# Patient Record
Sex: MALE | Race: WHITE | NOT HISPANIC OR LATINO | Employment: FULL TIME | URBAN - METROPOLITAN AREA
[De-identification: names, ages, dates, MRNs, and addresses within clinical notes are randomized per-mention and may not be internally consistent; named-entity substitution may affect disease eponyms.]

---

## 2017-08-28 RX ORDER — SILDENAFIL CITRATE 100 MG/1
TABLET, FILM COATED ORAL
Qty: 10 TABLET | Refills: 12 | Status: SHIPPED | OUTPATIENT
Start: 2017-08-28 | End: 2018-10-03 | Stop reason: SDUPTHER

## 2017-09-09 RX ORDER — PANTOPRAZOLE SODIUM 40 MG/1
40 TABLET, DELAYED RELEASE ORAL DAILY
Qty: 90 TABLET | Refills: 1 | Status: SHIPPED | OUTPATIENT
Start: 2017-09-09 | End: 2018-03-09 | Stop reason: SDUPTHER

## 2017-09-09 RX ORDER — ATENOLOL 25 MG/1
25 TABLET ORAL DAILY
Qty: 90 TABLET | Refills: 1 | Status: SHIPPED | OUTPATIENT
Start: 2017-09-09 | End: 2018-03-09 | Stop reason: SDUPTHER

## 2017-09-09 RX ORDER — ENALAPRIL MALEATE 20 MG/1
20 TABLET ORAL DAILY
Qty: 90 TABLET | Refills: 1 | Status: SHIPPED | OUTPATIENT
Start: 2017-09-09 | End: 2018-03-09 | Stop reason: SDUPTHER

## 2017-09-13 ENCOUNTER — PATIENT MESSAGE (OUTPATIENT)
Dept: FAMILY MEDICINE | Facility: CLINIC | Age: 57
End: 2017-09-13

## 2017-11-14 ENCOUNTER — OFFICE VISIT (OUTPATIENT)
Dept: FAMILY MEDICINE | Facility: CLINIC | Age: 57
End: 2017-11-14
Payer: COMMERCIAL

## 2017-11-14 VITALS
HEIGHT: 67 IN | TEMPERATURE: 98 F | BODY MASS INDEX: 21.73 KG/M2 | HEART RATE: 72 BPM | WEIGHT: 138.44 LBS | SYSTOLIC BLOOD PRESSURE: 125 MMHG | DIASTOLIC BLOOD PRESSURE: 76 MMHG

## 2017-11-14 DIAGNOSIS — M65.311 TRIGGER FINGER OF RIGHT THUMB: Primary | ICD-10-CM

## 2017-11-14 PROCEDURE — 99999 PR PBB SHADOW E&M-EST. PATIENT-LVL III: CPT | Mod: PBBFAC,,, | Performed by: FAMILY MEDICINE

## 2017-11-14 PROCEDURE — 99213 OFFICE O/P EST LOW 20 MIN: CPT | Mod: S$GLB,,, | Performed by: FAMILY MEDICINE

## 2017-11-14 RX ORDER — DICLOFENAC SODIUM 75 MG/1
75 TABLET, DELAYED RELEASE ORAL 2 TIMES DAILY
Qty: 60 TABLET | Refills: 2 | Status: SHIPPED | OUTPATIENT
Start: 2017-11-14 | End: 2018-05-16 | Stop reason: SDUPTHER

## 2017-11-14 RX ORDER — NAPROXEN SODIUM 220 MG
220 TABLET ORAL
COMMUNITY
End: 2018-04-16

## 2017-11-14 NOTE — PROGRESS NOTES
The patient presents with tender painfulrt thumb x3 weeks triggering  ROS:  General: No fever or sig wt change  HEENT:No other PND eye pain or dc  Respiratory: No cough wheezing  PE: vital signs noted  HEENT: Normocephalic,with no recent trauma,PERRLA,EOMI,conjunctiva injected with no exudate.Nasopharynx is injected and edematous.External otic canal edematous and injected TM dull  Neck:Supple without adenopathy  Chest:Clear bilateral breath sounds    Heart:Regular rhthym without murmer  Abdomen:Soft, non tender,no masses, no hepatosplenomegaly  Extremeties Tender ruiz dip thumb  Neurologic:Grossly within normal limits     Impression:Trigger thumb  Plan:    Diclofenac -if fails to improve Ortho consult

## 2018-03-09 ENCOUNTER — TELEPHONE (OUTPATIENT)
Dept: FAMILY MEDICINE | Facility: CLINIC | Age: 58
End: 2018-03-09

## 2018-03-09 RX ORDER — PANTOPRAZOLE SODIUM 40 MG/1
40 TABLET, DELAYED RELEASE ORAL DAILY
Qty: 90 TABLET | Refills: 4 | Status: SHIPPED | OUTPATIENT
Start: 2018-03-09 | End: 2019-06-01 | Stop reason: SDUPTHER

## 2018-03-09 RX ORDER — ENALAPRIL MALEATE 20 MG/1
20 TABLET ORAL DAILY
Qty: 90 TABLET | Refills: 4 | Status: SHIPPED | OUTPATIENT
Start: 2018-03-09 | End: 2019-06-01 | Stop reason: SDUPTHER

## 2018-03-09 RX ORDER — ATENOLOL 25 MG/1
25 TABLET ORAL DAILY
Qty: 90 TABLET | Refills: 4 | Status: SHIPPED | OUTPATIENT
Start: 2018-03-09 | End: 2019-06-01 | Stop reason: SDUPTHER

## 2018-03-09 NOTE — TELEPHONE ENCOUNTER
----- Message from Darin Marino MD sent at 3/9/2018  6:43 AM CST -----  rf sent-time for lab then terrell w me

## 2018-03-12 ENCOUNTER — TELEPHONE (OUTPATIENT)
Dept: FAMILY MEDICINE | Facility: CLINIC | Age: 58
End: 2018-03-12

## 2018-03-12 DIAGNOSIS — Z00.00 ROUTINE ADULT HEALTH MAINTENANCE: Primary | ICD-10-CM

## 2018-04-02 ENCOUNTER — PATIENT OUTREACH (OUTPATIENT)
Dept: ADMINISTRATIVE | Facility: HOSPITAL | Age: 58
End: 2018-04-02

## 2018-04-13 ENCOUNTER — LAB VISIT (OUTPATIENT)
Dept: LAB | Facility: HOSPITAL | Age: 58
End: 2018-04-13
Attending: FAMILY MEDICINE
Payer: COMMERCIAL

## 2018-04-13 ENCOUNTER — TELEPHONE (OUTPATIENT)
Dept: FAMILY MEDICINE | Facility: CLINIC | Age: 58
End: 2018-04-13

## 2018-04-13 DIAGNOSIS — Z00.00 ROUTINE ADULT HEALTH MAINTENANCE: ICD-10-CM

## 2018-04-13 LAB
ALBUMIN SERPL BCP-MCNC: 4.1 G/DL
ALP SERPL-CCNC: 74 U/L
ALT SERPL W/O P-5'-P-CCNC: 16 U/L
ANION GAP SERPL CALC-SCNC: 9 MMOL/L
AST SERPL-CCNC: 20 U/L
BASOPHILS # BLD AUTO: 0.04 K/UL
BASOPHILS NFR BLD: 0.5 %
BILIRUB SERPL-MCNC: 0.7 MG/DL
BUN SERPL-MCNC: 5 MG/DL
CALCIUM SERPL-MCNC: 9.6 MG/DL
CHLORIDE SERPL-SCNC: 101 MMOL/L
CHOLEST SERPL-MCNC: 124 MG/DL
CHOLEST/HDLC SERPL: 3.8 {RATIO}
CO2 SERPL-SCNC: 27 MMOL/L
CREAT SERPL-MCNC: 0.8 MG/DL
DIFFERENTIAL METHOD: ABNORMAL
EOSINOPHIL # BLD AUTO: 0.1 K/UL
EOSINOPHIL NFR BLD: 0.8 %
ERYTHROCYTE [DISTWIDTH] IN BLOOD BY AUTOMATED COUNT: 13.2 %
EST. GFR  (AFRICAN AMERICAN): >60 ML/MIN/1.73 M^2
EST. GFR  (NON AFRICAN AMERICAN): >60 ML/MIN/1.73 M^2
GLUCOSE SERPL-MCNC: 95 MG/DL
HCT VFR BLD AUTO: 59.3 %
HDLC SERPL-MCNC: 33 MG/DL
HDLC SERPL: 26.6 %
HGB BLD-MCNC: 20.3 G/DL
IMM GRANULOCYTES # BLD AUTO: 0.02 K/UL
IMM GRANULOCYTES NFR BLD AUTO: 0.3 %
LDLC SERPL CALC-MCNC: 70.4 MG/DL
LYMPHOCYTES # BLD AUTO: 2 K/UL
LYMPHOCYTES NFR BLD: 25.7 %
MCH RBC QN AUTO: 34 PG
MCHC RBC AUTO-ENTMCNC: 34.2 G/DL
MCV RBC AUTO: 99 FL
MONOCYTES # BLD AUTO: 0.7 K/UL
MONOCYTES NFR BLD: 8.6 %
NEUTROPHILS # BLD AUTO: 5 K/UL
NEUTROPHILS NFR BLD: 64.1 %
NONHDLC SERPL-MCNC: 91 MG/DL
NRBC BLD-RTO: 0 /100 WBC
PLATELET # BLD AUTO: 130 K/UL
PMV BLD AUTO: 11.2 FL
POTASSIUM SERPL-SCNC: 4.9 MMOL/L
PROT SERPL-MCNC: 7 G/DL
RBC # BLD AUTO: 5.97 M/UL
SODIUM SERPL-SCNC: 137 MMOL/L
TRIGL SERPL-MCNC: 103 MG/DL
TSH SERPL DL<=0.005 MIU/L-ACNC: 0.93 UIU/ML
WBC # BLD AUTO: 7.79 K/UL

## 2018-04-13 PROCEDURE — 80061 LIPID PANEL: CPT

## 2018-04-13 PROCEDURE — 80053 COMPREHEN METABOLIC PANEL: CPT

## 2018-04-13 PROCEDURE — 84443 ASSAY THYROID STIM HORMONE: CPT

## 2018-04-13 PROCEDURE — 36415 COLL VENOUS BLD VENIPUNCTURE: CPT | Mod: PO

## 2018-04-13 PROCEDURE — 85025 COMPLETE CBC W/AUTO DIFF WBC: CPT

## 2018-04-13 NOTE — TELEPHONE ENCOUNTER
Received call from RUSSELL Light at 1555 from [facility] with a critical lab on patient MRN [id number], with Hemoglobin 20.3.  Dr Marino was informed by phone at 0632.

## 2018-04-16 ENCOUNTER — PATIENT MESSAGE (OUTPATIENT)
Dept: FAMILY MEDICINE | Facility: CLINIC | Age: 58
End: 2018-04-16

## 2018-04-16 ENCOUNTER — OFFICE VISIT (OUTPATIENT)
Dept: FAMILY MEDICINE | Facility: CLINIC | Age: 58
End: 2018-04-16
Payer: COMMERCIAL

## 2018-04-16 VITALS
SYSTOLIC BLOOD PRESSURE: 117 MMHG | HEIGHT: 67 IN | DIASTOLIC BLOOD PRESSURE: 71 MMHG | TEMPERATURE: 99 F | HEART RATE: 73 BPM | WEIGHT: 141.56 LBS | BODY MASS INDEX: 22.22 KG/M2

## 2018-04-16 DIAGNOSIS — D75.1 POLYCYTHEMIA: ICD-10-CM

## 2018-04-16 DIAGNOSIS — Z00.00 ROUTINE CHECK-UP: Primary | ICD-10-CM

## 2018-04-16 DIAGNOSIS — K21.9 GASTROESOPHAGEAL REFLUX DISEASE, ESOPHAGITIS PRESENCE NOT SPECIFIED: ICD-10-CM

## 2018-04-16 DIAGNOSIS — Z12.11 SCREEN FOR COLON CANCER: ICD-10-CM

## 2018-04-16 PROCEDURE — 3074F SYST BP LT 130 MM HG: CPT | Mod: CPTII,S$GLB,, | Performed by: FAMILY MEDICINE

## 2018-04-16 PROCEDURE — 99396 PREV VISIT EST AGE 40-64: CPT | Mod: S$GLB,,, | Performed by: FAMILY MEDICINE

## 2018-04-16 PROCEDURE — 99999 PR PBB SHADOW E&M-EST. PATIENT-LVL III: CPT | Mod: PBBFAC,,, | Performed by: FAMILY MEDICINE

## 2018-04-16 PROCEDURE — 3078F DIAST BP <80 MM HG: CPT | Mod: CPTII,S$GLB,, | Performed by: FAMILY MEDICINE

## 2018-04-16 RX ORDER — IBUPROFEN 200 MG
1 TABLET ORAL DAILY
Qty: 28 PATCH | Refills: 1 | Status: SHIPPED | OUTPATIENT
Start: 2018-04-16 | End: 2019-07-30

## 2018-04-16 NOTE — PROGRESS NOTES
The patient presents today for general health evaluation and counseling    Also has ongoing dydpepsia despite PPI   Past Medical History:  Past Medical History:   Diagnosis Date    Anxiety     Depression     Fatigue     History of psychiatric care     Hypertension     Psychiatric problem      Past Surgical History:   Procedure Laterality Date    VASECTOMY       Review of patient's allergies indicates:  No Known Allergies  Current Outpatient Prescriptions on File Prior to Visit   Medication Sig Dispense Refill    atenolol (TENORMIN) 25 MG tablet TAKE 1 TABLET (25 MG TOTAL) BY MOUTH ONCE DAILY. 90 tablet 4    diclofenac (VOLTAREN) 75 MG EC tablet Take 1 tablet (75 mg total) by mouth 2 (two) times daily. (Patient taking differently: Take 75 mg by mouth 2 (two) times daily as needed. ) 60 tablet 2    enalapril (VASOTEC) 20 MG tablet TAKE 1 TABLET (20 MG TOTAL) BY MOUTH ONCE DAILY. 90 tablet 4    pantoprazole (PROTONIX) 40 MG tablet TAKE 1 TABLET (40 MG TOTAL) BY MOUTH ONCE DAILY. 90 tablet 4    VIAGRA 100 mg tablet TAKE 1 TABLET DAILY AS DIRECTED 10 tablet 12    [DISCONTINUED] naproxen sodium (ANAPROX) 220 MG tablet Take 220 mg by mouth every 12 (twelve) hours.      [DISCONTINUED] paroxetine (PAXIL) 20 MG tablet Take 1 tablet (20 mg total) by mouth every morning. 30 tablet 11    [DISCONTINUED] ranitidine (ZANTAC) 300 MG capsule Take 1 capsule (300 mg total) by mouth every evening. 30 capsule 1     No current facility-administered medications on file prior to visit.      Social History     Social History    Marital status:      Spouse name: N/A    Number of children: N/A    Years of education: N/A     Occupational History    unemployed Security Plan Insurance     Social History Main Topics    Smoking status: Current Every Day Smoker     Packs/day: 1.00     Types: Cigarettes    Smokeless tobacco: Not on file    Alcohol use 25.2 oz/week     42 Cans of beer per week    Drug use: No    Sexual  activity: Yes     Partners: Female     Other Topics Concern    Patient Feels They Ought To Cut Down On Drinking/Drug Use Yes    Patient Annoyed By Others Criticizing Their Drinking/Drug Use Yes    Patient Has Felt Bad Or Guilty About Drinking/Drug Use Yes    Patient Has Had A Drink/Used Drugs As An Eye Opener In The Am No     Social History Narrative    No narrative on file     Family History   Problem Relation Age of Onset    Suicide Neg Hx     Sexual abuse Neg Hx     Self injury Neg Hx     Seizures Neg Hx     Schizophrenia Neg Hx     Physical abuse Neg Hx     Paranoid behavior Neg Hx     OCD Neg Hx     Drug abuse Neg Hx     Depression Neg Hx     Dementia Neg Hx     Bipolar disorder Neg Hx     Anxiety disorder Neg Hx     Alcohol abuse Neg Hx          ROS:GENERAL: No fever, chills, fatigability or weight loss.  SKIN: No rashes, itching or changes in color or texture of skin.  HEAD: No headaches or recent head trauma.EYES: Visual acuity fine. No photophobia, ocular pain or diplopia.EARS: Denies ear pain, discharge or vertigo.NOSE: No loss of smell, no epistaxis or postnasal drip.MOUTH & THROAT: No hoarseness or change in voice. No excessive gum bleeding.NODES: Denies swollen glands.  CHEST: Denies NAVARRO, cyanosis, wheezing, cough and sputum production.  CARDIOVASCULAR: Denies chest pain, PND, orthopnea or reduced exercise tolerance.  ABDOMEN: Appetite fine. No weight loss. Denies diarrhea, abdominal pain, hematemesis or blood in stool.  URINARY: No flank pain, dysuria or hematuria.  PERIPHERAL VASCULAR: No claudication or cyanosis.  MUSCULOSKELETAL: See above.  NEUROLOGIC: No history of seizures, paralysis, alteration of gait or coordination.  PE:   HEAD: Normocephalic, atraumatic.EYES: PERRL. EOMI.   EARS: TM's intact. Light reflex normal. No retraction or perforation.   NOSE: Mucosa pink. Airway clear.MOUTH & THROAT: No tonsillar enlargement. No pharyngeal erythema or exudate. No stridor.  NODES:  No cervical, axillary or inguinal lymph node enlargement.  CHEST: Lungs clear to auscultation.  CARDIOVASCULAR: Normal S1, S2. No rubs, murmurs or gallops.  ABDOMEN: Bowel sounds normal. Not distended. Soft. No tenderness or masses.  MUSCULOSKELETAL: No palpable abnormality  NEUROLOGIC: Cranial Nerves: II-XII grossly intact.  Motor: 5/5 strength major flexors/extensors.  DTR's: Knees, Ankles 2+ and equal bilaterally; downgoing toes.  Sensory: Intact to light touch distally.  Gait & Posture: Normal gait and fine motion. No cerebellar signs.     Impression:Routine health check  GERD   Plan:Lab eval  Rec diet and ex recs  Rev age appropriate screenings  -needs first screening c scope   Rev smoking cessation   EGD

## 2018-04-17 ENCOUNTER — TELEPHONE (OUTPATIENT)
Dept: FAMILY MEDICINE | Facility: CLINIC | Age: 58
End: 2018-04-17

## 2018-04-17 DIAGNOSIS — Z86.010 PERSONAL HISTORY OF COLONIC POLYPS: Primary | ICD-10-CM

## 2018-04-17 DIAGNOSIS — Z12.11 SPECIAL SCREENING FOR MALIGNANT NEOPLASMS, COLON: ICD-10-CM

## 2018-04-17 DIAGNOSIS — K21.9 GASTROESOPHAGEAL REFLUX DISEASE, ESOPHAGITIS PRESENCE NOT SPECIFIED: Primary | ICD-10-CM

## 2018-04-17 RX ORDER — SCOLOPAMINE TRANSDERMAL SYSTEM 1 MG/1
1 PATCH, EXTENDED RELEASE TRANSDERMAL
Qty: 4 PATCH | Refills: 1 | Status: SHIPPED | OUTPATIENT
Start: 2018-04-17 | End: 2019-07-30

## 2018-04-17 NOTE — TELEPHONE ENCOUNTER
----- Message from Citlaly Shah sent at 4/17/2018 11:34 AM CDT -----  Pt is requesting a call from nurse to discuss some orders needed at the labs. Please faxt to [phone] ( [facility] ) ( pt is waiting in office )           Please call pt back at [phone]

## 2018-04-17 NOTE — TELEPHONE ENCOUNTER
Rx sent-we need to check an HH when he gets back to see how much the 1 units phlebotomy helped then we can see how soon to do another

## 2018-04-20 ENCOUNTER — DOCUMENTATION ONLY (OUTPATIENT)
Dept: ENDOSCOPY | Facility: HOSPITAL | Age: 58
End: 2018-04-20

## 2018-05-16 RX ORDER — DICLOFENAC SODIUM 75 MG/1
75 TABLET, DELAYED RELEASE ORAL 2 TIMES DAILY PRN
Qty: 60 TABLET | Refills: 11 | Status: SHIPPED | OUTPATIENT
Start: 2018-05-16 | End: 2019-05-22 | Stop reason: SDUPTHER

## 2018-06-24 ENCOUNTER — PATIENT MESSAGE (OUTPATIENT)
Dept: FAMILY MEDICINE | Facility: CLINIC | Age: 58
End: 2018-06-24

## 2018-06-26 NOTE — TELEPHONE ENCOUNTER
Should H and H be rechecked? Does it need to be done at the blood center again or can he do them here?

## 2018-07-02 ENCOUNTER — PATIENT MESSAGE (OUTPATIENT)
Dept: FAMILY MEDICINE | Facility: CLINIC | Age: 58
End: 2018-07-02

## 2018-07-03 ENCOUNTER — PATIENT MESSAGE (OUTPATIENT)
Dept: FAMILY MEDICINE | Facility: CLINIC | Age: 58
End: 2018-07-03

## 2018-07-17 ENCOUNTER — PATIENT MESSAGE (OUTPATIENT)
Dept: FAMILY MEDICINE | Facility: CLINIC | Age: 58
End: 2018-07-17

## 2018-07-17 ENCOUNTER — DOCUMENTATION ONLY (OUTPATIENT)
Dept: ENDOSCOPY | Facility: HOSPITAL | Age: 58
End: 2018-07-17

## 2018-07-17 NOTE — PROGRESS NOTES
7/17/2018 pt called to schedule and states that he wants procedure done in [place].  Instructed he will need to contact his PCP to discuss options.  Verbalized understanding.

## 2018-10-03 RX ORDER — SILDENAFIL 100 MG/1
TABLET, FILM COATED ORAL
Qty: 10 TABLET | Refills: 12 | Status: SHIPPED | OUTPATIENT
Start: 2018-10-03 | End: 2020-01-21 | Stop reason: SDUPTHER

## 2018-12-05 ENCOUNTER — PATIENT OUTREACH (OUTPATIENT)
Dept: ADMINISTRATIVE | Facility: HOSPITAL | Age: 58
End: 2018-12-05

## 2019-05-22 RX ORDER — DICLOFENAC SODIUM 75 MG/1
75 TABLET, DELAYED RELEASE ORAL 2 TIMES DAILY PRN
Qty: 60 TABLET | Refills: 8 | Status: SHIPPED | OUTPATIENT
Start: 2019-05-22 | End: 2020-08-24

## 2019-05-23 ENCOUNTER — PATIENT OUTREACH (OUTPATIENT)
Dept: ADMINISTRATIVE | Facility: HOSPITAL | Age: 59
End: 2019-05-23

## 2019-06-02 RX ORDER — ATENOLOL 25 MG/1
25 TABLET ORAL DAILY
Qty: 90 TABLET | Refills: 1 | Status: SHIPPED | OUTPATIENT
Start: 2019-06-02 | End: 2019-11-23 | Stop reason: SDUPTHER

## 2019-06-02 RX ORDER — ENALAPRIL MALEATE 20 MG/1
20 TABLET ORAL DAILY
Qty: 90 TABLET | Refills: 1 | Status: SHIPPED | OUTPATIENT
Start: 2019-06-02 | End: 2019-11-23 | Stop reason: SDUPTHER

## 2019-06-02 RX ORDER — PANTOPRAZOLE SODIUM 40 MG/1
40 TABLET, DELAYED RELEASE ORAL DAILY
Qty: 90 TABLET | Refills: 1 | Status: SHIPPED | OUTPATIENT
Start: 2019-06-02 | End: 2019-11-23 | Stop reason: SDUPTHER

## 2019-06-02 NOTE — TELEPHONE ENCOUNTER
I am refilling a requested medication x 1 for the patient and reviewed the chart.  The patient is due for a physical.  Please book the patient for lab and OV with Dr. SINGH  2-6 weeks

## 2019-07-16 ENCOUNTER — PATIENT OUTREACH (OUTPATIENT)
Dept: ADMINISTRATIVE | Facility: HOSPITAL | Age: 59
End: 2019-07-16

## 2019-07-30 ENCOUNTER — OFFICE VISIT (OUTPATIENT)
Dept: FAMILY MEDICINE | Facility: CLINIC | Age: 59
End: 2019-07-30
Payer: COMMERCIAL

## 2019-07-30 VITALS
HEIGHT: 67 IN | HEART RATE: 67 BPM | SYSTOLIC BLOOD PRESSURE: 114 MMHG | DIASTOLIC BLOOD PRESSURE: 67 MMHG | BODY MASS INDEX: 21.22 KG/M2 | WEIGHT: 135.19 LBS | TEMPERATURE: 99 F

## 2019-07-30 DIAGNOSIS — Z00.00 ROUTINE CHECK-UP: Primary | ICD-10-CM

## 2019-07-30 PROCEDURE — 3078F PR MOST RECENT DIASTOLIC BLOOD PRESSURE < 80 MM HG: ICD-10-PCS | Mod: CPTII,S$GLB,, | Performed by: FAMILY MEDICINE

## 2019-07-30 PROCEDURE — 99396 PR PREVENTIVE VISIT,EST,40-64: ICD-10-PCS | Mod: S$GLB,,, | Performed by: FAMILY MEDICINE

## 2019-07-30 PROCEDURE — 3078F DIAST BP <80 MM HG: CPT | Mod: CPTII,S$GLB,, | Performed by: FAMILY MEDICINE

## 2019-07-30 PROCEDURE — 99999 PR PBB SHADOW E&M-EST. PATIENT-LVL III: ICD-10-PCS | Mod: PBBFAC,,, | Performed by: FAMILY MEDICINE

## 2019-07-30 PROCEDURE — 99396 PREV VISIT EST AGE 40-64: CPT | Mod: S$GLB,,, | Performed by: FAMILY MEDICINE

## 2019-07-30 PROCEDURE — 3074F PR MOST RECENT SYSTOLIC BLOOD PRESSURE < 130 MM HG: ICD-10-PCS | Mod: CPTII,S$GLB,, | Performed by: FAMILY MEDICINE

## 2019-07-30 PROCEDURE — 3074F SYST BP LT 130 MM HG: CPT | Mod: CPTII,S$GLB,, | Performed by: FAMILY MEDICINE

## 2019-07-30 PROCEDURE — 99999 PR PBB SHADOW E&M-EST. PATIENT-LVL III: CPT | Mod: PBBFAC,,, | Performed by: FAMILY MEDICINE

## 2019-07-30 RX ORDER — MELOXICAM 15 MG/1
15 TABLET ORAL DAILY
Qty: 90 TABLET | Refills: 3 | Status: SHIPPED | OUTPATIENT
Start: 2019-07-30 | End: 2020-08-24 | Stop reason: SDUPTHER

## 2019-07-30 RX ORDER — GABAPENTIN 300 MG/1
600 CAPSULE ORAL NIGHTLY
Qty: 180 CAPSULE | Refills: 3 | Status: SHIPPED | OUTPATIENT
Start: 2019-07-30 | End: 2019-09-17

## 2019-07-30 NOTE — PROGRESS NOTES
The patient presents today for general health evaluation and counseling      Past Medical History:  Past Medical History:   Diagnosis Date    Anxiety     Depression     Fatigue     History of psychiatric care     Hypertension     Psychiatric problem      Past Surgical History:   Procedure Laterality Date    VASECTOMY       Review of patient's allergies indicates:  No Known Allergies  Current Outpatient Medications on File Prior to Visit   Medication Sig Dispense Refill    atenolol (TENORMIN) 25 MG tablet TAKE 1 TABLET (25 MG TOTAL) BY MOUTH ONCE DAILY. 90 tablet 1    diclofenac (VOLTAREN) 75 MG EC tablet TAKE 1 TABLET (75 MG TOTAL) BY MOUTH 2 (TWO) TIMES DAILY AS NEEDED. 60 tablet 8    enalapril (VASOTEC) 20 MG tablet TAKE 1 TABLET (20 MG TOTAL) BY MOUTH ONCE DAILY. 90 tablet 1    nicotine (NICODERM CQ) 21 mg/24 hr Place 1 patch onto the skin once daily. 28 patch 1    pantoprazole (PROTONIX) 40 MG tablet TAKE 1 TABLET (40 MG TOTAL) BY MOUTH ONCE DAILY. 90 tablet 1    scopolamine (TRANSDERM-SCOP) 1.3-1.5 mg (1 mg over 3 days) Place 1 patch onto the skin every 72 hours. 4 patch 1    sildenafil (VIAGRA) 100 MG tablet TAKE 1 TABLET DAILY AS DIRECTED 10 tablet 12     No current facility-administered medications on file prior to visit.      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   Social Needs    Financial resource strain: Not on file    Food insecurity:     Worry: Not on file     Inability: Not on file    Transportation needs:     Medical: Not on file     Non-medical: Not on file   Tobacco Use    Smoking status: Current Every Day Smoker     Packs/day: 1.00     Types: Cigarettes   Substance and Sexual Activity    Alcohol use: Yes     Alcohol/week:  25.2 oz     Types: 42 Cans of beer per week    Drug use: No    Sexual activity: Yes     Partners: Female   Lifestyle    Physical activity:     Days per week: Not on file     Minutes per session: Not on file    Stress: Not on file   Relationships    Social connections:     Talks on phone: Not on file     Gets together: Not on file     Attends [employer] service: Not on file     Active member of club or organization: Not on file     Attends meetings of clubs or organizations: Not on file     Relationship status: Not on file   Other Topics Concern    Patient feels they ought to cut down on drinking/drug use Yes    Patient annoyed by others criticizing their drinking/drug use Yes    Patient has felt bad or guilty about drinking/drug use Yes    Patient has had a drink/used drugs as an eye opener in the AM No   Social History Narrative    Not on file     Family History   Problem Relation Age of Onset    Suicide Neg Hx     Sexual abuse Neg Hx     Self injury Neg Hx     Seizures Neg Hx     Schizophrenia Neg Hx     Physical abuse Neg Hx     Paranoid behavior Neg Hx     OCD Neg Hx     Drug abuse Neg Hx     Depression Neg Hx     Dementia Neg Hx     Bipolar disorder Neg Hx     Anxiety disorder Neg Hx     Alcohol abuse Neg Hx          ROS:GENERAL: No fever, chills, fatigability or weight loss.  SKIN: No rashes, itching or changes in color or texture of skin.  HEAD: No headaches or recent head trauma.EYES: Visual acuity fine. No photophobia, ocular pain or diplopia.EARS: Denies ear pain, discharge or vertigo.NOSE: No loss of smell, no epistaxis or postnasal drip.MOUTH & THROAT: No hoarseness or change in voice. No excessive gum bleeding.NODES: Denies swollen glands.  CHEST: Denies NAVARRO, cyanosis, wheezing, cough and sputum production.  CARDIOVASCULAR: Denies chest pain, PND, orthopnea or reduced exercise tolerance.  ABDOMEN: Appetite fine. No weight loss. Denies diarrhea, abdominal pain, hematemesis or blood  in stool.  URINARY: No flank pain, dysuria or hematuria.  PERIPHERAL VASCULAR: No claudication or cyanosis.  MUSCULOSKELETAL: See above.  NEUROLOGIC: No history of seizures, paralysis, alteration of gait or coordination.  PE:    HEAD: Normocephalic, atraumatic.EYES: PERRL. EOMI.   EARS: TM's intact. Light reflex normal. No retraction or perforation.   NOSE: Mucosa pink. Airway clear.MOUTH & THROAT: No tonsillar enlargement. No pharyngeal erythema or exudate. No stridor.  NODES: No cervical, axillary or inguinal lymph node enlargement.  CHEST: Lungs clear to auscultation.  CARDIOVASCULAR: Normal S1, S2. No rubs, murmurs or gallops.  ABDOMEN: Bowel sounds normal. Not distended. Soft. No tenderness or masses.  MUSCULOSKELETAL: No palpable abnormality  NEUROLOGIC: Cranial Nerves: II-XII grossly intact.  Motor: 5/5 strength major flexors/extensors.  DTR's: Knees, Ankles 2+ and equal bilaterally; downgoing toes.  Sensory: Intact to light touch distally.  Gait & Posture: Normal gait and fine motion. No cerebellar signs.     Impression:Routine health check  Plan:Lab eval  Rec diet and ex recs  Rev age appropriate screenings  -had c scope Dr Sung 7/25/19-polyps   Health Maintenance Due   Topic Date Due    TETANUS VACCINE  01/22/1978    Pneumococcal Vaccine (Medium Risk) (1 of 1 - PPSV23) 01/22/1979    Colonoscopy  01/22/2010

## 2019-08-08 ENCOUNTER — LAB VISIT (OUTPATIENT)
Dept: LAB | Facility: HOSPITAL | Age: 59
End: 2019-08-08
Attending: FAMILY MEDICINE
Payer: COMMERCIAL

## 2019-08-08 DIAGNOSIS — Z00.00 ROUTINE CHECK-UP: ICD-10-CM

## 2019-08-08 LAB
ALBUMIN SERPL BCP-MCNC: 3.9 G/DL (ref 3.5–5.2)
ALP SERPL-CCNC: 78 U/L (ref 55–135)
ALT SERPL W/O P-5'-P-CCNC: 16 U/L (ref 10–44)
ANION GAP SERPL CALC-SCNC: 8 MMOL/L (ref 8–16)
AST SERPL-CCNC: 23 U/L (ref 10–40)
BASOPHILS # BLD AUTO: 0.02 K/UL (ref 0–0.2)
BASOPHILS NFR BLD: 0.2 % (ref 0–1.9)
BILIRUB SERPL-MCNC: 0.8 MG/DL (ref 0.1–1)
BUN SERPL-MCNC: 6 MG/DL (ref 6–20)
CALCIUM SERPL-MCNC: 9.6 MG/DL (ref 8.7–10.5)
CHLORIDE SERPL-SCNC: 99 MMOL/L (ref 95–110)
CHOLEST SERPL-MCNC: 117 MG/DL (ref 120–199)
CHOLEST/HDLC SERPL: 3.9 {RATIO} (ref 2–5)
CO2 SERPL-SCNC: 26 MMOL/L (ref 23–29)
CREAT SERPL-MCNC: 0.9 MG/DL (ref 0.5–1.4)
DIFFERENTIAL METHOD: ABNORMAL
EOSINOPHIL # BLD AUTO: 0.1 K/UL (ref 0–0.5)
EOSINOPHIL NFR BLD: 0.6 % (ref 0–8)
ERYTHROCYTE [DISTWIDTH] IN BLOOD BY AUTOMATED COUNT: 14.9 % (ref 11.5–14.5)
EST. GFR  (AFRICAN AMERICAN): >60 ML/MIN/1.73 M^2
EST. GFR  (NON AFRICAN AMERICAN): >60 ML/MIN/1.73 M^2
GLUCOSE SERPL-MCNC: 90 MG/DL (ref 70–110)
HCT VFR BLD AUTO: 53.1 % (ref 40–54)
HDLC SERPL-MCNC: 30 MG/DL (ref 40–75)
HDLC SERPL: 25.6 % (ref 20–50)
HGB BLD-MCNC: 17.5 G/DL (ref 14–18)
IMM GRANULOCYTES # BLD AUTO: 0.01 K/UL (ref 0–0.04)
IMM GRANULOCYTES NFR BLD AUTO: 0.1 % (ref 0–0.5)
LDLC SERPL CALC-MCNC: 69.2 MG/DL (ref 63–159)
LYMPHOCYTES # BLD AUTO: 1.6 K/UL (ref 1–4.8)
LYMPHOCYTES NFR BLD: 19.6 % (ref 18–48)
MCH RBC QN AUTO: 31.5 PG (ref 27–31)
MCHC RBC AUTO-ENTMCNC: 33 G/DL (ref 32–36)
MCV RBC AUTO: 96 FL (ref 82–98)
MONOCYTES # BLD AUTO: 0.6 K/UL (ref 0.3–1)
MONOCYTES NFR BLD: 6.7 % (ref 4–15)
NEUTROPHILS # BLD AUTO: 6 K/UL (ref 1.8–7.7)
NEUTROPHILS NFR BLD: 72.8 % (ref 38–73)
NONHDLC SERPL-MCNC: 87 MG/DL
NRBC BLD-RTO: 0 /100 WBC
PLATELET # BLD AUTO: 154 K/UL (ref 150–350)
PMV BLD AUTO: 11.1 FL (ref 9.2–12.9)
POTASSIUM SERPL-SCNC: 5.2 MMOL/L (ref 3.5–5.1)
PROT SERPL-MCNC: 6.8 G/DL (ref 6–8.4)
RBC # BLD AUTO: 5.56 M/UL (ref 4.6–6.2)
SODIUM SERPL-SCNC: 133 MMOL/L (ref 136–145)
TRIGL SERPL-MCNC: 89 MG/DL (ref 30–150)
WBC # BLD AUTO: 8.26 K/UL (ref 3.9–12.7)

## 2019-08-08 PROCEDURE — 80053 COMPREHEN METABOLIC PANEL: CPT

## 2019-08-08 PROCEDURE — 36415 COLL VENOUS BLD VENIPUNCTURE: CPT | Mod: PO

## 2019-08-08 PROCEDURE — 85025 COMPLETE CBC W/AUTO DIFF WBC: CPT

## 2019-08-08 PROCEDURE — 80061 LIPID PANEL: CPT

## 2019-08-11 DIAGNOSIS — D75.1 POLYCYTHEMIA: Primary | ICD-10-CM

## 2019-08-12 ENCOUNTER — TELEPHONE (OUTPATIENT)
Dept: FAMILY MEDICINE | Facility: CLINIC | Age: 59
End: 2019-08-12

## 2019-08-12 ENCOUNTER — PATIENT MESSAGE (OUTPATIENT)
Dept: FAMILY MEDICINE | Facility: CLINIC | Age: 59
End: 2019-08-12

## 2019-08-12 NOTE — TELEPHONE ENCOUNTER
----- Message from Darin Marino MD sent at 8/11/2019  9:28 AM CDT -----  K just above nl-prob not sig Sugar kidney liver cholesterol tests good CBC normal-alfie in 4m

## 2019-08-12 NOTE — TELEPHONE ENCOUNTER
If notgetting any help from meloxicam, ok to stop it and try just gabapentin-but if getting any help from it,ok to take both

## 2019-09-16 ENCOUNTER — PATIENT MESSAGE (OUTPATIENT)
Dept: FAMILY MEDICINE | Facility: CLINIC | Age: 59
End: 2019-09-16

## 2019-09-16 DIAGNOSIS — M54.16 LUMBAR RADICULOPATHY: Primary | ICD-10-CM

## 2019-09-16 NOTE — TELEPHONE ENCOUNTER
Since that visit wa a routine physical-I didn't make a note of his problem-was it neck or low back? Consider PT

## 2019-09-17 RX ORDER — GABAPENTIN 300 MG/1
900 CAPSULE ORAL 2 TIMES DAILY
Qty: 180 CAPSULE | Refills: 11 | Status: SHIPPED | OUTPATIENT
Start: 2019-09-17 | End: 2020-08-24

## 2019-09-17 NOTE — TELEPHONE ENCOUNTER
OK-rec incr gabapentin -if it doesn't make him sleepy incr it to 3 x300 am and bedtime . PT might help ok to try if he wishes but if not better soon rec PMR consult. I'll order today since I won't be able to respond next week

## 2019-11-23 RX ORDER — PANTOPRAZOLE SODIUM 40 MG/1
40 TABLET, DELAYED RELEASE ORAL DAILY
Qty: 90 TABLET | Refills: 1 | Status: SHIPPED | OUTPATIENT
Start: 2019-11-23 | End: 2020-05-16

## 2019-11-23 RX ORDER — ATENOLOL 25 MG/1
25 TABLET ORAL DAILY
Qty: 90 TABLET | Refills: 1 | Status: SHIPPED | OUTPATIENT
Start: 2019-11-23 | End: 2020-05-16

## 2019-11-23 RX ORDER — ENALAPRIL MALEATE 20 MG/1
20 TABLET ORAL DAILY
Qty: 90 TABLET | Refills: 1 | Status: SHIPPED | OUTPATIENT
Start: 2019-11-23 | End: 2020-05-16

## 2019-11-27 ENCOUNTER — PATIENT MESSAGE (OUTPATIENT)
Dept: FAMILY MEDICINE | Facility: CLINIC | Age: 59
End: 2019-11-27

## 2019-12-12 ENCOUNTER — LAB VISIT (OUTPATIENT)
Dept: LAB | Facility: HOSPITAL | Age: 59
End: 2019-12-12
Attending: FAMILY MEDICINE
Payer: COMMERCIAL

## 2019-12-12 DIAGNOSIS — Z00.00 ROUTINE ADULT HEALTH MAINTENANCE: ICD-10-CM

## 2019-12-12 DIAGNOSIS — D75.1 POLYCYTHEMIA: ICD-10-CM

## 2019-12-12 LAB
ALBUMIN SERPL BCP-MCNC: 4.1 G/DL (ref 3.5–5.2)
ALP SERPL-CCNC: 79 U/L (ref 55–135)
ALT SERPL W/O P-5'-P-CCNC: 16 U/L (ref 10–44)
ANION GAP SERPL CALC-SCNC: 8 MMOL/L (ref 8–16)
AST SERPL-CCNC: 22 U/L (ref 10–40)
BASOPHILS # BLD AUTO: 0.03 K/UL (ref 0–0.2)
BASOPHILS NFR BLD: 0.4 % (ref 0–1.9)
BILIRUB SERPL-MCNC: 0.8 MG/DL (ref 0.1–1)
BUN SERPL-MCNC: 6 MG/DL (ref 6–20)
CALCIUM SERPL-MCNC: 9.1 MG/DL (ref 8.7–10.5)
CHLORIDE SERPL-SCNC: 98 MMOL/L (ref 95–110)
CHOLEST SERPL-MCNC: 143 MG/DL (ref 120–199)
CHOLEST/HDLC SERPL: 4.6 {RATIO} (ref 2–5)
CO2 SERPL-SCNC: 26 MMOL/L (ref 23–29)
CREAT SERPL-MCNC: 1 MG/DL (ref 0.5–1.4)
DIFFERENTIAL METHOD: ABNORMAL
EOSINOPHIL # BLD AUTO: 0.1 K/UL (ref 0–0.5)
EOSINOPHIL NFR BLD: 0.8 % (ref 0–8)
ERYTHROCYTE [DISTWIDTH] IN BLOOD BY AUTOMATED COUNT: 14.1 % (ref 11.5–14.5)
EST. GFR  (AFRICAN AMERICAN): >60 ML/MIN/1.73 M^2
EST. GFR  (NON AFRICAN AMERICAN): >60 ML/MIN/1.73 M^2
GLUCOSE SERPL-MCNC: 84 MG/DL (ref 70–110)
HCT VFR BLD AUTO: 54.5 % (ref 40–54)
HDLC SERPL-MCNC: 31 MG/DL (ref 40–75)
HDLC SERPL: 21.7 % (ref 20–50)
HGB BLD-MCNC: 17.9 G/DL (ref 14–18)
IMM GRANULOCYTES # BLD AUTO: 0.02 K/UL (ref 0–0.04)
IMM GRANULOCYTES NFR BLD AUTO: 0.3 % (ref 0–0.5)
LDLC SERPL CALC-MCNC: 76 MG/DL (ref 63–159)
LYMPHOCYTES # BLD AUTO: 1.5 K/UL (ref 1–4.8)
LYMPHOCYTES NFR BLD: 20.6 % (ref 18–48)
MCH RBC QN AUTO: 32.4 PG (ref 27–31)
MCHC RBC AUTO-ENTMCNC: 32.8 G/DL (ref 32–36)
MCV RBC AUTO: 99 FL (ref 82–98)
MONOCYTES # BLD AUTO: 0.5 K/UL (ref 0.3–1)
MONOCYTES NFR BLD: 7.4 % (ref 4–15)
NEUTROPHILS # BLD AUTO: 5.1 K/UL (ref 1.8–7.7)
NEUTROPHILS NFR BLD: 70.5 % (ref 38–73)
NONHDLC SERPL-MCNC: 112 MG/DL
NRBC BLD-RTO: 0 /100 WBC
PLATELET # BLD AUTO: 156 K/UL (ref 150–350)
PMV BLD AUTO: 11.3 FL (ref 9.2–12.9)
POTASSIUM SERPL-SCNC: 4.5 MMOL/L (ref 3.5–5.1)
PROT SERPL-MCNC: 7.2 G/DL (ref 6–8.4)
RBC # BLD AUTO: 5.52 M/UL (ref 4.6–6.2)
SODIUM SERPL-SCNC: 132 MMOL/L (ref 136–145)
TRIGL SERPL-MCNC: 180 MG/DL (ref 30–150)
WBC # BLD AUTO: 7.29 K/UL (ref 3.9–12.7)

## 2019-12-12 PROCEDURE — 85025 COMPLETE CBC W/AUTO DIFF WBC: CPT

## 2019-12-12 PROCEDURE — 80061 LIPID PANEL: CPT

## 2019-12-12 PROCEDURE — 36415 COLL VENOUS BLD VENIPUNCTURE: CPT | Mod: PO

## 2019-12-12 PROCEDURE — 80053 COMPREHEN METABOLIC PANEL: CPT

## 2020-01-21 RX ORDER — SILDENAFIL 100 MG/1
100 TABLET, FILM COATED ORAL DAILY
Qty: 10 TABLET | Refills: 12 | Status: SHIPPED | OUTPATIENT
Start: 2020-01-21 | End: 2021-07-02

## 2020-01-21 NOTE — TELEPHONE ENCOUNTER
----- Message from Theresa Ag sent at 1/21/2020  8:38 AM CST -----  Contact: pt  Type:  RX Refill Request    Who Called: Patient  Refill or New Rx:Refill  RX Name and Strength:Sildenafil 50mg  How is the patient currently taking it? (ex. 1XDay):1xday  Is this a 30 day or 90 day RX 90day  Preferred Pharmacy with phone number:[facility]  Local or Mail Order:local  Ordering Provider:Dr Marino  Would the patient rather a call back or a response via [facility]? Call back  Best Call Back Number:[phone]  Additional Information: na

## 2020-05-16 RX ORDER — PANTOPRAZOLE SODIUM 40 MG/1
TABLET, DELAYED RELEASE ORAL
Qty: 90 TABLET | Refills: 1 | Status: SHIPPED | OUTPATIENT
Start: 2020-05-16 | End: 2020-10-12 | Stop reason: SDUPTHER

## 2020-05-16 RX ORDER — ATENOLOL 25 MG/1
TABLET ORAL
Qty: 90 TABLET | Refills: 1 | Status: SHIPPED | OUTPATIENT
Start: 2020-05-16 | End: 2020-08-24 | Stop reason: SDUPTHER

## 2020-05-16 RX ORDER — ENALAPRIL MALEATE 20 MG/1
TABLET ORAL
Qty: 90 TABLET | Refills: 1 | Status: SHIPPED | OUTPATIENT
Start: 2020-05-16 | End: 2020-08-24 | Stop reason: SDUPTHER

## 2020-08-22 NOTE — PROGRESS NOTES
This note is specifically for wellness visit performed today.     WELLNESS EXAM      Patient ID: Sal Villarreal Jr. is a 60 y.o. male.  has a past medical history of Anxiety, Depression, Fatigue, History of psychiatric care, Hypertension, and Psychiatric problem.     Chief Complaint:  Encounter for wellness exam    Well Adult Physical: Patient here for a comprehensive physical exam.The patient reports chronic problems.  Mainly having right leg pain from lumbar degenerative disc disease.  Patient has had several GEORGIE and nerve ablation.  Patient is also seeing neuro surgery.  Patient is likely going to have surgery in the near future.  Patient refuses any pain medication.  Patient takes anti-inflammatories only.  Do you take any herbs or supplements that were not prescribed by a doctor? no   Are you taking calcium supplements? no      History: none  UROLOGIST:   Date last PSA: Reviewed  No results found for: PSA   No history of STDs    Health Maintenance Topics with due status: Not Due       Topic Last Completion Date    Colorectal Cancer Screening 07/25/2019    Lipid Panel 12/12/2019    Influenza Vaccine Not Due        ==============================================  History reviewed.     Health Maintenance Due   Topic Date Due    PROSTATE-SPECIFIC ANTIGEN  1960    HIV Screening  01/22/1975    TETANUS VACCINE  01/22/1978    Pneumococcal Vaccine (Medium Risk) (1 of 1 - PPSV23) 01/22/1979    Shingles Vaccine (1 of 2) 01/22/2010       Past Medical History:  Past Medical History:   Diagnosis Date    Anxiety     Depression     Fatigue     History of psychiatric care     Hypertension     Psychiatric problem      Past Surgical History:   Procedure Laterality Date    VASECTOMY       Review of patient's allergies indicates:   Allergen Reactions    Gabapentin      Current Outpatient Medications on File Prior to Visit   Medication Sig Dispense Refill    pantoprazole (PROTONIX) 40 MG tablet TAKE 1 TABLET  BY MOUTH EVERY DAY 90 tablet 1    sildenafil (VIAGRA) 100 MG tablet Take 1 tablet (100 mg total) by mouth once daily. 10 tablet 12    [DISCONTINUED] atenoloL (TENORMIN) 25 MG tablet TAKE 1 TABLET BY MOUTH EVERY DAY 90 tablet 1    [DISCONTINUED] enalapril (VASOTEC) 20 MG tablet TAKE 1 TABLET BY MOUTH EVERY DAY 90 tablet 1    [DISCONTINUED] meloxicam (MOBIC) 15 MG tablet Take 1 tablet (15 mg total) by mouth once daily. 90 tablet 3    [DISCONTINUED] diclofenac (VOLTAREN) 75 MG EC tablet TAKE 1 TABLET (75 MG TOTAL) BY MOUTH 2 (TWO) TIMES DAILY AS NEEDED. 60 tablet 8    [DISCONTINUED] gabapentin (NEURONTIN) 300 MG capsule Take 3 capsules (900 mg total) by mouth 2 (two) times daily. 180 capsule 11    [DISCONTINUED] methylPREDNISolone (MEDROL DOSEPACK) 4 mg tablet use as directed 1 Package 0    [DISCONTINUED] ranitidine (ZANTAC) 75 MG tablet Take 75 mg by mouth nightly.       No current facility-administered medications on file prior to visit.      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   Social Needs    Financial resource strain: Not hard at all    Food insecurity     Worry: Never true     Inability: Never true    Transportation needs     Medical: No     Non-medical: No   Tobacco Use    Smoking status: Current Every Day Smoker     Packs/day: 1.00     Types: Cigarettes   Substance and Sexual Activity    Alcohol use: Yes     Alcohol/week: 42.0 standard drinks     Types: 42 Cans of beer per week     Frequency: 4 or more times a week     Drinks per session: 5 or 6     Binge frequency: Monthly    Drug use: No    Sexual activity: Yes     Partners: Female   Lifestyle    Physical activity     Days per week: 7 days     Minutes per session: 60 min    Stress:  Very much   Relationships    Social connections     Talks on phone: More than three times a week     Gets together: More than three times a week     Attends [employer] service: Not on file     Active member of club or organization: No     Attends meetings of clubs or organizations: Not on file     Relationship status:    Other Topics Concern    Patient feels they ought to cut down on drinking/drug use Yes    Patient annoyed by others criticizing their drinking/drug use Yes    Patient has felt bad or guilty about drinking/drug use Yes    Patient has had a drink/used drugs as an eye opener in the AM No   Social History Narrative    Not on file     Family History   Problem Relation Age of Onset    Suicide Neg Hx     Sexual abuse Neg Hx     Self injury Neg Hx     Seizures Neg Hx     Schizophrenia Neg Hx     Physical abuse Neg Hx     Paranoid behavior Neg Hx     OCD Neg Hx     Drug abuse Neg Hx     Depression Neg Hx     Dementia Neg Hx     Bipolar disorder Neg Hx     Anxiety disorder Neg Hx     Alcohol abuse Neg Hx        Vitals:    08/24/20 0746   BP: (!) 140/90   Pulse:    Temp:       Body mass index is 21.65 kg/m².     Review of Systems   Constitutional: Negative for activity change and unexpected weight change.   HENT: Negative for hearing loss, rhinorrhea and trouble swallowing.    Eyes: Negative for discharge and visual disturbance.   Respiratory: Negative for chest tightness and wheezing.    Cardiovascular: Negative for chest pain and palpitations.   Gastrointestinal: Negative for blood in stool, constipation, diarrhea and vomiting.   Endocrine: Negative for polydipsia and polyuria.   Genitourinary: Negative for difficulty urinating, hematuria and urgency.   Musculoskeletal: Positive for back pain, gait problem and neck pain. Negative for joint swelling.   Neurological: Negative for weakness and headaches.   Psychiatric/Behavioral: Negative for confusion and dysphoric mood.           Objective:      Physical Exam  Vitals signs and nursing note reviewed.   Constitutional:       General: He is not in acute distress.     Appearance: He is well-developed.   HENT:      Head: Normocephalic and atraumatic.   Eyes:      Pupils: Pupils are equal, round, and reactive to light.   Neck:      Musculoskeletal: Normal range of motion and neck supple.   Cardiovascular:      Rate and Rhythm: Normal rate and regular rhythm.      Heart sounds: Normal heart sounds. No murmur.   Pulmonary:      Effort: Pulmonary effort is normal. Prolonged expiration present. No respiratory distress.      Breath sounds: Decreased air movement present. Decreased breath sounds and wheezing present.   Musculoskeletal:      Cervical back: He exhibits decreased range of motion, tenderness, bony tenderness, deformity, pain and spasm.      Thoracic back: He exhibits decreased range of motion, tenderness, bony tenderness, deformity, pain and spasm. He exhibits no swelling and no edema.      Lumbar back: He exhibits decreased range of motion, tenderness, bony tenderness, deformity, pain and spasm. He exhibits no swelling and no edema.   Skin:     General: Skin is warm and dry.      Capillary Refill: Capillary refill takes less than 2 seconds.   Neurological:      Mental Status: He is alert and oriented to person, place, and time.      Cranial Nerves: No cranial nerve deficit.   Psychiatric:         Behavior: Behavior normal.       CHRONIC. Stable. Compliant with medications for managed conditions. See medication list. No SE reported.   Routine lab analysis is being monitored. Refills were addressed.  Lab Results   Component Value Date    WBC 7.29 12/12/2019    HGB 17.9 12/12/2019    HCT 54.5 (H) 12/12/2019    MCV 99 (H) 12/12/2019     12/12/2019         Chemistry        Component Value Date/Time     (L) 12/12/2019 0746    K 4.5 12/12/2019 0746    CL 98 12/12/2019 0746    CO2 26 12/12/2019 0746    BUN 6 12/12/2019 0746     CREATININE 1.0 12/12/2019 0746    GLU 84 12/12/2019 0746        Component Value Date/Time    CALCIUM 9.1 12/12/2019 0746    ALKPHOS 79 12/12/2019 0746    AST 22 12/12/2019 0746    ALT 16 12/12/2019 0746    BILITOT 0.8 12/12/2019 0746    ESTGFRAFRICA >60.0 12/12/2019 0746    EGFRNONAA >60.0 12/12/2019 0746          Lab Results   Component Value Date    TSH 0.932 04/13/2018     Lab Results   Component Value Date    CHOL 143 12/12/2019    CHOL 117 (L) 08/08/2019    CHOL 124 04/13/2018     Lab Results   Component Value Date    HDL 31 (L) 12/12/2019    HDL 30 (L) 08/08/2019    HDL 33 (L) 04/13/2018     Lab Results   Component Value Date    LDLCALC 76.0 12/12/2019    LDLCALC 69.2 08/08/2019    LDLCALC 70.4 04/13/2018     Lab Results   Component Value Date    TRIG 180 (H) 12/12/2019    TRIG 89 08/08/2019    TRIG 103 04/13/2018     Lab Results   Component Value Date    CHOLHDL 21.7 12/12/2019    CHOLHDL 25.6 08/08/2019    CHOLHDL 26.6 04/13/2018          Assessment / Plan:      1.  Routine health exam-patient here for annual wellness exam.  Labs ordered.  Health maintenance was reviewed and ordered.    Complete history and physical was completed today.  Complete and thorough medication reconciliation was performed.  Discussed risks and benefits of medications.  Advised patient on orders and health maintenance.  We discussed old records and old labs if available.  Will request any records not available through epic.  Continue current medications listed on your summary sheet.    All questions were answered. Patient had no further concerns. Advised of diagnoses and plan. Follow up as planned or return sooner if symptoms persist or worsen.     Orders Placed This Encounter   Procedures    PSA, Screening     Standing Status:   Future     Number of Occurrences:   1     Standing Expiration Date:   10/23/2021    Lipid Panel     Standing Status:   Standing     Number of Occurrences:   99     Standing Expiration Date:   8/19/2040     Hemoglobin A1C     Standing Status:   Standing     Number of Occurrences:   99     Standing Expiration Date:   8/19/2040    Comprehensive metabolic panel     Standing Status:   Standing     Number of Occurrences:   99     Standing Expiration Date:   8/19/2040    TSH     Standing Status:   Standing     Number of Occurrences:   99     Standing Expiration Date:   10/23/2021    CBC Without Differential     Standing Status:   Standing     Number of Occurrences:   99     Standing Expiration Date:   10/23/2021       Medications Ordered This Encounter   Medications    atenoloL (TENORMIN) 25 MG tablet     Sig: Take 1 tablet (25 mg total) by mouth once daily.     Dispense:  90 tablet     Refill:  3     .    enalapril (VASOTEC) 20 MG tablet     Sig: Take 1 tablet (20 mg total) by mouth once daily.     Dispense:  90 tablet     Refill:  3     .    meloxicam (MOBIC) 15 MG tablet     Sig: Take 1 tablet (15 mg total) by mouth once daily.     Dispense:  90 tablet     Refill:  3    methylPREDNISolone (MEDROL DOSEPACK) 4 mg tablet     Sig: follow package directions     Dispense:  21 tablet     Refill:  0        Bipin Penaloza MD

## 2020-08-24 ENCOUNTER — OFFICE VISIT (OUTPATIENT)
Dept: FAMILY MEDICINE | Facility: CLINIC | Age: 60
End: 2020-08-24
Payer: COMMERCIAL

## 2020-08-24 VITALS
TEMPERATURE: 98 F | HEIGHT: 67 IN | DIASTOLIC BLOOD PRESSURE: 88 MMHG | WEIGHT: 138.19 LBS | SYSTOLIC BLOOD PRESSURE: 138 MMHG | HEART RATE: 72 BPM | BODY MASS INDEX: 21.69 KG/M2

## 2020-08-24 DIAGNOSIS — Z00.01 ENCOUNTER FOR GENERAL ADULT MEDICAL EXAMINATION WITH ABNORMAL FINDINGS: Primary | ICD-10-CM

## 2020-08-24 DIAGNOSIS — Z79.899 ENCOUNTER FOR LONG-TERM (CURRENT) USE OF MEDICATIONS: ICD-10-CM

## 2020-08-24 DIAGNOSIS — I10 ESSENTIAL HYPERTENSION: ICD-10-CM

## 2020-08-24 DIAGNOSIS — Z13.6 ENCOUNTER FOR LIPID SCREENING FOR CARDIOVASCULAR DISEASE: ICD-10-CM

## 2020-08-24 DIAGNOSIS — Z12.5 ENCOUNTER FOR PROSTATE CANCER SCREENING: ICD-10-CM

## 2020-08-24 DIAGNOSIS — Z13.220 ENCOUNTER FOR LIPID SCREENING FOR CARDIOVASCULAR DISEASE: ICD-10-CM

## 2020-08-24 DIAGNOSIS — M79.604 RIGHT LEG PAIN: ICD-10-CM

## 2020-08-24 DIAGNOSIS — M51.36 LUMBAR DEGENERATIVE DISC DISEASE: ICD-10-CM

## 2020-08-24 PROBLEM — M51.369 LUMBAR DEGENERATIVE DISC DISEASE: Status: ACTIVE | Noted: 2020-08-24

## 2020-08-24 PROCEDURE — 3075F PR MOST RECENT SYSTOLIC BLOOD PRESS GE 130-139MM HG: ICD-10-PCS | Mod: CPTII,S$GLB,, | Performed by: FAMILY MEDICINE

## 2020-08-24 PROCEDURE — 3079F PR MOST RECENT DIASTOLIC BLOOD PRESSURE 80-89 MM HG: ICD-10-PCS | Mod: CPTII,S$GLB,, | Performed by: FAMILY MEDICINE

## 2020-08-24 PROCEDURE — 99396 PR PREVENTIVE VISIT,EST,40-64: ICD-10-PCS | Mod: S$GLB,,, | Performed by: FAMILY MEDICINE

## 2020-08-24 PROCEDURE — 99396 PREV VISIT EST AGE 40-64: CPT | Mod: S$GLB,,, | Performed by: FAMILY MEDICINE

## 2020-08-24 PROCEDURE — 99999 PR PBB SHADOW E&M-EST. PATIENT-LVL IV: CPT | Mod: PBBFAC,,, | Performed by: FAMILY MEDICINE

## 2020-08-24 PROCEDURE — 3008F PR BODY MASS INDEX (BMI) DOCUMENTED: ICD-10-PCS | Mod: CPTII,S$GLB,, | Performed by: FAMILY MEDICINE

## 2020-08-24 PROCEDURE — 3008F BODY MASS INDEX DOCD: CPT | Mod: CPTII,S$GLB,, | Performed by: FAMILY MEDICINE

## 2020-08-24 PROCEDURE — 3079F DIAST BP 80-89 MM HG: CPT | Mod: CPTII,S$GLB,, | Performed by: FAMILY MEDICINE

## 2020-08-24 PROCEDURE — 3075F SYST BP GE 130 - 139MM HG: CPT | Mod: CPTII,S$GLB,, | Performed by: FAMILY MEDICINE

## 2020-08-24 PROCEDURE — 99999 PR PBB SHADOW E&M-EST. PATIENT-LVL IV: ICD-10-PCS | Mod: PBBFAC,,, | Performed by: FAMILY MEDICINE

## 2020-08-24 RX ORDER — MELOXICAM 15 MG/1
15 TABLET ORAL DAILY
Qty: 90 TABLET | Refills: 3 | Status: ON HOLD | OUTPATIENT
Start: 2020-08-24 | End: 2021-03-25 | Stop reason: HOSPADM

## 2020-08-24 RX ORDER — DICLOFENAC POTASSIUM 50 MG/1
50 TABLET, FILM COATED ORAL 2 TIMES DAILY
Status: ON HOLD | COMMUNITY
Start: 2020-08-18 | End: 2021-03-25 | Stop reason: HOSPADM

## 2020-08-24 RX ORDER — ENALAPRIL MALEATE 20 MG/1
20 TABLET ORAL DAILY
Qty: 90 TABLET | Refills: 3 | Status: SHIPPED | OUTPATIENT
Start: 2020-08-24 | End: 2021-08-25 | Stop reason: SDUPTHER

## 2020-08-24 RX ORDER — DICLOFENAC SODIUM 10 MG/G
GEL TOPICAL
Status: ON HOLD | COMMUNITY
Start: 2020-08-18 | End: 2021-03-25 | Stop reason: HOSPADM

## 2020-08-24 RX ORDER — ATENOLOL 25 MG/1
25 TABLET ORAL DAILY
Qty: 90 TABLET | Refills: 3 | Status: SHIPPED | OUTPATIENT
Start: 2020-08-24 | End: 2021-08-25 | Stop reason: SDUPTHER

## 2020-08-24 RX ORDER — METHYLPREDNISOLONE 4 MG/1
TABLET ORAL
Qty: 21 TABLET | Refills: 0 | Status: ON HOLD | OUTPATIENT
Start: 2020-08-24 | End: 2021-03-25 | Stop reason: HOSPADM

## 2020-08-24 NOTE — PATIENT INSTRUCTIONS
Follow up in about 1 year (around 8/24/2021), or if symptoms worsen or fail to improve, for Annual Wellness Exam, As scheduled.     If no improvement in symptoms or symptoms worsen, please be advised to call MD, follow-up at clinic and/or go to ER if becomes severe.    Bipin Penaloza M.D.        We Offer TELEHEALTH & Same Day Appointments!   Book your Telehealth appointment with me through my nurse or   Clinic appointments on [facility]!    [id number] [place] [address]    Office: [phone]   FAX: [phone]    Check out my Facebook Page and Follow Me at: [email or web address]    Check out my website at [employer] by clicking on: [email or web address]    To Schedule appointments online, go to [facility]: [email or web address]

## 2020-09-08 ENCOUNTER — TELEPHONE (OUTPATIENT)
Dept: FAMILY MEDICINE | Facility: CLINIC | Age: 60
End: 2020-09-08

## 2020-09-08 NOTE — TELEPHONE ENCOUNTER
Returned call to patient,  Patient was calling to speak with me regarding a house for sale in his area.   Message not medically related.

## 2020-09-08 NOTE — TELEPHONE ENCOUNTER
----- Message from Wilmar Nick sent at 9/8/2020  8:03 AM CDT -----  Regarding: Pt advice  Type:  Needs Medical Advice    Who Called: Pt   Symptoms (please be specific): personal matter   How long has patient had these symptoms:  n/a  Pharmacy name and phone #:  n/a  Would the patient rather a call back or a response via [facility]? Call back   Best Call Back Number: [phone]  Additional Information: The patient states he was speaking with a staff member and does not know her name in regards  to a personal matter, please advise.

## 2020-11-23 ENCOUNTER — TELEPHONE (OUTPATIENT)
Dept: VASCULAR SURGERY | Facility: CLINIC | Age: 60
End: 2020-11-23

## 2020-11-23 NOTE — TELEPHONE ENCOUNTER
----- Message from Hoang Tong sent at 11/23/2020 12:05 PM CST -----  Type: Needs Medical Advice  Who Called:  Telma/ Dr. Ayala    Best Call Back Number: [phone]  Additional Information: Caller states that she would like a callback from [facility] regarding scheduling the patient for surgery.

## 2021-03-19 ENCOUNTER — LAB VISIT (OUTPATIENT)
Dept: FAMILY MEDICINE | Facility: CLINIC | Age: 61
End: 2021-03-19
Payer: COMMERCIAL

## 2021-03-19 DIAGNOSIS — Z01.818 PRE-OP TESTING: ICD-10-CM

## 2021-03-19 PROCEDURE — U0005 INFEC AGEN DETEC AMPLI PROBE: HCPCS | Performed by: NEUROLOGICAL SURGERY

## 2021-03-19 PROCEDURE — U0003 INFECTIOUS AGENT DETECTION BY NUCLEIC ACID (DNA OR RNA); SEVERE ACUTE RESPIRATORY SYNDROME CORONAVIRUS 2 (SARS-COV-2) (CORONAVIRUS DISEASE [COVID-19]), AMPLIFIED PROBE TECHNIQUE, MAKING USE OF HIGH THROUGHPUT TECHNOLOGIES AS DESCRIBED BY CMS-2020-01-R: HCPCS | Performed by: NEUROLOGICAL SURGERY

## 2021-03-20 LAB — SARS-COV-2 RNA RESP QL NAA+PROBE: NOT DETECTED

## 2021-03-23 PROBLEM — M51.16 INTERVERTEBRAL DISC DISORDER WITH RADICULOPATHY OF LUMBAR REGION: Status: ACTIVE | Noted: 2021-03-23

## 2021-03-25 PROBLEM — M43.17 SPONDYLOLISTHESIS AT L5-S1 LEVEL: Status: ACTIVE | Noted: 2021-03-25

## 2021-05-28 ENCOUNTER — PATIENT MESSAGE (OUTPATIENT)
Dept: FAMILY MEDICINE | Facility: CLINIC | Age: 61
End: 2021-05-28

## 2021-06-07 ENCOUNTER — PATIENT MESSAGE (OUTPATIENT)
Dept: ADMINISTRATIVE | Facility: HOSPITAL | Age: 61
End: 2021-06-07

## 2021-06-07 ENCOUNTER — PATIENT OUTREACH (OUTPATIENT)
Dept: ADMINISTRATIVE | Facility: HOSPITAL | Age: 61
End: 2021-06-07

## 2021-06-08 ENCOUNTER — OFFICE VISIT (OUTPATIENT)
Dept: FAMILY MEDICINE | Facility: CLINIC | Age: 61
End: 2021-06-08
Payer: COMMERCIAL

## 2021-06-08 ENCOUNTER — TELEPHONE (OUTPATIENT)
Dept: VASCULAR SURGERY | Facility: CLINIC | Age: 61
End: 2021-06-08

## 2021-06-08 VITALS
SYSTOLIC BLOOD PRESSURE: 127 MMHG | HEIGHT: 67 IN | RESPIRATION RATE: 14 BRPM | DIASTOLIC BLOOD PRESSURE: 82 MMHG | TEMPERATURE: 98 F | OXYGEN SATURATION: 99 % | HEART RATE: 79 BPM | BODY MASS INDEX: 22.3 KG/M2 | WEIGHT: 142.06 LBS

## 2021-06-08 DIAGNOSIS — R60.0 BILATERAL LOWER EXTREMITY EDEMA: ICD-10-CM

## 2021-06-08 DIAGNOSIS — I73.9 RIGHT LEG CLAUDICATION: ICD-10-CM

## 2021-06-08 DIAGNOSIS — F17.210 CIGARETTE SMOKER: ICD-10-CM

## 2021-06-08 DIAGNOSIS — Z09 HOSPITAL DISCHARGE FOLLOW-UP: Primary | ICD-10-CM

## 2021-06-08 PROCEDURE — 3008F BODY MASS INDEX DOCD: CPT | Mod: CPTII,S$GLB,, | Performed by: FAMILY MEDICINE

## 2021-06-08 PROCEDURE — 1125F AMNT PAIN NOTED PAIN PRSNT: CPT | Mod: S$GLB,,, | Performed by: FAMILY MEDICINE

## 2021-06-08 PROCEDURE — 1125F PR PAIN SEVERITY QUANTIFIED, PAIN PRESENT: ICD-10-PCS | Mod: S$GLB,,, | Performed by: FAMILY MEDICINE

## 2021-06-08 PROCEDURE — 3008F PR BODY MASS INDEX (BMI) DOCUMENTED: ICD-10-PCS | Mod: CPTII,S$GLB,, | Performed by: FAMILY MEDICINE

## 2021-06-08 PROCEDURE — 99999 PR PBB SHADOW E&M-EST. PATIENT-LVL V: ICD-10-PCS | Mod: PBBFAC,,, | Performed by: FAMILY MEDICINE

## 2021-06-08 PROCEDURE — 99214 PR OFFICE/OUTPT VISIT, EST, LEVL IV, 30-39 MIN: ICD-10-PCS | Mod: S$GLB,,, | Performed by: FAMILY MEDICINE

## 2021-06-08 PROCEDURE — 99214 OFFICE O/P EST MOD 30 MIN: CPT | Mod: S$GLB,,, | Performed by: FAMILY MEDICINE

## 2021-06-08 PROCEDURE — 99999 PR PBB SHADOW E&M-EST. PATIENT-LVL V: CPT | Mod: PBBFAC,,, | Performed by: FAMILY MEDICINE

## 2021-06-08 RX ORDER — GABAPENTIN 300 MG/1
300 CAPSULE ORAL NIGHTLY
COMMUNITY
End: 2021-07-23 | Stop reason: CLARIF

## 2021-06-08 RX ORDER — NICOTINE 7MG/24HR
1 PATCH, TRANSDERMAL 24 HOURS TRANSDERMAL DAILY
Qty: 14 PATCH | Refills: 1 | Status: SHIPPED | OUTPATIENT
Start: 2021-06-08 | End: 2021-08-25

## 2021-06-08 RX ORDER — SULFAMETHOXAZOLE AND TRIMETHOPRIM 800; 160 MG/1; MG/1
1 TABLET ORAL 2 TIMES DAILY
COMMUNITY
Start: 2021-03-19 | End: 2021-06-08

## 2021-06-10 ENCOUNTER — TELEPHONE (OUTPATIENT)
Dept: VASCULAR SURGERY | Facility: CLINIC | Age: 61
End: 2021-06-10

## 2021-06-15 ENCOUNTER — HOSPITAL ENCOUNTER (OUTPATIENT)
Dept: RADIOLOGY | Facility: HOSPITAL | Age: 61
Discharge: HOME | End: 2021-06-15
Attending: FAMILY MEDICINE
Payer: COMMERCIAL

## 2021-06-15 ENCOUNTER — PATIENT MESSAGE (OUTPATIENT)
Dept: FAMILY MEDICINE | Facility: CLINIC | Age: 61
End: 2021-06-15

## 2021-06-15 DIAGNOSIS — I73.9 RIGHT LEG CLAUDICATION: ICD-10-CM

## 2021-06-15 DIAGNOSIS — I73.9 RIGHT LEG CLAUDICATION: Primary | ICD-10-CM

## 2021-06-15 DIAGNOSIS — I73.9 PERIPHERAL VASCULAR DISEASE, UNSPECIFIED: ICD-10-CM

## 2021-06-15 PROCEDURE — 93925 LOWER EXTREMITY STUDY: CPT | Mod: 26,,, | Performed by: RADIOLOGY

## 2021-06-15 PROCEDURE — 93925 US ARTERIAL LOWER EXTREMITY BILAT WITH ABI (XPD): ICD-10-PCS | Mod: 26,,, | Performed by: RADIOLOGY

## 2021-06-15 PROCEDURE — 93922 US ARTERIAL LOWER EXTREMITY BILAT WITH ABI (XPD): ICD-10-PCS | Mod: 26,,, | Performed by: RADIOLOGY

## 2021-06-15 PROCEDURE — 93922 UPR/L XTREMITY ART 2 LEVELS: CPT | Mod: TC,PO

## 2021-06-15 PROCEDURE — 93922 UPR/L XTREMITY ART 2 LEVELS: CPT | Mod: 26,,, | Performed by: RADIOLOGY

## 2021-06-16 DIAGNOSIS — M96.0 PSEUDARTHROSIS AFTER FUSION OR ARTHRODESIS: ICD-10-CM

## 2021-06-16 DIAGNOSIS — Z09 HOSPITAL DISCHARGE FOLLOW-UP: Primary | ICD-10-CM

## 2021-06-16 DIAGNOSIS — M43.16 SPONDYLOLISTHESIS OF LUMBAR REGION: ICD-10-CM

## 2021-06-17 ENCOUNTER — HOSPITAL ENCOUNTER (OUTPATIENT)
Dept: RADIOLOGY | Facility: HOSPITAL | Age: 61
Discharge: HOME | End: 2021-06-17
Attending: NEUROLOGICAL SURGERY
Payer: COMMERCIAL

## 2021-06-17 ENCOUNTER — HOSPITAL ENCOUNTER (OUTPATIENT)
Dept: RADIOLOGY | Facility: HOSPITAL | Age: 61
Discharge: HOME | End: 2021-06-17
Attending: FAMILY MEDICINE
Payer: COMMERCIAL

## 2021-06-17 DIAGNOSIS — M43.16 SPONDYLOLISTHESIS OF LUMBAR REGION: ICD-10-CM

## 2021-06-17 DIAGNOSIS — I73.9 RIGHT LEG CLAUDICATION: ICD-10-CM

## 2021-06-17 DIAGNOSIS — I73.9 PERIPHERAL VASCULAR DISEASE, UNSPECIFIED: ICD-10-CM

## 2021-06-17 PROCEDURE — 72100 X-RAY EXAM L-S SPINE 2/3 VWS: CPT | Mod: TC,FY,PO

## 2021-06-17 PROCEDURE — 75635 CT ANGIO ABDOMINAL ARTERIES: CPT | Mod: 26,,, | Performed by: RADIOLOGY

## 2021-06-17 PROCEDURE — 72131 CT LUMBAR SPINE W/O DYE: CPT | Mod: TC,PO

## 2021-06-17 PROCEDURE — 72100 XR LUMBAR SPINE AP AND LATERAL: ICD-10-PCS | Mod: 26,,, | Performed by: RADIOLOGY

## 2021-06-17 PROCEDURE — 75635 CT ANGIO ABDOMINAL ARTERIES: CPT | Mod: TC,PO

## 2021-06-17 PROCEDURE — 72131 CT LUMBAR SPINE W/O DYE: CPT | Mod: 26,,, | Performed by: RADIOLOGY

## 2021-06-17 PROCEDURE — 75635 CTA RUNOFF ABD PEL BILAT LOWER EXT: ICD-10-PCS | Mod: 26,,, | Performed by: RADIOLOGY

## 2021-06-17 PROCEDURE — 25500020 PHARM REV CODE 255: Mod: PO | Performed by: FAMILY MEDICINE

## 2021-06-17 PROCEDURE — 72100 X-RAY EXAM L-S SPINE 2/3 VWS: CPT | Mod: 26,,, | Performed by: RADIOLOGY

## 2021-06-17 PROCEDURE — 72131 CT LUMBAR SPINE WITHOUT CONTRAST: ICD-10-PCS | Mod: 26,,, | Performed by: RADIOLOGY

## 2021-06-17 RX ADMIN — IOHEXOL 100 ML: 350 INJECTION, SOLUTION INTRAVENOUS at 11:06

## 2021-06-18 ENCOUNTER — PATIENT MESSAGE (OUTPATIENT)
Dept: VASCULAR SURGERY | Facility: CLINIC | Age: 61
End: 2021-06-18

## 2021-06-18 ENCOUNTER — PATIENT MESSAGE (OUTPATIENT)
Dept: FAMILY MEDICINE | Facility: CLINIC | Age: 61
End: 2021-06-18

## 2021-06-29 ENCOUNTER — PATIENT MESSAGE (OUTPATIENT)
Dept: VASCULAR SURGERY | Facility: CLINIC | Age: 61
End: 2021-06-29

## 2021-06-30 ENCOUNTER — PATIENT MESSAGE (OUTPATIENT)
Dept: VASCULAR SURGERY | Facility: CLINIC | Age: 61
End: 2021-06-30

## 2021-07-04 ENCOUNTER — PATIENT MESSAGE (OUTPATIENT)
Dept: FAMILY MEDICINE | Facility: CLINIC | Age: 61
End: 2021-07-04

## 2021-07-06 ENCOUNTER — PATIENT MESSAGE (OUTPATIENT)
Dept: FAMILY MEDICINE | Facility: CLINIC | Age: 61
End: 2021-07-06

## 2021-07-07 ENCOUNTER — PATIENT OUTREACH (OUTPATIENT)
Dept: ADMINISTRATIVE | Facility: OTHER | Age: 61
End: 2021-07-07

## 2021-07-08 ENCOUNTER — HOSPITAL ENCOUNTER (OUTPATIENT)
Dept: RADIOLOGY | Facility: HOSPITAL | Age: 61
Discharge: HOME | End: 2021-07-08
Attending: NEUROLOGICAL SURGERY
Payer: COMMERCIAL

## 2021-07-08 ENCOUNTER — OFFICE VISIT (OUTPATIENT)
Dept: VASCULAR SURGERY | Facility: CLINIC | Age: 61
End: 2021-07-08
Payer: COMMERCIAL

## 2021-07-08 VITALS
HEART RATE: 69 BPM | WEIGHT: 141.13 LBS | HEIGHT: 67 IN | BODY MASS INDEX: 22.15 KG/M2 | DIASTOLIC BLOOD PRESSURE: 83 MMHG | SYSTOLIC BLOOD PRESSURE: 139 MMHG

## 2021-07-08 DIAGNOSIS — M43.16 SPONDYLOLISTHESIS OF LUMBAR REGION: ICD-10-CM

## 2021-07-08 DIAGNOSIS — I73.9 PAD (PERIPHERAL ARTERY DISEASE): Primary | ICD-10-CM

## 2021-07-08 DIAGNOSIS — M96.0 PSEUDARTHROSIS AFTER FUSION OR ARTHRODESIS: ICD-10-CM

## 2021-07-08 PROCEDURE — 3008F BODY MASS INDEX DOCD: CPT | Mod: CPTII,S$GLB,, | Performed by: THORACIC SURGERY (CARDIOTHORACIC VASCULAR SURGERY)

## 2021-07-08 PROCEDURE — 72100 X-RAY EXAM L-S SPINE 2/3 VWS: CPT | Mod: TC,FY,PO

## 2021-07-08 PROCEDURE — 99999 PR PBB SHADOW E&M-EST. PATIENT-LVL IV: ICD-10-PCS | Mod: PBBFAC,,, | Performed by: THORACIC SURGERY (CARDIOTHORACIC VASCULAR SURGERY)

## 2021-07-08 PROCEDURE — 72100 XR LUMBAR SPINE AP AND LATERAL: ICD-10-PCS | Mod: 26,,, | Performed by: RADIOLOGY

## 2021-07-08 PROCEDURE — 99215 OFFICE O/P EST HI 40 MIN: CPT | Mod: S$GLB,,, | Performed by: THORACIC SURGERY (CARDIOTHORACIC VASCULAR SURGERY)

## 2021-07-08 PROCEDURE — 72100 X-RAY EXAM L-S SPINE 2/3 VWS: CPT | Mod: 26,,, | Performed by: RADIOLOGY

## 2021-07-08 PROCEDURE — 1125F PR PAIN SEVERITY QUANTIFIED, PAIN PRESENT: ICD-10-PCS | Mod: S$GLB,,, | Performed by: THORACIC SURGERY (CARDIOTHORACIC VASCULAR SURGERY)

## 2021-07-08 PROCEDURE — 99215 PR OFFICE/OUTPT VISIT, EST, LEVL V, 40-54 MIN: ICD-10-PCS | Mod: S$GLB,,, | Performed by: THORACIC SURGERY (CARDIOTHORACIC VASCULAR SURGERY)

## 2021-07-08 PROCEDURE — 99999 PR PBB SHADOW E&M-EST. PATIENT-LVL IV: CPT | Mod: PBBFAC,,, | Performed by: THORACIC SURGERY (CARDIOTHORACIC VASCULAR SURGERY)

## 2021-07-08 PROCEDURE — 3008F PR BODY MASS INDEX (BMI) DOCUMENTED: ICD-10-PCS | Mod: CPTII,S$GLB,, | Performed by: THORACIC SURGERY (CARDIOTHORACIC VASCULAR SURGERY)

## 2021-07-08 PROCEDURE — 1125F AMNT PAIN NOTED PAIN PRSNT: CPT | Mod: S$GLB,,, | Performed by: THORACIC SURGERY (CARDIOTHORACIC VASCULAR SURGERY)

## 2021-07-08 RX ORDER — DEXTROMETHORPHAN HYDROBROMIDE, GUAIFENESIN 5; 100 MG/5ML; MG/5ML
650 LIQUID ORAL EVERY 8 HOURS
COMMUNITY
End: 2021-11-29 | Stop reason: CLARIF

## 2021-07-08 RX ORDER — HYDROCODONE BITARTRATE AND ACETAMINOPHEN 5; 325 MG/1; MG/1
1 TABLET ORAL EVERY 6 HOURS PRN
COMMUNITY
End: 2021-08-25

## 2021-07-13 ENCOUNTER — TELEPHONE (OUTPATIENT)
Dept: VASCULAR SURGERY | Facility: CLINIC | Age: 61
End: 2021-07-13

## 2021-07-13 DIAGNOSIS — Z01.818 PRE-OP TESTING: ICD-10-CM

## 2021-07-13 DIAGNOSIS — I73.9 PAD (PERIPHERAL ARTERY DISEASE): Primary | ICD-10-CM

## 2021-07-13 RX ORDER — LIDOCAINE HYDROCHLORIDE 10 MG/ML
1 INJECTION, SOLUTION EPIDURAL; INFILTRATION; INTRACAUDAL; PERINEURAL ONCE
Status: CANCELLED | OUTPATIENT
Start: 2021-07-13 | End: 2021-07-13

## 2021-07-24 ENCOUNTER — LAB VISIT (OUTPATIENT)
Dept: FAMILY MEDICINE | Facility: CLINIC | Age: 61
End: 2021-07-24
Payer: COMMERCIAL

## 2021-07-24 DIAGNOSIS — Z01.818 PRE-OP TESTING: ICD-10-CM

## 2021-07-24 DIAGNOSIS — I73.9 PAD (PERIPHERAL ARTERY DISEASE): ICD-10-CM

## 2021-07-24 PROCEDURE — U0005 INFEC AGEN DETEC AMPLI PROBE: HCPCS | Performed by: THORACIC SURGERY (CARDIOTHORACIC VASCULAR SURGERY)

## 2021-07-24 PROCEDURE — U0003 INFECTIOUS AGENT DETECTION BY NUCLEIC ACID (DNA OR RNA); SEVERE ACUTE RESPIRATORY SYNDROME CORONAVIRUS 2 (SARS-COV-2) (CORONAVIRUS DISEASE [COVID-19]), AMPLIFIED PROBE TECHNIQUE, MAKING USE OF HIGH THROUGHPUT TECHNOLOGIES AS DESCRIBED BY CMS-2020-01-R: HCPCS | Performed by: THORACIC SURGERY (CARDIOTHORACIC VASCULAR SURGERY)

## 2021-07-26 LAB
SARS-COV-2 RNA RESP QL NAA+PROBE: NOT DETECTED
SARS-COV-2- CYCLE NUMBER: -1

## 2021-07-27 PROBLEM — I73.9 PAD (PERIPHERAL ARTERY DISEASE): Status: ACTIVE | Noted: 2021-07-27

## 2021-07-28 ENCOUNTER — PATIENT MESSAGE (OUTPATIENT)
Dept: VASCULAR SURGERY | Facility: CLINIC | Age: 61
End: 2021-07-28

## 2021-07-29 ENCOUNTER — PATIENT MESSAGE (OUTPATIENT)
Dept: FAMILY MEDICINE | Facility: CLINIC | Age: 61
End: 2021-07-29

## 2021-07-29 ENCOUNTER — PATIENT MESSAGE (OUTPATIENT)
Dept: VASCULAR SURGERY | Facility: CLINIC | Age: 61
End: 2021-07-29

## 2021-07-29 DIAGNOSIS — Z20.822 EXPOSURE TO COVID-19 VIRUS: Primary | ICD-10-CM

## 2021-07-30 ENCOUNTER — CLINICAL SUPPORT (OUTPATIENT)
Dept: FAMILY MEDICINE | Facility: CLINIC | Age: 61
End: 2021-07-30
Payer: COMMERCIAL

## 2021-07-30 DIAGNOSIS — Z20.822 EXPOSURE TO COVID-19 VIRUS: ICD-10-CM

## 2021-07-30 PROCEDURE — U0005 INFEC AGEN DETEC AMPLI PROBE: HCPCS | Performed by: FAMILY MEDICINE

## 2021-07-30 PROCEDURE — U0003 INFECTIOUS AGENT DETECTION BY NUCLEIC ACID (DNA OR RNA); SEVERE ACUTE RESPIRATORY SYNDROME CORONAVIRUS 2 (SARS-COV-2) (CORONAVIRUS DISEASE [COVID-19]), AMPLIFIED PROBE TECHNIQUE, MAKING USE OF HIGH THROUGHPUT TECHNOLOGIES AS DESCRIBED BY CMS-2020-01-R: HCPCS | Performed by: FAMILY MEDICINE

## 2021-07-31 ENCOUNTER — PATIENT MESSAGE (OUTPATIENT)
Dept: FAMILY MEDICINE | Facility: CLINIC | Age: 61
End: 2021-07-31

## 2021-07-31 LAB
SARS-COV-2 RNA RESP QL NAA+PROBE: NOT DETECTED
SARS-COV-2- CYCLE NUMBER: -1

## 2021-08-17 ENCOUNTER — PATIENT MESSAGE (OUTPATIENT)
Dept: FAMILY MEDICINE | Facility: CLINIC | Age: 61
End: 2021-08-17

## 2021-08-22 ENCOUNTER — PATIENT MESSAGE (OUTPATIENT)
Dept: FAMILY MEDICINE | Facility: CLINIC | Age: 61
End: 2021-08-22

## 2021-08-24 ENCOUNTER — TELEPHONE (OUTPATIENT)
Dept: FAMILY MEDICINE | Facility: CLINIC | Age: 61
End: 2021-08-24

## 2021-08-25 ENCOUNTER — OFFICE VISIT (OUTPATIENT)
Dept: FAMILY MEDICINE | Facility: CLINIC | Age: 61
End: 2021-08-25
Payer: COMMERCIAL

## 2021-08-25 DIAGNOSIS — Z79.899 ENCOUNTER FOR LONG-TERM (CURRENT) USE OF MEDICATIONS: ICD-10-CM

## 2021-08-25 DIAGNOSIS — Z98.1 STATUS POST LUMBAR SPINAL FUSION: ICD-10-CM

## 2021-08-25 DIAGNOSIS — Z00.00 WELL ADULT EXAM: Primary | ICD-10-CM

## 2021-08-25 DIAGNOSIS — Z13.220 ENCOUNTER FOR LIPID SCREENING FOR CARDIOVASCULAR DISEASE: ICD-10-CM

## 2021-08-25 DIAGNOSIS — Z13.6 ENCOUNTER FOR LIPID SCREENING FOR CARDIOVASCULAR DISEASE: ICD-10-CM

## 2021-08-25 DIAGNOSIS — I10 ESSENTIAL HYPERTENSION: ICD-10-CM

## 2021-08-25 DIAGNOSIS — F17.210 CIGARETTE SMOKER: ICD-10-CM

## 2021-08-25 DIAGNOSIS — Z12.5 ENCOUNTER FOR PROSTATE CANCER SCREENING: ICD-10-CM

## 2021-08-25 DIAGNOSIS — Z23 NEED FOR PNEUMOCOCCAL VACCINE: ICD-10-CM

## 2021-08-25 DIAGNOSIS — I73.9 PAD (PERIPHERAL ARTERY DISEASE): ICD-10-CM

## 2021-08-25 PROCEDURE — 1159F PR MEDICATION LIST DOCUMENTED IN MEDICAL RECORD: ICD-10-PCS | Mod: CPTII,,, | Performed by: FAMILY MEDICINE

## 2021-08-25 PROCEDURE — 1160F PR REVIEW ALL MEDS BY PRESCRIBER/CLIN PHARMACIST DOCUMENTED: ICD-10-PCS | Mod: CPTII,,, | Performed by: FAMILY MEDICINE

## 2021-08-25 PROCEDURE — 1159F MED LIST DOCD IN RCRD: CPT | Mod: CPTII,,, | Performed by: FAMILY MEDICINE

## 2021-08-25 PROCEDURE — 1160F RVW MEDS BY RX/DR IN RCRD: CPT | Mod: CPTII,,, | Performed by: FAMILY MEDICINE

## 2021-08-25 PROCEDURE — 99396 PR PREVENTIVE VISIT,EST,40-64: ICD-10-PCS | Mod: 95,,, | Performed by: FAMILY MEDICINE

## 2021-08-25 PROCEDURE — 99396 PREV VISIT EST AGE 40-64: CPT | Mod: 95,,, | Performed by: FAMILY MEDICINE

## 2021-08-25 RX ORDER — ATENOLOL 25 MG/1
25 TABLET ORAL EVERY MORNING
Qty: 90 TABLET | Refills: 4 | Status: SHIPPED | OUTPATIENT
Start: 2021-08-25 | End: 2022-09-15

## 2021-08-25 RX ORDER — ENALAPRIL MALEATE 20 MG/1
20 TABLET ORAL EVERY MORNING
Qty: 90 TABLET | Refills: 4 | Status: SHIPPED | OUTPATIENT
Start: 2021-08-25 | End: 2022-06-06

## 2021-08-26 ENCOUNTER — OFFICE VISIT (OUTPATIENT)
Dept: VASCULAR SURGERY | Facility: CLINIC | Age: 61
End: 2021-08-26
Payer: COMMERCIAL

## 2021-08-26 VITALS
HEART RATE: 68 BPM | SYSTOLIC BLOOD PRESSURE: 142 MMHG | WEIGHT: 138.88 LBS | DIASTOLIC BLOOD PRESSURE: 84 MMHG | HEIGHT: 66 IN | BODY MASS INDEX: 22.32 KG/M2

## 2021-08-26 DIAGNOSIS — I73.9 PAD (PERIPHERAL ARTERY DISEASE): Primary | ICD-10-CM

## 2021-08-26 PROCEDURE — 1159F MED LIST DOCD IN RCRD: CPT | Mod: CPTII,S$GLB,, | Performed by: THORACIC SURGERY (CARDIOTHORACIC VASCULAR SURGERY)

## 2021-08-26 PROCEDURE — 3079F DIAST BP 80-89 MM HG: CPT | Mod: CPTII,S$GLB,, | Performed by: THORACIC SURGERY (CARDIOTHORACIC VASCULAR SURGERY)

## 2021-08-26 PROCEDURE — 99999 PR PBB SHADOW E&M-EST. PATIENT-LVL III: CPT | Mod: PBBFAC,,, | Performed by: THORACIC SURGERY (CARDIOTHORACIC VASCULAR SURGERY)

## 2021-08-26 PROCEDURE — 99215 PR OFFICE/OUTPT VISIT, EST, LEVL V, 40-54 MIN: ICD-10-PCS | Mod: S$GLB,,, | Performed by: THORACIC SURGERY (CARDIOTHORACIC VASCULAR SURGERY)

## 2021-08-26 PROCEDURE — 3077F PR MOST RECENT SYSTOLIC BLOOD PRESSURE >= 140 MM HG: ICD-10-PCS | Mod: CPTII,S$GLB,, | Performed by: THORACIC SURGERY (CARDIOTHORACIC VASCULAR SURGERY)

## 2021-08-26 PROCEDURE — 3077F SYST BP >= 140 MM HG: CPT | Mod: CPTII,S$GLB,, | Performed by: THORACIC SURGERY (CARDIOTHORACIC VASCULAR SURGERY)

## 2021-08-26 PROCEDURE — 99999 PR PBB SHADOW E&M-EST. PATIENT-LVL III: ICD-10-PCS | Mod: PBBFAC,,, | Performed by: THORACIC SURGERY (CARDIOTHORACIC VASCULAR SURGERY)

## 2021-08-26 PROCEDURE — 99215 OFFICE O/P EST HI 40 MIN: CPT | Mod: S$GLB,,, | Performed by: THORACIC SURGERY (CARDIOTHORACIC VASCULAR SURGERY)

## 2021-08-26 PROCEDURE — 1160F RVW MEDS BY RX/DR IN RCRD: CPT | Mod: CPTII,S$GLB,, | Performed by: THORACIC SURGERY (CARDIOTHORACIC VASCULAR SURGERY)

## 2021-08-26 PROCEDURE — 3008F BODY MASS INDEX DOCD: CPT | Mod: CPTII,S$GLB,, | Performed by: THORACIC SURGERY (CARDIOTHORACIC VASCULAR SURGERY)

## 2021-08-26 PROCEDURE — 3079F PR MOST RECENT DIASTOLIC BLOOD PRESSURE 80-89 MM HG: ICD-10-PCS | Mod: CPTII,S$GLB,, | Performed by: THORACIC SURGERY (CARDIOTHORACIC VASCULAR SURGERY)

## 2021-08-26 PROCEDURE — 1160F PR REVIEW ALL MEDS BY PRESCRIBER/CLIN PHARMACIST DOCUMENTED: ICD-10-PCS | Mod: CPTII,S$GLB,, | Performed by: THORACIC SURGERY (CARDIOTHORACIC VASCULAR SURGERY)

## 2021-08-26 PROCEDURE — 3008F PR BODY MASS INDEX (BMI) DOCUMENTED: ICD-10-PCS | Mod: CPTII,S$GLB,, | Performed by: THORACIC SURGERY (CARDIOTHORACIC VASCULAR SURGERY)

## 2021-08-26 PROCEDURE — 1159F PR MEDICATION LIST DOCUMENTED IN MEDICAL RECORD: ICD-10-PCS | Mod: CPTII,S$GLB,, | Performed by: THORACIC SURGERY (CARDIOTHORACIC VASCULAR SURGERY)

## 2021-08-26 PROCEDURE — 1125F AMNT PAIN NOTED PAIN PRSNT: CPT | Mod: CPTII,S$GLB,, | Performed by: THORACIC SURGERY (CARDIOTHORACIC VASCULAR SURGERY)

## 2021-08-26 PROCEDURE — 1125F PR PAIN SEVERITY QUANTIFIED, PAIN PRESENT: ICD-10-PCS | Mod: CPTII,S$GLB,, | Performed by: THORACIC SURGERY (CARDIOTHORACIC VASCULAR SURGERY)

## 2021-09-08 ENCOUNTER — CLINICAL SUPPORT (OUTPATIENT)
Dept: SMOKING CESSATION | Facility: CLINIC | Age: 61
End: 2021-09-08
Payer: COMMERCIAL

## 2021-09-08 DIAGNOSIS — F17.210 MODERATE CIGARETTE SMOKER (10-19 PER DAY): Primary | ICD-10-CM

## 2021-09-08 PROCEDURE — 99404 PR PREVENT COUNSEL,INDIV,60 MIN: ICD-10-PCS | Mod: S$GLB,,,

## 2021-09-08 PROCEDURE — 99999 PR PBB SHADOW E&M-EST. PATIENT-LVL I: ICD-10-PCS | Mod: PBBFAC,,,

## 2021-09-08 PROCEDURE — 99999 PR PBB SHADOW E&M-EST. PATIENT-LVL I: CPT | Mod: PBBFAC,,,

## 2021-09-08 PROCEDURE — 99404 PREV MED CNSL INDIV APPRX 60: CPT | Mod: S$GLB,,,

## 2021-09-08 RX ORDER — IBUPROFEN 200 MG
1 TABLET ORAL DAILY
Qty: 28 PATCH | Refills: 0 | Status: SHIPPED | OUTPATIENT
Start: 2021-09-08 | End: 2021-10-05 | Stop reason: SDUPTHER

## 2021-09-08 RX ORDER — MICONAZOLE NITRATE 2 %
2 CREAM (GRAM) TOPICAL
Qty: 100 EACH | Refills: 0 | Status: SHIPPED | OUTPATIENT
Start: 2021-09-08 | End: 2021-10-05 | Stop reason: SDUPTHER

## 2021-09-13 PROBLEM — Z09 HOSPITAL DISCHARGE FOLLOW-UP: Status: RESOLVED | Noted: 2021-06-08 | Resolved: 2021-09-13

## 2021-09-15 ENCOUNTER — CLINICAL SUPPORT (OUTPATIENT)
Dept: SMOKING CESSATION | Facility: CLINIC | Age: 61
End: 2021-09-15
Payer: COMMERCIAL

## 2021-09-15 DIAGNOSIS — F17.210 MODERATE CIGARETTE SMOKER (10-19 PER DAY): Primary | ICD-10-CM

## 2021-09-15 PROCEDURE — 99404 PREV MED CNSL INDIV APPRX 60: CPT | Mod: S$GLB,,,

## 2021-09-15 PROCEDURE — 99999 PR PBB SHADOW E&M-EST. PATIENT-LVL I: CPT | Mod: PBBFAC,,,

## 2021-09-15 PROCEDURE — 99999 PR PBB SHADOW E&M-EST. PATIENT-LVL I: ICD-10-PCS | Mod: PBBFAC,,,

## 2021-09-15 PROCEDURE — 99404 PR PREVENT COUNSEL,INDIV,60 MIN: ICD-10-PCS | Mod: S$GLB,,,

## 2021-09-15 RX ORDER — VARENICLINE TARTRATE 0.5 (11)-1
KIT ORAL
Qty: 1 PACKAGE | Refills: 0 | Status: SHIPPED | OUTPATIENT
Start: 2021-09-15 | End: 2021-11-29 | Stop reason: CLARIF

## 2021-09-18 ENCOUNTER — PATIENT MESSAGE (OUTPATIENT)
Dept: VASCULAR SURGERY | Facility: CLINIC | Age: 61
End: 2021-09-18

## 2021-09-21 RX ORDER — PANTOPRAZOLE SODIUM 40 MG/1
TABLET, DELAYED RELEASE ORAL
Qty: 90 TABLET | Refills: 1 | Status: SHIPPED | OUTPATIENT
Start: 2021-09-21 | End: 2022-01-10

## 2021-09-22 ENCOUNTER — CLINICAL SUPPORT (OUTPATIENT)
Dept: SMOKING CESSATION | Facility: CLINIC | Age: 61
End: 2021-09-22
Payer: COMMERCIAL

## 2021-09-22 DIAGNOSIS — F17.210 MODERATE CIGARETTE SMOKER (10-19 PER DAY): Primary | ICD-10-CM

## 2021-09-22 PROCEDURE — 99999 PR PBB SHADOW E&M-EST. PATIENT-LVL I: CPT | Mod: PBBFAC,,,

## 2021-09-22 PROCEDURE — 99404 PREV MED CNSL INDIV APPRX 60: CPT | Mod: S$GLB,,,

## 2021-09-22 PROCEDURE — 99999 PR PBB SHADOW E&M-EST. PATIENT-LVL I: ICD-10-PCS | Mod: PBBFAC,,,

## 2021-09-22 PROCEDURE — 99404 PR PREVENT COUNSEL,INDIV,60 MIN: ICD-10-PCS | Mod: S$GLB,,,

## 2021-09-23 RX ORDER — SODIUM FLUORIDE 1.1 G/100G
1 CREAM ORAL 2 TIMES DAILY
COMMUNITY
Start: 2021-09-10

## 2021-09-23 RX ORDER — IBUPROFEN 600 MG/1
600 TABLET ORAL NIGHTLY
Status: ON HOLD | COMMUNITY
Start: 2021-08-27 | End: 2021-12-02 | Stop reason: HOSPADM

## 2021-09-23 RX ORDER — SILDENAFIL 100 MG/1
100 TABLET, FILM COATED ORAL
COMMUNITY
Start: 2021-09-05 | End: 2022-02-09 | Stop reason: SDUPTHER

## 2021-09-25 ENCOUNTER — PATIENT MESSAGE (OUTPATIENT)
Dept: VASCULAR SURGERY | Facility: CLINIC | Age: 61
End: 2021-09-25

## 2021-09-29 ENCOUNTER — CLINICAL SUPPORT (OUTPATIENT)
Dept: SMOKING CESSATION | Facility: CLINIC | Age: 61
End: 2021-09-29
Payer: COMMERCIAL

## 2021-09-29 DIAGNOSIS — F17.210 MODERATE CIGARETTE SMOKER (10-19 PER DAY): Primary | ICD-10-CM

## 2021-09-29 PROCEDURE — 99404 PR PREVENT COUNSEL,INDIV,60 MIN: ICD-10-PCS | Mod: S$GLB,,,

## 2021-09-29 PROCEDURE — 99999 PR PBB SHADOW E&M-EST. PATIENT-LVL I: CPT | Mod: PBBFAC,,,

## 2021-09-29 PROCEDURE — 99404 PREV MED CNSL INDIV APPRX 60: CPT | Mod: S$GLB,,,

## 2021-09-29 PROCEDURE — 99999 PR PBB SHADOW E&M-EST. PATIENT-LVL I: ICD-10-PCS | Mod: PBBFAC,,,

## 2021-10-05 ENCOUNTER — CLINICAL SUPPORT (OUTPATIENT)
Dept: SMOKING CESSATION | Facility: CLINIC | Age: 61
End: 2021-10-05
Payer: COMMERCIAL

## 2021-10-05 DIAGNOSIS — F17.210 MODERATE CIGARETTE SMOKER (10-19 PER DAY): ICD-10-CM

## 2021-10-05 DIAGNOSIS — F17.210 MODERATE CIGARETTE SMOKER (10-19 PER DAY): Primary | ICD-10-CM

## 2021-10-05 PROCEDURE — 99404 PR PREVENT COUNSEL,INDIV,60 MIN: ICD-10-PCS | Mod: S$GLB,,,

## 2021-10-05 PROCEDURE — 99404 PREV MED CNSL INDIV APPRX 60: CPT | Mod: S$GLB,,,

## 2021-10-05 PROCEDURE — 99999 PR PBB SHADOW E&M-EST. PATIENT-LVL I: CPT | Mod: PBBFAC,,,

## 2021-10-05 PROCEDURE — 99999 PR PBB SHADOW E&M-EST. PATIENT-LVL I: ICD-10-PCS | Mod: PBBFAC,,,

## 2021-10-05 RX ORDER — IBUPROFEN 200 MG
1 TABLET ORAL DAILY
Qty: 28 PATCH | Refills: 0 | Status: SHIPPED | OUTPATIENT
Start: 2021-10-05 | End: 2021-11-29 | Stop reason: CLARIF

## 2021-10-05 RX ORDER — MICONAZOLE NITRATE 2 %
2 CREAM (GRAM) TOPICAL
Qty: 100 EACH | Refills: 0 | Status: SHIPPED | OUTPATIENT
Start: 2021-10-05 | End: 2021-10-22

## 2021-10-07 RX ORDER — IBUPROFEN 200 MG
1 TABLET ORAL DAILY
Qty: 28 PATCH | Refills: 0 | OUTPATIENT
Start: 2021-10-07

## 2021-10-13 ENCOUNTER — CLINICAL SUPPORT (OUTPATIENT)
Dept: SMOKING CESSATION | Facility: CLINIC | Age: 61
End: 2021-10-13
Payer: COMMERCIAL

## 2021-10-13 DIAGNOSIS — F17.210 MODERATE CIGARETTE SMOKER (10-19 PER DAY): Primary | ICD-10-CM

## 2021-10-13 PROCEDURE — 99999 PR PBB SHADOW E&M-EST. PATIENT-LVL I: CPT | Mod: PBBFAC,,,

## 2021-10-13 PROCEDURE — 99404 PR PREVENT COUNSEL,INDIV,60 MIN: ICD-10-PCS | Mod: S$GLB,,,

## 2021-10-13 PROCEDURE — 99404 PREV MED CNSL INDIV APPRX 60: CPT | Mod: S$GLB,,,

## 2021-10-13 PROCEDURE — 99999 PR PBB SHADOW E&M-EST. PATIENT-LVL I: ICD-10-PCS | Mod: PBBFAC,,,

## 2021-10-18 ENCOUNTER — PATIENT OUTREACH (OUTPATIENT)
Dept: ADMINISTRATIVE | Facility: OTHER | Age: 61
End: 2021-10-18

## 2021-10-22 ENCOUNTER — PATIENT MESSAGE (OUTPATIENT)
Dept: VASCULAR SURGERY | Facility: CLINIC | Age: 61
End: 2021-10-22

## 2021-10-22 ENCOUNTER — OFFICE VISIT (OUTPATIENT)
Dept: CARDIOLOGY | Facility: CLINIC | Age: 61
End: 2021-10-22
Payer: COMMERCIAL

## 2021-10-22 VITALS
DIASTOLIC BLOOD PRESSURE: 81 MMHG | BODY MASS INDEX: 23.03 KG/M2 | WEIGHT: 143.31 LBS | SYSTOLIC BLOOD PRESSURE: 128 MMHG | HEIGHT: 66 IN | HEART RATE: 72 BPM

## 2021-10-22 DIAGNOSIS — E78.5 DYSLIPIDEMIA (HIGH LDL; LOW HDL): Chronic | ICD-10-CM

## 2021-10-22 DIAGNOSIS — I10 ESSENTIAL HYPERTENSION: Chronic | ICD-10-CM

## 2021-10-22 DIAGNOSIS — R09.89 BRUIT OF RIGHT CAROTID ARTERY: ICD-10-CM

## 2021-10-22 DIAGNOSIS — F17.210 CIGARETTE SMOKER: Chronic | ICD-10-CM

## 2021-10-22 DIAGNOSIS — I73.9 PAD (PERIPHERAL ARTERY DISEASE): Primary | Chronic | ICD-10-CM

## 2021-10-22 DIAGNOSIS — R01.1 UNDIAGNOSED CARDIAC MURMURS: Chronic | ICD-10-CM

## 2021-10-22 DIAGNOSIS — Z01.810 PRE-OPERATIVE CARDIOVASCULAR EXAMINATION: ICD-10-CM

## 2021-10-22 PROCEDURE — 4010F ACE/ARB THERAPY RXD/TAKEN: CPT | Mod: CPTII,S$GLB,, | Performed by: INTERNAL MEDICINE

## 2021-10-22 PROCEDURE — 99999 PR PBB SHADOW E&M-EST. PATIENT-LVL IV: ICD-10-PCS | Mod: PBBFAC,,, | Performed by: INTERNAL MEDICINE

## 2021-10-22 PROCEDURE — 1159F MED LIST DOCD IN RCRD: CPT | Mod: CPTII,S$GLB,, | Performed by: INTERNAL MEDICINE

## 2021-10-22 PROCEDURE — 3079F PR MOST RECENT DIASTOLIC BLOOD PRESSURE 80-89 MM HG: ICD-10-PCS | Mod: CPTII,S$GLB,, | Performed by: INTERNAL MEDICINE

## 2021-10-22 PROCEDURE — 99999 PR PBB SHADOW E&M-EST. PATIENT-LVL IV: CPT | Mod: PBBFAC,,, | Performed by: INTERNAL MEDICINE

## 2021-10-22 PROCEDURE — 93010 EKG 12-LEAD: ICD-10-PCS | Mod: S$GLB,,, | Performed by: INTERNAL MEDICINE

## 2021-10-22 PROCEDURE — 93010 ELECTROCARDIOGRAM REPORT: CPT | Mod: S$GLB,,, | Performed by: INTERNAL MEDICINE

## 2021-10-22 PROCEDURE — 3008F PR BODY MASS INDEX (BMI) DOCUMENTED: ICD-10-PCS | Mod: CPTII,S$GLB,, | Performed by: INTERNAL MEDICINE

## 2021-10-22 PROCEDURE — 3074F SYST BP LT 130 MM HG: CPT | Mod: CPTII,S$GLB,, | Performed by: INTERNAL MEDICINE

## 2021-10-22 PROCEDURE — 3079F DIAST BP 80-89 MM HG: CPT | Mod: CPTII,S$GLB,, | Performed by: INTERNAL MEDICINE

## 2021-10-22 PROCEDURE — 99204 OFFICE O/P NEW MOD 45 MIN: CPT | Mod: 25,S$GLB,, | Performed by: INTERNAL MEDICINE

## 2021-10-22 PROCEDURE — 99204 PR OFFICE/OUTPT VISIT, NEW, LEVL IV, 45-59 MIN: ICD-10-PCS | Mod: 25,S$GLB,, | Performed by: INTERNAL MEDICINE

## 2021-10-22 PROCEDURE — 93005 ELECTROCARDIOGRAM TRACING: CPT | Mod: PO

## 2021-10-22 PROCEDURE — 3074F PR MOST RECENT SYSTOLIC BLOOD PRESSURE < 130 MM HG: ICD-10-PCS | Mod: CPTII,S$GLB,, | Performed by: INTERNAL MEDICINE

## 2021-10-22 PROCEDURE — 4010F PR ACE/ARB THEARPY RXD/TAKEN: ICD-10-PCS | Mod: CPTII,S$GLB,, | Performed by: INTERNAL MEDICINE

## 2021-10-22 PROCEDURE — 3008F BODY MASS INDEX DOCD: CPT | Mod: CPTII,S$GLB,, | Performed by: INTERNAL MEDICINE

## 2021-10-22 PROCEDURE — 1159F PR MEDICATION LIST DOCUMENTED IN MEDICAL RECORD: ICD-10-PCS | Mod: CPTII,S$GLB,, | Performed by: INTERNAL MEDICINE

## 2021-10-22 RX ORDER — ROSUVASTATIN CALCIUM 10 MG/1
10 TABLET, COATED ORAL NIGHTLY
Qty: 90 TABLET | Refills: 0 | Status: SHIPPED | OUTPATIENT
Start: 2021-10-22 | End: 2022-01-10

## 2021-10-23 ENCOUNTER — PATIENT MESSAGE (OUTPATIENT)
Dept: VASCULAR SURGERY | Facility: CLINIC | Age: 61
End: 2021-10-23
Payer: COMMERCIAL

## 2021-10-25 DIAGNOSIS — Z01.818 PRE-OP TESTING: ICD-10-CM

## 2021-10-25 DIAGNOSIS — I77.9 PERIPHERAL ARTERIAL OCCLUSIVE DISEASE: Primary | ICD-10-CM

## 2021-10-25 RX ORDER — LIDOCAINE HYDROCHLORIDE 10 MG/ML
1 INJECTION, SOLUTION EPIDURAL; INFILTRATION; INTRACAUDAL; PERINEURAL ONCE
Status: CANCELLED | OUTPATIENT
Start: 2021-10-25 | End: 2021-10-25

## 2021-10-27 ENCOUNTER — CLINICAL SUPPORT (OUTPATIENT)
Dept: CARDIOLOGY | Facility: HOSPITAL | Age: 61
End: 2021-10-27
Attending: INTERNAL MEDICINE
Payer: COMMERCIAL

## 2021-10-27 DIAGNOSIS — R09.89 BRUIT OF RIGHT CAROTID ARTERY: ICD-10-CM

## 2021-10-27 PROCEDURE — 93880 EXTRACRANIAL BILAT STUDY: CPT | Mod: 26,,, | Performed by: INTERNAL MEDICINE

## 2021-10-27 PROCEDURE — 93880 EXTRACRANIAL BILAT STUDY: CPT | Mod: PO

## 2021-10-27 PROCEDURE — 93880 CV US DOPPLER CAROTID (CUPID ONLY): ICD-10-PCS | Mod: 26,,, | Performed by: INTERNAL MEDICINE

## 2021-10-28 ENCOUNTER — CLINICAL SUPPORT (OUTPATIENT)
Dept: CARDIOLOGY | Facility: HOSPITAL | Age: 61
End: 2021-10-28
Attending: INTERNAL MEDICINE
Payer: COMMERCIAL

## 2021-10-28 VITALS
WEIGHT: 143.31 LBS | HEIGHT: 66 IN | BODY MASS INDEX: 23.03 KG/M2 | HEART RATE: 61 BPM | DIASTOLIC BLOOD PRESSURE: 87 MMHG | SYSTOLIC BLOOD PRESSURE: 140 MMHG

## 2021-10-28 DIAGNOSIS — I73.9 PAD (PERIPHERAL ARTERY DISEASE): ICD-10-CM

## 2021-10-28 DIAGNOSIS — F17.210 CIGARETTE SMOKER: ICD-10-CM

## 2021-10-28 DIAGNOSIS — R01.1 UNDIAGNOSED CARDIAC MURMURS: ICD-10-CM

## 2021-10-28 DIAGNOSIS — Z01.810 PRE-OPERATIVE CARDIOVASCULAR EXAMINATION: ICD-10-CM

## 2021-10-28 LAB
ASCENDING AORTA: 3.38 CM
AV INDEX (PROSTH): 0.55
AV MEAN GRADIENT: 6 MMHG
AV PEAK GRADIENT: 9 MMHG
AV VALVE AREA: 1.79 CM2
AV VELOCITY RATIO: 0.57
BSA FOR ECHO PROCEDURE: 1.74 M2
CV ECHO LV RWT: 0.34 CM
CV STRESS BASE HR: 70 BPM
DIASTOLIC BLOOD PRESSURE: 87 MMHG
DOP CALC AO PEAK VEL: 1.53 M/S
DOP CALC AO VTI: 36.92 CM
DOP CALC LVOT AREA: 3.3 CM2
DOP CALC LVOT DIAMETER: 2.04 CM
DOP CALC LVOT PEAK VEL: 0.87 M/S
DOP CALC LVOT STROKE VOLUME: 66.12 CM3
DOP CALCLVOT PEAK VEL VTI: 20.24 CM
E WAVE DECELERATION TIME: 236.35 MSEC
E/A RATIO: 1.22
E/E' RATIO: 7.43 M/S
ECHO LV POSTERIOR WALL: 0.86 CM (ref 0.6–1.1)
EJECTION FRACTION: 65 %
FRACTIONAL SHORTENING: 35 % (ref 28–44)
INTERVENTRICULAR SEPTUM: 0.85 CM (ref 0.6–1.1)
IVRT: 128.45 MSEC
LA MAJOR: 3.74 CM
LA MINOR: 4.24 CM
LA WIDTH: 3.45 CM
LEFT ATRIUM SIZE: 3.85 CM
LEFT ATRIUM VOLUME INDEX: 25.8 ML/M2
LEFT ATRIUM VOLUME: 44.87 CM3
LEFT CBA DIAS: 25 CM/S
LEFT CBA SYS: 76 CM/S
LEFT CCA DIST DIAS: 28 CM/S
LEFT CCA DIST SYS: 89 CM/S
LEFT CCA MID DIAS: 28 CM/S
LEFT CCA MID SYS: 95 CM/S
LEFT CCA PROX DIAS: 20 CM/S
LEFT CCA PROX SYS: 90 CM/S
LEFT ECA DIAS: 13 CM/S
LEFT ECA SYS: 57 CM/S
LEFT ICA DIST DIAS: 42 CM/S
LEFT ICA DIST SYS: 92 CM/S
LEFT ICA MID DIAS: 44 CM/S
LEFT ICA MID SYS: 111 CM/S
LEFT ICA PROX DIAS: 39 CM/S
LEFT ICA PROX SYS: 116 CM/S
LEFT INTERNAL DIMENSION IN SYSTOLE: 3.3 CM (ref 2.1–4)
LEFT VENTRICLE DIASTOLIC VOLUME INDEX: 69.89 ML/M2
LEFT VENTRICLE DIASTOLIC VOLUME: 121.6 ML
LEFT VENTRICLE MASS INDEX: 87 G/M2
LEFT VENTRICLE SYSTOLIC VOLUME INDEX: 25.4 ML/M2
LEFT VENTRICLE SYSTOLIC VOLUME: 44.18 ML
LEFT VENTRICULAR INTERNAL DIMENSION IN DIASTOLE: 5.06 CM (ref 3.5–6)
LEFT VENTRICULAR MASS: 150.97 G
LEFT VERTEBRAL DIAS: 0 CM/S
LEFT VERTEBRAL SYS: 22 CM/S
LV LATERAL E/E' RATIO: 6.5 M/S
LV SEPTAL E/E' RATIO: 8.67 M/S
MV A" WAVE DURATION": 9.42 MSEC
MV PEAK A VEL: 0.64 M/S
MV PEAK E VEL: 0.78 M/S
MV STENOSIS PRESSURE HALF TIME: 68.54 MS
MV VALVE AREA P 1/2 METHOD: 3.21 CM2
OHS CV CAROTID RIGHT ICA EDV HIGHEST: 51
OHS CV CAROTID ULTRASOUND LEFT ICA/CCA RATIO: 1.3
OHS CV CAROTID ULTRASOUND RIGHT ICA/CCA RATIO: 1.92
OHS CV CPX 85 PERCENT MAX PREDICTED HEART RATE MALE: 135
OHS CV CPX MAX PREDICTED HEART RATE: 159
OHS CV CPX PATIENT IS FEMALE: 0
OHS CV CPX PATIENT IS MALE: 1
OHS CV CPX PEAK DIASTOLIC BLOOD PRESSURE: 66 MMHG
OHS CV CPX PEAK HEAR RATE: 150 BPM
OHS CV CPX PEAK RATE PRESSURE PRODUCT: NORMAL
OHS CV CPX PEAK SYSTOLIC BLOOD PRESSURE: 147 MMHG
OHS CV CPX PERCENT MAX PREDICTED HEART RATE ACHIEVED: 94
OHS CV CPX RATE PRESSURE PRODUCT PRESENTING: 9800
OHS CV PV CAROTID LEFT HIGHEST CCA: 95
OHS CV PV CAROTID LEFT HIGHEST ICA: 116
OHS CV PV CAROTID RIGHT HIGHEST CCA: 101
OHS CV PV CAROTID RIGHT HIGHEST ICA: 144
OHS CV US CAROTID LEFT HIGHEST EDV: 44
PULM VEIN S/D RATIO: 1.18
PV PEAK D VEL: 0.49 M/S
PV PEAK S VEL: 0.58 M/S
RA MAJOR: 4.6 CM
RA PRESSURE: 3 MMHG
RA WIDTH: 2.48 CM
RIGHT CBA DIAS: 18 CM/S
RIGHT CBA SYS: 63 CM/S
RIGHT CCA DIST DIAS: 14 CM/S
RIGHT CCA DIST SYS: 75 CM/S
RIGHT CCA MID DIAS: 15 CM/S
RIGHT CCA MID SYS: 101 CM/S
RIGHT CCA PROX DIAS: 13 CM/S
RIGHT CCA PROX SYS: 91 CM/S
RIGHT ECA DIAS: 8 CM/S
RIGHT ECA SYS: 53 CM/S
RIGHT ICA DIST DIAS: 51 CM/S
RIGHT ICA DIST SYS: 139 CM/S
RIGHT ICA MID DIAS: 48 CM/S
RIGHT ICA MID SYS: 113 CM/S
RIGHT ICA PROX DIAS: 51 CM/S
RIGHT ICA PROX SYS: 144 CM/S
RIGHT VENTRICULAR END-DIASTOLIC DIMENSION: 3.13 CM
RIGHT VERTEBRAL DIAS: 22 CM/S
RIGHT VERTEBRAL SYS: 80 CM/S
SINUS: 3.72 CM
STJ: 2.75 CM
SYSTOLIC BLOOD PRESSURE: 140 MMHG
TDI LATERAL: 0.12 M/S
TDI SEPTAL: 0.09 M/S
TDI: 0.11 M/S
TRICUSPID ANNULAR PLANE SYSTOLIC EXCURSION: 2.41 CM

## 2021-10-28 PROCEDURE — 93325 STRESS ECHO (CUPID ONLY): ICD-10-PCS | Mod: 26,,, | Performed by: INTERNAL MEDICINE

## 2021-10-28 PROCEDURE — 93351 STRESS TTE COMPLETE: CPT | Mod: 26,,, | Performed by: INTERNAL MEDICINE

## 2021-10-28 PROCEDURE — 93325 DOPPLER ECHO COLOR FLOW MAPG: CPT | Mod: 26,,, | Performed by: INTERNAL MEDICINE

## 2021-10-28 PROCEDURE — 93320 STRESS ECHO (CUPID ONLY): ICD-10-PCS | Mod: 26,,, | Performed by: INTERNAL MEDICINE

## 2021-10-28 PROCEDURE — 93320 DOPPLER ECHO COMPLETE: CPT | Mod: 26,,, | Performed by: INTERNAL MEDICINE

## 2021-10-28 PROCEDURE — 93351 STRESS TTE COMPLETE: CPT | Mod: PO

## 2021-10-28 PROCEDURE — 93351 STRESS ECHO (CUPID ONLY): ICD-10-PCS | Mod: 26,,, | Performed by: INTERNAL MEDICINE

## 2021-11-01 ENCOUNTER — TELEPHONE (OUTPATIENT)
Dept: CARDIOLOGY | Facility: CLINIC | Age: 61
End: 2021-11-01
Payer: COMMERCIAL

## 2021-11-02 ENCOUNTER — TELEPHONE (OUTPATIENT)
Dept: SMOKING CESSATION | Facility: CLINIC | Age: 61
End: 2021-11-02
Payer: COMMERCIAL

## 2021-11-04 ENCOUNTER — PATIENT MESSAGE (OUTPATIENT)
Dept: FAMILY MEDICINE | Facility: CLINIC | Age: 61
End: 2021-11-04
Payer: COMMERCIAL

## 2021-11-07 ENCOUNTER — PATIENT MESSAGE (OUTPATIENT)
Dept: FAMILY MEDICINE | Facility: CLINIC | Age: 61
End: 2021-11-07
Payer: COMMERCIAL

## 2021-11-08 ENCOUNTER — TELEPHONE (OUTPATIENT)
Dept: FAMILY MEDICINE | Facility: CLINIC | Age: 61
End: 2021-11-08
Payer: COMMERCIAL

## 2021-11-08 ENCOUNTER — PATIENT OUTREACH (OUTPATIENT)
Dept: ADMINISTRATIVE | Facility: HOSPITAL | Age: 61
End: 2021-11-08
Payer: COMMERCIAL

## 2021-11-08 VITALS — SYSTOLIC BLOOD PRESSURE: 107 MMHG | DIASTOLIC BLOOD PRESSURE: 63 MMHG

## 2021-11-29 ENCOUNTER — PATIENT MESSAGE (OUTPATIENT)
Dept: FAMILY MEDICINE | Facility: CLINIC | Age: 61
End: 2021-11-29
Payer: COMMERCIAL

## 2021-11-30 PROBLEM — I77.9 PERIPHERAL ARTERIAL OCCLUSIVE DISEASE: Status: ACTIVE | Noted: 2021-07-27

## 2021-12-01 PROBLEM — Z95.828 S/P FEMORAL-FEMORAL BYPASS SURGERY: Status: ACTIVE | Noted: 2021-12-01

## 2021-12-02 ENCOUNTER — TELEPHONE (OUTPATIENT)
Dept: VASCULAR SURGERY | Facility: CLINIC | Age: 61
End: 2021-12-02
Payer: COMMERCIAL

## 2021-12-03 ENCOUNTER — PATIENT OUTREACH (OUTPATIENT)
Dept: ADMINISTRATIVE | Facility: HOSPITAL | Age: 61
End: 2021-12-03
Payer: COMMERCIAL

## 2021-12-03 ENCOUNTER — PATIENT MESSAGE (OUTPATIENT)
Dept: FAMILY MEDICINE | Facility: CLINIC | Age: 61
End: 2021-12-03
Payer: COMMERCIAL

## 2021-12-06 ENCOUNTER — OFFICE VISIT (OUTPATIENT)
Dept: FAMILY MEDICINE | Facility: CLINIC | Age: 61
End: 2021-12-06
Payer: COMMERCIAL

## 2021-12-06 VITALS
BODY MASS INDEX: 22.68 KG/M2 | WEIGHT: 141.13 LBS | HEART RATE: 73 BPM | TEMPERATURE: 99 F | DIASTOLIC BLOOD PRESSURE: 76 MMHG | SYSTOLIC BLOOD PRESSURE: 118 MMHG | HEIGHT: 66 IN

## 2021-12-06 DIAGNOSIS — D64.9 ANEMIA, UNSPECIFIED TYPE: ICD-10-CM

## 2021-12-06 DIAGNOSIS — I77.9 PERIPHERAL ARTERIAL OCCLUSIVE DISEASE: ICD-10-CM

## 2021-12-06 DIAGNOSIS — Z95.828 S/P FEMORAL-FEMORAL BYPASS SURGERY: ICD-10-CM

## 2021-12-06 DIAGNOSIS — Z09 HOSPITAL DISCHARGE FOLLOW-UP: Primary | ICD-10-CM

## 2021-12-06 PROCEDURE — 99495 TRANSJ CARE MGMT MOD F2F 14D: CPT | Mod: S$GLB,,, | Performed by: NURSE PRACTITIONER

## 2021-12-06 PROCEDURE — 99495 TCM SERVICES (MODERATE COMPLEXITY): ICD-10-PCS | Mod: S$GLB,,, | Performed by: NURSE PRACTITIONER

## 2021-12-06 PROCEDURE — 99999 PR PBB SHADOW E&M-EST. PATIENT-LVL IV: ICD-10-PCS | Mod: PBBFAC,,, | Performed by: NURSE PRACTITIONER

## 2021-12-06 PROCEDURE — 4010F ACE/ARB THERAPY RXD/TAKEN: CPT | Mod: CPTII,S$GLB,, | Performed by: NURSE PRACTITIONER

## 2021-12-06 PROCEDURE — 4010F PR ACE/ARB THEARPY RXD/TAKEN: ICD-10-PCS | Mod: CPTII,S$GLB,, | Performed by: NURSE PRACTITIONER

## 2021-12-06 PROCEDURE — 99999 PR PBB SHADOW E&M-EST. PATIENT-LVL IV: CPT | Mod: PBBFAC,,, | Performed by: NURSE PRACTITIONER

## 2021-12-07 ENCOUNTER — PATIENT MESSAGE (OUTPATIENT)
Dept: VASCULAR SURGERY | Facility: CLINIC | Age: 61
End: 2021-12-07
Payer: COMMERCIAL

## 2021-12-08 ENCOUNTER — PATIENT MESSAGE (OUTPATIENT)
Dept: VASCULAR SURGERY | Facility: CLINIC | Age: 61
End: 2021-12-08
Payer: COMMERCIAL

## 2021-12-16 ENCOUNTER — OFFICE VISIT (OUTPATIENT)
Dept: VASCULAR SURGERY | Facility: CLINIC | Age: 61
End: 2021-12-16
Payer: COMMERCIAL

## 2021-12-16 VITALS
WEIGHT: 136.69 LBS | HEIGHT: 66 IN | HEART RATE: 67 BPM | SYSTOLIC BLOOD PRESSURE: 114 MMHG | DIASTOLIC BLOOD PRESSURE: 72 MMHG | BODY MASS INDEX: 21.97 KG/M2

## 2021-12-16 DIAGNOSIS — I73.9 PAD (PERIPHERAL ARTERY DISEASE): Primary | ICD-10-CM

## 2021-12-16 PROCEDURE — 99999 PR PBB SHADOW E&M-EST. PATIENT-LVL IV: ICD-10-PCS | Mod: PBBFAC,,, | Performed by: THORACIC SURGERY (CARDIOTHORACIC VASCULAR SURGERY)

## 2021-12-16 PROCEDURE — 4010F PR ACE/ARB THEARPY RXD/TAKEN: ICD-10-PCS | Mod: CPTII,S$GLB,, | Performed by: THORACIC SURGERY (CARDIOTHORACIC VASCULAR SURGERY)

## 2021-12-16 PROCEDURE — 99024 PR POST-OP FOLLOW-UP VISIT: ICD-10-PCS | Mod: S$GLB,,, | Performed by: THORACIC SURGERY (CARDIOTHORACIC VASCULAR SURGERY)

## 2021-12-16 PROCEDURE — 99024 POSTOP FOLLOW-UP VISIT: CPT | Mod: S$GLB,,, | Performed by: THORACIC SURGERY (CARDIOTHORACIC VASCULAR SURGERY)

## 2021-12-16 PROCEDURE — 99999 PR PBB SHADOW E&M-EST. PATIENT-LVL IV: CPT | Mod: PBBFAC,,, | Performed by: THORACIC SURGERY (CARDIOTHORACIC VASCULAR SURGERY)

## 2021-12-16 PROCEDURE — 4010F ACE/ARB THERAPY RXD/TAKEN: CPT | Mod: CPTII,S$GLB,, | Performed by: THORACIC SURGERY (CARDIOTHORACIC VASCULAR SURGERY)

## 2021-12-17 ENCOUNTER — TELEPHONE (OUTPATIENT)
Dept: VASCULAR SURGERY | Facility: CLINIC | Age: 61
End: 2021-12-17
Payer: COMMERCIAL

## 2021-12-17 DIAGNOSIS — I77.9 PERIPHERAL ARTERIAL OCCLUSIVE DISEASE: ICD-10-CM

## 2021-12-17 DIAGNOSIS — I73.9 PAD (PERIPHERAL ARTERY DISEASE): Primary | ICD-10-CM

## 2021-12-23 ENCOUNTER — HOSPITAL ENCOUNTER (OUTPATIENT)
Dept: RADIOLOGY | Facility: HOSPITAL | Age: 61
Discharge: HOME | End: 2021-12-23
Attending: THORACIC SURGERY (CARDIOTHORACIC VASCULAR SURGERY)
Payer: COMMERCIAL

## 2021-12-23 DIAGNOSIS — I73.9 PAD (PERIPHERAL ARTERY DISEASE): ICD-10-CM

## 2021-12-23 DIAGNOSIS — I77.9 PERIPHERAL ARTERIAL OCCLUSIVE DISEASE: ICD-10-CM

## 2021-12-23 PROCEDURE — 93922 UPR/L XTREMITY ART 2 LEVELS: CPT | Mod: 26,,, | Performed by: RADIOLOGY

## 2021-12-23 PROCEDURE — 93922 US ARTERIAL LOWER EXTREMITY BILAT WITH ABI (XPD): ICD-10-PCS | Mod: 26,,, | Performed by: RADIOLOGY

## 2021-12-23 PROCEDURE — 93925 US ARTERIAL LOWER EXTREMITY BILAT WITH ABI (XPD): ICD-10-PCS | Mod: 26,,, | Performed by: RADIOLOGY

## 2021-12-23 PROCEDURE — 93922 UPR/L XTREMITY ART 2 LEVELS: CPT | Mod: TC,PO

## 2021-12-23 PROCEDURE — 93925 LOWER EXTREMITY STUDY: CPT | Mod: 26,,, | Performed by: RADIOLOGY

## 2022-01-04 ENCOUNTER — PATIENT MESSAGE (OUTPATIENT)
Dept: FAMILY MEDICINE | Facility: CLINIC | Age: 62
End: 2022-01-04
Payer: COMMERCIAL

## 2022-01-07 ENCOUNTER — TELEPHONE (OUTPATIENT)
Dept: VASCULAR SURGERY | Facility: CLINIC | Age: 62
End: 2022-01-07
Payer: COMMERCIAL

## 2022-01-07 DIAGNOSIS — I73.9 PAD (PERIPHERAL ARTERY DISEASE): Primary | ICD-10-CM

## 2022-01-07 NOTE — TELEPHONE ENCOUNTER
Spoke with patient and informed him that Dr. Briggs reviewed his results of US and recommends another US in 6 months. Verbalized understanding and agreeable.

## 2022-01-10 RX ORDER — PANTOPRAZOLE SODIUM 40 MG/1
TABLET, DELAYED RELEASE ORAL
Qty: 90 TABLET | Refills: 4 | Status: SHIPPED | OUTPATIENT
Start: 2022-01-10 | End: 2023-03-29

## 2022-01-10 NOTE — TELEPHONE ENCOUNTER
No new care gaps identified.  Powered by [employer]: turn off the lights. Reference number: [id number].   1/10/2022 11:38:17 AM CST

## 2022-01-11 ENCOUNTER — PATIENT OUTREACH (OUTPATIENT)
Dept: ADMINISTRATIVE | Facility: OTHER | Age: 62
End: 2022-01-11
Payer: COMMERCIAL

## 2022-01-11 NOTE — PROGRESS NOTES
LINKS immunization registry updated  Care Everywhere updated  Health Maintenance updated  Chart reviewed for overdue Proactive [facility] Encounters ([facility]YANA) health maintenance testing (CRS, Breast Ca, Diabetic Eye Exam)   Orders entered:N/A

## 2022-01-14 ENCOUNTER — OFFICE VISIT (OUTPATIENT)
Dept: CARDIOLOGY | Facility: CLINIC | Age: 62
End: 2022-01-14
Payer: COMMERCIAL

## 2022-01-14 VITALS
SYSTOLIC BLOOD PRESSURE: 133 MMHG | WEIGHT: 142.44 LBS | HEART RATE: 58 BPM | DIASTOLIC BLOOD PRESSURE: 82 MMHG | HEIGHT: 66 IN | BODY MASS INDEX: 22.89 KG/M2

## 2022-01-14 DIAGNOSIS — Z79.02 LONG TERM (CURRENT) USE OF ANTITHROMBOTICS/ANTIPLATELETS: Chronic | ICD-10-CM

## 2022-01-14 DIAGNOSIS — Z71.6 TOBACCO ABUSE COUNSELING: Chronic | ICD-10-CM

## 2022-01-14 DIAGNOSIS — E78.5 DYSLIPIDEMIA (HIGH LDL; LOW HDL): Chronic | ICD-10-CM

## 2022-01-14 DIAGNOSIS — I77.810 MILD ASCENDING AORTA DILATATION: Chronic | ICD-10-CM

## 2022-01-14 DIAGNOSIS — I10 ESSENTIAL HYPERTENSION: Chronic | ICD-10-CM

## 2022-01-14 DIAGNOSIS — I77.9 PERIPHERAL ARTERIAL OCCLUSIVE DISEASE: Chronic | ICD-10-CM

## 2022-01-14 DIAGNOSIS — I65.21 CAROTID STENOSIS, ASYMPTOMATIC, RIGHT: Primary | Chronic | ICD-10-CM

## 2022-01-14 PROCEDURE — 1159F MED LIST DOCD IN RCRD: CPT | Mod: CPTII,S$GLB,, | Performed by: INTERNAL MEDICINE

## 2022-01-14 PROCEDURE — 99214 OFFICE O/P EST MOD 30 MIN: CPT | Mod: S$GLB,,, | Performed by: INTERNAL MEDICINE

## 2022-01-14 PROCEDURE — 3075F PR MOST RECENT SYSTOLIC BLOOD PRESS GE 130-139MM HG: ICD-10-PCS | Mod: CPTII,S$GLB,, | Performed by: INTERNAL MEDICINE

## 2022-01-14 PROCEDURE — 1159F PR MEDICATION LIST DOCUMENTED IN MEDICAL RECORD: ICD-10-PCS | Mod: CPTII,S$GLB,, | Performed by: INTERNAL MEDICINE

## 2022-01-14 PROCEDURE — 3079F PR MOST RECENT DIASTOLIC BLOOD PRESSURE 80-89 MM HG: ICD-10-PCS | Mod: CPTII,S$GLB,, | Performed by: INTERNAL MEDICINE

## 2022-01-14 PROCEDURE — 3075F SYST BP GE 130 - 139MM HG: CPT | Mod: CPTII,S$GLB,, | Performed by: INTERNAL MEDICINE

## 2022-01-14 PROCEDURE — 99999 PR PBB SHADOW E&M-EST. PATIENT-LVL III: ICD-10-PCS | Mod: PBBFAC,,, | Performed by: INTERNAL MEDICINE

## 2022-01-14 PROCEDURE — 99214 PR OFFICE/OUTPT VISIT, EST, LEVL IV, 30-39 MIN: ICD-10-PCS | Mod: S$GLB,,, | Performed by: INTERNAL MEDICINE

## 2022-01-14 PROCEDURE — 3008F BODY MASS INDEX DOCD: CPT | Mod: CPTII,S$GLB,, | Performed by: INTERNAL MEDICINE

## 2022-01-14 PROCEDURE — 3008F PR BODY MASS INDEX (BMI) DOCUMENTED: ICD-10-PCS | Mod: CPTII,S$GLB,, | Performed by: INTERNAL MEDICINE

## 2022-01-14 PROCEDURE — 3079F DIAST BP 80-89 MM HG: CPT | Mod: CPTII,S$GLB,, | Performed by: INTERNAL MEDICINE

## 2022-01-14 PROCEDURE — 99999 PR PBB SHADOW E&M-EST. PATIENT-LVL III: CPT | Mod: PBBFAC,,, | Performed by: INTERNAL MEDICINE

## 2022-01-14 RX ORDER — ROSUVASTATIN CALCIUM 10 MG/1
10 TABLET, COATED ORAL NIGHTLY
Qty: 90 TABLET | Refills: 0 | Status: SHIPPED | OUTPATIENT
Start: 2022-01-14 | End: 2022-02-05

## 2022-01-14 NOTE — PROGRESS NOTES
Subjective:    Patient ID:  Sal Villarreal Jr. is a 61 y.o. male who presents for No chief complaint on file.        HPI    NEGATIVE SE,MILDLY DIALTED ASECNDING AORTA,  40-49% JING STENOSIS, STILL SMOKES 1 PPD,HAD FEM POP BYPASS 12/1/2021,  SEE ROS  Past Medical History:   Diagnosis Date    Anxiety     Arthritis     Depression     Digestive disorder     ulcers    Fatigue     History of acute bronchitis     History of psychiatric care     Hypertension     Intervertebral disc disorder with radiculopathy of lumbar region 3/23/2021    Psychiatric problem     PVD (peripheral vascular disease)     S/P femoral-femoral bypass surgery 12/1/2021     Past Surgical History:   Procedure Laterality Date    ANTERIOR LUMBAR INTERBODY FUSION (ALIF) Right 3/23/2021    Procedure: FUSION, SPINE, LUMBAR, ALIF RIGHT L5-S1;  Surgeon: Kelly Ayala MD;  Location: [facility];  Service: Neurosurgery;  Laterality: Right;    AORTOGRAPHY WITH EXTREMITY RUNOFF N/A 7/27/2021    Procedure: AORTOGRAM, WITH EXTREMITY RUNOFF;  Surgeon: Jim Briggs MD;  Location: [facility] CATH;  Service: Cardiovascular;  Laterality: N/A;    AORTOGRAPHY WITH SERIALOGRAPHY Bilateral 7/27/2021    Procedure: AORTOGRAM, WITH SERIALOGRAPHY;  Surgeon: Jim Briggs MD;  Location: [facility] CATH;  Service: Cardiovascular;  Laterality: Bilateral;    CREATION OF FEMORAL-FEMORAL ARTERY BYPASS WITH GRAFT Left 11/30/2021    Procedure: CREATION, BYPASS, ARTERIAL, FEMORAL TO FEMORAL, USING GRAFT;  Surgeon: Jim Briggs MD;  Location: [facility] OR;  Service: Cardiovascular;  Laterality: Left;    ENDARTERECTOMY OF FEMORAL ARTERY Bilateral 11/30/2021    Procedure: ENDARTERECTOMY, FEMORAL;  Surgeon: Jim Briggs MD;  Location: [facility] OR;  Service: Cardiovascular;  Laterality: Bilateral;    FUSION OF LUMBAR SPINE BY ANTERIOR APPROACH  3/23/2021    Procedure: FUSION, SPINE, LUMBAR, ANTERIOR APPROACH;  Surgeon: Jim Brgigs MD;  Location: [facility] OR;  Service: Cardiovascular;;     HARVESTING OF BONE GRAFT N/A 3/23/2021    Procedure: SURGICAL PROCUREMENT, BONE GRAFT-Iliac;  Surgeon: Kelly Ayala MD;  Location: [facility] OR;  Service: Neurosurgery;  Laterality: N/A;    HYPOSPADIAS CORRECTION      VASECTOMY       Family History   Problem Relation Age of Onset    Diabetes Mother     Hypertension Mother     Diabetes Father     Hypertension Father     Cancer Sister     Muscular dystrophy Son     Arthritis Paternal Grandfather     Birth defects Brother     Heart disease Maternal Aunt     Suicide Neg Hx     Sexual abuse Neg Hx     Self injury Neg Hx     Seizures Neg Hx     Schizophrenia Neg Hx     Physical abuse Neg Hx     Paranoid behavior Neg Hx     OCD Neg Hx     Drug abuse Neg Hx     Depression Neg Hx     Dementia Neg Hx     Bipolar disorder Neg Hx     Anxiety disorder Neg Hx     Alcohol abuse Neg Hx      Social History     Socioeconomic History    Marital status:    Occupational History    Occupation: unemployed     Employer: [employer]   Tobacco Use    Smoking status: Current Every Day Smoker     Packs/day: 1.00     Years: 42.00     Pack years: 42.00     Types: Cigarettes    Smokeless tobacco: Never Used   Substance and Sexual Activity    Alcohol use: Not Currently     Alcohol/week: 42.0 standard drinks     Types: 42 Cans of beer per week     Comment: stopped recently - last drink 3/21    Drug use: No    Sexual activity: Yes     Partners: Female, Male   Other Topics Concern    Patient feels they ought to cut down on drinking/drug use Yes    Patient annoyed by others criticizing their drinking/drug use Yes    Patient has felt bad or guilty about drinking/drug use Yes    Patient has had a drink/used drugs as an eye opener in the AM No     Social Determinants of Health     Financial Resource Strain: Low Risk     Difficulty of Paying Living Expenses: Not hard at all   Food Insecurity: No Food Insecurity    Worried About Running Out of  Food in the Last Year: Never true    Ran Out of Food in the Last Year: Never true   Transportation Needs: No Transportation Needs    Lack of Transportation (Medical): No    Lack of Transportation (Non-Medical): No   Physical Activity: Sufficiently Active    Days of Exercise per Week: 6 days    Minutes of Exercise per Session: 150+ min   Stress: Stress Concern Present    Feeling of Stress : To some extent   Social Connections: Unknown    Frequency of Communication with Friends and Family: More than three times a week    Frequency of Social Gatherings with Friends and Family: More than three times a week    Active Member of Clubs or Organizations: No    Attends Club or Organization Meetings: Never    Marital Status:    Housing Stability: Low Risk     Unable to Pay for Housing in the Last Year: No    Number of Places Lived in the Last Year: 1    Unstable Housing in the Last Year: No       Review of patient's allergies indicates:   Allergen Reactions    Blueberry        Current Outpatient Medications:     aspirin (ECOTRIN) 81 MG EC tablet, Take 1 tablet (81 mg total) by mouth once daily., Disp: 90 tablet, Rfl: 0    atenoloL (TENORMIN) 25 MG tablet, Take 1 tablet (25 mg total) by mouth every morning., Disp: 90 tablet, Rfl: 4    clopidogreL (PLAVIX) 75 mg tablet, Take 1 tablet (75 mg total) by mouth once daily., Disp: 30 tablet, Rfl: 2    DENTA 5000 PLUS 1.1 % Crea, Take 1 application by mouth 2 (two) times daily., Disp: , Rfl:     diclofenac (VOLTAREN) 25 MG TbEC, Take 25 mg by mouth 2 (two) times daily as needed., Disp: , Rfl:     enalapril (VASOTEC) 20 MG tablet, Take 1 tablet (20 mg total) by mouth every morning., Disp: 90 tablet, Rfl: 4    famotidine (PEPCID) 20 MG tablet, Take 20 mg by mouth every evening., Disp: , Rfl:     gabapentin (NEURONTIN) 300 MG capsule, Take 300 mg by mouth every evening., Disp: , Rfl:     multivitamin (THERAGRAN) per tablet, Take 1 tablet by mouth once  "daily., Disp: , Rfl:     pantoprazole (PROTONIX) 40 MG tablet, TAKE 1 TABLET BY MOUTH EVERY DAY, Disp: 90 tablet, Rfl: 4    sildenafiL (VIAGRA) 100 MG tablet, Take 100 mg by mouth as needed., Disp: , Rfl:     HYDROcodone-acetaminophen (NORCO) 5-325 mg per tablet, Take 1 tablet by mouth every 6 (six) hours as needed for Pain., Disp: 28 tablet, Rfl: 0    rosuvastatin (CRESTOR) 10 MG tablet, Take 1 tablet (10 mg total) by mouth every evening., Disp: 90 tablet, Rfl: 0    Review of Systems   Constitutional: Negative for chills, diaphoresis, fever, malaise/fatigue and night sweats.   HENT: Negative for congestion and nosebleeds.    Eyes: Negative for blurred vision (READING) and visual disturbance.   Cardiovascular: Negative for chest pain, claudication (BETTER), cyanosis, dyspnea on exertion, irregular heartbeat, leg swelling (OCC), near-syncope, orthopnea, palpitations, paroxysmal nocturnal dyspnea and syncope.   Respiratory: Negative for cough, hemoptysis, shortness of breath, sleep disturbances due to breathing and wheezing.    Endocrine: Negative.    Hematologic/Lymphatic: Negative for adenopathy. Does not bruise/bleed easily.   Skin: Negative for color change and rash.   Musculoskeletal: Positive for back pain. Negative for falls.   Gastrointestinal: Negative for abdominal pain, change in bowel habit, dysphagia, jaundice, melena, nausea and vomiting.   Genitourinary: Negative for dysuria and flank pain.   Neurological: Negative for brief paralysis, dizziness, focal weakness, light-headedness, loss of balance, numbness and weakness.   Psychiatric/Behavioral: Negative for altered mental status, depression and memory loss. The patient is not nervous/anxious.    Allergic/Immunologic: Negative.         Objective:      Vitals:    01/14/22 1144   BP: 133/82   Pulse: (!) 58   Weight: 64.6 kg (142 lb 6.7 oz)   Height: 5' 6" (1.676 m)   PainSc: 0-No pain     Body mass index is 22.99 kg/m².    Physical Exam  HENT:      " Head: Normocephalic and atraumatic.   Eyes:      Conjunctiva/sclera: Conjunctivae normal.      Pupils: Pupils are equal, round, and reactive to light.   Neck:      Vascular: Carotid bruit present.   Cardiovascular:      Rate and Rhythm: Normal rate and regular rhythm.      Pulses:           Carotid pulses are 2+ on the right side with bruit and 2+ on the left side.       Radial pulses are 2+ on the right side and 2+ on the left side.        Femoral pulses are 2+ on the right side with bruit and 2+ on the left side with bruit.       Posterior tibial pulses are 1+ on the right side and 1+ on the left side.      Heart sounds: No friction rub. No gallop.    Pulmonary:      Effort: Pulmonary effort is normal.      Breath sounds: Rhonchi present.   Abdominal:      Palpations: Abdomen is soft.      Tenderness: There is no abdominal tenderness.       Musculoskeletal:      Cervical back: Neck supple.      Right lower leg: No edema.      Left lower leg: No edema.   Skin:     General: Skin is warm and dry.      Capillary Refill: Capillary refill takes less than 2 seconds.   Neurological:      General: No focal deficit present.      Mental Status: He is alert.   Psychiatric:         Mood and Affect: Mood normal.         Behavior: Behavior normal.                 ..    Chemistry        Component Value Date/Time     (L) 12/02/2021 0515    K 3.6 12/02/2021 0515     12/02/2021 0515    CO2 26 12/02/2021 0515    BUN 3 (L) 12/02/2021 0515    CREATININE 0.71 12/02/2021 0515     12/02/2021 0515        Component Value Date/Time    CALCIUM 8.4 12/02/2021 0515    ALKPHOS 115 11/29/2021 1520    AST 37 11/29/2021 1520    ALT 24 11/29/2021 1520    BILITOT 0.9 11/29/2021 1520    ESTGFRAFRICA >60 12/02/2021 0515    EGFRNONAA >60 12/02/2021 0515            ..  Lab Results   Component Value Date    CHOL 120 11/29/2021    CHOL 143 12/12/2019    CHOL 117 (L) 08/08/2019     Lab Results   Component Value Date    HDL 23 (L)  11/29/2021    HDL 31 (L) 12/12/2019    HDL 30 (L) 08/08/2019     Lab Results   Component Value Date    LDLCALC 63.4 11/29/2021    LDLCALC 76.0 12/12/2019    LDLCALC 69.2 08/08/2019     Lab Results   Component Value Date    TRIG 168 (H) 11/29/2021    TRIG 180 (H) 12/12/2019    TRIG 89 08/08/2019     Lab Results   Component Value Date    CHOLHDL 19.2 (L) 11/29/2021    CHOLHDL 21.7 12/12/2019    CHOLHDL 25.6 08/08/2019     ..  Lab Results   Component Value Date    WBC 10.55 12/02/2021    HGB 9.8 (L) 12/02/2021    HCT 28.5 (L) 12/02/2021    MCV 91 12/02/2021     (L) 12/02/2021       Test(s) Reviewed  I have reviewed the following in detail:  [x] Stress test   [] Angiography   [x] Echocardiogram   [x] Labs   [x] Other:       Assessment:         ICD-10-CM ICD-9-CM   1. Carotid stenosis, asymptomatic, right  I65.21 433.10   2. Tobacco abuse counseling  Z71.6 V65.42     305.1   3. Long term (current) use of antithrombotics/antiplatelets  Z79.02 V58.63   4. Peripheral arterial occlusive disease  I77.9 444.22   5. Mild ascending aorta dilatation  I77.810 447.71   6. Dyslipidemia (high LDL; low HDL)  E78.5 272.4   7. Essential hypertension  I10 401.9     Problem List Items Addressed This Visit        Cardiac/Vascular    Essential hypertension (Chronic)    Relevant Orders    Comprehensive Metabolic Panel    Peripheral arterial occlusive disease    Overview     Date of Service: 07/27/2021  DATE OF PROCEDURE:  07/27/2021.     PREOPERATIVE DIAGNOSES:  1.  Peripheral arterial occlusive disease.  2.  Pain of the right hip.  3.  Severe intermittent claudication of the right lower extremity affecting   activities of daily living.     POSTOPERATIVE DIAGNOSES:  1.  Peripheral arterial occlusive disease.  2.  Pain of the right hip  3.  Severe intermittent claudication of the right lower extremity affecting   activities of daily living.     OPERATIONS:  1.  Aortogram with bilateral renal arteriogram.  2.  Bilateral common iliac  arteriograms with bilateral lower extremity runoff.  3.  Measurement of pressures in the aorta and the left iliac artery.     SURGEON:  Momo Briggs M.D., F.A.C.S.     ANESTHESIA:  Lidocaine 1% for local and intravenous sedation using 4 mg of   Versed and 100 mcg of fentanyl IV.  The patient's level of consciousness was   monitored by the registered nurse from 10:59-11:30 a.m.  Other monitors included   blood pressure, pulse oximetry, heart rate and respiratory rate.     PROCEDURE IN DETAIL:  With the patient in the supine position on the cardiac   cath table, bilateral groins were prepped and draped in a sterile fashion.  CT   angiography had showed an occluded right external iliac artery and a 50%   stenosis of the left common iliac artery.  Access to the left common femoral   artery was obtained using an 18-gauge access needle and a guidewire was passed   through this.  A 5-Azeri sheath was passed over the guidewire and the guidewire   and dilator were removed and the sheath was aspirated and flushed.  A J-tip   guidewire was passed through the sheath and advanced up into the suprarenal   aorta under fluoroscopy.  The guidewire was removed.  Contrast was injected and   digital subtraction angiography was performed and an abdominal aortogram with   bilateral renal arteriograms was done.  The renal arteries were patent.  The   infrarenal aorta had calcified plaque with some mild luminal irregularities, but   no significant stenosis.     The angiographic catheter was pulled down into the distal aorta just above the   aortic bifurcation.  Contrast was injected and cineangiography was performed,   and bilateral common iliac arteriograms with bilateral lower extremity runoff   were performed.  The right common iliac artery was patent.  The right   hypogastric artery was patent and large, giving off a lot of collaterals around   the hip.  The right external iliac artery and the right common femoral artery   were both  occluded.  The very distal common femoral artery reconstituted a   couple of millimeters above the femoral bifurcation.  The profunda femoris and   the superficial femoral artery and the right popliteal artery were all patent.    Runoff was via 3 vessels.     The left common iliac artery had some luminal irregularities, but did not seem   to have high-grade stenosis.  The left hypogastric and the left external iliac   arteries were patent, although the left external iliac artery seemed to have   about 20% stenosis distally.  The left common femoral, the left profunda, the   left superficial femoral and the left popliteal arteries were patent.  Runoff   was via 3 vessels.  Bilateral oblique arteriograms of the iliac arteries were   then performed to see if there was any stenosis that could not be seen with the   anteroposterior projections.  No high-grade stenosis in the left common iliac   artery could be seen.  The Omniflush angiographic catheter was then exchanged   for a straight Glidecath.  Pressures were then measured in the infrarenal aorta   and the catheter was pulled back with continuous blood pressure measurements   down into the left common iliac and then into the left external iliac artery.    There was absolutely no change in pressure from the aorta into the common iliac   or into the external iliac artery.  No intervention was performed.  The sheath   was pulled from the left common femoral artery and pressure was held for about   20 minutes.  At the end of that time, there was no bleeding and no hematoma.    Sterile dressing was placed.  The patient tolerated the procedure and left the   Cardiac Cath Lab in satisfactory and stable condition.        RON  dd: 07/27/2021 11:52:36 (CDT)  td: 07/27/2021 13:12:39 (CDT)  Doc ID   #[id number]  Job ID #[id number]     CC:          Last signed by: Jim Briggs MD at 7/27/2021  3:08 PM              Relevant Orders    Comprehensive Metabolic Panel    Dyslipidemia (high  LDL; low HDL)    Relevant Orders    Comprehensive Metabolic Panel    Lipid Panel    Carotid stenosis, asymptomatic, right - Primary    Mild ascending aorta dilatation       Hematology    Long term (current) use of antithrombotics/antiplatelets       Other    Tobacco abuse counseling           Plan:     EXTENSIVE COUNSELING REGARDING TOBACCO CESSATION, WATCH BLOOD PRESSURE,ALL CV CLINICALLY RELATIVELY STABLE, NO ANGINA, NO HF, NO TIA, NO CLINICAL ARRHYTHMIA,CONTINUE CURRENT MEDS, EDUCATION, DIET, EXERCISE, WALKING EXERCISE RETURN TO CLINIC IN 4 MO WITH LABS, TOBACCO CESSATION      Carotid stenosis, asymptomatic, right  Comments:  NO TIA    Tobacco abuse counseling  Comments:  COUNSELING    Long term (current) use of antithrombotics/antiplatelets    Peripheral arterial occlusive disease  -     Comprehensive Metabolic Panel; Future; Expected date: 05/14/2022    Mild ascending aorta dilatation    Dyslipidemia (high LDL; low HDL)  -     Comprehensive Metabolic Panel; Future; Expected date: 05/14/2022  -     Lipid Panel; Future; Expected date: 05/14/2022    Essential hypertension  -     Comprehensive Metabolic Panel; Future; Expected date: 05/14/2022    Other orders  -     rosuvastatin (CRESTOR) 10 MG tablet; Take 1 tablet (10 mg total) by mouth every evening.  Dispense: 90 tablet; Refill: 0    RTC Low level/low impact aerobic exercise 5x's/wk. Heart healthy diet and risk factor modification.    See labs and med orders.    Aerobic exercise 5x's/wk. Heart healthy diet and risk factor modification.    See labs and med orders.

## 2022-01-17 ENCOUNTER — PATIENT MESSAGE (OUTPATIENT)
Dept: VASCULAR SURGERY | Facility: CLINIC | Age: 62
End: 2022-01-17
Payer: COMMERCIAL

## 2022-01-18 ENCOUNTER — PATIENT MESSAGE (OUTPATIENT)
Dept: VASCULAR SURGERY | Facility: CLINIC | Age: 62
End: 2022-01-18
Payer: COMMERCIAL

## 2022-01-19 ENCOUNTER — PATIENT MESSAGE (OUTPATIENT)
Dept: VASCULAR SURGERY | Facility: CLINIC | Age: 62
End: 2022-01-19
Payer: COMMERCIAL

## 2022-01-19 ENCOUNTER — PATIENT MESSAGE (OUTPATIENT)
Dept: FAMILY MEDICINE | Facility: CLINIC | Age: 62
End: 2022-01-19
Payer: COMMERCIAL

## 2022-01-19 DIAGNOSIS — Z98.1 STATUS POST LUMBAR SPINAL FUSION: Primary | ICD-10-CM

## 2022-01-19 DIAGNOSIS — M51.16 INTERVERTEBRAL DISC DISORDER WITH RADICULOPATHY OF LUMBAR REGION: ICD-10-CM

## 2022-01-20 ENCOUNTER — TELEPHONE (OUTPATIENT)
Dept: FAMILY MEDICINE | Facility: CLINIC | Age: 62
End: 2022-01-20
Payer: COMMERCIAL

## 2022-01-20 ENCOUNTER — TELEPHONE (OUTPATIENT)
Dept: NEUROSURGERY | Facility: CLINIC | Age: 62
End: 2022-01-20
Payer: COMMERCIAL

## 2022-01-20 NOTE — TELEPHONE ENCOUNTER
Patient had note in chart that he was to see a neurosurgeon that was recommended to him.  I called to verify this because the referral was in my WQ.  Patient verified that he has an appt with an outside physician.      Sury Phillips RN

## 2022-01-20 NOTE — TELEPHONE ENCOUNTER
I have signed for the following orders AND/OR meds.  Please call the patient and ask the patient to schedule the testing AND/OR inform about any medications that were sent.      Orders Placed This Encounter   Procedures    Ambulatory referral/consult to Neurosurgery     Standing Status:   Future     Standing Expiration Date:   2/20/2023     Referral Priority:   Routine     Referral Type:   Consultation     Referral Reason:   Specialty Services Required     Requested Specialty:   Neurosurgery     Number of Visits Requested:   1

## 2022-01-20 NOTE — TELEPHONE ENCOUNTER
----- Message from Sharri Azul sent at 1/20/2022  8:59 AM CST -----  Pt is returning Angella call. Pt can be reached at [phone]

## 2022-02-07 ENCOUNTER — TELEPHONE (OUTPATIENT)
Dept: FAMILY MEDICINE | Facility: CLINIC | Age: 62
End: 2022-02-07
Payer: COMMERCIAL

## 2022-02-07 NOTE — TELEPHONE ENCOUNTER
----- Message from Anusha Ruby sent at 2/7/2022  3:11 PM CST -----  Doing PA on sildenafil citrate.  I need a diagnosis and code please.  thanks

## 2022-02-08 ENCOUNTER — OFFICE VISIT (OUTPATIENT)
Dept: FAMILY MEDICINE | Facility: CLINIC | Age: 62
End: 2022-02-08
Payer: COMMERCIAL

## 2022-02-08 ENCOUNTER — HOSPITAL ENCOUNTER (OUTPATIENT)
Dept: RADIOLOGY | Facility: HOSPITAL | Age: 62
Discharge: HOME | End: 2022-02-08
Attending: NURSE PRACTITIONER
Payer: COMMERCIAL

## 2022-02-08 VITALS
DIASTOLIC BLOOD PRESSURE: 76 MMHG | BODY MASS INDEX: 22.82 KG/M2 | HEIGHT: 66 IN | TEMPERATURE: 98 F | SYSTOLIC BLOOD PRESSURE: 123 MMHG | WEIGHT: 142 LBS | HEART RATE: 68 BPM

## 2022-02-08 DIAGNOSIS — M25.521 RIGHT ELBOW PAIN: ICD-10-CM

## 2022-02-08 DIAGNOSIS — M25.521 RIGHT ELBOW PAIN: Primary | ICD-10-CM

## 2022-02-08 PROCEDURE — 99214 PR OFFICE/OUTPT VISIT, EST, LEVL IV, 30-39 MIN: ICD-10-PCS | Mod: S$GLB,,, | Performed by: NURSE PRACTITIONER

## 2022-02-08 PROCEDURE — 73080 X-RAY EXAM OF ELBOW: CPT | Mod: 26,RT,, | Performed by: RADIOLOGY

## 2022-02-08 PROCEDURE — 73080 XR ELBOW COMPLETE 3 VIEW RIGHT: ICD-10-PCS | Mod: 26,RT,, | Performed by: RADIOLOGY

## 2022-02-08 PROCEDURE — 99214 OFFICE O/P EST MOD 30 MIN: CPT | Mod: S$GLB,,, | Performed by: NURSE PRACTITIONER

## 2022-02-08 PROCEDURE — 1159F PR MEDICATION LIST DOCUMENTED IN MEDICAL RECORD: ICD-10-PCS | Mod: CPTII,S$GLB,, | Performed by: NURSE PRACTITIONER

## 2022-02-08 PROCEDURE — 99999 PR PBB SHADOW E&M-EST. PATIENT-LVL IV: CPT | Mod: PBBFAC,,, | Performed by: NURSE PRACTITIONER

## 2022-02-08 PROCEDURE — 3074F PR MOST RECENT SYSTOLIC BLOOD PRESSURE < 130 MM HG: ICD-10-PCS | Mod: CPTII,S$GLB,, | Performed by: NURSE PRACTITIONER

## 2022-02-08 PROCEDURE — 99999 PR PBB SHADOW E&M-EST. PATIENT-LVL IV: ICD-10-PCS | Mod: PBBFAC,,, | Performed by: NURSE PRACTITIONER

## 2022-02-08 PROCEDURE — 73080 X-RAY EXAM OF ELBOW: CPT | Mod: TC,PO,RT

## 2022-02-08 PROCEDURE — 3008F BODY MASS INDEX DOCD: CPT | Mod: CPTII,S$GLB,, | Performed by: NURSE PRACTITIONER

## 2022-02-08 PROCEDURE — 3008F PR BODY MASS INDEX (BMI) DOCUMENTED: ICD-10-PCS | Mod: CPTII,S$GLB,, | Performed by: NURSE PRACTITIONER

## 2022-02-08 PROCEDURE — 1160F RVW MEDS BY RX/DR IN RCRD: CPT | Mod: CPTII,S$GLB,, | Performed by: NURSE PRACTITIONER

## 2022-02-08 PROCEDURE — 1159F MED LIST DOCD IN RCRD: CPT | Mod: CPTII,S$GLB,, | Performed by: NURSE PRACTITIONER

## 2022-02-08 PROCEDURE — 3078F DIAST BP <80 MM HG: CPT | Mod: CPTII,S$GLB,, | Performed by: NURSE PRACTITIONER

## 2022-02-08 PROCEDURE — 3074F SYST BP LT 130 MM HG: CPT | Mod: CPTII,S$GLB,, | Performed by: NURSE PRACTITIONER

## 2022-02-08 PROCEDURE — 1160F PR REVIEW ALL MEDS BY PRESCRIBER/CLIN PHARMACIST DOCUMENTED: ICD-10-PCS | Mod: CPTII,S$GLB,, | Performed by: NURSE PRACTITIONER

## 2022-02-08 PROCEDURE — 3078F PR MOST RECENT DIASTOLIC BLOOD PRESSURE < 80 MM HG: ICD-10-PCS | Mod: CPTII,S$GLB,, | Performed by: NURSE PRACTITIONER

## 2022-02-08 RX ORDER — PREDNISONE 20 MG/1
40 TABLET ORAL DAILY
Qty: 10 TABLET | Refills: 0 | Status: SHIPPED | OUTPATIENT
Start: 2022-02-08 | End: 2022-02-13

## 2022-02-08 NOTE — TELEPHONE ENCOUNTER
Insurance typically does not cover this medication for erectile dysfunction.  Patient will need to use good Rx and not go through insurance.

## 2022-02-08 NOTE — PROGRESS NOTES
Assessment/Plan:  Problem List Items Addressed This Visit        Orthopedic    Right elbow pain - Primary    Overview     Patient is in clinic today as an established patient here for pain and swelling to right elbow. Report onset x4 days ago. Worse with palpation. He has had no localized redness or warmth. There has been no fever, chills, sweats. He denies limitation in ROM. Denies known injury or trauma. He has tried using ACE compression wrap which has provided no improvement in symptoms.     Will obtain imaging today. Trial of steroids as prescribed. Supportive care- rest, ice, heat, compression, avoid heavy lifting, limit strenuous activity. Follow up with PCP as scheduled in 2 weeks.          Relevant Medications    predniSONE (DELTASONE) 20 MG tablet    Other Relevant Orders    X-Ray Elbow Complete Right (Completed)        Follow up in about 1 week (around 2/15/2022), or if symptoms worsen or fail to improve, for Follow up with PCP.    Helena Azul NP  _____________________________________________________________________________________________________________________________________________________    CC: swelling to elbow    HPI: Patient is in clinic today as an established patient here for pain and swelling to right elbow. Report onset x4 days ago. Worse with palpation. He has had no localized redness or warmth. There has been no fever, chills, sweats. He denies limitation in ROM. Denies known injury or trauma. He has tried using ACE compression wrap which has provided no improvement in symptoms.     Past Medical History:  Past Medical History:   Diagnosis Date    Anxiety     Arthritis     Depression     Digestive disorder     ulcers    Fatigue     History of acute bronchitis     History of psychiatric care     Hypertension     Intervertebral disc disorder with radiculopathy of lumbar region 3/23/2021    Psychiatric problem     PVD (peripheral vascular disease)     S/P femoral-femoral bypass  surgery 12/1/2021     Past Surgical History:   Procedure Laterality Date    ANTERIOR LUMBAR INTERBODY FUSION (ALIF) Right 3/23/2021    Procedure: FUSION, SPINE, LUMBAR, ALIF RIGHT L5-S1;  Surgeon: Kelly Ayala MD;  Location: [facility] OR;  Service: Neurosurgery;  Laterality: Right;    AORTOGRAPHY WITH EXTREMITY RUNOFF N/A 7/27/2021    Procedure: AORTOGRAM, WITH EXTREMITY RUNOFF;  Surgeon: Jim Briggs MD;  Location: [facility] CATH;  Service: Cardiovascular;  Laterality: N/A;    AORTOGRAPHY WITH SERIALOGRAPHY Bilateral 7/27/2021    Procedure: AORTOGRAM, WITH SERIALOGRAPHY;  Surgeon: Jim Briggs MD;  Location: [facility];  Service: Cardiovascular;  Laterality: Bilateral;    CREATION OF FEMORAL-FEMORAL ARTERY BYPASS WITH GRAFT Left 11/30/2021    Procedure: CREATION, BYPASS, ARTERIAL, FEMORAL TO FEMORAL, USING GRAFT;  Surgeon: Jim Briggs MD;  Location: [facility] OR;  Service: Cardiovascular;  Laterality: Left;    ENDARTERECTOMY OF FEMORAL ARTERY Bilateral 11/30/2021    Procedure: ENDARTERECTOMY, FEMORAL;  Surgeon: Jim Briggs MD;  Location: [facility] OR;  Service: Cardiovascular;  Laterality: Bilateral;    FUSION OF LUMBAR SPINE BY ANTERIOR APPROACH  3/23/2021    Procedure: FUSION, SPINE, LUMBAR, ANTERIOR APPROACH;  Surgeon: Jim Briggs MD;  Location: [facility] OR;  Service: Cardiovascular;;    HARVESTING OF BONE GRAFT N/A 3/23/2021    Procedure: SURGICAL PROCUREMENT, BONE GRAFT-Iliac;  Surgeon: Kelly Ayala MD;  Location: [facility] OR;  Service: Neurosurgery;  Laterality: N/A;    HYPOSPADIAS CORRECTION      VASECTOMY       Review of patient's allergies indicates:   Allergen Reactions    Blueberry      Social History     Tobacco Use    Smoking status: Current Every Day Smoker     Packs/day: 1.00     Years: 42.00     Pack years: 42.00     Types: Cigarettes    Smokeless tobacco: Never Used   Substance Use Topics    Alcohol use: Not Currently     Alcohol/week: 42.0 standard drinks     Types: 42 Cans of beer per week      Comment: stopped recently - last drink 3/21    Drug use: No     Family History   Problem Relation Age of Onset    Diabetes Mother     Hypertension Mother     Diabetes Father     Hypertension Father     Cancer Sister     Muscular dystrophy Son     Arthritis Paternal Grandfather     Birth defects Brother     Heart disease Maternal Aunt     Suicide Neg Hx     Sexual abuse Neg Hx     Self injury Neg Hx     Seizures Neg Hx     Schizophrenia Neg Hx     Physical abuse Neg Hx     Paranoid behavior Neg Hx     OCD Neg Hx     Drug abuse Neg Hx     Depression Neg Hx     Dementia Neg Hx     Bipolar disorder Neg Hx     Anxiety disorder Neg Hx     Alcohol abuse Neg Hx      Current Outpatient Medications on File Prior to Visit   Medication Sig Dispense Refill    aspirin (ECOTRIN) 81 MG EC tablet Take 1 tablet (81 mg total) by mouth once daily. 90 tablet 0    atenoloL (TENORMIN) 25 MG tablet Take 1 tablet (25 mg total) by mouth every morning. 90 tablet 4    clopidogreL (PLAVIX) 75 mg tablet Take 1 tablet (75 mg total) by mouth once daily. 30 tablet 2    DENTA 5000 PLUS 1.1 % Crea Take 1 application by mouth 2 (two) times daily.      diclofenac (VOLTAREN) 25 MG TbEC Take 25 mg by mouth 2 (two) times daily as needed.      enalapril (VASOTEC) 20 MG tablet Take 1 tablet (20 mg total) by mouth every morning. 90 tablet 4    famotidine (PEPCID) 20 MG tablet Take 20 mg by mouth every evening.      gabapentin (NEURONTIN) 300 MG capsule Take 300 mg by mouth every evening.      multivitamin (THERAGRAN) per tablet Take 1 tablet by mouth once daily.      pantoprazole (PROTONIX) 40 MG tablet TAKE 1 TABLET BY MOUTH EVERY DAY 90 tablet 4    rosuvastatin (CRESTOR) 10 MG tablet TAKE 1 TABLET BY MOUTH EVERY DAY IN THE EVENING 90 tablet 0    sildenafiL (VIAGRA) 100 MG tablet Take 100 mg by mouth as needed.      [DISCONTINUED] HYDROcodone-acetaminophen (NORCO) 5-325 mg per tablet Take 1 tablet by mouth every 6 (six)  "hours as needed for Pain. 28 tablet 0     No current facility-administered medications on file prior to visit.     Review of Systems   Constitutional: Negative for activity change, appetite change, chills, fatigue, fever and unexpected weight change.   HENT: Negative for congestion, hearing loss, rhinorrhea, sore throat and trouble swallowing.    Eyes: Negative for discharge and visual disturbance.   Respiratory: Negative for cough, chest tightness, shortness of breath and wheezing.    Cardiovascular: Negative for chest pain, palpitations and leg swelling.   Gastrointestinal: Negative for abdominal pain, blood in stool, constipation, diarrhea and vomiting.   Endocrine: Negative for polydipsia and polyuria.   Genitourinary: Negative for difficulty urinating, dysuria, hematuria and urgency.   Musculoskeletal: Positive for arthralgias and joint swelling. Negative for myalgias and neck pain.   Skin: Negative for rash and wound.   Neurological: Negative for dizziness, weakness and headaches.   Psychiatric/Behavioral: Negative for behavioral problems, confusion and dysphoric mood. The patient is not nervous/anxious.      Vitals:    02/08/22 0708   BP: 123/76   Pulse: 68   Temp: 97.9 °F (36.6 °C)   TempSrc: Oral   Weight: 64.4 kg (142 lb)   Height: 5' 6" (1.676 m)     Wt Readings from Last 3 Encounters:   02/08/22 64.4 kg (142 lb)   01/14/22 64.6 kg (142 lb 6.7 oz)   12/16/21 62 kg (136 lb 11 oz)     Physical Exam  Vitals reviewed.   Constitutional:       General: He is not in acute distress.     Appearance: Normal appearance. He is not ill-appearing.   HENT:      Head: Normocephalic and atraumatic.      Right Ear: External ear normal.      Left Ear: External ear normal.   Eyes:      Extraocular Movements: Extraocular movements intact.      Conjunctiva/sclera: Conjunctivae normal.   Cardiovascular:      Rate and Rhythm: Normal rate.      Heart sounds: Normal heart sounds.   Pulmonary:      Effort: Pulmonary effort is " normal. No respiratory distress.      Breath sounds: Normal breath sounds.   Abdominal:      General: Abdomen is flat. There is no distension.   Musculoskeletal:         General: Normal range of motion.      Right elbow: Swelling present. Normal range of motion. Tenderness present.      Left elbow: Normal.      Cervical back: Normal range of motion.      Comments: No erythema or warmth noted   Skin:     General: Skin is warm and dry.      Capillary Refill: Capillary refill takes less than 2 seconds.      Coloration: Skin is not pale.      Findings: No rash.   Neurological:      Mental Status: He is alert and oriented to person, place, and time. Mental status is at baseline.   Psychiatric:         Mood and Affect: Mood normal.         Behavior: Behavior normal.       Health Maintenance   Topic Date Due    TETANUS VACCINE  Never done    LDCT Lung Screen  Never done    PROSTATE-SPECIFIC ANTIGEN  11/29/2022    High Dose Statin  02/08/2023    Lipid Panel  11/29/2026    Hepatitis C Screening  Completed

## 2022-02-09 ENCOUNTER — PATIENT MESSAGE (OUTPATIENT)
Dept: FAMILY MEDICINE | Facility: CLINIC | Age: 62
End: 2022-02-09
Payer: COMMERCIAL

## 2022-02-09 DIAGNOSIS — N52.9 ERECTILE DYSFUNCTION, UNSPECIFIED ERECTILE DYSFUNCTION TYPE: Primary | ICD-10-CM

## 2022-02-09 RX ORDER — SILDENAFIL 100 MG/1
100 TABLET, FILM COATED ORAL
Qty: 30 TABLET | Refills: 2 | Status: SHIPPED | OUTPATIENT
Start: 2022-02-09 | End: 2022-02-25 | Stop reason: SDUPTHER

## 2022-02-09 NOTE — TELEPHONE ENCOUNTER
No new care gaps identified.  Powered by [employer]. Reference number: [id number].   2/09/2022 10:04:50 AM CST

## 2022-02-09 NOTE — TELEPHONE ENCOUNTER
I received your message which was reviewed along with the the medication list and allergies that we have below.  Please review it for accuracy to make sure that we have the most recent records on your history.     Based on this, the following orders were placed AND/OR medicines were sent in.     No orders of the defined types were placed in this encounter.      Medications written and sent at this time include:  Medications Ordered This Encounter   Medications    sildenafiL (VIAGRA) 100 MG tablet     Sig: Take 1 tablet (100 mg total) by mouth as needed.     Dispense:  30 tablet     Refill:  2     Use good Rx.  Do not run on patient's insurance.       Your pharmacy(ies) of choice at this time on record include the list below and any medications would have been sent to the one at the top.    [id number] - [place], [place] - [address]  [address] [address]  Phone: [phone] Fax: [phone]    [id number] - [place], [place] - [address] [address]  Phone: [phone] Fax: [phone]      Thank you for choosing us as your healthcare provider!  Dr. Bipin Penaloza    ALLERGY LIST  Review of patient's allergies indicates:   Allergen Reactions    Blueberry        MEDICATION LIST  Current Outpatient Medications on File Prior to Visit   Medication Sig Dispense Refill    aspirin (ECOTRIN) 81 MG EC tablet Take 1 tablet (81 mg total) by mouth once daily. 90 tablet 0    atenoloL (TENORMIN) 25 MG tablet Take 1 tablet (25 mg total) by mouth every morning. 90 tablet 4    clopidogreL (PLAVIX) 75 mg tablet Take 1 tablet (75 mg total) by mouth once daily. 30 tablet 2    DENTA 5000 PLUS 1.1 % Crea Take 1 application by mouth 2 (two) times daily.      diclofenac (VOLTAREN) 25 MG TbEC Take 25 mg by mouth 2 (two) times daily as needed.      enalapril (VASOTEC) 20 MG tablet Take 1 tablet (20 mg total) by mouth every morning. 90  tablet 4    famotidine (PEPCID) 20 MG tablet Take 20 mg by mouth every evening.      gabapentin (NEURONTIN) 300 MG capsule Take 300 mg by mouth every evening.      multivitamin (THERAGRAN) per tablet Take 1 tablet by mouth once daily.      pantoprazole (PROTONIX) 40 MG tablet TAKE 1 TABLET BY MOUTH EVERY DAY 90 tablet 4    predniSONE (DELTASONE) 20 MG tablet Take 2 tablets (40 mg total) by mouth once daily. for 5 days 10 tablet 0    rosuvastatin (CRESTOR) 10 MG tablet TAKE 1 TABLET BY MOUTH EVERY DAY IN THE EVENING 90 tablet 0    [DISCONTINUED] sildenafiL (VIAGRA) 100 MG tablet Take 100 mg by mouth as needed.       No current facility-administered medications on file prior to visit.       HEALTH MAINTENANCE THAT IS OVERDUE OR NEEDS TO BE UPDATED ON OUR CHART IS LISTED BELOW.  IF YOU HAVE HAD IT DONE ELSEWHERE, PLEASE SEND US DATES AND RECORDS IF YOU HAVE THEM TO MAKE YOUR CHART ACCURATE.  IF YOU HAVE NOT HAD THESE DONE AND ARE READY FOR US TO SCHEDULE THEM, PLEASE SEND US A MESSAGE.  Health Maintenance Due   Topic Date Due    TETANUS VACCINE  Never done    LDCT Lung Screen  Never done    Shingles Vaccine (1 of 2) Never done    Pneumococcal Vaccines (Age 0-64) (1 of 2 - PPSV23) 11/16/2021    COVID-19 Vaccine (3 - Booster for Pfizer series) 02/23/2022       DISCLAIMER: This note was compiled by using a speech recognition dictation system and therefore please be aware that typographical / speech recognition errors can and do occur.  Please contact me if you see any errors specifically.    Bipin Penaloza MD  We Offer Telehealth & Same Day Appointments!   Book your Telehealth appointment with me through my nurse or   Clinic appointments on [employer]!  [phone]     Check out my Facebook Page and Follow Me at: CLICK HERE    Check out my website at [employer] by clicking on: CLICK HERE    To Schedule appointments online, go to [employer]: CLICK HERE     Location:  [email or web address]    [address]  [place] [address]    FAX: [phone]

## 2022-02-11 ENCOUNTER — PATIENT MESSAGE (OUTPATIENT)
Dept: VASCULAR SURGERY | Facility: CLINIC | Age: 62
End: 2022-02-11
Payer: COMMERCIAL

## 2022-02-11 ENCOUNTER — PATIENT MESSAGE (OUTPATIENT)
Dept: FAMILY MEDICINE | Facility: CLINIC | Age: 62
End: 2022-02-11
Payer: COMMERCIAL

## 2022-02-14 ENCOUNTER — PATIENT MESSAGE (OUTPATIENT)
Dept: VASCULAR SURGERY | Facility: CLINIC | Age: 62
End: 2022-02-14
Payer: COMMERCIAL

## 2022-02-25 ENCOUNTER — OFFICE VISIT (OUTPATIENT)
Dept: FAMILY MEDICINE | Facility: CLINIC | Age: 62
End: 2022-02-25
Payer: COMMERCIAL

## 2022-02-25 VITALS
DIASTOLIC BLOOD PRESSURE: 82 MMHG | BODY MASS INDEX: 22.92 KG/M2 | HEART RATE: 64 BPM | WEIGHT: 142.63 LBS | OXYGEN SATURATION: 98 % | HEIGHT: 66 IN | RESPIRATION RATE: 16 BRPM | TEMPERATURE: 99 F | SYSTOLIC BLOOD PRESSURE: 136 MMHG

## 2022-02-25 DIAGNOSIS — R04.0 BLEEDING NOSE: ICD-10-CM

## 2022-02-25 DIAGNOSIS — M70.21 OLECRANON BURSITIS OF RIGHT ELBOW: Primary | ICD-10-CM

## 2022-02-25 DIAGNOSIS — N52.9 ERECTILE DYSFUNCTION, UNSPECIFIED ERECTILE DYSFUNCTION TYPE: ICD-10-CM

## 2022-02-25 DIAGNOSIS — Z01.818 PRE-OP EXAM: ICD-10-CM

## 2022-02-25 DIAGNOSIS — Z98.1 STATUS POST LUMBAR SPINAL FUSION: ICD-10-CM

## 2022-02-25 PROCEDURE — 1159F MED LIST DOCD IN RCRD: CPT | Mod: CPTII,S$GLB,, | Performed by: FAMILY MEDICINE

## 2022-02-25 PROCEDURE — 99999 PR PBB SHADOW E&M-EST. PATIENT-LVL V: CPT | Mod: PBBFAC,,, | Performed by: FAMILY MEDICINE

## 2022-02-25 PROCEDURE — 3008F BODY MASS INDEX DOCD: CPT | Mod: CPTII,S$GLB,, | Performed by: FAMILY MEDICINE

## 2022-02-25 PROCEDURE — 3079F PR MOST RECENT DIASTOLIC BLOOD PRESSURE 80-89 MM HG: ICD-10-PCS | Mod: CPTII,S$GLB,, | Performed by: FAMILY MEDICINE

## 2022-02-25 PROCEDURE — 3075F SYST BP GE 130 - 139MM HG: CPT | Mod: CPTII,S$GLB,, | Performed by: FAMILY MEDICINE

## 2022-02-25 PROCEDURE — 1160F RVW MEDS BY RX/DR IN RCRD: CPT | Mod: CPTII,S$GLB,, | Performed by: FAMILY MEDICINE

## 2022-02-25 PROCEDURE — 3075F PR MOST RECENT SYSTOLIC BLOOD PRESS GE 130-139MM HG: ICD-10-PCS | Mod: CPTII,S$GLB,, | Performed by: FAMILY MEDICINE

## 2022-02-25 PROCEDURE — 1160F PR REVIEW ALL MEDS BY PRESCRIBER/CLIN PHARMACIST DOCUMENTED: ICD-10-PCS | Mod: CPTII,S$GLB,, | Performed by: FAMILY MEDICINE

## 2022-02-25 PROCEDURE — 99214 PR OFFICE/OUTPT VISIT, EST, LEVL IV, 30-39 MIN: ICD-10-PCS | Mod: S$GLB,,, | Performed by: FAMILY MEDICINE

## 2022-02-25 PROCEDURE — 3079F DIAST BP 80-89 MM HG: CPT | Mod: CPTII,S$GLB,, | Performed by: FAMILY MEDICINE

## 2022-02-25 PROCEDURE — 1159F PR MEDICATION LIST DOCUMENTED IN MEDICAL RECORD: ICD-10-PCS | Mod: CPTII,S$GLB,, | Performed by: FAMILY MEDICINE

## 2022-02-25 PROCEDURE — 99214 OFFICE O/P EST MOD 30 MIN: CPT | Mod: S$GLB,,, | Performed by: FAMILY MEDICINE

## 2022-02-25 PROCEDURE — 3008F PR BODY MASS INDEX (BMI) DOCUMENTED: ICD-10-PCS | Mod: CPTII,S$GLB,, | Performed by: FAMILY MEDICINE

## 2022-02-25 PROCEDURE — 99999 PR PBB SHADOW E&M-EST. PATIENT-LVL V: ICD-10-PCS | Mod: PBBFAC,,, | Performed by: FAMILY MEDICINE

## 2022-02-25 RX ORDER — SILDENAFIL 100 MG/1
100 TABLET, FILM COATED ORAL
Qty: 30 TABLET | Refills: 2 | Status: SHIPPED | OUTPATIENT
Start: 2022-02-25 | End: 2022-11-30

## 2022-02-25 RX ORDER — MUPIROCIN 20 MG/G
OINTMENT TOPICAL 3 TIMES DAILY
Start: 2022-02-25 | End: 2022-06-06

## 2022-02-25 NOTE — ASSESSMENT & PLAN NOTE
Apply Bactroban to the nose bilaterally.  Avoid coughing and sneezing.  ER precautions if severe bleeding occurs.  Patient may need ENT consult.

## 2022-02-25 NOTE — ASSESSMENT & PLAN NOTE
Refill sildenafil.  Patient given good Rx information.    The patient desires Viagra to treat his erectile dysfunction. History and physical exam has not disclosed any obvious treatable cause of this complaint. He is informed that Viagra is sometimes not covered by insurance. It is available on a fee-for-service cost basis, and is relatively expensive. He can start with 50 mg dose, and increase to 100 mg if necessary. The method of use 1 hour prior to anticipated intercourse is explained. He should not use any more than one tablet in a 24 hour period. The side effects of possible headache, flushing, dyspepsia and transient changes in vision have been explained.     The patient is not taking nitrates, and denies he has access to nitrates in any form at any time. I have counseled him that taking Viagra with nitrates of any form can cause death. Additionally, Viagra serum concentrations can be increased by the following: cimetidine, erythromycin, itraconazole or ketoconazole. This patient does not take these drugs, but I have counseled him to avoid Viagra if he does take any of these.    We have also discussed the fact that there have been some deaths in patients after taking Viagra, felt due to the exertion of intercourse rather than the drug itself. The patient is aware of this, and accepts whatever unknown degree of risk there is in this aspect.

## 2022-02-25 NOTE — ASSESSMENT & PLAN NOTE
Patient advised to send preop workup to us from performing facility and we will review.  I recommend Cardiology and vascular clearance.

## 2022-02-25 NOTE — PROGRESS NOTES
PLAN:      Problem List Items Addressed This Visit     Status post lumbar spinal fusion (Chronic)     Patient is undergoing another lumbar surgery.  Requesting records.           Olecranon bursitis of right elbow - Primary (Chronic)     I have recommended compression with Ace wrap.  Continue monitoring.  No signs of infection today.  If worsening will refer to Orthopedics.    Discussed condition course and signs and symptoms to expect.  Patient advised take anti-inflammatories and or Tylenol for pain or fever.  ER precautions.  Call MD or follow-up to clinic if not improving or worsening symptoms.             Erectile dysfunction (Chronic)     Refill sildenafil.  Patient given good Rx information.    The patient desires Viagra to treat his erectile dysfunction. History and physical exam has not disclosed any obvious treatable cause of this complaint. He is informed that Viagra is sometimes not covered by insurance. It is available on a fee-for-service cost basis, and is relatively expensive. He can start with 50 mg dose, and increase to 100 mg if necessary. The method of use 1 hour prior to anticipated intercourse is explained. He should not use any more than one tablet in a 24 hour period. The side effects of possible headache, flushing, dyspepsia and transient changes in vision have been explained.     The patient is not taking nitrates, and denies he has access to nitrates in any form at any time. I have counseled him that taking Viagra with nitrates of any form can cause death. Additionally, Viagra serum concentrations can be increased by the following: cimetidine, erythromycin, itraconazole or ketoconazole. This patient does not take these drugs, but I have counseled him to avoid Viagra if he does take any of these.    We have also discussed the fact that there have been some deaths in patients after taking Viagra, felt due to the exertion of intercourse rather than the drug itself. The patient is aware of  this, and accepts whatever unknown degree of risk there is in this aspect.             Relevant Medications    sildenafiL (VIAGRA) 100 MG tablet    Pre-op exam     Patient advised to send preop workup to us from performing facility and we will review.  I recommend Cardiology and vascular clearance.           Bleeding nose     Apply Bactroban to the nose bilaterally.  Avoid coughing and sneezing.  ER precautions if severe bleeding occurs.  Patient may need ENT consult.           Relevant Medications    mupirocin (BACTROBAN) 2 % ointment        Future Appointments     Date Provider Specialty Appt Notes    3/17/2022  Radiology Doctors orders    6/6/2022 Samm Wagner MD Cardiology 5 mo fu    7/7/2022  Radiology N/a    8/29/2022 Bipin Penaloza MD Family Medicine 6 mth f/u         Medication Management for assessment above:   Medication List with Changes/Refills   New Medications    MUPIROCIN (BACTROBAN) 2 % OINTMENT    Apply topically 3 (three) times daily.   Current Medications    ASPIRIN (ECOTRIN) 81 MG EC TABLET    Take 1 tablet (81 mg total) by mouth once daily.    ATENOLOL (TENORMIN) 25 MG TABLET    Take 1 tablet (25 mg total) by mouth every morning.    CLOPIDOGREL (PLAVIX) 75 MG TABLET    Take 1 tablet (75 mg total) by mouth once daily.    DENTA 5000 PLUS 1.1 % CREA    Take 1 application by mouth 2 (two) times daily.    DICLOFENAC (VOLTAREN) 25 MG TBEC    Take 25 mg by mouth 2 (two) times daily as needed.    ENALAPRIL (VASOTEC) 20 MG TABLET    Take 1 tablet (20 mg total) by mouth every morning.    FAMOTIDINE (PEPCID) 20 MG TABLET    Take 20 mg by mouth every evening.    GABAPENTIN (NEURONTIN) 300 MG CAPSULE    Take 300 mg by mouth every evening.    MULTIVITAMIN (THERAGRAN) PER TABLET    Take 1 tablet by mouth once daily.    PANTOPRAZOLE (PROTONIX) 40 MG TABLET    TAKE 1 TABLET BY MOUTH EVERY DAY    ROSUVASTATIN (CRESTOR) 10 MG TABLET    TAKE 1 TABLET BY MOUTH EVERY DAY IN THE EVENING   Changed and/or Refilled  Medications    Modified Medication Previous Medication    SILDENAFIL (VIAGRA) 100 MG TABLET sildenafiL (VIAGRA) 100 MG tablet       Take 1 tablet (100 mg total) by mouth as needed for Erectile Dysfunction.    Take 1 tablet (100 mg total) by mouth as needed.       Bipin Penaloza M.D.  ==========================================================================  Subjective:   Patient ID: Sal Villarreal Jr. is a 62 y.o. male.  has a past medical history of Anxiety, Arthritis, Depression, Digestive disorder, Fatigue, History of acute bronchitis, History of psychiatric care, Hypertension, Intervertebral disc disorder with radiculopathy of lumbar region (3/23/2021), Psychiatric problem, PVD (peripheral vascular disease), and S/P femoral-femoral bypass surgery (12/1/2021).   Chief Complaint: Follow-up      Problem List Items Addressed This Visit     Status post lumbar spinal fusion (Chronic)    Overview     SURGEON: Kelly Ayala M.D.     ASSISTING SURGEON: Momo Briggs M.D.     APPROACH AND CLOSURE SURGEON: Momo Briggs M.D.     PREOPERATIVE DIAGNOSES:  1. Lumbar disk disorder with radiculopathy at L5-S1.  2. Connective tissue and disc stenosis of the lumbar foramina at L5-S1.   3. Lumbar spondylolisthesis.      POSTOPERATIVE DIAGNOSES:  1. Lumbar disk disorder with radiculopathy at L5-S1.  2. Connective tissue and disc stenosis of the lumbar foramina at L5-S1.   3. Lumbar spondylolisthesis.      PROCEDURES:  1. Anterior lumbar interbody arthrodesis at L5-S1.  2. Anterior lumbar diskectomy of L5-S1 with preparation of endplates for  arthrodesis and distraction.  3. Insertion of Titanium-coated PEEK interbody device at L5-S1.  4. Anterior lumbar instrumentation at L5-S1 with 3 vertebral body screws.  5. Adrian of small iliac crest bone graft from the right anterior iliac crest side.  6. Placement of morcellized autograft from a different incision into the fusion  site at L5-S1, mixed with OsteoSponge  allograft.  7. Insertion of BMP Infuse.  8. Use of C-Arm fluoroscopy for image guidance.  9. Intraoperative monitoring with SSEPs and spontaneous EMG with sphincter monitoring.     ESTIMATED BLOOD LOSS: 200 mL.     PROCEDURE NOTE: Please note a separate operative report for the  exposure will be dictated by Dr Briggs.     PROCEDURE IN DETAIL: The patient was wheeled into the Operating Theater and  placed under general anesthesia with endotracheal intubation. At this point,  the entire abdomen including the right ASIS of the iliac crest was exposed and  prepped and draped in standard sterile fashion. At this point, a small incision  on the right ASIS, but slightly behind it was made with #15 knife followed by  dissection all the way to the anterior iliac crest border avoiding any neural  structures. At this point, a small button of iliac crest bone harvest was  obtained and sent for processing, and hemostasis was achieved with Surgifoam.  Closure was done after irrigation with 2-0 Vicryl suture followed by Dermabond.  Then the area was dressed appropriately.     At this point, a midline abdominal incision was marked by Dr Briggs and injected  with 1% Xylocaine and Marcaine with epinephrine. At this  point, the incision was made for the approach with anterior lumbar interbody  fusion with #10 knife, and the approach was performed by Dr. Briggs to  expose the anterior L5-S1 level voiding all vascular structures, the ureter and  intraabdominal organs. Please refer to Dr. Briggs dictation for details on  this approach.     After exposure, the L5-S1 level was confirmed with x-ray guidance. At this point,   a #15 knife was made to incise the anterior annulus of the L5-S1 disk  and a Styles elevator was used to elevate the disk off the endplates at L5 and S1.   The disk was then removed mostly en bloc, but the remainder of the disk was then  removed using sequential pituitary rongeurs. Please note that the disk was   fragmented and  came off easily off the endplates.  Discectomy was achieved   until the PLL was visualized, and foraminal decompression achieved as well.    The ALL was prepped sequentially to accommodate a 30mm footprint anterior   interbody Titanium plasma-sprayed PEEK device, and the distraction was achieved by   from 11mm to 13mm using trials. At this point, the appropriate size interbody device   was prepped and filled with autograft obtained from the separate iliac crest   incision, as well as allograft with OsteoSponge and Infuse BMP, and placed in the   interbody space under compression. The cage inserted has a 12 deg lordosis.   Subsequently, anterior instrumentation was achieved with the attached plate with   3 vertebral body screws at L5 and S1.      At this point, copious irrigation of the area was achieved with normal saline and   bacitracin solution.  Closure was performed by Dr. Briggs. Please refer his dictation  for details of the closure. Staple were used for skin closure. Please note that all  counts were correct at the end of the procedure.        Kelly Ayala MD         Electronically signed by Kelly Ayala MD at 3/24/2021  3:50 PM             Current Assessment & Plan     Patient is undergoing another lumbar surgery.  Requesting records.           Olecranon bursitis of right elbow - Primary (Chronic)    Overview     Subacute.  Started weeks ago.  Patient was seen here and had x-ray.  He has been applying compression.  Located on the right elbow where he puts most of his pressure own during driving.  No trauma noted.    X-Ray Elbow Complete Right  Narrative: EXAMINATION:  XR ELBOW COMPLETE 3 VIEW RIGHT    CLINICAL HISTORY:  . Pain in right elbow    TECHNIQUE:  AP, lateral, and oblique views of the right elbow were performed.    COMPARISON:  None    FINDINGS:  There is nonspecific localized soft tissue prominence seen along the dorsal surface of the elbow and proximal forearm.  No acute fractures  or dislocations visualized.  No definite joint effusion demonstrated.  Joint spaces are preserved. Visualized osseous structures appear diffusely osteopenic.  Impression: As above    Electronically signed by: Scar Sanders MD  Date:    02/08/2022  Time:    08:07               Current Assessment & Plan     I have recommended compression with Ace wrap.  Continue monitoring.  No signs of infection today.  If worsening will refer to Orthopedics.    Discussed condition course and signs and symptoms to expect.  Patient advised take anti-inflammatories and or Tylenol for pain or fever.  ER precautions.  Call MD or follow-up to clinic if not improving or worsening symptoms.             Erectile dysfunction (Chronic)    Overview     Chronic.  Stable.  Patient has tolerated sildenafil in the past.  Requesting refill.           Current Assessment & Plan     Refill sildenafil.  Patient given good Rx information.    The patient desires Viagra to treat his erectile dysfunction. History and physical exam has not disclosed any obvious treatable cause of this complaint. He is informed that Viagra is sometimes not covered by insurance. It is available on a fee-for-service cost basis, and is relatively expensive. He can start with 50 mg dose, and increase to 100 mg if necessary. The method of use 1 hour prior to anticipated intercourse is explained. He should not use any more than one tablet in a 24 hour period. The side effects of possible headache, flushing, dyspepsia and transient changes in vision have been explained.     The patient is not taking nitrates, and denies he has access to nitrates in any form at any time. I have counseled him that taking Viagra with nitrates of any form can cause death. Additionally, Viagra serum concentrations can be increased by the following: cimetidine, erythromycin, itraconazole or ketoconazole. This patient does not take these drugs, but I have counseled him to avoid Viagra if he does take  any of these.    We have also discussed the fact that there have been some deaths in patients after taking Viagra, felt due to the exertion of intercourse rather than the drug itself. The patient is aware of this, and accepts whatever unknown degree of risk there is in this aspect.             Pre-op exam    Overview     Patient coming in for preop for upcoming back surgery.  He is unsure what they are doing.  I do not have any records at this time.  He states that he has full labs, EKG chest x-ray preop coming up at \[facility]".            Current Assessment & Plan     Patient advised to send preop workup to us from performing facility and we will review.  I recommend Cardiology and vascular clearance.           Bleeding nose    Overview     Chronic.  Worsening.  Patient recently on plavix.            Current Assessment & Plan     Apply Bactroban to the nose bilaterally.  Avoid coughing and sneezing.  ER precautions if severe bleeding occurs.  Patient may need ENT consult.                  Review of patient's allergies indicates:   Allergen Reactions    Blueberry      Current Outpatient Medications   Medication Instructions    aspirin (ECOTRIN) 81 mg, Oral, Daily    atenoloL (TENORMIN) 25 mg, Oral, Every morning    clopidogreL (PLAVIX) 75 mg, Oral, Daily    DENTA 5000 PLUS 1.1 % Crea 1 application, Oral, 2 times daily    diclofenac (VOLTAREN) 25 mg, Oral, 2 times daily PRN    enalapril (VASOTEC) 20 mg, Oral, Every morning    famotidine (PEPCID) 20 mg, Oral, Nightly    gabapentin (NEURONTIN) 300 mg, Oral, Nightly    multivitamin (THERAGRAN) per tablet 1 tablet, Oral, Daily    mupirocin (BACTROBAN) 2 % ointment Topical (Top), 3 times daily    pantoprazole (PROTONIX) 40 MG tablet TAKE 1 TABLET BY MOUTH EVERY DAY    rosuvastatin (CRESTOR) 10 MG tablet TAKE 1 TABLET BY MOUTH EVERY DAY IN THE EVENING    sildenafiL (VIAGRA) 100 mg, Oral, As needed (PRN)      I have reviewed the PMH, social history, FamilyHx,  "surgical history, allergies and medications documented / confirmed by the patient at the time of this visit.  Review of Systems   Constitutional: Positive for fatigue. Negative for activity change and unexpected weight change.   HENT: Negative for hearing loss, rhinorrhea and trouble swallowing.    Eyes: Negative for discharge and visual disturbance.   Respiratory: Negative for chest tightness and wheezing.    Cardiovascular: Negative for chest pain and palpitations.   Gastrointestinal: Negative for blood in stool, constipation, diarrhea and vomiting.   Endocrine: Negative for polydipsia and polyuria.   Genitourinary: Negative for difficulty urinating, hematuria and urgency.   Musculoskeletal: Positive for arthralgias, back pain, gait problem and neck pain. Negative for joint swelling.   Neurological: Negative for weakness and headaches.   Psychiatric/Behavioral: Negative for confusion and dysphoric mood.     Objective:   /82   Pulse 64   Temp 98.7 °F (37.1 °C) (Temporal)   Resp 16   Ht 5' 6" (1.676 m)   Wt 64.7 kg (142 lb 9.6 oz)   SpO2 98%   BMI 23.02 kg/m²   Physical Exam  Vitals and nursing note reviewed.   Constitutional:       General: He is not in acute distress.     Appearance: He is well-developed.   HENT:      Head: Normocephalic and atraumatic.      Right Ear: External ear normal.      Left Ear: External ear normal.      Nose: Nose normal. No rhinorrhea.   Eyes:      Extraocular Movements: Extraocular movements intact.      Pupils: Pupils are equal, round, and reactive to light.   Cardiovascular:      Rate and Rhythm: Normal rate.      Pulses: Normal pulses.   Pulmonary:      Effort: Pulmonary effort is normal. No respiratory distress.      Breath sounds: Normal breath sounds.   Musculoskeletal:         General: Swelling, tenderness and deformity present. Normal range of motion.      Right elbow: Deformity and effusion present.      Cervical back: Normal range of motion and neck supple. "   Skin:     General: Skin is warm and dry.      Capillary Refill: Capillary refill takes less than 2 seconds.   Neurological:      General: No focal deficit present.      Mental Status: He is alert and oriented to person, place, and time.   Psychiatric:         Mood and Affect: Mood normal.         Behavior: Behavior normal.        Patient is wearing a mask which limits physical exam due to COVID restrictions.   Assessment:     1. Olecranon bursitis of right elbow    2. Erectile dysfunction, unspecified erectile dysfunction type    3. Status post lumbar spinal fusion    4. Pre-op exam    5. Bleeding nose      MDM:   Moderate complexity.  Moderate risk.  Total time:31 minutes.  This includes total time spent on the encounter, which includes face to face time and non-face to face time preparing to see the patient (eg, review of previous medical records, tests), Obtaining and/or reviewing separately obtained history, documenting clinical information in the electronic or other health record, independently interpreting results (not separately reported)/communicating results to the patient/family/caregiver, and/or care coordination (not separately reported).    I have Reviewed and summarized old records.  I have performed thorough medication reconciliation today and discussed risk and benefits of medications.  I have reviewed labs and discussed with patient.  All questions were answered.  I am requesting old records and will review them once they are available.    I have signed for the following orders AND/OR meds.  No orders of the defined types were placed in this encounter.    Medications Ordered This Encounter   Medications    mupirocin (BACTROBAN) 2 % ointment     Sig: Apply topically 3 (three) times daily.    sildenafiL (VIAGRA) 100 MG tablet     Sig: Take 1 tablet (100 mg total) by mouth as needed for Erectile Dysfunction.     Dispense:  30 tablet     Refill:  2     Use good Rx.  Do not run on patient's  insurance.        Follow up in about 6 months (around 8/25/2022), or if symptoms worsen or fail to improve, for Annual Wellness Exam.    If no improvement in symptoms or symptoms worsen, advised to call/follow-up at clinic or go to ER. Patient voiced understanding and all questions/concerns were addressed.   DISCLAIMER: This note was compiled by using a speech recognition dictation system and therefore please be aware that typographical / speech recognition errors can and do occur.  Please contact me if you see any errors specifically.    Bipin Penaloza M.D.       Office: [phone]   [address] [place] [address]  FAX: [phone]

## 2022-02-25 NOTE — PATIENT INSTRUCTIONS
Follow up in about 6 months (around 8/25/2022), or if symptoms worsen or fail to improve, for Annual Wellness Exam.     Dear patient,   As a result of recent federal legislation (The Federal Cures Act), you may receive lab or pathology results from your visit in your [facility] account before your physician is able to contact you. Your physician or their representative will relay the results to you with their recommendations at their soonest availability.     If no improvement in symptoms or symptoms worsen, please be advised to call MD, follow-up at clinic and/or go to ER if becomes severe.    Bipin Penaloza M.D.        We Offer TELEHEALTH & Same Day Appointments!   Book your Telehealth appointment with me through my nurse or   Clinic appointments on [employer]!    [address] [place] [address]    Office: [phone]   FAX: [phone]    Check out my Facebook Page and Follow Me at: [email or web address]    Check out my website at [email or web address] by clicking on: [email or web address]    To Schedule appointments online, go to [facility]: [email or web address]

## 2022-02-25 NOTE — ASSESSMENT & PLAN NOTE
I have recommended compression with Ace wrap.  Continue monitoring.  No signs of infection today.  If worsening will refer to Orthopedics.    Discussed condition course and signs and symptoms to expect.  Patient advised take anti-inflammatories and or Tylenol for pain or fever.  ER precautions.  Call MD or follow-up to clinic if not improving or worsening symptoms.

## 2022-03-15 ENCOUNTER — CLINICAL SUPPORT (OUTPATIENT)
Dept: SMOKING CESSATION | Facility: CLINIC | Age: 62
End: 2022-03-15
Payer: COMMERCIAL

## 2022-03-15 DIAGNOSIS — F17.200 NICOTINE DEPENDENCE, UNCOMPLICATED: Primary | ICD-10-CM

## 2022-03-15 PROCEDURE — 99407 PR TOBACCO USE CESSATION INTENSIVE >10 MINUTES: ICD-10-PCS | Mod: S$GLB,,, | Performed by: GENERAL PRACTICE

## 2022-03-15 PROCEDURE — 99407 BEHAV CHNG SMOKING > 10 MIN: CPT | Mod: S$GLB,,, | Performed by: GENERAL PRACTICE

## 2022-03-15 NOTE — PROGRESS NOTES
Spoke with patient today in regard to smoking cessation progress for 6 month follow up. He states he is not tobacco free. Patient had back surgery and can not ride in a car for three weeks. Patient stated he did want to rejoin the program once he can come to his appointments. Informed patient of benefit period, future follow ups and contact information if any further help is needed. Will resolve smart form for 3 & 6 month follow up for Quit # 1.

## 2022-03-17 ENCOUNTER — PATIENT MESSAGE (OUTPATIENT)
Dept: FAMILY MEDICINE | Facility: CLINIC | Age: 62
End: 2022-03-17
Payer: COMMERCIAL

## 2022-04-14 ENCOUNTER — HOSPITAL ENCOUNTER (OUTPATIENT)
Dept: RADIOLOGY | Facility: HOSPITAL | Age: 62
Discharge: HOME | End: 2022-04-14
Attending: THORACIC SURGERY (CARDIOTHORACIC VASCULAR SURGERY)
Payer: COMMERCIAL

## 2022-04-14 ENCOUNTER — PATIENT MESSAGE (OUTPATIENT)
Dept: FAMILY MEDICINE | Facility: CLINIC | Age: 62
End: 2022-04-14
Payer: COMMERCIAL

## 2022-04-14 DIAGNOSIS — I77.9 PERIPHERAL ARTERIAL OCCLUSIVE DISEASE: ICD-10-CM

## 2022-04-14 DIAGNOSIS — I73.9 PAD (PERIPHERAL ARTERY DISEASE): ICD-10-CM

## 2022-04-14 DIAGNOSIS — Z76.89 REFERRAL OF PATIENT: Primary | ICD-10-CM

## 2022-04-14 PROCEDURE — 93922 US ARTERIAL LOWER EXTREMITY BILAT WITH ABI (XPD): ICD-10-PCS | Mod: 26,,, | Performed by: RADIOLOGY

## 2022-04-14 PROCEDURE — 93925 LOWER EXTREMITY STUDY: CPT | Mod: 26,,, | Performed by: RADIOLOGY

## 2022-04-14 PROCEDURE — 93925 US ARTERIAL LOWER EXTREMITY BILAT WITH ABI (XPD): ICD-10-PCS | Mod: 26,,, | Performed by: RADIOLOGY

## 2022-04-14 PROCEDURE — 93925 LOWER EXTREMITY STUDY: CPT | Mod: TC,PO

## 2022-04-14 PROCEDURE — 93922 UPR/L XTREMITY ART 2 LEVELS: CPT | Mod: 26,,, | Performed by: RADIOLOGY

## 2022-04-14 NOTE — TELEPHONE ENCOUNTER
I received your message which was reviewed along with the the medication list and allergies that we have below.  Please review it for accuracy to make sure that we have the most recent records on your history.     Based on this, the following orders were placed AND/OR medicines were sent in.     Orders Placed This Encounter   Procedures    Ambulatory referral/consult to Urology     Standing Status:   Future     Standing Expiration Date:   5/14/2023     Referral Priority:   Routine     Referral Type:   Consultation     Referral Reason:   Specialty Services Required     Requested Specialty:   Urology     Number of Visits Requested:   1       Medications written and sent at this time include:       Your pharmacy(ies) of choice at this time on record include the list below and any medications would have been sent to the one at the top.    [id number] - [place], [place] - [address] [facility] [address] [place]  [address] [facility] [address]  Phone: [phone] Fax: [phone]    [id number] - [place], [place] - [address] [address]  Phone: [phone] Fax: [phone]      Thank you for choosing us as your healthcare provider!  Dr. Bipin Penaloza    ALLERGY LIST  Review of patient's allergies indicates:   Allergen Reactions    Blueberry        MEDICATION LIST  Current Outpatient Medications on File Prior to Visit   Medication Sig Dispense Refill    aspirin (ECOTRIN) 81 MG EC tablet Take 1 tablet (81 mg total) by mouth once daily. 90 tablet 0    atenoloL (TENORMIN) 25 MG tablet Take 1 tablet (25 mg total) by mouth every morning. 90 tablet 4    clopidogreL (PLAVIX) 75 mg tablet Take 1 tablet (75 mg total) by mouth once daily. 30 tablet 2    DENTA 5000 PLUS 1.1 % Crea Take 1 application by mouth 2 (two) times daily.      diclofenac (VOLTAREN) 25 MG TbEC Take 25 mg by mouth 2 (two) times daily as needed.      enalapril (VASOTEC) 20 MG tablet Take 1  tablet (20 mg total) by mouth every morning. 90 tablet 4    famotidine (PEPCID) 20 MG tablet Take 20 mg by mouth every evening.      gabapentin (NEURONTIN) 300 MG capsule Take 300 mg by mouth every evening.      multivitamin (THERAGRAN) per tablet Take 1 tablet by mouth once daily.      mupirocin (BACTROBAN) 2 % ointment Apply topically 3 (three) times daily.      pantoprazole (PROTONIX) 40 MG tablet TAKE 1 TABLET BY MOUTH EVERY DAY 90 tablet 4    rosuvastatin (CRESTOR) 10 MG tablet TAKE 1 TABLET BY MOUTH EVERY DAY IN THE EVENING 90 tablet 0    sildenafiL (VIAGRA) 100 MG tablet Take 1 tablet (100 mg total) by mouth as needed for Erectile Dysfunction. 30 tablet 2     No current facility-administered medications on file prior to visit.       HEALTH MAINTENANCE THAT IS OVERDUE OR NEEDS TO BE UPDATED ON OUR CHART IS LISTED BELOW.  IF YOU HAVE HAD IT DONE ELSEWHERE, PLEASE SEND US DATES AND RECORDS IF YOU HAVE THEM TO MAKE YOUR CHART ACCURATE.  IF YOU HAVE NOT HAD THESE DONE AND ARE READY FOR US TO SCHEDULE THEM, PLEASE SEND US A MESSAGE.  Health Maintenance Due   Topic Date Due    TETANUS VACCINE  Never done    LDCT Lung Screen  Never done    Shingles Vaccine (1 of 2) Never done       DISCLAIMER: This note was compiled by using a speech recognition dictation system and therefore please be aware that typographical / speech recognition errors can and do occur.  Please contact me if you see any errors specifically.    Bipin Penaloza MD  We Offer Telehealth & Same Day Appointments!   Book your Telehealth appointment with me through my nurse or   Clinic appointments on beqom!  [phone]     Check out my Facebook Page and Follow Me at: CLICK HERE    Check out my website at [employer] by clicking on: CLICK HERE    To Schedule appointments online, go to beqom: CLICK HERE     Location: [email or web address]    [address] [place] [address]    FAX: [phone]

## 2022-04-19 ENCOUNTER — PATIENT MESSAGE (OUTPATIENT)
Dept: CARDIOLOGY | Facility: CLINIC | Age: 62
End: 2022-04-19
Payer: COMMERCIAL

## 2022-04-19 RX ORDER — CLOPIDOGREL BISULFATE 75 MG/1
75 TABLET ORAL DAILY
Qty: 30 TABLET | Refills: 1 | Status: SHIPPED | OUTPATIENT
Start: 2022-04-19 | End: 2022-05-13

## 2022-04-21 ENCOUNTER — PATIENT MESSAGE (OUTPATIENT)
Dept: VASCULAR SURGERY | Facility: CLINIC | Age: 62
End: 2022-04-21
Payer: COMMERCIAL

## 2022-04-22 ENCOUNTER — PATIENT MESSAGE (OUTPATIENT)
Dept: VASCULAR SURGERY | Facility: CLINIC | Age: 62
End: 2022-04-22
Payer: COMMERCIAL

## 2022-04-22 DIAGNOSIS — I73.9 PAD (PERIPHERAL ARTERY DISEASE): Primary | ICD-10-CM

## 2022-04-26 ENCOUNTER — PATIENT MESSAGE (OUTPATIENT)
Dept: FAMILY MEDICINE | Facility: CLINIC | Age: 62
End: 2022-04-26
Payer: COMMERCIAL

## 2022-04-26 DIAGNOSIS — F17.210 CIGARETTE SMOKER: ICD-10-CM

## 2022-04-26 DIAGNOSIS — Z71.6 TOBACCO ABUSE COUNSELING: Primary | ICD-10-CM

## 2022-04-26 NOTE — TELEPHONE ENCOUNTER
I received your message which was reviewed along with the the medication list and allergies that we have below.  Please review it for accuracy to make sure that we have the most recent records on your history.     Based on this, the following orders were placed AND/OR medicines were sent in.     Orders Placed This Encounter   Procedures    Ambulatory referral/consult to Smoking Cessation Program     Standing Status:   Future     Standing Expiration Date:   5/26/2023     Referral Priority:   Routine     Referral Type:   Consultation     Referral Reason:   Specialty Services Required     Requested Specialty:   Addiction Medicine     Number of Visits Requested:   1       Medications written and sent at this time include:       Your pharmacy(ies) of choice at this time on record include the list below and any medications would have been sent to the one at the top.    [id number] - [place], [place] - [address] [facility] [address] [place]  [address] [facility] [address]  Phone: [phone] Fax: [phone]    [id number] - [place], [place] - [address] [address]  Phone: [phone] Fax: [phone]      Thank you for choosing us as your healthcare provider!  Dr. Bipin Penaloza    ALLERGY LIST  Review of patient's allergies indicates:   Allergen Reactions    Blueberry        MEDICATION LIST  Current Outpatient Medications on File Prior to Visit   Medication Sig Dispense Refill    aspirin (ECOTRIN) 81 MG EC tablet Take 1 tablet (81 mg total) by mouth once daily. 90 tablet 0    atenoloL (TENORMIN) 25 MG tablet Take 1 tablet (25 mg total) by mouth every morning. 90 tablet 4    clopidogreL (PLAVIX) 75 mg tablet Take 1 tablet (75 mg total) by mouth once daily. 30 tablet 1    DENTA 5000 PLUS 1.1 % Crea Take 1 application by mouth 2 (two) times daily.      diclofenac (VOLTAREN) 25 MG TbEC Take 25 mg by mouth 2 (two) times daily as needed.      enalapril  (VASOTEC) 20 MG tablet Take 1 tablet (20 mg total) by mouth every morning. 90 tablet 4    famotidine (PEPCID) 20 MG tablet Take 20 mg by mouth every evening.      gabapentin (NEURONTIN) 300 MG capsule Take 300 mg by mouth every evening.      multivitamin (THERAGRAN) per tablet Take 1 tablet by mouth once daily.      mupirocin (BACTROBAN) 2 % ointment Apply topically 3 (three) times daily.      pantoprazole (PROTONIX) 40 MG tablet TAKE 1 TABLET BY MOUTH EVERY DAY 90 tablet 4    rosuvastatin (CRESTOR) 10 MG tablet TAKE 1 TABLET BY MOUTH EVERY DAY IN THE EVENING 90 tablet 0    sildenafiL (VIAGRA) 100 MG tablet Take 1 tablet (100 mg total) by mouth as needed for Erectile Dysfunction. 30 tablet 2     No current facility-administered medications on file prior to visit.       HEALTH MAINTENANCE THAT IS OVERDUE OR NEEDS TO BE UPDATED ON OUR CHART IS LISTED BELOW.  IF YOU HAVE HAD IT DONE ELSEWHERE, PLEASE SEND US DATES AND RECORDS IF YOU HAVE THEM TO MAKE YOUR CHART ACCURATE.  IF YOU HAVE NOT HAD THESE DONE AND ARE READY FOR US TO SCHEDULE THEM, PLEASE SEND US A MESSAGE.  Health Maintenance Due   Topic Date Due    TETANUS VACCINE  Never done    LDCT Lung Screen  Never done    Shingles Vaccine (1 of 2) Never done       DISCLAIMER: This note was compiled by using a speech recognition dictation system and therefore please be aware that typographical / speech recognition errors can and do occur.  Please contact me if you see any errors specifically.    Bipin Penaloza MD  We Offer Telehealth & Same Day Appointments!   Book your Telehealth appointment with me through my nurse or   Clinic appointments on [employer]!  [phone]     Check out my Facebook Page and Follow Me at: CLICK HERE    Check out my website at [employer] by clicking on: CLICK HERE    To Schedule appointments online, go to Billdesk: CLICK HERE     Location: [email or web address]    [address] [facility] [place]  [address]    FAX: [phone]

## 2022-05-12 ENCOUNTER — PATIENT MESSAGE (OUTPATIENT)
Dept: VASCULAR SURGERY | Facility: CLINIC | Age: 62
End: 2022-05-12
Payer: COMMERCIAL

## 2022-05-13 ENCOUNTER — PATIENT MESSAGE (OUTPATIENT)
Dept: VASCULAR SURGERY | Facility: CLINIC | Age: 62
End: 2022-05-13
Payer: COMMERCIAL

## 2022-05-13 DIAGNOSIS — I73.9 PAD (PERIPHERAL ARTERY DISEASE): Primary | ICD-10-CM

## 2022-05-17 ENCOUNTER — CLINICAL SUPPORT (OUTPATIENT)
Dept: SMOKING CESSATION | Facility: CLINIC | Age: 62
End: 2022-05-17
Payer: COMMERCIAL

## 2022-05-17 VITALS
HEART RATE: 75 BPM | WEIGHT: 142 LBS | OXYGEN SATURATION: 100 % | BODY MASS INDEX: 22.92 KG/M2 | DIASTOLIC BLOOD PRESSURE: 79 MMHG | SYSTOLIC BLOOD PRESSURE: 119 MMHG

## 2022-05-17 DIAGNOSIS — F17.210 HEAVY SMOKER (MORE THAN 20 CIGARETTES PER DAY): Primary | ICD-10-CM

## 2022-05-17 PROCEDURE — 99999 PR PBB SHADOW E&M-EST. PATIENT-LVL III: CPT | Mod: PBBFAC,,,

## 2022-05-17 PROCEDURE — 99999 PR PBB SHADOW E&M-EST. PATIENT-LVL III: ICD-10-PCS | Mod: PBBFAC,,,

## 2022-05-17 PROCEDURE — 99404 PREV MED CNSL INDIV APPRX 60: CPT | Mod: S$GLB,,,

## 2022-05-17 PROCEDURE — 99404 PR PREVENT COUNSEL,INDIV,60 MIN: ICD-10-PCS | Mod: S$GLB,,,

## 2022-05-17 RX ORDER — ACETAMINOPHEN AND CODEINE PHOSPHATE 300; 60 MG/1; MG/1
TABLET ORAL
COMMUNITY
End: 2023-02-16

## 2022-05-17 RX ORDER — VARENICLINE TARTRATE 1 MG/1
1 TABLET, FILM COATED ORAL 2 TIMES DAILY
Qty: 56 TABLET | Refills: 0 | Status: SHIPPED | OUTPATIENT
Start: 2022-05-17 | End: 2022-06-14 | Stop reason: SDUPTHER

## 2022-05-17 NOTE — PROGRESS NOTES
Per Smoker[facility]zer CO 48 ppm, last smoked 1 hour prior to meeting. He states he smokes 20+ cigarettes per day, was a 40 cigarettes per day smoker in the past. Starting on Chantix Starter pack, he has 21 mg patches at home, he states he's used but they do nothing for him. Advised to try to double patch or to cut in half and use 1.5 patches. FTND of 8 indicates a high level of tobacco addiction. CESD of 32 is preceived as a significant amount of mental distress or depression at this time. Patient is on medical leave due to health issues and is not happy to be off of work and not able to play Golf or other sports.

## 2022-05-17 NOTE — Clinical Note
Your patient has just re-enrolled in the smoking program. Per Smoker[facility] CO 48 ppm, last smoked 1 hour prior to meeting. He states he smokes 20+ cigarettes per day, was a 40 cigarettes per day smoker in the past. Starting on Chantix Starter pack, he has 21 mg patches at home, he states he's used but they do nothing for him. Advised to try to double patch or to cut in half and use 1.5 patches. FTND of 8 indicates a high level of tobacco addiction. CESD of 32 is preceived as a significant amount of mental distress or depression at this time. Will monitor. Patient is on medical leave due to health issues and is not happy to be off of work and not able to play Golf or other sports.

## 2022-05-23 ENCOUNTER — TELEPHONE (OUTPATIENT)
Dept: SMOKING CESSATION | Facility: CLINIC | Age: 62
End: 2022-05-23
Payer: COMMERCIAL

## 2022-05-23 DIAGNOSIS — F17.210 MODERATE CIGARETTE SMOKER (10-19 PER DAY): Primary | ICD-10-CM

## 2022-05-23 RX ORDER — IBUPROFEN 200 MG
1 TABLET ORAL DAILY
Qty: 28 PATCH | Refills: 0 | Status: SHIPPED | OUTPATIENT
Start: 2022-05-23 | End: 2022-06-14 | Stop reason: SDUPTHER

## 2022-05-25 ENCOUNTER — CLINICAL SUPPORT (OUTPATIENT)
Dept: SMOKING CESSATION | Facility: CLINIC | Age: 62
End: 2022-05-25
Payer: COMMERCIAL

## 2022-05-25 DIAGNOSIS — F17.210 MODERATE CIGARETTE SMOKER (10-19 PER DAY): Primary | ICD-10-CM

## 2022-05-25 PROCEDURE — 99404 PREV MED CNSL INDIV APPRX 60: CPT | Mod: S$GLB,,,

## 2022-05-25 PROCEDURE — 99999 PR PBB SHADOW E&M-EST. PATIENT-LVL II: CPT | Mod: PBBFAC,,,

## 2022-05-25 PROCEDURE — 99999 PR PBB SHADOW E&M-EST. PATIENT-LVL II: ICD-10-PCS | Mod: PBBFAC,,,

## 2022-05-25 PROCEDURE — 99404 PR PREVENT COUNSEL,INDIV,60 MIN: ICD-10-PCS | Mod: S$GLB,,,

## 2022-05-25 RX ORDER — NICOTINE POLACRILEX 2 MG/1
2 LOZENGE ORAL
Qty: 81 EACH | Refills: 0 | Status: SHIPPED | OUTPATIENT
Start: 2022-05-25 | End: 2022-08-29

## 2022-05-25 RX ORDER — DM/P-EPHED/ACETAMINOPH/DOXYLAM 30-7.5/3
2 LIQUID (ML) ORAL
Qty: 100 LOZENGE | Refills: 0 | Status: SHIPPED | OUTPATIENT
Start: 2022-05-25 | End: 2022-05-25

## 2022-05-25 NOTE — Clinical Note
"Patient was seen in the clinic for smoking cessation follow up. He states he's smoking 18-20 cigarettes per day. Commended him for this reduction. The patient remains on the prescribed tobacco cessation medication regimen of 21 mg NRT patches, 2 mg NRT gum and 1 mg Chantix BID without any negative side effects at this time. He states his anxiety/depression is increasing, which is to be expected but was significantly high when beginning the program. At intake 5/17/22 he scored on the CESD a 32 which is preceived as a significant amount of mental distress or depression at this time. Patient is on medical leave due to health issues and is not happy to be off of work and not able to play Golf or other sports. He has a least 2 more months at home until he gets his medical clearance. He plans to message his PCP regarding his increased anxiety. He has taken Wellbutrin in the past with "not a good reaction". He could not remember specifics. "

## 2022-05-25 NOTE — PROGRESS NOTES
"Individual Follow-Up Form    5/25/2022    Quit Date: tba    Clinical Status of Patient: Outpatient    Length of Service: 60 minutes    Continuing Medication: yes  Chantix, Patches or Nicotine gum    Other Medications: none     Target Symptoms: Withdrawal and medication side effects. The following were  rated moderate (3) to severe (4) on TCRS:  · Moderate (3): none  · Severe (4): none    Comments: Patient was seen in the clinic for smoking cessation follow up. He states he's smoking 18-20 cigarettes per day. Commended him for this reduction. The patient remains on the prescribed tobacco cessation medication regimen of 21 mg NRT patches, 2 mg NRT gum and 1 mg Chantix BID without any negative side effects at this time. He states his anxiety/depression is increasing, which is to be expected but was significantly high when beginning the program. At intake 5/17/22 he scored on the CESD a 32 which is preceived as a significant amount of mental distress or depression at this time. Patient is on medical leave due to health issues and is not happy to be off of work and not able to play Golf or other sports. He has a least 2 more months at home until he gets his medical clearance. He plans to message his PCP regarding his increased anxiety. He has taken Wellbutrin in the past with "not a good reaction". He could not remember specifics. Session Focus:  orientation, client introductions, completion of TCRS (Tobacco Cessation Rating Scale) learned addiction model, cues/triggers, personal reasons for quitting, medications, goals, quit date discussed but not set yet. The patient will continue with  therapy sessions and medication monitoring by CTTS. Prescribed medication management will be by physician.    Diagnosis: F17.210    Next Visit: 1 week    "

## 2022-05-31 ENCOUNTER — PATIENT MESSAGE (OUTPATIENT)
Dept: FAMILY MEDICINE | Facility: CLINIC | Age: 62
End: 2022-05-31
Payer: COMMERCIAL

## 2022-05-31 ENCOUNTER — CLINICAL SUPPORT (OUTPATIENT)
Dept: SMOKING CESSATION | Facility: CLINIC | Age: 62
End: 2022-05-31
Payer: COMMERCIAL

## 2022-05-31 VITALS — HEART RATE: 74 BPM | DIASTOLIC BLOOD PRESSURE: 74 MMHG | SYSTOLIC BLOOD PRESSURE: 110 MMHG | OXYGEN SATURATION: 98 %

## 2022-05-31 DIAGNOSIS — R11.0 NAUSEA: Primary | ICD-10-CM

## 2022-05-31 DIAGNOSIS — F17.210 MODERATE CIGARETTE SMOKER (10-19 PER DAY): Primary | ICD-10-CM

## 2022-05-31 PROCEDURE — 99404 PREV MED CNSL INDIV APPRX 60: CPT | Mod: S$GLB,,,

## 2022-05-31 PROCEDURE — 99999 PR PBB SHADOW E&M-EST. PATIENT-LVL II: CPT | Mod: PBBFAC,,,

## 2022-05-31 PROCEDURE — 99404 PR PREVENT COUNSEL,INDIV,60 MIN: ICD-10-PCS | Mod: S$GLB,,,

## 2022-05-31 PROCEDURE — 99999 PR PBB SHADOW E&M-EST. PATIENT-LVL II: ICD-10-PCS | Mod: PBBFAC,,,

## 2022-05-31 NOTE — PROGRESS NOTES
Individual Follow-Up Form    5/31/2022    Quit Date: tba    Clinical Status of Patient: Outpatient    Length of Service: 60 minutes    Continuing Medication: yes  Chantix or Patches    Other Medications: none     Target Symptoms: Withdrawal and medication side effects. The following were  rated moderate (3) to severe (4) on TCRS:  · Moderate (3): none  · Severe (4): none    Comments: Patient was seen in the clinic for smoking cessation follow up.  Per Smokerlyzer CO 28 ppm, last smoked 2 hours prior to meeting. He states he's down to 10 cigarettes per day. The patient remains on the prescribed tobacco cessation medication regimen of 21 mg patches and 1 mg Chantix BID without any negative side effects at this time. However, he does admit to feeling an overwhelming amount of tiredness. He is also starting to feal some nausea.  Discussed reducing to 1, 21mg patch and .5 mg BID Chantix. He mentioned he may message his PCP for nausea medication if symptoms do not subside. Session Focus: completion of TCRS (Tobacco Cessation Rating Scale) reviewed strategies, habitual behavior, high risks situations, understanding urges and cravings, stress and relaxation with open discussion and additional interventions, Introduced lapses, relapses, understanding them and analyzing the situation of a lapse, conflict issues that may be linked to a lapse. The patient denies any abnormal behavioral or mental changes at this time. The patient will continue with  therapy sessions and medication monitoring by CTTS. Prescribed medication management will be by physician.    Diagnosis: F17.210    Next Visit: 1 week

## 2022-05-31 NOTE — Clinical Note
Just a note to advise how the patient is progressing in the tobacco cessation program. Patient was seen in the clinic for smoking cessation follow up.  Per Smokerlyzer CO 28 ppm, last smoked 2 hours prior to meeting. He states he's down to 10 cigarettes per day. The patient remains on the prescribed tobacco cessation medication regimen of 21 mg patches and 1 mg Chantix BID without any negative side effects at this time. However, he does admit to feeling an overwhelming amount of tiredness. He is also starting to feal some nausea.  Discussed reducing to 1, 21mg patch and .5 mg BID Chantix. He mentioned he may message his PCP for nausea medication if symptoms do not subside. Session Focus: completion of TCRS (Tobacco Cessation Rating Scale) reviewed strategies, habitual behavior, high risks situations, understanding urges and cravings, stress and relaxation with open discussion and additional interventions, Introduced lapses, relapses, understanding them and analyzing the situation of a lapse.

## 2022-06-01 ENCOUNTER — PATIENT MESSAGE (OUTPATIENT)
Dept: FAMILY MEDICINE | Facility: CLINIC | Age: 62
End: 2022-06-01
Payer: COMMERCIAL

## 2022-06-01 RX ORDER — ONDANSETRON 8 MG/1
8 TABLET, ORALLY DISINTEGRATING ORAL EVERY 6 HOURS PRN
Qty: 30 TABLET | Refills: 0 | Status: SHIPPED | OUTPATIENT
Start: 2022-06-01 | End: 2022-07-01

## 2022-06-01 NOTE — TELEPHONE ENCOUNTER
I have signed for the following orders AND/OR meds.  Please call the patient and ask the patient to schedule the testing AND/OR inform about any medications that were sent.      No orders of the defined types were placed in this encounter.      Medications Ordered This Encounter   Medications    ondansetron (ZOFRAN-ODT) 8 MG TbDL     Sig: Take 1 tablet (8 mg total) by mouth every 6 (six) hours as needed.     Dispense:  30 tablet     Refill:  0

## 2022-06-06 ENCOUNTER — LAB VISIT (OUTPATIENT)
Dept: LAB | Facility: HOSPITAL | Age: 62
End: 2022-06-06
Attending: INTERNAL MEDICINE
Payer: COMMERCIAL

## 2022-06-06 ENCOUNTER — OFFICE VISIT (OUTPATIENT)
Dept: CARDIOLOGY | Facility: CLINIC | Age: 62
End: 2022-06-06
Payer: COMMERCIAL

## 2022-06-06 VITALS
SYSTOLIC BLOOD PRESSURE: 122 MMHG | WEIGHT: 140.88 LBS | DIASTOLIC BLOOD PRESSURE: 80 MMHG | HEIGHT: 66 IN | BODY MASS INDEX: 22.64 KG/M2 | HEART RATE: 61 BPM

## 2022-06-06 DIAGNOSIS — I77.9 PERIPHERAL ARTERIAL OCCLUSIVE DISEASE: ICD-10-CM

## 2022-06-06 DIAGNOSIS — I10 ESSENTIAL HYPERTENSION: Chronic | ICD-10-CM

## 2022-06-06 DIAGNOSIS — Z71.6 TOBACCO ABUSE COUNSELING: Chronic | ICD-10-CM

## 2022-06-06 DIAGNOSIS — E78.5 DYSLIPIDEMIA (HIGH LDL; LOW HDL): ICD-10-CM

## 2022-06-06 DIAGNOSIS — E78.5 DYSLIPIDEMIA (HIGH LDL; LOW HDL): Chronic | ICD-10-CM

## 2022-06-06 DIAGNOSIS — I77.9 PERIPHERAL ARTERIAL OCCLUSIVE DISEASE: Primary | Chronic | ICD-10-CM

## 2022-06-06 DIAGNOSIS — Z79.02 LONG TERM (CURRENT) USE OF ANTITHROMBOTICS/ANTIPLATELETS: Chronic | ICD-10-CM

## 2022-06-06 DIAGNOSIS — Z79.02 LONG TERM (CURRENT) USE OF ANTITHROMBOTICS/ANTIPLATELETS: ICD-10-CM

## 2022-06-06 LAB
ALBUMIN SERPL BCP-MCNC: 4.1 G/DL (ref 3.5–5.2)
ALP SERPL-CCNC: 88 U/L (ref 55–135)
ALT SERPL W/O P-5'-P-CCNC: 27 U/L (ref 10–44)
ANION GAP SERPL CALC-SCNC: 11 MMOL/L (ref 8–16)
AST SERPL-CCNC: 23 U/L (ref 10–40)
BILIRUB SERPL-MCNC: 0.5 MG/DL (ref 0.1–1)
BUN SERPL-MCNC: 4 MG/DL (ref 8–23)
CALCIUM SERPL-MCNC: 9.5 MG/DL (ref 8.7–10.5)
CHLORIDE SERPL-SCNC: 104 MMOL/L (ref 95–110)
CHOLEST SERPL-MCNC: 81 MG/DL (ref 120–199)
CHOLEST/HDLC SERPL: 3 {RATIO} (ref 2–5)
CO2 SERPL-SCNC: 25 MMOL/L (ref 23–29)
CREAT SERPL-MCNC: 1 MG/DL (ref 0.5–1.4)
EST. GFR  (AFRICAN AMERICAN): >60 ML/MIN/1.73 M^2
EST. GFR  (NON AFRICAN AMERICAN): >60 ML/MIN/1.73 M^2
GLUCOSE SERPL-MCNC: 100 MG/DL (ref 70–110)
HDLC SERPL-MCNC: 27 MG/DL (ref 40–75)
HDLC SERPL: 33.3 % (ref 20–50)
HGB BLD-MCNC: 12.7 G/DL (ref 14–18)
LDLC SERPL CALC-MCNC: 30.6 MG/DL (ref 63–159)
NONHDLC SERPL-MCNC: 54 MG/DL
POTASSIUM SERPL-SCNC: 5 MMOL/L (ref 3.5–5.1)
PROT SERPL-MCNC: 6.6 G/DL (ref 6–8.4)
SODIUM SERPL-SCNC: 140 MMOL/L (ref 136–145)
TRIGL SERPL-MCNC: 117 MG/DL (ref 30–150)

## 2022-06-06 PROCEDURE — 99214 PR OFFICE/OUTPT VISIT, EST, LEVL IV, 30-39 MIN: ICD-10-PCS | Mod: S$GLB,,, | Performed by: INTERNAL MEDICINE

## 2022-06-06 PROCEDURE — 4010F PR ACE/ARB THEARPY RXD/TAKEN: ICD-10-PCS | Mod: CPTII,S$GLB,, | Performed by: INTERNAL MEDICINE

## 2022-06-06 PROCEDURE — 36415 COLL VENOUS BLD VENIPUNCTURE: CPT | Mod: PO | Performed by: INTERNAL MEDICINE

## 2022-06-06 PROCEDURE — 99999 PR PBB SHADOW E&M-EST. PATIENT-LVL III: CPT | Mod: PBBFAC,,, | Performed by: INTERNAL MEDICINE

## 2022-06-06 PROCEDURE — 85018 HEMOGLOBIN: CPT | Performed by: INTERNAL MEDICINE

## 2022-06-06 PROCEDURE — 3008F PR BODY MASS INDEX (BMI) DOCUMENTED: ICD-10-PCS | Mod: CPTII,S$GLB,, | Performed by: INTERNAL MEDICINE

## 2022-06-06 PROCEDURE — 3079F PR MOST RECENT DIASTOLIC BLOOD PRESSURE 80-89 MM HG: ICD-10-PCS | Mod: CPTII,S$GLB,, | Performed by: INTERNAL MEDICINE

## 2022-06-06 PROCEDURE — 99214 OFFICE O/P EST MOD 30 MIN: CPT | Mod: S$GLB,,, | Performed by: INTERNAL MEDICINE

## 2022-06-06 PROCEDURE — 4010F ACE/ARB THERAPY RXD/TAKEN: CPT | Mod: CPTII,S$GLB,, | Performed by: INTERNAL MEDICINE

## 2022-06-06 PROCEDURE — 3074F PR MOST RECENT SYSTOLIC BLOOD PRESSURE < 130 MM HG: ICD-10-PCS | Mod: CPTII,S$GLB,, | Performed by: INTERNAL MEDICINE

## 2022-06-06 PROCEDURE — 80061 LIPID PANEL: CPT | Performed by: INTERNAL MEDICINE

## 2022-06-06 PROCEDURE — 3079F DIAST BP 80-89 MM HG: CPT | Mod: CPTII,S$GLB,, | Performed by: INTERNAL MEDICINE

## 2022-06-06 PROCEDURE — 80053 COMPREHEN METABOLIC PANEL: CPT | Performed by: INTERNAL MEDICINE

## 2022-06-06 PROCEDURE — 99999 PR PBB SHADOW E&M-EST. PATIENT-LVL III: ICD-10-PCS | Mod: PBBFAC,,, | Performed by: INTERNAL MEDICINE

## 2022-06-06 PROCEDURE — 3074F SYST BP LT 130 MM HG: CPT | Mod: CPTII,S$GLB,, | Performed by: INTERNAL MEDICINE

## 2022-06-06 PROCEDURE — 3008F BODY MASS INDEX DOCD: CPT | Mod: CPTII,S$GLB,, | Performed by: INTERNAL MEDICINE

## 2022-06-06 RX ORDER — LISINOPRIL 2.5 MG/1
2.5 TABLET ORAL NIGHTLY
Qty: 90 TABLET | Refills: 1 | Status: SHIPPED | OUTPATIENT
Start: 2022-06-06 | End: 2022-11-28

## 2022-06-06 NOTE — PROGRESS NOTES
Subjective:    Patient ID:  Sal Villarreal Jr. is a 62 y.o. male who presents for Hypertension and Peripheral Arterial Disease        HPI    PATIENT MISSED LABS, DOING WELL, DECREASED TOBACCO TO 1/2 PPD, NO CHEST PAIN NO TIA TYPE SYMPTOMS NO NEAR-SYNCOPE, , SEE ROS  Past Medical History:   Diagnosis Date    Anxiety     Arthritis     Depression     Digestive disorder     ulcers    Fatigue     History of acute bronchitis     History of psychiatric care     Hypertension     Intervertebral disc disorder with radiculopathy of lumbar region 3/23/2021    Psychiatric problem     PVD (peripheral vascular disease)     S/P femoral-femoral bypass surgery 12/1/2021     Past Surgical History:   Procedure Laterality Date    ANTERIOR LUMBAR INTERBODY FUSION (ALIF) Right 3/23/2021    Procedure: FUSION, SPINE, LUMBAR, ALIF RIGHT L5-S1;  Surgeon: Kelly Ayala MD;  Location: [facility] OR;  Service: Neurosurgery;  Laterality: Right;    AORTOGRAPHY WITH EXTREMITY RUNOFF N/A 7/27/2021    Procedure: AORTOGRAM, WITH EXTREMITY RUNOFF;  Surgeon: Jim Briggs MD;  Location: [facility] CATH;  Service: Cardiovascular;  Laterality: N/A;    AORTOGRAPHY WITH SERIALOGRAPHY Bilateral 7/27/2021    Procedure: AORTOGRAM, WITH SERIALOGRAPHY;  Surgeon: Jim Briggs MD;  Location: [facility] CATH;  Service: Cardiovascular;  Laterality: Bilateral;    CREATION OF FEMORAL-FEMORAL ARTERY BYPASS WITH GRAFT Left 11/30/2021    Procedure: CREATION, BYPASS, ARTERIAL, FEMORAL TO FEMORAL, USING GRAFT;  Surgeon: Jim Briggs MD;  Location: [facility] OR;  Service: Cardiovascular;  Laterality: Left;    ENDARTERECTOMY OF FEMORAL ARTERY Bilateral 11/30/2021    Procedure: ENDARTERECTOMY, FEMORAL;  Surgeon: Jim Briggs MD;  Location: [facility] OR;  Service: Cardiovascular;  Laterality: Bilateral;    FUSION OF LUMBAR SPINE BY ANTERIOR APPROACH  3/23/2021    Procedure: FUSION, SPINE, LUMBAR, ANTERIOR APPROACH;  Surgeon: Jim Briggs MD;  Location: [facility] OR;  Service:  Cardiovascular;;    HARVESTING OF BONE GRAFT N/A 3/23/2021    Procedure: SURGICAL PROCUREMENT, BONE GRAFT-Iliac;  Surgeon: Kelly Ayala MD;  [email or web address] OR;  Service: Neurosurgery;  Laterality: N/A;    HYPOSPADIAS CORRECTION      VASECTOMY       Family History   Problem Relation Age of Onset    Diabetes Mother     Hypertension Mother     Diabetes Father     Hypertension Father     Cancer Sister     Muscular dystrophy Son     Arthritis Paternal Grandfather     Birth defects Brother     Heart disease Maternal Aunt     Suicide Neg Hx     Sexual abuse Neg Hx     Self injury Neg Hx     Seizures Neg Hx     Schizophrenia Neg Hx     Physical abuse Neg Hx     Paranoid behavior Neg Hx     OCD Neg Hx     Drug abuse Neg Hx     Depression Neg Hx     Dementia Neg Hx     Bipolar disorder Neg Hx     Anxiety disorder Neg Hx     Alcohol abuse Neg Hx      Social History     Socioeconomic History    Marital status:    Occupational History    Occupation: unemployed     Employer: [employer]   Tobacco Use    Smoking status: Current Every Day Smoker     Packs/day: 1.00     Years: 42.00     Pack years: 42.00     Types: Cigarettes    Smokeless tobacco: Never Used   Substance and Sexual Activity    Alcohol use: Not Currently     Alcohol/week: 42.0 standard drinks     Types: 42 Cans of beer per week     Comment: stopped recently - last drink 3/21    Drug use: No    Sexual activity: Yes     Partners: Female, Male   Other Topics Concern    Patient feels they ought to cut down on drinking/drug use Yes    Patient annoyed by others criticizing their drinking/drug use Yes    Patient has felt bad or guilty about drinking/drug use Yes    Patient has had a drink/used drugs as an eye opener in the AM No     Social Determinants of Health     Financial Resource Strain: Low Risk     Difficulty of Paying Living Expenses: Not hard at all   Food Insecurity: No Food Insecurity    Worried  About Running Out of Food in the Last Year: Never true    Ran Out of Food in the Last Year: Never true   Transportation Needs: No Transportation Needs    Lack of Transportation (Medical): No    Lack of Transportation (Non-Medical): No   Physical Activity: Sufficiently Active    Days of Exercise per Week: 5 days    Minutes of Exercise per Session: 150+ min   Stress: Stress Concern Present    Feeling of Stress : Rather much   Social Connections: Unknown    Frequency of Communication with Friends and Family: More than three times a week    Frequency of Social Gatherings with Friends and Family: Three times a week    Active Member of Clubs or Organizations: Yes    Attends Club or Organization Meetings: More than 4 times per year    Marital Status:    Housing Stability: Low Risk     Unable to Pay for Housing in the Last Year: No    Number of Places Lived in the Last Year: 1    Unstable Housing in the Last Year: No       Review of patient's allergies indicates:   Allergen Reactions    Blueberry        Current Outpatient Medications:     acetaminophen-codeine 300-60mg (TYLENOL #4) 300-60 mg Tab, Take by mouth., Disp: , Rfl:     aspirin (ECOTRIN) 81 MG EC tablet, Take 1 tablet (81 mg total) by mouth once daily., Disp: 90 tablet, Rfl: 0    atenoloL (TENORMIN) 25 MG tablet, Take 1 tablet (25 mg total) by mouth every morning., Disp: 90 tablet, Rfl: 4    clopidogreL (PLAVIX) 75 mg tablet, TAKE 1 TABLET BY MOUTH EVERY DAY, Disp: 30 tablet, Rfl: 0    DENTA 5000 PLUS 1.1 % Crea, Take 1 application by mouth 2 (two) times daily., Disp: , Rfl:     famotidine (PEPCID) 20 MG tablet, Take 20 mg by mouth every evening., Disp: , Rfl:     multivitamin (THERAGRAN) per tablet, Take 1 tablet by mouth once daily., Disp: , Rfl:     nicotine (NICODERM CQ) 21 mg/24 hr, Place 1 patch onto the skin once daily., Disp: 28 patch, Rfl: 0    nicotine, polacrilex, 2 mg lzmn, Take 1 lozenge (2 mg total) by mouth as needed  (as needed). Dispense MINIS please. Can use 1-2 per hour as needed in place of a cigarette., Disp: 81 each, Rfl: 0    ondansetron (ZOFRAN-ODT) 8 MG TbDL, Take 1 tablet (8 mg total) by mouth every 6 (six) hours as needed., Disp: 30 tablet, Rfl: 0    pantoprazole (PROTONIX) 40 MG tablet, TAKE 1 TABLET BY MOUTH EVERY DAY, Disp: 90 tablet, Rfl: 4    rosuvastatin (CRESTOR) 10 MG tablet, TAKE 1 TABLET BY MOUTH EVERY DAY IN THE EVENING, Disp: 90 tablet, Rfl: 0    sildenafiL (VIAGRA) 100 MG tablet, Take 1 tablet (100 mg total) by mouth as needed for Erectile Dysfunction., Disp: 30 tablet, Rfl: 2    varenicline (CHANTIX) 1 mg Tab, Take ½ tablet once a day for 3 days, then increase to ½ tablet twice a day for 4 days. Beginning on Day 8, take 1 tablet twice daily until complete, Disp: 56 tablet, Rfl: 0    diclofenac (VOLTAREN) 25 MG TbEC, Take 25 mg by mouth 2 (two) times daily as needed., Disp: , Rfl:     gabapentin (NEURONTIN) 300 MG capsule, Take 300 mg by mouth every evening., Disp: , Rfl:     lisinopriL (PRINIVIL,ZESTRIL) 2.5 MG tablet, Take 1 tablet (2.5 mg total) by mouth every evening., Disp: 90 tablet, Rfl: 1    Review of Systems   Constitutional: Negative for chills, diaphoresis, fever, malaise/fatigue and night sweats.   HENT: Negative for congestion and nosebleeds.    Eyes: Negative for blurred vision (READING) and visual disturbance.   Cardiovascular: Negative for chest pain, claudication (MILD), cyanosis, dyspnea on exertion, irregular heartbeat, leg swelling, near-syncope, orthopnea, palpitations, paroxysmal nocturnal dyspnea and syncope.   Respiratory: Negative for cough, hemoptysis, shortness of breath, sleep disturbances due to breathing and wheezing.    Endocrine: Negative.    Hematologic/Lymphatic: Negative for adenopathy. Does not bruise/bleed easily.   Skin: Negative for color change and rash.   Musculoskeletal: Positive for back pain. Negative for falls.   Gastrointestinal: Negative for abdominal  "pain, change in bowel habit, dysphagia, jaundice, melena and nausea (WITH CHANTIX).   Genitourinary: Negative for dysuria and flank pain.   Neurological: Negative for brief paralysis, focal weakness, headaches, light-headedness, loss of balance, numbness (INCISIONAL) and weakness.   Psychiatric/Behavioral: Negative for altered mental status, depression and memory loss. The patient is not nervous/anxious.    Allergic/Immunologic: Negative.         Objective:      Vitals:    06/06/22 0951   BP: 122/80   Pulse: 61   Weight: 63.9 kg (140 lb 14 oz)   Height: 5' 6" (1.676 m)   PainSc:   4   PainLoc: Back     Body mass index is 22.74 kg/m².    Physical Exam  HENT:      Head: Normocephalic and atraumatic.   Eyes:      Pupils: Pupils are equal, round, and reactive to light.   Neck:      Vascular: Carotid bruit present.   Cardiovascular:      Rate and Rhythm: Normal rate and regular rhythm.      Pulses:           Carotid pulses are 2+ on the right side with bruit and 2+ on the left side.       Radial pulses are 2+ on the right side and 2+ on the left side.        Femoral pulses are 2+ on the right side with bruit and 2+ on the left side with bruit.       Posterior tibial pulses are 1+ on the right side and 1+ on the left side.      Heart sounds: Murmur heard.    Systolic murmur is present with a grade of 1/6.    No friction rub. No gallop.   Pulmonary:      Effort: Pulmonary effort is normal.      Breath sounds: No rales.   Abdominal:      Palpations: Abdomen is soft.      Tenderness: There is no abdominal tenderness.       Musculoskeletal:      Cervical back: Neck supple.      Right lower leg: No edema.      Left lower leg: No edema.   Skin:     General: Skin is warm and dry.      Capillary Refill: Capillary refill takes less than 2 seconds.   Neurological:      General: No focal deficit present.      Mental Status: He is alert.   Psychiatric:         Mood and Affect: Mood normal.         Behavior: Behavior normal.           "       ..    Chemistry        Component Value Date/Time     06/06/2022 1037    K 5.0 06/06/2022 1037     06/06/2022 1037    CO2 25 06/06/2022 1037    BUN 4 (L) 06/06/2022 1037    CREATININE 1.0 06/06/2022 1037     06/06/2022 1037        Component Value Date/Time    CALCIUM 9.5 06/06/2022 1037    ALKPHOS 88 06/06/2022 1037    AST 23 06/06/2022 1037    ALT 27 06/06/2022 1037    BILITOT 0.5 06/06/2022 1037    ESTGFRAFRICA >60.0 06/06/2022 1037    EGFRNONAA >60.0 06/06/2022 1037            ..  Lab Results   Component Value Date    CHOL 81 (L) 06/06/2022    CHOL 120 11/29/2021    CHOL 143 12/12/2019     Lab Results   Component Value Date    HDL 27 (L) 06/06/2022    HDL 23 (L) 11/29/2021    HDL 31 (L) 12/12/2019     Lab Results   Component Value Date    LDLCALC 30.6 (L) 06/06/2022    LDLCALC 63.4 11/29/2021    LDLCALC 76.0 12/12/2019     Lab Results   Component Value Date    TRIG 117 06/06/2022    TRIG 168 (H) 11/29/2021    TRIG 180 (H) 12/12/2019     Lab Results   Component Value Date    CHOLHDL 33.3 06/06/2022    CHOLHDL 19.2 (L) 11/29/2021    CHOLHDL 21.7 12/12/2019     ..  Lab Results   Component Value Date    WBC 10.55 12/02/2021    HGB 12.7 (L) 06/06/2022    HCT 28.5 (L) 12/02/2021    MCV 91 12/02/2021     (L) 12/02/2021       Test(s) Reviewed  I have reviewed the following in detail:  [] Stress test   [] Angiography   [] Echocardiogram   [] Labs   [] Other:       Assessment:         ICD-10-CM ICD-9-CM   1. Peripheral arterial occlusive disease  I77.9 444.22   2. Long term (current) use of antithrombotics/antiplatelets  Z79.02 V58.63   3. Tobacco abuse counseling  Z71.6 V65.42     305.1   4. Dyslipidemia (high LDL; low HDL)  E78.5 272.4   5. Essential hypertension  I10 401.9     Problem List Items Addressed This Visit        Cardiac/Vascular    Essential hypertension (Chronic)    Relevant Orders    Comprehensive Metabolic Panel (Completed)    Peripheral arterial occlusive disease - Primary     Overview     Date of Service: 07/27/2021  DATE OF PROCEDURE:  07/27/2021.     PREOPERATIVE DIAGNOSES:  1.  Peripheral arterial occlusive disease.  2.  Pain of the right hip.  3.  Severe intermittent claudication of the right lower extremity affecting   activities of daily living.     POSTOPERATIVE DIAGNOSES:  1.  Peripheral arterial occlusive disease.  2.  Pain of the right hip  3.  Severe intermittent claudication of the right lower extremity affecting   activities of daily living.     OPERATIONS:  1.  Aortogram with bilateral renal arteriogram.  2.  Bilateral common iliac arteriograms with bilateral lower extremity runoff.  3.  Measurement of pressures in the aorta and the left iliac artery.     SURGEON:  Momo Briggs M.D., F.A.C.S.     ANESTHESIA:  Lidocaine 1% for local and intravenous sedation using 4 mg of   Versed and 100 mcg of fentanyl IV.  The patient's level of consciousness was   monitored by the registered nurse from 10:59-11:30 a.m.  Other monitors included   blood pressure, pulse oximetry, heart rate and respiratory rate.     PROCEDURE IN DETAIL:  With the patient in the supine position on the cardiac   cath table, bilateral groins were prepped and draped in a sterile fashion.  CT   angiography had showed an occluded right external iliac artery and a 50%   stenosis of the left common iliac artery.  Access to the left common femoral   artery was obtained using an 18-gauge access needle and a guidewire was passed   through this.  A 5-Indonesian sheath was passed over the guidewire and the guidewire   and dilator were removed and the sheath was aspirated and flushed.  A J-tip   guidewire was passed through the sheath and advanced up into the suprarenal   aorta under fluoroscopy.  The guidewire was removed.  Contrast was injected and   digital subtraction angiography was performed and an abdominal aortogram with   bilateral renal arteriograms was done.  The renal arteries were patent.  The   infrarenal aorta  had calcified plaque with some mild luminal irregularities, but   no significant stenosis.     The angiographic catheter was pulled down into the distal aorta just above the   aortic bifurcation.  Contrast was injected and cineangiography was performed,   and bilateral common iliac arteriograms with bilateral lower extremity runoff   were performed.  The right common iliac artery was patent.  The right   hypogastric artery was patent and large, giving off a lot of collaterals around   the hip.  The right external iliac artery and the right common femoral artery   were both occluded.  The very distal common femoral artery reconstituted a   couple of millimeters above the femoral bifurcation.  The profunda femoris and   the superficial femoral artery and the right popliteal artery were all patent.    Runoff was via 3 vessels.     The left common iliac artery had some luminal irregularities, but did not seem   to have high-grade stenosis.  The left hypogastric and the left external iliac   arteries were patent, although the left external iliac artery seemed to have   about 20% stenosis distally.  The left common femoral, the left profunda, the   left superficial femoral and the left popliteal arteries were patent.  Runoff   was via 3 vessels.  Bilateral oblique arteriograms of the iliac arteries were   then performed to see if there was any stenosis that could not be seen with the   anteroposterior projections.  No high-grade stenosis in the left common iliac   artery could be seen.  The Omniflush angiographic catheter was then exchanged   for a straight Glidecath.  Pressures were then measured in the infrarenal aorta   and the catheter was pulled back with continuous blood pressure measurements   down into the left common iliac and then into the left external iliac artery.    There was absolutely no change in pressure from the aorta into the common iliac   or into the external iliac artery.  No intervention was  performed.  The sheath   was pulled from the left common femoral artery and pressure was held for about   20 minutes.  At the end of that time, there was no bleeding and no hematoma.    Sterile dressing was placed.  The patient tolerated the procedure and left the   Cardiac Cath Lab in satisfactory and stable condition.        NIMO/VIVIAN  dd: 07/27/2021 11:52:36 (CDT)  td: 07/27/2021 13:12:39 (CDT)  Doc ID   #[id number]  Job ID #[id number]     CC:          Last signed by: Jim Briggs MD at 7/27/2021  3:08 PM                Relevant Orders    Lipid Panel (Completed)    Dyslipidemia (high LDL; low HDL)    Relevant Orders    Comprehensive Metabolic Panel (Completed)    Lipid Panel (Completed)       Hematology    Long term (current) use of antithrombotics/antiplatelets    Relevant Orders    Comprehensive Metabolic Panel (Completed)    Hemoglobin (Completed)       Other    Tobacco abuse counseling           Plan:         ADD LISINOPRIL FOR BLOOD PRESSURE CARDIOVASCULAR ATHEROSCLEROSIS, CHECK, LABS, TOBACCO CESSATION COUNSELING AND WALKING EXERCISE PROGRAM,ALL CV CLINICALLY STABLE, NO ANGINA, NO HF, NO TIA, NO CLINICAL ARRHYTHMIA,CONTINUE CURRENT MEDS, EDUCATION, DIET, EXERCISE RETURN TO CLINIC IN, 6 MO  Peripheral arterial occlusive disease  -     Lipid Panel; Future; Expected date: 06/06/2022    Long term (current) use of antithrombotics/antiplatelets  -     Comprehensive Metabolic Panel; Future; Expected date: 06/06/2022  -     Hemoglobin; Future; Expected date: 06/06/2022    Tobacco abuse counseling    Dyslipidemia (high LDL; low HDL)  -     Comprehensive Metabolic Panel; Future; Expected date: 06/06/2022  -     Lipid Panel; Future; Expected date: 06/06/2022    Essential hypertension  Comments:  CONTROLLED  Orders:  -     Comprehensive Metabolic Panel; Future; Expected date: 06/06/2022    Other orders  -     lisinopriL (PRINIVIL,ZESTRIL) 2.5 MG tablet; Take 1 tablet (2.5 mg total) by mouth every evening.  Dispense: 90 tablet;  Refill: 1    RTC Low level/low impact aerobic exercise 5x's/wk. Heart healthy diet and risk factor modification.    See labs and med orders.    Aerobic exercise 5x's/wk. Heart healthy diet and risk factor modification.    See labs and med orders.

## 2022-06-07 ENCOUNTER — CLINICAL SUPPORT (OUTPATIENT)
Dept: SMOKING CESSATION | Facility: CLINIC | Age: 62
End: 2022-06-07
Payer: COMMERCIAL

## 2022-06-07 DIAGNOSIS — F17.210 MODERATE CIGARETTE SMOKER (10-19 PER DAY): Primary | ICD-10-CM

## 2022-06-07 PROCEDURE — 99404 PREV MED CNSL INDIV APPRX 60: CPT | Mod: S$GLB,,,

## 2022-06-07 PROCEDURE — 99404 PR PREVENT COUNSEL,INDIV,60 MIN: ICD-10-PCS | Mod: S$GLB,,,

## 2022-06-07 PROCEDURE — 99999 PR PBB SHADOW E&M-EST. PATIENT-LVL I: ICD-10-PCS | Mod: PBBFAC,,,

## 2022-06-07 PROCEDURE — 99999 PR PBB SHADOW E&M-EST. PATIENT-LVL I: CPT | Mod: PBBFAC,,,

## 2022-06-07 NOTE — PROGRESS NOTES
Individual Follow-Up Form    6/7/2022    Quit Date: tba    Clinical Status of Patient: Outpatient    Length of Service: 60 minutes    Continuing Medication: yes  Chantix, Patches or Nicotine Lozenges    Other Medications: none     Target Symptoms: Withdrawal and medication side effects. The following were  rated moderate (3) to severe (4) on TCRS:  · Moderate (3): none  · Severe (4): none    Comments: Patient was seen in the clinic for smoking cessation follow up. He states he's smoking 10-12 cigarettes per day. Per Smokerlyzer 25 ppm last smoked 3 hours prior to meeting. The patient remains on the prescribed tobacco cessation medication regimen of .5 mg Chantix in the AM and 1 mg Chantix PM, 21 mg patches and 2 mg lozenges prn without any negative side effects at this time. Morning cigarette is down to 1 in the am with coffee and all other cigarettes are 1:30 apart from each other. Patient states it's hard but he's determined. Session Focus: completion of TCRS (Tobacco Cessation Rating Scale) reviewed strategies, cues, and triggers. Introduced the negative impact of tobacco on health, the health advantages of discontinuing the use of tobacco, time line improved health changes after a quit, withdrawal issues to expect from nicotine and habit, and ways to achieve the goal of a quit.The patient denies any abnormal behavioral or mental changes at this time. The patient will continue with  therapy sessions and medication monitoring by CTTS. Prescribed medication management will be by physician.    Diagnosis: F17.210    Next Visit: 1 week

## 2022-06-07 NOTE — Clinical Note
Just a note to advise how the patient is progressing in the tobacco cessation program.  Patient was seen in the clinic for smoking cessation follow up. He states he's smoking 10-12 cigarettes per day. Per Smokerlyzer 25 ppm last smoked 3 hours prior to meeting. The patient remains on the prescribed tobacco cessation medication regimen of .5 mg Chantix in the AM and 1 mg Chantix PM, 21 mg patches and 2 mg lozenges prn without any negative side effects at this time. Morning cigarette is down to 1 in the am with coffee and all other cigarettes are 1:30 apart from each other. Patient states it's hard but he's determined. Session Focus: completion of TCRS (Tobacco Cessation Rating Scale) reviewed strategies, cues, and triggers. Introduced the negative impact of tobacco on health, the health advantages of discontinuing the use of tobacco, time line improved health changes after a quit, withdrawal issues to expect from nicotine and habit, and ways to achieve the goal of a quit.

## 2022-06-14 ENCOUNTER — CLINICAL SUPPORT (OUTPATIENT)
Dept: SMOKING CESSATION | Facility: CLINIC | Age: 62
End: 2022-06-14
Payer: COMMERCIAL

## 2022-06-14 DIAGNOSIS — F17.210 HEAVY SMOKER (MORE THAN 20 CIGARETTES PER DAY): ICD-10-CM

## 2022-06-14 DIAGNOSIS — F17.210 MODERATE CIGARETTE SMOKER (10-19 PER DAY): ICD-10-CM

## 2022-06-14 PROCEDURE — 99999 PR PBB SHADOW E&M-EST. PATIENT-LVL II: ICD-10-PCS | Mod: PBBFAC,,,

## 2022-06-14 PROCEDURE — 99404 PREV MED CNSL INDIV APPRX 60: CPT | Mod: S$GLB,,,

## 2022-06-14 PROCEDURE — 99999 PR PBB SHADOW E&M-EST. PATIENT-LVL II: CPT | Mod: PBBFAC,,,

## 2022-06-14 PROCEDURE — 99404 PR PREVENT COUNSEL,INDIV,60 MIN: ICD-10-PCS | Mod: S$GLB,,,

## 2022-06-14 RX ORDER — VARENICLINE TARTRATE 1 MG/1
1 TABLET, FILM COATED ORAL 2 TIMES DAILY
Qty: 60 TABLET | Refills: 0 | Status: SHIPPED | OUTPATIENT
Start: 2022-06-14 | End: 2022-07-06 | Stop reason: SDUPTHER

## 2022-06-14 RX ORDER — IBUPROFEN 200 MG
1 TABLET ORAL DAILY
Qty: 28 PATCH | Refills: 0 | Status: SHIPPED | OUTPATIENT
Start: 2022-06-14 | End: 2022-06-14

## 2022-06-14 NOTE — Clinical Note
Patient was seen in the clinic for smoking cessation follow up . He states he's smoking about 10-12 cigarettes per day. Per Smokerlyzer CO 13 ppm, last smoked 3 hours prior. The patient remains on the prescribed tobacco cessation medication regimen of 1.5 mg Chantix QD, 21 mg patches and 2 mg lozenges without any negative side effects at this time. Session Focus: completion of TCRS (Tobacco Cessation Rating Scale) reviewed strategies, habitual behavior, high risks situations, understanding urges and cravings, stress and relaxation with open discussion and additional interventions, Introduced lapses, relapses, understanding them and analyzing the situation of a lapse, conflict issues that may be linked to a lapse. The patient denies any abnormal behavioral or mental changes at this time.

## 2022-06-14 NOTE — PROGRESS NOTES
Individual Follow-Up Form    6/14/2022    Quit Date: tba    Clinical Status of Patient: Outpatient    Length of Service: 60 minutes    Continuing Medication: yes  Chantix, Patches or Nicotine Lozenges    Other Medications: none     Target Symptoms: Withdrawal and medication side effects. The following were  rated moderate (3) to severe (4) on TCRS:  · Moderate (3): none  · Severe (4): none    Comments:  Patient was seen in the clinic for smoking cessation follow up . He states he's smoking about 10-12 cigarettes per day. Per Smokerlyzer CO 13 ppm, last smoked 3 hours prior. The patient remains on the prescribed tobacco cessation medication regimen of 1.5 mg Chantix QD, 21 mg patches and 2 mg lozenges without any negative side effects at this time. Session Focus: completion of TCRS (Tobacco Cessation Rating Scale) reviewed strategies, habitual behavior, high risks situations, understanding urges and cravings, stress and relaxation with open discussion and additional interventions, Introduced lapses, relapses, understanding them and analyzing the situation of a lapse, conflict issues that may be linked to a lapse. The patient denies any abnormal behavioral or mental changes at this time.     Diagnosis: F17.210    Next Visit: 1 week

## 2022-06-21 ENCOUNTER — CLINICAL SUPPORT (OUTPATIENT)
Dept: SMOKING CESSATION | Facility: CLINIC | Age: 62
End: 2022-06-21
Payer: COMMERCIAL

## 2022-06-21 DIAGNOSIS — F17.210 HEAVY SMOKER (MORE THAN 20 CIGARETTES PER DAY): ICD-10-CM

## 2022-06-21 PROCEDURE — 99999 PR PBB SHADOW E&M-EST. PATIENT-LVL II: ICD-10-PCS | Mod: PBBFAC,,,

## 2022-06-21 PROCEDURE — 99999 PR PBB SHADOW E&M-EST. PATIENT-LVL II: CPT | Mod: PBBFAC,,,

## 2022-06-21 PROCEDURE — 99404 PR PREVENT COUNSEL,INDIV,60 MIN: ICD-10-PCS | Mod: S$GLB,,,

## 2022-06-21 PROCEDURE — 99404 PREV MED CNSL INDIV APPRX 60: CPT | Mod: S$GLB,,,

## 2022-06-21 NOTE — PROGRESS NOTES
"Individual Follow-Up Form    6/21/2022    Quit Date: tba    Clinical Status of Patient: Outpatient    Length of Service: 60 minutes    Continuing Medication: yes  Chantix    Other Medications: none     Target Symptoms: Withdrawal and medication side effects. The following were  rated moderate (3) to severe (4) on TCRS:  · Moderate (3): none  · Severe (4): none    Comments: Stopped taking 21 mg patches and is not using the 2 mg lozenges he is continuing with 1.5 mg Chantix and has kept his 10 cigarettes per day or smoking every 1.5 hours. He is now planing activities during the "set smoke time" so he has to wait even longer (eg. This appointment was set during his smoke break time so he passed it up). Commended him for this. Patient very determined to quit but is concerned about how strong the cravings are. Session Focus: completion of TCRS (Tobacco Cessation Rating Scale) reviewed strategies, controlling environment, cues, triggers, new goals set. Introduced high risk situations with preparation interventions, caffeine similarities with withdrawal issues of habit and nicotine, Alcohol, Understanding urges, cravings, stress and relaxation. Open discussion with intervention discussion. The patient denies any abnormal behavioral or mental changes at this time. The patient will continue with  therapy sessions and medication monitoring by CTTS. Prescribed medication management will be by physician.    Diagnosis: F17.210    Next Visit: 1 week    "

## 2022-06-21 NOTE — Clinical Note
"Stopped taking 21 mg patches and is not using the 2 mg lozenges he is continuing with 1.5 mg Chantix and has kept his 10 cigarettes per day or smoking every 1.5 hours. He is now planing activities during the "set smoke time" so he has to wait even longer (eg. This appointment was set during his smoke break time so he passed it up). Commended him for this. Patient very determined to quit but is concerned about how strong the cravings are. Session Focus: completion of TCRS (Tobacco Cessation Rating Scale) reviewed strategies, controlling environment, cues, triggers, new goals set. Introduced high risk situations with preparation interventions, caffeine similarities with withdrawal issues of habit and nicotine, Alcohol, Understanding urges, cravings, stress and relaxation. Open discussion with intervention discussion. The patient denies any abnormal behavioral or mental changes at this time. The patient will continue with  therapy sessions and medication monitoring by CTTS. "

## 2022-06-28 ENCOUNTER — CLINICAL SUPPORT (OUTPATIENT)
Dept: SMOKING CESSATION | Facility: CLINIC | Age: 62
End: 2022-06-28
Payer: COMMERCIAL

## 2022-06-28 DIAGNOSIS — F17.210 LIGHT CIGARETTE SMOKER (1-9 CIGARETTES PER DAY): Primary | ICD-10-CM

## 2022-06-28 PROCEDURE — 99999 PR PBB SHADOW E&M-EST. PATIENT-LVL II: ICD-10-PCS | Mod: PBBFAC,,,

## 2022-06-28 PROCEDURE — 99404 PR PREVENT COUNSEL,INDIV,60 MIN: ICD-10-PCS | Mod: S$GLB,,,

## 2022-06-28 PROCEDURE — 99999 PR PBB SHADOW E&M-EST. PATIENT-LVL II: CPT | Mod: PBBFAC,,,

## 2022-06-28 PROCEDURE — 99404 PREV MED CNSL INDIV APPRX 60: CPT | Mod: S$GLB,,,

## 2022-06-28 NOTE — Clinical Note
Just a note to advise how the patient is progressing in the tobacco cessation program.  Patient was seen in the clinic for smoking cessation follow up. He states he is smoking 10 cigarettes per day and extending it to 1.5 hours but states its hard especially since reducing the NRTs (paches and gum) commended him for sticking to it. He states he's tried to quit several times and this is the best he's done and he's more determined than ever.  Discussed its only been 1.5 months and a quit attempt is 4 months but that it's different for everyone. The important thing is that he's continuing to work on this. The patient remains on the prescribed tobacco cessation medication regimen of 1 mg Chantix BID without any negative side effects at this time. Session Focus: completion of TCRS (Tobacco Cessation Rating Scale) reviewed strategies, habitual behavior, high risks situations, understanding urges and cravings, stress and relaxation with open discussion and additional interventions, Introduced lapses, relapses.

## 2022-06-28 NOTE — PROGRESS NOTES
Addendum created to correct the medication amount take daily.     Individual Follow-Up Form    6/28/2022    Quit Date: tba    Clinical Status of Patient: Outpatient    Length of Service: 60 minutes    Continuing Medication: yes  Chantix    Other Medications: none     Target Symptoms: Withdrawal and medication side effects. The following were  rated moderate (3) to severe (4) on TCRS:  · Moderate (3): none  · Severe (4): none    Comments: Patient was seen in the clinic for smoking cessation follow up. He states he is smoking 10 cigarettes per day and extending it to 1.5 hours but states its hard especially since reducing the NRTs (paches and gum) commended him for sticking to it. He states he's tried to quit several times and this is the best he's done and he's more determined than ever.  Discussed its only been 1.5 months and a quit attempt is 4 months but that it's different for everyone. The important thing is that he's continuing to work on this. The patient remains on the prescribed tobacco cessation medication regimen of 1.5 mg Chantix daily (.5mg in the am and 1mg in pm) without any negative side effects at this time. Session Focus: completion of TCRS (Tobacco Cessation Rating Scale) reviewed strategies, habitual behavior, high risks situations, understanding urges and cravings, stress and relaxation with open discussion and additional interventions, Introduced lapses, relapses, understanding them and analyzing the situation of a lapse, conflict issues that may be linked to a lapse. The patient denies any abnormal behavioral or mental changes at this time. The patient will continue with  therapy sessions and medication monitoring by CTTS. Prescribed medication management will be by physician.    Diagnosis: F17.210    Next Visit: 1 week

## 2022-06-30 ENCOUNTER — PATIENT MESSAGE (OUTPATIENT)
Dept: VASCULAR SURGERY | Facility: CLINIC | Age: 62
End: 2022-06-30
Payer: COMMERCIAL

## 2022-07-06 ENCOUNTER — CLINICAL SUPPORT (OUTPATIENT)
Dept: SMOKING CESSATION | Facility: CLINIC | Age: 62
End: 2022-07-06
Payer: COMMERCIAL

## 2022-07-06 DIAGNOSIS — F17.210 HEAVY SMOKER (MORE THAN 20 CIGARETTES PER DAY): ICD-10-CM

## 2022-07-06 DIAGNOSIS — F17.210 LIGHT CIGARETTE SMOKER (1-9 CIGARETTES PER DAY): Primary | ICD-10-CM

## 2022-07-06 PROCEDURE — 99404 PREV MED CNSL INDIV APPRX 60: CPT | Mod: S$GLB,,,

## 2022-07-06 PROCEDURE — 99999 PR PBB SHADOW E&M-EST. PATIENT-LVL II: ICD-10-PCS | Mod: PBBFAC,,,

## 2022-07-06 PROCEDURE — 99999 PR PBB SHADOW E&M-EST. PATIENT-LVL II: CPT | Mod: PBBFAC,,,

## 2022-07-06 PROCEDURE — 99404 PR PREVENT COUNSEL,INDIV,60 MIN: ICD-10-PCS | Mod: S$GLB,,,

## 2022-07-06 RX ORDER — VARENICLINE TARTRATE 1 MG/1
1 TABLET, FILM COATED ORAL 2 TIMES DAILY
Qty: 60 TABLET | Refills: 0 | Status: SHIPPED | OUTPATIENT
Start: 2022-07-06 | End: 2022-08-16 | Stop reason: SDUPTHER

## 2022-07-06 NOTE — Clinical Note
Just a note to advise how the patient is progressing in the tobacco cessation program. The patient was seen in the clinic for smoking cessation follow up. He states he's smokes 10 cigarettes per day and that this is the best he's ever done trying to quit and also the longest he's tried to quit. He stated he had a major break through, [place] and stuck to his goal of smoking 1.5 hours. Commended him for this. The patient remains on the prescribed tobacco cessation medication regimen of 1mg Chantix BID, which was increased from 1mg in am and .5 mg in pm without any negative side effects at this time. He states he's using his nausea medication and still feeling nauseas but he's willing to push through because he can feel a big difference in his urges. And not watching the clock as much. Discussed trying to stop smoking in the car. Per Smokerlyzer CO 24 ppm last smoked 1.5 hours prior to meeting. The patient denies any abnormal behavioral or mental changes at this time.

## 2022-07-06 NOTE — PROGRESS NOTES
Individual Follow-Up Form    7/6/2022    Quit Date: tba    Clinical Status of Patient: Outpatient    Length of Service: 60 minutes    Continuing Medication: yes  Chantix    Other Medications: none     Target Symptoms: Withdrawal and medication side effects. The following were  rated moderate (3) to severe (4) on TCRS:  · Moderate (3): none  · Severe (4): none    Comments: The patient was seen in the clinic for smoking cessation follow up. He states he's smokes 10 cigarettes per day and that this is the best he's ever done trying to quit and also the longest he's tried to quit. He stated he had a major break through, he [place] and stuck to his goal of smoking 1.5 hours. Commended him for this. The patient remains on the prescribed tobacco cessation medication regimen of 1mg Chantix BID, which was increased from 1mg in am and .5 mg in pm without any negative side effects at this time. He states he's using his nausea medication and still feeling nauseas but he's willing to push through because he can feel a big difference in his urges. And not watching the clock as much. Discussed trying to stop smoking in the car. Per Smokerlyzer CO 24 ppm last smoked 1.5 hours prior to meeting. The patient denies any abnormal behavioral or mental changes at this time. The patient will continue with  therapy sessions and medication monitoring by CTTS. Prescribed medication management will be by physician. He is going to the beach and wants to see if he can not bring cigarette there. Follow up in 2 weeks.     Diagnosis: F17.210    Next Visit: 2 weeks

## 2022-07-07 ENCOUNTER — HOSPITAL ENCOUNTER (OUTPATIENT)
Dept: RADIOLOGY | Facility: HOSPITAL | Age: 62
Discharge: HOME | End: 2022-07-07
Attending: THORACIC SURGERY (CARDIOTHORACIC VASCULAR SURGERY)
Payer: COMMERCIAL

## 2022-07-07 DIAGNOSIS — I73.9 PAD (PERIPHERAL ARTERY DISEASE): ICD-10-CM

## 2022-07-07 PROCEDURE — 93922 UPR/L XTREMITY ART 2 LEVELS: CPT | Mod: 26,,, | Performed by: RADIOLOGY

## 2022-07-07 PROCEDURE — 93925 LOWER EXTREMITY STUDY: CPT | Mod: TC,PO

## 2022-07-07 PROCEDURE — 93922 US ARTERIAL LOWER EXTREMITY BILAT WITH ABI (XPD): ICD-10-PCS | Mod: 26,,, | Performed by: RADIOLOGY

## 2022-07-07 PROCEDURE — 93925 LOWER EXTREMITY STUDY: CPT | Mod: 26,,, | Performed by: RADIOLOGY

## 2022-07-07 PROCEDURE — 93925 US ARTERIAL LOWER EXTREMITY BILAT WITH ABI (XPD): ICD-10-PCS | Mod: 26,,, | Performed by: RADIOLOGY

## 2022-07-12 ENCOUNTER — PATIENT MESSAGE (OUTPATIENT)
Dept: VASCULAR SURGERY | Facility: CLINIC | Age: 62
End: 2022-07-12
Payer: COMMERCIAL

## 2022-07-19 ENCOUNTER — CLINICAL SUPPORT (OUTPATIENT)
Dept: SMOKING CESSATION | Facility: CLINIC | Age: 62
End: 2022-07-19
Payer: COMMERCIAL

## 2022-07-19 ENCOUNTER — PATIENT MESSAGE (OUTPATIENT)
Dept: VASCULAR SURGERY | Facility: CLINIC | Age: 62
End: 2022-07-19
Payer: COMMERCIAL

## 2022-07-19 ENCOUNTER — HOSPITAL ENCOUNTER (OUTPATIENT)
Dept: RADIOLOGY | Facility: HOSPITAL | Age: 62
Discharge: HOME | End: 2022-07-19
Attending: THORACIC SURGERY (CARDIOTHORACIC VASCULAR SURGERY)
Payer: COMMERCIAL

## 2022-07-19 DIAGNOSIS — F17.200 NICOTINE DEPENDENCE: Primary | ICD-10-CM

## 2022-07-19 DIAGNOSIS — I73.9 PAD (PERIPHERAL ARTERY DISEASE): ICD-10-CM

## 2022-07-19 PROCEDURE — 99404 PREV MED CNSL INDIV APPRX 60: CPT | Mod: S$GLB,,,

## 2022-07-19 PROCEDURE — 99999 PR PBB SHADOW E&M-EST. PATIENT-LVL II: CPT | Mod: PBBFAC,,,

## 2022-07-19 PROCEDURE — 99404 PR PREVENT COUNSEL,INDIV,60 MIN: ICD-10-PCS | Mod: S$GLB,,,

## 2022-07-19 PROCEDURE — 99999 PR PBB SHADOW E&M-EST. PATIENT-LVL II: ICD-10-PCS | Mod: PBBFAC,,,

## 2022-07-19 NOTE — Clinical Note
The patient was seen in the clinic for smoking cessation follow up. He states he smokes 15 cigarettes per day. The patient remains on the prescribed tobacco cessation medication regimen of 1 mg Chantix bid without any negative side effects at this time other than nausea that he is treating. Session Focus completion of TCRS (Tobacco Cessation Rating Scale) reviewed strategies, habitual behavior, stress, and high risk situations. Introduced stress with addition interventions, SOLVE, relaxation with interventions, nutrition, exercise, weight gain, and the importance of rewarding oneself for accomplishments toward becoming tobacco free. Open discussion of all items with interventions. He states he recently went on a trip to the beach that went well. He was able to stick to his goal of 1.5 hours between smoking. He discussed how many times during the trip he noticed that he normally would be smoking at certain times. We discussed pushing his goal to 2 hours.

## 2022-07-19 NOTE — PROGRESS NOTES
Individual Follow-Up Form    7/19/2022    Quit Date: tba    Clinical Status of Patient: Outpatient    Length of Service: 60 minutes    Continuing Medication: yes  Chantix    Other Medications: none     Target Symptoms: Withdrawal and medication side effects. The following were  rated moderate (3) to severe (4) on TCRS:  · Moderate (3): none  · Severe (4): none    Comments: The patient was seen in the clinic for smoking cessation follow up. He states he smokes 15 cigarettes per day. The patient remains on the prescribed tobacco cessation medication regimen of 1 mg Chantix bid without any negative side effects at this time other than nausea that he is treating. Session Focus completion of TCRS (Tobacco Cessation Rating Scale) reviewed strategies, habitual behavior, stress, and high risk situations. Introduced stress with addition interventions, SOLVE, relaxation with interventions, nutrition, exercise, weight gain, and the importance of rewarding oneself for accomplishments toward becoming tobacco free. Open discussion of all items with interventions. He states he recently went on a trip to the beach that went well. He was able to stick to his goal of 1.5 hours between smoking. He discussed how many times during the trip he noticed that he normally would be smoking at certain times. We discussed pushing his goal to 2 hours. The patient denies any abnormal behavioral or mental changes at this time. The patient will continue with  therapy sessions and medication monitoring by CTTS. Prescribed medication management will be by physician.    Diagnosis: F17.210    Next Visit: 1 week

## 2022-07-21 ENCOUNTER — PATIENT MESSAGE (OUTPATIENT)
Dept: PULMONOLOGY | Facility: CLINIC | Age: 62
End: 2022-07-21
Payer: COMMERCIAL

## 2022-07-26 ENCOUNTER — PATIENT MESSAGE (OUTPATIENT)
Dept: VASCULAR SURGERY | Facility: CLINIC | Age: 62
End: 2022-07-26
Payer: COMMERCIAL

## 2022-07-26 ENCOUNTER — CLINICAL SUPPORT (OUTPATIENT)
Dept: SMOKING CESSATION | Facility: CLINIC | Age: 62
End: 2022-07-26
Payer: COMMERCIAL

## 2022-07-26 DIAGNOSIS — F17.200 NICOTINE DEPENDENCE: Primary | ICD-10-CM

## 2022-07-26 DIAGNOSIS — I73.9 PAD (PERIPHERAL ARTERY DISEASE): Primary | ICD-10-CM

## 2022-07-26 PROCEDURE — 99404 PREV MED CNSL INDIV APPRX 60: CPT | Mod: S$GLB,,,

## 2022-07-26 PROCEDURE — 99999 PR PBB SHADOW E&M-EST. PATIENT-LVL II: CPT | Mod: PBBFAC,,,

## 2022-07-26 PROCEDURE — 99404 PR PREVENT COUNSEL,INDIV,60 MIN: ICD-10-PCS | Mod: S$GLB,,,

## 2022-07-26 PROCEDURE — 99999 PR PBB SHADOW E&M-EST. PATIENT-LVL II: ICD-10-PCS | Mod: PBBFAC,,,

## 2022-07-26 NOTE — Clinical Note
The patient was seen in the clinic for smoking cessation follow up.  He states keeping up with his new goal of every  2 hrs in between smoking. He states he smokes 6 cigarettes per day. Not smoking when waking, he waits 40-60 minutes. Per Smokerlyzer CO 19 ppm, last smoked 2.5  hours prior to meeting. The patient remains on the prescribed tobacco cessation medication regimen of 1 mg Chantix bid without any negative side effects at this time. He states he's been in a very bad mood lastly probably due to the reduction. The patient denies any abnormal behavioral or mental changes at this time. The patient will continue with  therapy sessions and medication monitoring by CTTS. Prescribed medication management will be by physician.

## 2022-07-26 NOTE — PROGRESS NOTES
Individual Follow-Up Form    7/26/2022    Quit Date: tba    Clinical Status of Patient: Outpatient    Length of Service: 60 minutes    Continuing Medication: yes  Chantix    Other Medications: none     Target Symptoms: Withdrawal and medication side effects. The following were  rated moderate (3) to severe (4) on TCRS:  · Moderate (3): none  · Severe (4): none    Comments: The patient was seen in the clinic for smoking cessation follow up.  He states keeping up with his new goal of every  2 hrs in between smoking. He states he smokes 6 cigarettes per day. Not smoking when waking, he waits 40-60 minutes. Per Smokerlyzer CO 19 ppm, last smoked 2.5  hours prior to meeting. The patient remains on the prescribed tobacco cessation medication regimen of 1 mg Chantix bid without any negative side effects at this time. He states he's been in a very bad mood lastly probably due to the reduction. The patient denies any abnormal behavioral or mental changes at this time. The patient will continue with  therapy sessions and medication monitoring by CTTS. Prescribed medication management will be by physician.    Diagnosis: F17.210    Next Visit: 2 weeks

## 2022-08-01 ENCOUNTER — PATIENT MESSAGE (OUTPATIENT)
Dept: FAMILY MEDICINE | Facility: CLINIC | Age: 62
End: 2022-08-01
Payer: COMMERCIAL

## 2022-08-02 RX ORDER — ONDANSETRON 4 MG/1
4 TABLET, ORALLY DISINTEGRATING ORAL EVERY 8 HOURS PRN
Qty: 30 TABLET | Refills: 0 | Status: SHIPPED | OUTPATIENT
Start: 2022-08-02 | End: 2022-08-26

## 2022-08-02 NOTE — TELEPHONE ENCOUNTER
No new care gaps identified.  [facility] Embedded Care Gaps. Reference number: [id number]. 8/02/2022   7:15:00 AM CDT

## 2022-08-09 ENCOUNTER — CLINICAL SUPPORT (OUTPATIENT)
Dept: SMOKING CESSATION | Facility: CLINIC | Age: 62
End: 2022-08-09
Payer: COMMERCIAL

## 2022-08-09 ENCOUNTER — PATIENT MESSAGE (OUTPATIENT)
Dept: FAMILY MEDICINE | Facility: CLINIC | Age: 62
End: 2022-08-09
Payer: COMMERCIAL

## 2022-08-09 DIAGNOSIS — F17.200 NICOTINE DEPENDENCE: Primary | ICD-10-CM

## 2022-08-09 PROCEDURE — 99404 PREV MED CNSL INDIV APPRX 60: CPT | Mod: S$GLB,,,

## 2022-08-09 PROCEDURE — 99999 PR PBB SHADOW E&M-EST. PATIENT-LVL II: ICD-10-PCS | Mod: PBBFAC,,,

## 2022-08-09 PROCEDURE — 99404 PR PREVENT COUNSEL,INDIV,60 MIN: ICD-10-PCS | Mod: S$GLB,,,

## 2022-08-09 PROCEDURE — 99999 PR PBB SHADOW E&M-EST. PATIENT-LVL II: CPT | Mod: PBBFAC,,,

## 2022-08-09 NOTE — Clinical Note
Just a note to advise how the patient is progressing in the tobacco cessation program. The patient was seen in the clinic for smoking cessation follow up. He states he smokes 6-8 cigarettes per day and pushing them 2 hours between (at least), discussed setting a 3 hour between smoking goal.  Per Smokerlyzer CO 23 ppm, last smoked  hours prior to meeting. [place] to [place] around with friends and did not smoke the entire time. He is very proud that he has gotten so low in his goal number and he's never trieid to quit this long before and can finally see the light at the end of the tunnel. He feels like he can actually quit one day! The patient remains on the prescribed tobacco cessation medication regimen of 1 mg Chantix BID without any negative side effects at this time. Session Focus: completion of TCRS (Tobacco Cessation Rating Scale) reviewed strategies, habitual behavior, high risks situations, understanding urges and cravings, stress and relaxation with open discussion.

## 2022-08-09 NOTE — PROGRESS NOTES
Individual Follow-Up Form    8/9/2022    Quit Date: tba    Clinical Status of Patient: Outpatient    Length of Service: 60 minutes    Continuing Medication: yes  Chantix    Other Medications: none     Target Symptoms: Withdrawal and medication side effects. The following were rated moderate (3) to severe (4) on TCRS:  · Moderate (3): none  · Severe (4): none    Comments: The patient was seen in the clinic for smoking cessation follow up. He states he smokes 6-8 cigarettes per day and pushing them 2 hours between (at least), discussed setting a 3 hour between smoking goal.  Per Smokerlyzer CO 23 ppm, last smoked  hours prior to meeting. [place] to [place] around with friends and did not smoke the entire time. He is very proud that he has gotten so low in his goal number and he's never trieid to quit this long before and can finally see the light at the end of the tunnel. He feels like he can actually quit one day! The patient remains on the prescribed tobacco cessation medication regimen of 1 mg Chantix BID without any negative side effects at this time. Session Focus: completion of TCRS (Tobacco Cessation Rating Scale) reviewed strategies, habitual behavior, high risks situations, understanding urges and cravings, stress and relaxation with open discussion and additional interventions, Introduced lapses, relapses, understanding them and analyzing the situation of a lapse, conflict issues that may be linked to a lapse. The patient denies any abnormal behavioral or mental changes at this time. The patient will continue with  therapy sessions and medication monitoring by CTTS. Prescribed medication management will be by physician.     Diagnosis: F17.210    Next Visit: 1 week

## 2022-08-16 ENCOUNTER — CLINICAL SUPPORT (OUTPATIENT)
Dept: SMOKING CESSATION | Facility: CLINIC | Age: 62
End: 2022-08-16
Payer: COMMERCIAL

## 2022-08-16 DIAGNOSIS — F17.210 HEAVY SMOKER (MORE THAN 20 CIGARETTES PER DAY): ICD-10-CM

## 2022-08-16 PROCEDURE — 99999 PR PBB SHADOW E&M-EST. PATIENT-LVL II: ICD-10-PCS | Mod: PBBFAC,,,

## 2022-08-16 PROCEDURE — 99404 PREV MED CNSL INDIV APPRX 60: CPT | Mod: S$GLB,,,

## 2022-08-16 PROCEDURE — 99999 PR PBB SHADOW E&M-EST. PATIENT-LVL II: CPT | Mod: PBBFAC,,,

## 2022-08-16 PROCEDURE — 99404 PR PREVENT COUNSEL,INDIV,60 MIN: ICD-10-PCS | Mod: S$GLB,,,

## 2022-08-16 RX ORDER — VARENICLINE TARTRATE 1 MG/1
1 TABLET, FILM COATED ORAL 2 TIMES DAILY
Qty: 60 TABLET | Refills: 0 | Status: SHIPPED | OUTPATIENT
Start: 2022-08-16 | End: 2022-09-20 | Stop reason: SDUPTHER

## 2022-08-16 NOTE — PROGRESS NOTES
Individual Follow-Up Form    8/16/2022    Quit Date: tba    Clinical Status of Patient: Outpatient    Length of Service: 60 minutes    Continuing Medication: yes  Chantix    Other Medications: lozenges (not using)     Target Symptoms: Withdrawal and medication side effects. The following were  rated moderate (3) to severe (4) on TCRS:  · Moderate (3): none  · Severe (4): none    Comments: The patient was seen in the clinic for smoking cessation follow up.  He states he smokes 7 cigarettes per day. He states this week has been harder than it has been since quitting. He says there are no extra stressors in his life however he feels like he's smoked less lately so that may be the cause of feeling these craving. Per Smokerlyzer CO 19 ppm, last smoked 2.5 hours prior to meeting.The patient remains on the prescribed tobacco cessation medication regimen of 1 mg Chantix BID without any negative side effects at this time. He has 2 mg lozenges that I advised he use these to help pass up 1-2 cigarette per day. He is not comfortable using nicotine to help quitting. Discussed this.  He still has a goal of smoking no less than ever 2 hours. He was feeling down due to wanting to expand his goal to smoking every 3 hours which we too large of a jump. Discussed setting a goal of 2 hours and 15 minutes. He is getting flexible with the 7 cigarettes he is smoking, he's not smoking right after eating is his latest accomplishment. Commended him for this. The patient denies any abnormal behavioral or mental changes at this time. The patient will continue with  therapy sessions and medication monitoring by CTTS. Prescribed medication management will be by physician.    Diagnosis: F17.210    Next Visit: 1 week

## 2022-08-16 NOTE — Clinical Note
The patient was seen in the clinic for smoking cessation follow up.  He states he smokes 7 cigarettes per day. He states this week has been harder than it has been since quitting. He says there are no extra stressors in his life however he feels like he's smoked less lately so that may be the cause of feeling these craving. Per Smokerlyzer CO 19 ppm, last smoked 2.5 hours prior to meeting.The patient remains on the prescribed tobacco cessation medication regimen of 1 mg Chantix BID without any negative side effects at this time. He has 2 mg lozenges that I advised he use these to help pass up 1-2 cigarette per day. He is not comfortable using nicotine to help quitting. Discussed this.  He still has a goal of smoking no less than ever 2 hours. He was feeling down due to wanting to expand his goal to smoking every 3 hours which we too large of a jump. Discussed setting a goal of 2 hours and 15 minutes. He is getting flexible with the 7 cigarettes he is smoking, he's not smoking right after eating is his latest.

## 2022-08-18 ENCOUNTER — PATIENT MESSAGE (OUTPATIENT)
Dept: FAMILY MEDICINE | Facility: CLINIC | Age: 62
End: 2022-08-18
Payer: COMMERCIAL

## 2022-08-19 ENCOUNTER — PATIENT MESSAGE (OUTPATIENT)
Dept: FAMILY MEDICINE | Facility: CLINIC | Age: 62
End: 2022-08-19
Payer: COMMERCIAL

## 2022-08-25 ENCOUNTER — LAB VISIT (OUTPATIENT)
Dept: LAB | Facility: HOSPITAL | Age: 62
End: 2022-08-25
Attending: FAMILY MEDICINE
Payer: COMMERCIAL

## 2022-08-25 DIAGNOSIS — Z00.00 WELL ADULT EXAM: ICD-10-CM

## 2022-08-25 DIAGNOSIS — Z13.220 ENCOUNTER FOR LIPID SCREENING FOR CARDIOVASCULAR DISEASE: ICD-10-CM

## 2022-08-25 DIAGNOSIS — Z12.5 ENCOUNTER FOR PROSTATE CANCER SCREENING: ICD-10-CM

## 2022-08-25 DIAGNOSIS — Z13.6 ENCOUNTER FOR LIPID SCREENING FOR CARDIOVASCULAR DISEASE: ICD-10-CM

## 2022-08-25 DIAGNOSIS — Z79.899 ENCOUNTER FOR LONG-TERM (CURRENT) USE OF MEDICATIONS: ICD-10-CM

## 2022-08-25 LAB
ALBUMIN SERPL BCP-MCNC: 3.9 G/DL (ref 3.5–5.2)
ALP SERPL-CCNC: 80 U/L (ref 55–135)
ALT SERPL W/O P-5'-P-CCNC: 37 U/L (ref 10–44)
ANION GAP SERPL CALC-SCNC: 8 MMOL/L (ref 8–16)
AST SERPL-CCNC: 36 U/L (ref 10–40)
BILIRUB SERPL-MCNC: 0.5 MG/DL (ref 0.1–1)
BUN SERPL-MCNC: 5 MG/DL (ref 8–23)
CALCIUM SERPL-MCNC: 9.5 MG/DL (ref 8.7–10.5)
CHLORIDE SERPL-SCNC: 105 MMOL/L (ref 95–110)
CHOLEST SERPL-MCNC: 76 MG/DL (ref 120–199)
CHOLEST/HDLC SERPL: 4.2 {RATIO} (ref 2–5)
CO2 SERPL-SCNC: 25 MMOL/L (ref 23–29)
COMPLEXED PSA SERPL-MCNC: 1.2 NG/ML (ref 0–4)
CREAT SERPL-MCNC: 0.9 MG/DL (ref 0.5–1.4)
ERYTHROCYTE [DISTWIDTH] IN BLOOD BY AUTOMATED COUNT: 12 % (ref 11.5–14.5)
EST. GFR  (NO RACE VARIABLE): >60 ML/MIN/1.73 M^2
ESTIMATED AVG GLUCOSE: 111 MG/DL (ref 68–131)
GLUCOSE SERPL-MCNC: 91 MG/DL (ref 70–110)
HBA1C MFR BLD: 5.5 % (ref 4–5.6)
HCT VFR BLD AUTO: 37 % (ref 40–54)
HDLC SERPL-MCNC: 18 MG/DL (ref 40–75)
HDLC SERPL: 23.7 % (ref 20–50)
HGB BLD-MCNC: 12.5 G/DL (ref 14–18)
LDLC SERPL CALC-MCNC: 22.4 MG/DL (ref 63–159)
MCH RBC QN AUTO: 30.9 PG (ref 27–31)
MCHC RBC AUTO-ENTMCNC: 33.8 G/DL (ref 32–36)
MCV RBC AUTO: 91 FL (ref 82–98)
NONHDLC SERPL-MCNC: 58 MG/DL
PLATELET # BLD AUTO: 249 K/UL (ref 150–450)
PMV BLD AUTO: 9.8 FL (ref 9.2–12.9)
POTASSIUM SERPL-SCNC: 4.2 MMOL/L (ref 3.5–5.1)
PROT SERPL-MCNC: 6.9 G/DL (ref 6–8.4)
RBC # BLD AUTO: 4.05 M/UL (ref 4.6–6.2)
SODIUM SERPL-SCNC: 138 MMOL/L (ref 136–145)
TRIGL SERPL-MCNC: 178 MG/DL (ref 30–150)
TSH SERPL DL<=0.005 MIU/L-ACNC: 1.06 UIU/ML (ref 0.4–4)
WBC # BLD AUTO: 7.01 K/UL (ref 3.9–12.7)

## 2022-08-25 PROCEDURE — 85027 COMPLETE CBC AUTOMATED: CPT | Performed by: FAMILY MEDICINE

## 2022-08-25 PROCEDURE — 84153 ASSAY OF PSA TOTAL: CPT | Performed by: FAMILY MEDICINE

## 2022-08-25 PROCEDURE — 83036 HEMOGLOBIN GLYCOSYLATED A1C: CPT | Performed by: FAMILY MEDICINE

## 2022-08-25 PROCEDURE — 84443 ASSAY THYROID STIM HORMONE: CPT | Performed by: FAMILY MEDICINE

## 2022-08-25 PROCEDURE — 80053 COMPREHEN METABOLIC PANEL: CPT | Performed by: FAMILY MEDICINE

## 2022-08-25 PROCEDURE — 36415 COLL VENOUS BLD VENIPUNCTURE: CPT | Mod: PO | Performed by: FAMILY MEDICINE

## 2022-08-25 PROCEDURE — 80061 LIPID PANEL: CPT | Performed by: FAMILY MEDICINE

## 2022-08-26 RX ORDER — ONDANSETRON 4 MG/1
4 TABLET, ORALLY DISINTEGRATING ORAL EVERY 8 HOURS PRN
Qty: 30 TABLET | Refills: 0 | Status: SHIPPED | OUTPATIENT
Start: 2022-08-26 | End: 2022-08-29 | Stop reason: SDUPTHER

## 2022-08-29 ENCOUNTER — OFFICE VISIT (OUTPATIENT)
Dept: FAMILY MEDICINE | Facility: CLINIC | Age: 62
End: 2022-08-29
Payer: COMMERCIAL

## 2022-08-29 VITALS
BODY MASS INDEX: 23.19 KG/M2 | RESPIRATION RATE: 16 BRPM | SYSTOLIC BLOOD PRESSURE: 132 MMHG | OXYGEN SATURATION: 98 % | HEART RATE: 78 BPM | TEMPERATURE: 97 F | DIASTOLIC BLOOD PRESSURE: 80 MMHG | HEIGHT: 66 IN | WEIGHT: 144.31 LBS

## 2022-08-29 DIAGNOSIS — Z23 NEED FOR PNEUMOCOCCAL VACCINE: ICD-10-CM

## 2022-08-29 DIAGNOSIS — Z79.899 ENCOUNTER FOR LONG-TERM (CURRENT) USE OF MEDICATIONS: ICD-10-CM

## 2022-08-29 DIAGNOSIS — E78.5 DYSLIPIDEMIA (HIGH LDL; LOW HDL): ICD-10-CM

## 2022-08-29 DIAGNOSIS — F33.41 RECURRENT MAJOR DEPRESSIVE DISORDER, IN PARTIAL REMISSION: ICD-10-CM

## 2022-08-29 DIAGNOSIS — I10 ESSENTIAL HYPERTENSION: Primary | ICD-10-CM

## 2022-08-29 DIAGNOSIS — E55.9 VITAMIN D DEFICIENCY: ICD-10-CM

## 2022-08-29 DIAGNOSIS — R53.83 FATIGUE, UNSPECIFIED TYPE: ICD-10-CM

## 2022-08-29 DIAGNOSIS — R11.0 NAUSEA: ICD-10-CM

## 2022-08-29 PROCEDURE — 1160F RVW MEDS BY RX/DR IN RCRD: CPT | Mod: CPTII,S$GLB,, | Performed by: FAMILY MEDICINE

## 2022-08-29 PROCEDURE — 3075F PR MOST RECENT SYSTOLIC BLOOD PRESS GE 130-139MM HG: ICD-10-PCS | Mod: CPTII,S$GLB,, | Performed by: FAMILY MEDICINE

## 2022-08-29 PROCEDURE — 3079F DIAST BP 80-89 MM HG: CPT | Mod: CPTII,S$GLB,, | Performed by: FAMILY MEDICINE

## 2022-08-29 PROCEDURE — 3008F BODY MASS INDEX DOCD: CPT | Mod: CPTII,S$GLB,, | Performed by: FAMILY MEDICINE

## 2022-08-29 PROCEDURE — 3079F PR MOST RECENT DIASTOLIC BLOOD PRESSURE 80-89 MM HG: ICD-10-PCS | Mod: CPTII,S$GLB,, | Performed by: FAMILY MEDICINE

## 2022-08-29 PROCEDURE — 99214 PR OFFICE/OUTPT VISIT, EST, LEVL IV, 30-39 MIN: ICD-10-PCS | Mod: S$GLB,,, | Performed by: FAMILY MEDICINE

## 2022-08-29 PROCEDURE — 3008F PR BODY MASS INDEX (BMI) DOCUMENTED: ICD-10-PCS | Mod: CPTII,S$GLB,, | Performed by: FAMILY MEDICINE

## 2022-08-29 PROCEDURE — 1159F PR MEDICATION LIST DOCUMENTED IN MEDICAL RECORD: ICD-10-PCS | Mod: CPTII,S$GLB,, | Performed by: FAMILY MEDICINE

## 2022-08-29 PROCEDURE — 99999 PR PBB SHADOW E&M-EST. PATIENT-LVL V: ICD-10-PCS | Mod: PBBFAC,,, | Performed by: FAMILY MEDICINE

## 2022-08-29 PROCEDURE — 99999 PR PBB SHADOW E&M-EST. PATIENT-LVL V: CPT | Mod: PBBFAC,,, | Performed by: FAMILY MEDICINE

## 2022-08-29 PROCEDURE — 3044F HG A1C LEVEL LT 7.0%: CPT | Mod: CPTII,S$GLB,, | Performed by: FAMILY MEDICINE

## 2022-08-29 PROCEDURE — 3044F PR MOST RECENT HEMOGLOBIN A1C LEVEL <7.0%: ICD-10-PCS | Mod: CPTII,S$GLB,, | Performed by: FAMILY MEDICINE

## 2022-08-29 PROCEDURE — 1160F PR REVIEW ALL MEDS BY PRESCRIBER/CLIN PHARMACIST DOCUMENTED: ICD-10-PCS | Mod: CPTII,S$GLB,, | Performed by: FAMILY MEDICINE

## 2022-08-29 PROCEDURE — 4010F PR ACE/ARB THEARPY RXD/TAKEN: ICD-10-PCS | Mod: CPTII,S$GLB,, | Performed by: FAMILY MEDICINE

## 2022-08-29 PROCEDURE — 1159F MED LIST DOCD IN RCRD: CPT | Mod: CPTII,S$GLB,, | Performed by: FAMILY MEDICINE

## 2022-08-29 PROCEDURE — 99214 OFFICE O/P EST MOD 30 MIN: CPT | Mod: S$GLB,,, | Performed by: FAMILY MEDICINE

## 2022-08-29 PROCEDURE — 4010F ACE/ARB THERAPY RXD/TAKEN: CPT | Mod: CPTII,S$GLB,, | Performed by: FAMILY MEDICINE

## 2022-08-29 PROCEDURE — 3075F SYST BP GE 130 - 139MM HG: CPT | Mod: CPTII,S$GLB,, | Performed by: FAMILY MEDICINE

## 2022-08-29 RX ORDER — ONDANSETRON 4 MG/1
4 TABLET, ORALLY DISINTEGRATING ORAL EVERY 8 HOURS PRN
Qty: 30 TABLET | Refills: 0 | Status: SHIPPED | OUTPATIENT
Start: 2022-08-29 | End: 2022-10-04

## 2022-08-29 RX ORDER — ROSUVASTATIN CALCIUM 5 MG/1
5 TABLET, COATED ORAL NIGHTLY
Qty: 90 TABLET | Refills: 4 | Status: SHIPPED | OUTPATIENT
Start: 2022-08-29 | End: 2023-11-16

## 2022-08-29 NOTE — PATIENT INSTRUCTIONS
Follow up in 6 months (on 2/28/2023), or if symptoms worsen or fail to improve, for HTN, Med refills, LAB RESULTS.     Dear patient,   As a result of recent federal legislation (The Federal Cures Act), you may receive lab or pathology results from your visit in your [facility] account before your physician is able to contact you. Your physician or their representative will relay the results to you with their recommendations at their soonest availability.     If no improvement in symptoms or symptoms worsen, please be advised to call MD, follow-up at clinic and/or go to ER if becomes severe.    Bipin Penaloza M.D.        We Offer TELEHEALTH & Same Day Appointments!   Book your Telehealth appointment with me through my nurse or   Clinic appointments on [facility]!    [address] [place] [address]    Office: [phone]   FAX: [phone]    Check out my Facebook Page and Follow Me at: [email or web address]    Check out my website at [employer] by clicking on: [email or web address]    To Schedule appointments online, go to [facility]: [email or web address]

## 2022-08-29 NOTE — ASSESSMENT & PLAN NOTE
Declines medications.  He continues therapy with smoking cessation.Please be advised of condition course.  SNRI/SSRI is first-line treatment for this condition.  Please be advised of the risk of discontinuing this medication without tapering/contacting MD.  Patient has been advised of side effects, and all questions were answered.  Patient voiced understanding.  Patient will follow up routinely and notify us if having any side effects or worsening or persistent symptoms.  ER precautions were given. Antidepressant/Antianxiety Medication Initiation:  Patient informed of risks, benefits, and potential side effects of medication and accepts informed consent.  Common side effects include nausea, fatigue, headache, insomnia, etc see medication insert for complete side effect profile.  Most importantly be advised of the possibility of new or worsening suicidal thoughts/depression/anxiety etcetera.  Please be advised to stop the medication immediately and seek urgent treatment if this occurs.  Therefore please do not to abruptly discontinue medication without physician guidance except in cases of sudden onset or worsening of SI.

## 2022-08-29 NOTE — ASSESSMENT & PLAN NOTE
Reduce rosuvastatin to 5 milligrams daily to see if his LDL remain below 70 while hopefully giving him left side effects.    Counseled on hyperlipidemia disease course, healthy diet and increased need for exercise.  Please be advised of the risk of cardiovascular disease, increase stroke and heart attack risk with uncontrolled/untreated hyperlipidemia.     Patient voiced understanding and understood the treatment plan. All questions were answered.

## 2022-08-29 NOTE — PROGRESS NOTES
PLAN:      Problem List Items Addressed This Visit       Essential hypertension - Primary (Chronic)     Counseled on importance of hypertension disease course, I recommend ongoing Education for DASH-diet and exercise.  Counseled on medication regimen importance of treating high blood pressure.  Please be advised of risk of untreated blood pressure as discussed.  Please advised of ER precautions were given for symptoms of hypertensive urgency and emergency.           Encounter for long-term (current) use of medications (Chronic)     Complete history and physical was completed today.  Complete and thorough medication reconciliation was performed.  Discussed risks and benefits of medications.  Advised patient on orders and health maintenance.  We discussed old records and old labs if available.  Will request any records not available through epic.  Continue current medications listed on your summary sheet.           Dyslipidemia (high LDL; low HDL) (Chronic)     Reduce rosuvastatin to 5 milligrams daily to see if his LDL remain below 70 while hopefully giving him left side effects.    Counseled on hyperlipidemia disease course, healthy diet and increased need for exercise.  Please be advised of the risk of cardiovascular disease, increase stroke and heart attack risk with uncontrolled/untreated hyperlipidemia.     Patient voiced understanding and understood the treatment plan. All questions were answered.              Relevant Medications    rosuvastatin (CRESTOR) 5 MG tablet    Recurrent major depressive disorder, in partial remission (Chronic)     Declines medications.  He continues therapy with smoking cessation.Please be advised of condition course.  SNRI/SSRI is first-line treatment for this condition.  Please be advised of the risk of discontinuing this medication without tapering/contacting MD.  Patient has been advised of side effects, and all questions were answered.  Patient voiced understanding.  Patient  will follow up routinely and notify us if having any side effects or worsening or persistent symptoms.  ER precautions were given. Antidepressant/Antianxiety Medication Initiation:  Patient informed of risks, benefits, and potential side effects of medication and accepts informed consent.  Common side effects include nausea, fatigue, headache, insomnia, etc see medication insert for complete side effect profile.  Most importantly be advised of the possibility of new or worsening suicidal thoughts/depression/anxiety etcetera.  Please be advised to stop the medication immediately and seek urgent treatment if this occurs.  Therefore please do not to abruptly discontinue medication without physician guidance except in cases of sudden onset or worsening of SI.            Vitamin D deficiency (Chronic)     Check vitamin-D level.         Relevant Orders    Vitamin D    Fatigue (Chronic)     Check labs.  Increase lifestyle modification with diet and exercise.         Relevant Orders    Testosterone    Vitamin D    Nausea     Refill Zofran.  Discontinue Chantix once tobacco cessation is completed.         Relevant Medications    ondansetron (ZOFRAN-ODT) 4 MG TbDL     Other Visit Diagnoses       Need for pneumococcal vaccine        Relevant Orders    (In Office Administered) Pneumococcal Conjugate Vaccine (20 Valent) (IM)          Future Appointments       Date Provider Specialty Appt Notes    8/30/2022 Lara Wakefield, RRT Smoking Cessation ///indiv 7/9 clinic- Benefit End Date 10/26/2022    9/23/2022  Family Medicine Need for pneumococcal vaccine     12/6/2022 Samm Wagner MD Cardiology 6 mo fu w labs     1/26/2023  Radiology Follow doctors instructions    2/24/2023  Lab df    3/1/2023 Bipin Penaloza MD Family Medicine 6month            Medication Management for assessment above:   Medication List with Changes/Refills   Current Medications    ACETAMINOPHEN-CODEINE 300-60MG (TYLENOL #4) 300-60 MG TAB    Take by mouth.     ASPIRIN (ECOTRIN) 81 MG EC TABLET    Take 1 tablet (81 mg total) by mouth once daily.    ATENOLOL (TENORMIN) 25 MG TABLET    Take 1 tablet (25 mg total) by mouth every morning.    CLOPIDOGREL (PLAVIX) 75 MG TABLET    TAKE 1 TABLET BY MOUTH EVERY DAY    DENTA 5000 PLUS 1.1 % CREA    Take 1 application by mouth 2 (two) times daily.    FAMOTIDINE (PEPCID) 20 MG TABLET    Take 20 mg by mouth every evening.    LISINOPRIL (PRINIVIL,ZESTRIL) 2.5 MG TABLET    Take 1 tablet (2.5 mg total) by mouth every evening.    MULTIVITAMIN (THERAGRAN) PER TABLET    Take 1 tablet by mouth once daily.    PANTOPRAZOLE (PROTONIX) 40 MG TABLET    TAKE 1 TABLET BY MOUTH EVERY DAY    SILDENAFIL (VIAGRA) 100 MG TABLET    Take 1 tablet (100 mg total) by mouth as needed for Erectile Dysfunction.    VARENICLINE (CHANTIX) 1 MG TAB    Take 1 tablet by mouth twice daily.   Changed and/or Refilled Medications    Modified Medication Previous Medication    ONDANSETRON (ZOFRAN-ODT) 4 MG TBDL ondansetron (ZOFRAN-ODT) 4 MG TbDL       Take 1 tablet (4 mg total) by mouth every 8 (eight) hours as needed (nausea/vomiting).    TAKE 1 TABLET (4 MG TOTAL) BY MOUTH EVERY 8 (EIGHT) HOURS AS NEEDED (NAUSEA/VOMITING).    ROSUVASTATIN (CRESTOR) 5 MG TABLET rosuvastatin (CRESTOR) 10 MG tablet       Take 1 tablet (5 mg total) by mouth every evening.    TAKE 1 TABLET BY MOUTH EVERY DAY IN THE EVENING   Discontinued Medications    DICLOFENAC (VOLTAREN) 25 MG TBEC    Take 25 mg by mouth 2 (two) times daily as needed.    GABAPENTIN (NEURONTIN) 300 MG CAPSULE    Take 300 mg by mouth every evening.    NICOTINE, POLACRILEX, 2 MG LZMN    Take 1 lozenge (2 mg total) by mouth as needed (as needed). Dispense MINIS please. Can use 1-2 per hour as needed in place of a cigarette.       Bipin Penaloza M.D.  ==========================================================================  Subjective:   Patient ID: Sal DOSHI Phil Anders is a 62 y.o. male.  has a past medical history  of Anxiety, Arthritis, Depression, Digestive disorder, Fatigue, History of acute bronchitis, History of psychiatric care, Hypertension, Intervertebral disc disorder with radiculopathy of lumbar region (3/23/2021), Psychiatric problem, PVD (peripheral vascular disease), and S/P femoral-femoral bypass surgery (12/1/2021).   Chief Complaint: Follow-up, Hypertension, and Hyperlipidemia      Problem List Items Addressed This Visit       Essential hypertension - Primary (Chronic)    Overview     CHRONIC. STABLE. BP Reviewed.  Compliant with BP medications. No SE reported.   (-) CP, SOB, palpitations, dizziness, lightheadedness, HA, arm numbness, tingling or weakness, syncope.  Creatinine   Date Value Ref Range Status   08/25/2022 0.9 0.5 - 1.4 mg/dL Final            Current Assessment & Plan     Counseled on importance of hypertension disease course, I recommend ongoing Education for DASH-diet and exercise.  Counseled on medication regimen importance of treating high blood pressure.  Please be advised of risk of untreated blood pressure as discussed.  Please advised of ER precautions were given for symptoms of hypertensive urgency and emergency.           Encounter for long-term (current) use of medications (Chronic)    Overview     CHRONIC. Stable. Compliant with medications for managed conditions. See medication list. No SE reported.   Routine lab analysis is being monitored. Refills were addressed.  Lab Results   Component Value Date    WBC 7.01 08/25/2022    HGB 12.5 (L) 08/25/2022    HCT 37.0 (L) 08/25/2022    MCV 91 08/25/2022     08/25/2022       Chemistry        Component Value Date/Time     08/25/2022 0706    K 4.2 08/25/2022 0706     08/25/2022 0706    CO2 25 08/25/2022 0706    BUN 5 (L) 08/25/2022 0706    CREATININE 0.9 08/25/2022 0706    GLU 91 08/25/2022 0706        Component Value Date/Time    CALCIUM 9.5 08/25/2022 0706    ALKPHOS 80 08/25/2022 0706    AST 36 08/25/2022 0706    ALT 37  08/25/2022 0706    BILITOT 0.5 08/25/2022 0706    ESTGFRAFRICA >60.0 06/06/2022 1037    EGFRNONAA >60.0 06/06/2022 1037          Lab Results   Component Value Date    TSH 1.059 08/25/2022            Current Assessment & Plan     Complete history and physical was completed today.  Complete and thorough medication reconciliation was performed.  Discussed risks and benefits of medications.  Advised patient on orders and health maintenance.  We discussed old records and old labs if available.  Will request any records not available through epic.  Continue current medications listed on your summary sheet.           Dyslipidemia (high LDL; low HDL) (Chronic)    Overview     CHRONIC.  August 2022: LDL is very low on rosuvastatin 10 milligrams daily.  STABLE. Lab analysis reviewed.   (-) CP, SOB, abdominal pain, N/V/D, constipation, jaundice, skin changes.  (-) Myalgias  Lab Results   Component Value Date    CHOL 76 (L) 08/25/2022    CHOL 81 (L) 06/06/2022    CHOL 120 11/29/2021     Lab Results   Component Value Date    HDL 18 (L) 08/25/2022    HDL 27 (L) 06/06/2022    HDL 23 (L) 11/29/2021     Lab Results   Component Value Date    LDLCALC 22.4 (L) 08/25/2022    LDLCALC 30.6 (L) 06/06/2022    LDLCALC 63.4 11/29/2021     Lab Results   Component Value Date    TRIG 178 (H) 08/25/2022    TRIG 117 06/06/2022    TRIG 168 (H) 11/29/2021     Lab Results   Component Value Date    CHOLHDL 23.7 08/25/2022    CHOLHDL 33.3 06/06/2022    CHOLHDL 19.2 (L) 11/29/2021     Lab Results   Component Value Date    TOTALCHOLEST 4.2 08/25/2022    TOTALCHOLEST 3.0 06/06/2022    TOTALCHOLEST 5.2 (H) 11/29/2021     Lab Results   Component Value Date    ALT 37 08/25/2022    AST 36 08/25/2022    ALKPHOS 80 08/25/2022    BILITOT 0.5 08/25/2022   ======================================================           Current Assessment & Plan     Reduce rosuvastatin to 5 milligrams daily to see if his LDL remain below 70 while hopefully giving him left side  effects.    Counseled on hyperlipidemia disease course, healthy diet and increased need for exercise.  Please be advised of the risk of cardiovascular disease, increase stroke and heart attack risk with uncontrolled/untreated hyperlipidemia.     Patient voiced understanding and understood the treatment plan. All questions were answered.              Recurrent major depressive disorder, in partial remission (Chronic)    Overview     Chronic.  Stable.  Patient declines any medication.  He is doing well with smoking cessation.  He is going on a trip in October 2 [place] and would like to be tobacco free by then.  Denies SI HI or hallucinations.         Current Assessment & Plan     Declines medications.  He continues therapy with smoking cessation.Please be advised of condition course.  SNRI/SSRI is first-line treatment for this condition.  Please be advised of the risk of discontinuing this medication without tapering/contacting MD.  Patient has been advised of side effects, and all questions were answered.  Patient voiced understanding.  Patient will follow up routinely and notify us if having any side effects or worsening or persistent symptoms.  ER precautions were given. Antidepressant/Antianxiety Medication Initiation:  Patient informed of risks, benefits, and potential side effects of medication and accepts informed consent.  Common side effects include nausea, fatigue, headache, insomnia, etc see medication insert for complete side effect profile.  Most importantly be advised of the possibility of new or worsening suicidal thoughts/depression/anxiety etcetera.  Please be advised to stop the medication immediately and seek urgent treatment if this occurs.  Therefore please do not to abruptly discontinue medication without physician guidance except in cases of sudden onset or worsening of SI.            Vitamin D deficiency (Chronic)    Overview     Chronic. Vit d deficiency. Takes vitamin d supplement. No SE  reported.  Patient with chronic low back pain.  No results found for: [id number]            Current Assessment & Plan     Check vitamin-D level.         Fatigue (Chronic)    Overview     Chronic.  Intermittent control.  Lab Results   Component Value Date    WBC 7.01 08/25/2022    HGB 12.5 (L) 08/25/2022    HCT 37.0 (L) 08/25/2022    MCV 91 08/25/2022     08/25/2022       No results found for: IRON, TIBC, FERRITIN, SATURATEDIRO  Lab Results   Component Value Date    YVKTORGA01 290 10/04/2005     Lab Results   Component Value Date    FOLATE 10.3 11/07/2005     Lab Results   Component Value Date    TSH 1.059 08/25/2022              Current Assessment & Plan     Check labs.  Increase lifestyle modification with diet and exercise.         Nausea    Overview     Associated with Chantix usage.         Current Assessment & Plan     Refill Zofran.  Discontinue Chantix once tobacco cessation is completed.          Other Visit Diagnoses       Need for pneumococcal vaccine                 Review of patient's allergies indicates:   Allergen Reactions    Blueberry      Current Outpatient Medications   Medication Instructions    acetaminophen-codeine 300-60mg (TYLENOL #4) 300-60 mg Tab Oral    aspirin (ECOTRIN) 81 mg, Oral, Daily    atenoloL (TENORMIN) 25 mg, Oral, Every morning    clopidogreL (PLAVIX) 75 mg tablet TAKE 1 TABLET BY MOUTH EVERY DAY    DENTA 5000 PLUS 1.1 % Crea 1 application, Oral, 2 times daily    famotidine (PEPCID) 20 mg, Oral, Nightly    lisinopriL (PRINIVIL,ZESTRIL) 2.5 mg, Oral, Nightly    multivitamin (THERAGRAN) per tablet 1 tablet, Oral, Daily    ondansetron (ZOFRAN-ODT) 4 mg, Oral, Every 8 hours PRN    pantoprazole (PROTONIX) 40 MG tablet TAKE 1 TABLET BY MOUTH EVERY DAY    rosuvastatin (CRESTOR) 5 mg, Oral, Nightly    sildenafiL (VIAGRA) 100 mg, Oral, As needed (PRN)    varenicline (CHANTIX) 1 mg Tab Take 1 tablet by mouth twice daily.      I have reviewed the PMH, social history, FamilyHx,  "surgical history, allergies and medications documented / confirmed by the patient at the time of this visit.  Review of Systems   Constitutional:  Positive for fatigue. Negative for activity change and unexpected weight change.   HENT:  Negative for hearing loss, rhinorrhea and trouble swallowing.    Eyes:  Negative for discharge and visual disturbance.   Respiratory:  Negative for chest tightness and wheezing.    Cardiovascular:  Negative for chest pain and palpitations.   Gastrointestinal:  Negative for blood in stool, constipation, diarrhea and vomiting.   Endocrine: Negative for polydipsia and polyuria.   Genitourinary:  Negative for difficulty urinating, hematuria and urgency.   Musculoskeletal:  Positive for arthralgias, back pain, gait problem and neck pain. Negative for joint swelling.   Neurological:  Negative for weakness and headaches.   Psychiatric/Behavioral:  Negative for confusion and dysphoric mood.    Objective:   /80   Pulse 78   Temp 97 °F (36.1 °C) (Temporal)   Resp 16   Ht 5' 6" (1.676 m)   Wt 65.5 kg (144 lb 4.7 oz)   SpO2 98%   BMI 23.29 kg/m²   Physical Exam  Vitals and nursing note reviewed.   Constitutional:       General: He is not in acute distress.     Appearance: He is well-developed.   HENT:      Head: Normocephalic and atraumatic.      Right Ear: External ear normal.      Left Ear: External ear normal.      Nose: Nose normal. No rhinorrhea.   Eyes:      Extraocular Movements: Extraocular movements intact.      Pupils: Pupils are equal, round, and reactive to light.   Cardiovascular:      Rate and Rhythm: Normal rate.      Pulses: Normal pulses.   Pulmonary:      Effort: Pulmonary effort is normal. No respiratory distress.      Breath sounds: Normal breath sounds.   Musculoskeletal:         General: Tenderness and deformity present. No swelling. Normal range of motion.      Cervical back: Normal range of motion and neck supple.   Skin:     General: Skin is warm and dry.    "   Capillary Refill: Capillary refill takes less than 2 seconds.   Neurological:      General: No focal deficit present.      Mental Status: He is alert and oriented to person, place, and time.   Psychiatric:         Mood and Affect: Mood normal.         Behavior: Behavior normal.      Patient is wearing a mask which limits physical exam due to COVID restrictions.   Assessment:     1. Essential hypertension    2. Dyslipidemia (high LDL; low HDL)    3. Encounter for long-term (current) use of medications    4. Nausea    5. Recurrent major depressive disorder, in partial remission    6. Need for pneumococcal vaccine    7. Fatigue, unspecified type    8. Vitamin D deficiency      MDM:   Moderate complexity.  Moderate risk.  Total time: 33 minutes.  This includes total time spent on the encounter, which includes face to face time and non-face to face time preparing to see the patient (eg, review of previous medical records, tests), Obtaining and/or reviewing separately obtained history, documenting clinical information in the electronic or other health record, independently interpreting results (not separately reported)/communicating results to the patient/family/caregiver, and/or care coordination (not separately reported).    I have Reviewed and summarized old records.  I have performed thorough medication reconciliation today and discussed risk and benefits of medications.  I have reviewed labs and discussed with patient.  All questions were answered.  I am requesting old records and will review them once they are available.  Cardiology, physical therapy, neuro surgery    I have signed for the following orders AND/OR meds.  Orders Placed This Encounter   Procedures    (In Office Administered) Pneumococcal Conjugate Vaccine (20 Valent) (IM)     Standing Status:   Future     Standing Expiration Date:   2/29/2024    Testosterone     Standing Status:   Future     Standing Expiration Date:   8/29/2023    Vitamin D      Standing Status:   Future     Standing Expiration Date:   8/29/2023       Medications Ordered This Encounter   Medications    ondansetron (ZOFRAN-ODT) 4 MG TbDL     Sig: Take 1 tablet (4 mg total) by mouth every 8 (eight) hours as needed (nausea/vomiting).     Dispense:  30 tablet     Refill:  0    rosuvastatin (CRESTOR) 5 MG tablet     Sig: Take 1 tablet (5 mg total) by mouth every evening.     Dispense:  90 tablet     Refill:  4          Follow up in 6 months (on 2/28/2023), or if symptoms worsen or fail to improve, for HTN, Med refills, LAB RESULTS.    If no improvement in symptoms or symptoms worsen, advised to call/follow-up at clinic or go to ER. Patient voiced understanding and all questions/concerns were addressed.   DISCLAIMER: This note was compiled by using a speech recognition dictation system and therefore please be aware that typographical / speech recognition errors can and do occur.  Please contact me if you see any errors specifically.    Bipin Penaloza M.D.       Office: [phone] [address] [place] [address]  FAX: [phone]

## 2022-08-30 ENCOUNTER — CLINICAL SUPPORT (OUTPATIENT)
Dept: SMOKING CESSATION | Facility: CLINIC | Age: 62
End: 2022-08-30
Payer: COMMERCIAL

## 2022-08-30 VITALS — OXYGEN SATURATION: 98 %

## 2022-08-30 DIAGNOSIS — F17.200 NICOTINE DEPENDENCE: Primary | ICD-10-CM

## 2022-08-30 PROCEDURE — 99999 PR PBB SHADOW E&M-EST. PATIENT-LVL II: CPT | Mod: PBBFAC,,,

## 2022-08-30 PROCEDURE — 99404 PR PREVENT COUNSEL,INDIV,60 MIN: ICD-10-PCS | Mod: S$GLB,,,

## 2022-08-30 PROCEDURE — 99999 PR PBB SHADOW E&M-EST. PATIENT-LVL II: ICD-10-PCS | Mod: PBBFAC,,,

## 2022-08-30 PROCEDURE — 99404 PREV MED CNSL INDIV APPRX 60: CPT | Mod: S$GLB,,,

## 2022-08-30 NOTE — PROGRESS NOTES
Individual Follow-Up Form    8/30/2022    Quit Date: tba    Clinical Status of Patient: Outpatient    Length of Service: 60 minutes    Continuing Medication: yes  Chantix    Other Medications: none     Target Symptoms: Withdrawal and medication side effects. The following were  rated moderate (3) to severe (4) on TCRS:  Moderate (3): none  Severe (4): none    Comments: The patient was seen in the clinic for smoking cessation follow up.  He states he smokes 5 cigarettes per day. Waiting 3 hours between smoking. Commended him for these efforts. Per [employer] CO 10 ppm, last smoked 3.5  hours prior to meeting. Went to the [employer], he didn't bring his cigarettes in with him. He has a trip to [place] in Oct and wants to quit before then. He's gaining weight which he wanted and needed to do. He's feeling better overall. Session Focus: completion of TCRS (Tobacco Cessation Rating Scale) reviewed strategies, habitual behavior, stress, and high risk situations. Introduced stress with addition interventions, SOLVE, relaxation with interventions, nutrition, exercise, weight gain, and the importance of rewarding oneself for accomplishments toward becoming tobacco free. Open discussion of all items with interventions. The patient denies any abnormal behavioral or mental changes at this time. The patient will continue with  therapy sessions and medication monitoring by CTTS. Prescribed medication management will be by physician.    Diagnosis: F17.210    Next Visit: 1 week

## 2022-08-30 NOTE — Clinical Note
The patient was seen in the clinic for smoking cessation follow up.  He states he smokes 5 cigarettes per day. Waiting 3 hours between smoking. Commended him for these efforts. Per Smoker[employer] CO 10 ppm, last smoked 3.5  hours prior to meeting. Went to the [employer], he didn't bring his cigarettes in with him. He has a trip to [place] in Oct and wants to quit before then. He's gaining weight which he wanted and needed to do. He's feeling better overall. Session Focus: completion of TCRS (Tobacco Cessation Rating Scale) reviewed strategies, habitual behavior, stress, and high risk situations. Introduced stress with addition interventions, SOLVE, relaxation with interventions, nutrition, exercise, weight gain, and the importance of rewarding oneself for accomplishments toward becoming tobacco free. Open discussion of all items with interventions. The patient denies any abnormal behavioral or mental changes at this time. The patient will continue with  therapy sessions and medication monitoring by CTTS.

## 2022-09-06 ENCOUNTER — CLINICAL SUPPORT (OUTPATIENT)
Dept: SMOKING CESSATION | Facility: CLINIC | Age: 62
End: 2022-09-06
Payer: COMMERCIAL

## 2022-09-06 DIAGNOSIS — F17.200 NICOTINE DEPENDENCE: Primary | ICD-10-CM

## 2022-09-06 PROCEDURE — 99999 PR PBB SHADOW E&M-EST. PATIENT-LVL II: CPT | Mod: PBBFAC,,,

## 2022-09-06 PROCEDURE — 99999 PR PBB SHADOW E&M-EST. PATIENT-LVL II: ICD-10-PCS | Mod: PBBFAC,,,

## 2022-09-06 PROCEDURE — 99404 PR PREVENT COUNSEL,INDIV,60 MIN: ICD-10-PCS | Mod: S$GLB,,,

## 2022-09-06 PROCEDURE — 99404 PREV MED CNSL INDIV APPRX 60: CPT | Mod: S$GLB,,,

## 2022-09-06 RX ORDER — BUPROPION HYDROCHLORIDE 150 MG/1
150 TABLET, EXTENDED RELEASE ORAL 2 TIMES DAILY
Qty: 58 TABLET | Refills: 0 | Status: SHIPPED | OUTPATIENT
Start: 2022-09-06 | End: 2022-10-04 | Stop reason: SDUPTHER

## 2022-09-06 NOTE — Clinical Note
"Just a note to advise how the patient is progressing in the tobacco cessation program. The patient was seen in the clinic for smoking cessation follow up. He states he smokes 5 cigarettes per day, spaced out 3 hours between each. He states "he survived the [employer] game without smoking". Morning cigarettes is not an issue any longer, can wait til on the way to. The patient remains on the prescribed tobacco cessation medication regimen of 1 mg Chantix BID without any negative side effects at this time. He states he's been very short tempered, irritable and easily frustrated. Discussed adding Wellbutrin, ordered medication. Session Focus: completion of TCRS (Tobacco Cessation Rating Scale) reviewed strategies, habitual behavior, high risks situations, understanding urges and cravings, stress and relaxation with open discussion and additional interventions, Introduced lapses, relapses, understanding them and analyzing the situation of a lapse, conflict issues that may be linked to a lapse. "

## 2022-09-06 NOTE — PROGRESS NOTES
"Addendum: created to correct his note.     Individual Follow-Up Form    9/6/2022    Quit Date: tba    Clinical Status of Patient: Outpatient    Length of Service: 60 minutes    Continuing Medication: yes  Chantix    Other Medications: none     Target Symptoms: Withdrawal and medication side effects. The following were  rated moderate (3) to severe (4) on TCRS:  Moderate (3): none  Severe (4): none    Comments: The patient was seen in the clinic for smoking cessation follow up. He states he smokes 5 cigarettes per day, spaced out 3 hours between each. He states "he survived the [employer] game without smoking". Morning cigarettes is not an issue any longer, can wait til on the way to work to smoke his first cigarette, rather than his usual 2 or so in the mornings before leaving the house. The patient remains on the prescribed tobacco cessation medication regimen of 1 mg Chantix BID without any negative side effects at this time. He states he's been very short tempered, irritable and easily frustrated. Discussed adding Wellbutrin, ordered medication. Session Focus: completion of TCRS (Tobacco Cessation Rating Scale) reviewed strategies, habitual behavior, high risks situations, understanding urges and cravings, stress and relaxation with open discussion and additional interventions, Introduced lapses, relapses, understanding them and analyzing the situation of a lapse, conflict issues that may be linked to a lapse. The patient denies any abnormal behavioral or mental changes at this time. The patient will continue with  therapy sessions and medication monitoring by CTTS. Prescribed medication management will be by physician.    Diagnosis: F17.210    Next Visit: 1 week    "

## 2022-09-07 ENCOUNTER — CLINICAL SUPPORT (OUTPATIENT)
Dept: SMOKING CESSATION | Facility: CLINIC | Age: 62
End: 2022-09-07
Payer: COMMERCIAL

## 2022-09-07 DIAGNOSIS — F17.200 NICOTINE DEPENDENCE: Primary | ICD-10-CM

## 2022-09-07 PROCEDURE — 99407 PR TOBACCO USE CESSATION INTENSIVE >10 MINUTES: ICD-10-PCS | Mod: S$GLB,,,

## 2022-09-07 PROCEDURE — 99999 PR PBB SHADOW E&M-EST. PATIENT-LVL I: ICD-10-PCS | Mod: PBBFAC,,,

## 2022-09-07 PROCEDURE — 99407 BEHAV CHNG SMOKING > 10 MIN: CPT | Mod: S$GLB,,,

## 2022-09-07 PROCEDURE — 99999 PR PBB SHADOW E&M-EST. PATIENT-LVL I: CPT | Mod: PBBFAC,,,

## 2022-09-07 NOTE — PROGRESS NOTES
Spoke with patient today in regard to smoking cessation progress for 12/3 month phone follow up on Quit 1 and 2. Patient not tobacco free at this time but stated that he is close to Quitting, and down to about 5 cpd. Commended patient on their progress thus far.  Patient currently enrolled in program for Quit 2 and has an appointment scheduled with his CTTS.   Patient very happy with the help and support he has received from our program and his CTTS, Lara Wakefield.  Informed patient of benefit period and contact information if any further help or support is needed.  Will complete smart form for 3 month follow up on Quit attempt # 2.  Quit 1 was completed and resolved.

## 2022-09-13 ENCOUNTER — CLINICAL SUPPORT (OUTPATIENT)
Dept: SMOKING CESSATION | Facility: CLINIC | Age: 62
End: 2022-09-13
Payer: COMMERCIAL

## 2022-09-13 DIAGNOSIS — F17.200 NICOTINE DEPENDENCE: Primary | ICD-10-CM

## 2022-09-13 PROCEDURE — 99999 PR PBB SHADOW E&M-EST. PATIENT-LVL II: CPT | Mod: PBBFAC,,,

## 2022-09-13 PROCEDURE — 99404 PR PREVENT COUNSEL,INDIV,60 MIN: ICD-10-PCS | Mod: S$GLB,,,

## 2022-09-13 PROCEDURE — 99404 PREV MED CNSL INDIV APPRX 60: CPT | Mod: S$GLB,,,

## 2022-09-13 PROCEDURE — 99999 PR PBB SHADOW E&M-EST. PATIENT-LVL II: ICD-10-PCS | Mod: PBBFAC,,,

## 2022-09-13 NOTE — Clinical Note
"Just a note to advise how the patient is progressing in the tobacco cessation program. The patient was seen in the clinic for smoking cessation follow up.  He states he smokes 4 cigarettes per day, 4 hour between each. He states he has an appetite now. He's happy that he's not having "coughing fits" in the am, like he use to. The patient remains on the prescribed tobacco cessation medication regimen of 1 mg Chantix BID without any negative side effects at this time. He just started taking 150 mg Wellbutrin QD. Per Smokerlyzer CO 13  ppm, last smoked 2.5 hours prior to meeting. Session Focus: completion of TCRS (Tobacco Cessation Rating Scale) reviewed strategies, habitual behavior, high risks situations, understanding urges and cravings, stress and relaxation with open discussion and additional interventions, Introduced lapses, relapses, understanding them and analyzing the situation of a lapse, conflict issues that may be linked to a lapse."

## 2022-09-13 NOTE — PROGRESS NOTES
"Individual Follow-Up Form    9/13/2022    Quit Date: tba    Clinical Status of Patient: Outpatient    Length of Service: 60 minutes    Continuing Medication: yes  Chantix or Wellbutrin    Other Medications: none     Target Symptoms: Withdrawal and medication side effects. The following were  rated moderate (3) to severe (4) on TCRS:  Moderate (3): none  Severe (4): none    Comments: The patient was seen in the clinic for smoking cessation follow up.  He states he smokes 4 cigarettes per day, 4 hour between each. He states he has an appetite now. He's happy that he's not having "coughing fits" in the am, like he use to. The patient remains on the prescribed tobacco cessation medication regimen of 1 mg Chantix BID without any negative side effects at this time. He just started taking 150 mg Wellbutrin QD. Per Smokerlyzer CO 13  ppm, last smoked 2.5 hours prior to meeting. Session Focus: completion of TCRS (Tobacco Cessation Rating Scale) reviewed strategies, habitual behavior, high risks situations, understanding urges and cravings, stress and relaxation with open discussion and additional interventions, Introduced lapses, relapses, understanding them and analyzing the situation of a lapse, conflict issues that may be linked to a lapse. The patient denies any abnormal behavioral or mental changes at this time. The patient will continue with  therapy sessions and medication monitoring by CTTS. Prescribed medication management will be by physician.      Diagnosis: F17.210    Next Visit: 1 week    "

## 2022-09-15 DIAGNOSIS — I10 ESSENTIAL HYPERTENSION: ICD-10-CM

## 2022-09-15 RX ORDER — ATENOLOL 25 MG/1
TABLET ORAL
Qty: 90 TABLET | Refills: 3 | Status: SHIPPED | OUTPATIENT
Start: 2022-09-15 | End: 2023-09-04

## 2022-09-15 NOTE — TELEPHONE ENCOUNTER
Refill Decision Note   Sal Phil  is requesting a refill authorization.  Brief Assessment and Rationale for Refill:  Approve     Medication Therapy Plan:       Medication Reconciliation Completed: No   Comments:     No Care Gaps recommended.     Note composed:4:31 AM 09/15/2022

## 2022-09-15 NOTE — TELEPHONE ENCOUNTER
No new care gaps identified.  [facility] Embedded Care Gaps. Reference number: [id number]. 9/15/2022   12:05:54 AM NORIST

## 2022-09-20 ENCOUNTER — CLINICAL SUPPORT (OUTPATIENT)
Dept: SMOKING CESSATION | Facility: CLINIC | Age: 62
End: 2022-09-20
Payer: COMMERCIAL

## 2022-09-20 DIAGNOSIS — F17.210 HEAVY SMOKER (MORE THAN 20 CIGARETTES PER DAY): ICD-10-CM

## 2022-09-20 DIAGNOSIS — F17.200 NICOTINE DEPENDENCE: Primary | ICD-10-CM

## 2022-09-20 PROCEDURE — 99404 PREV MED CNSL INDIV APPRX 60: CPT | Mod: S$GLB,,,

## 2022-09-20 PROCEDURE — 99999 PR PBB SHADOW E&M-EST. PATIENT-LVL II: ICD-10-PCS | Mod: PBBFAC,,,

## 2022-09-20 PROCEDURE — 99404 PR PREVENT COUNSEL,INDIV,60 MIN: ICD-10-PCS | Mod: S$GLB,,,

## 2022-09-20 PROCEDURE — 99999 PR PBB SHADOW E&M-EST. PATIENT-LVL II: CPT | Mod: PBBFAC,,,

## 2022-09-20 RX ORDER — VARENICLINE TARTRATE 1 MG/1
1 TABLET, FILM COATED ORAL 2 TIMES DAILY
Qty: 56 TABLET | Refills: 0 | Status: SHIPPED | OUTPATIENT
Start: 2022-09-20 | End: 2022-10-04 | Stop reason: SDUPTHER

## 2022-09-20 NOTE — Clinical Note
"The patient was seen in the clinic for smoking cessation follow up. He smokes 4 cigarettes per day.Per Smokerlyzer CO 8 ppm, last smoked 4 hours prior to meeting. Patient states he played Golf for the first time in 6 months and did not smoke the entire time. He even waited until he was home to smoke at all, after a 30 minute drive. He was proud of this accomplishment and states he can "see the light at the end of the tunnel" He states he feels like a quit date at the end of the month sounds possible. Commended him for this!!! He also took a trip to the [employer] and stayed on the non-smoking floor the entire time. He wakes at 5:30 and not smoking until 7:00, which is a big accomplishment. He likes that he is not buying cigarettes and beer regularly, his drinking has also decreased. The patient remains on the prescribed tobacco cessation medication regimen of 1 mg Chantix BID and 150 mg Wellbutrin Sr BID without any negative side effects at this time. He states the Wellbutrin seem to be helping a little already. "

## 2022-09-20 NOTE — PROGRESS NOTES
"Individual Follow-Up Form    9/20/2022    Quit Date: tba    Clinical Status of Patient: Outpatient    Length of Service: 60 minutes    Continuing Medication: yes  Chantix or Wellbutrin    Other Medications: none     Target Symptoms: Withdrawal and medication side effects. The following were  rated moderate (3) to severe (4) on TCRS:  Moderate (3): none  Severe (4): none    Comments: The patient was seen in the clinic for smoking cessation follow up. He states he smokes 4 cigarettes per day. Per Smokerlyzer CO 8 ppm, last smoked 4 hours prior to meeting. Patient states he played Golf for the first time in 6 months and did not smoke the entire time. He even waited until he was home to smoke at all, after a 30 minute drive. He was proud of this accomplishment and states he can "see the light at the end of the tunnel" He states he feels like a quit date at the end of the month sounds possible. Commended him for this!!! He also took a trip to the [employer] and stayed on the non-smoking floor the entire time. He wakes at 5:30 and not smoking until 7:00, which is a big accomplishment. He likes that he is not buying cigarettes and beer regularly, his drinking has also decreased. The patient remains on the prescribed tobacco cessation medication regimen of 1 mg Chantix BID and 150 mg Wellbutrin Sr BID without any negative side effects at this time. He states the Wellbutrin seem to be helping a little already. Session Focus: completion of TCRS (Tobacco Cessation Rating Scale) reviewed strategies, habitual behavior, high risks situations, understanding urges and cravings, stress and relaxation with open discussion and additional interventions, Introduced lapses, relapses, understanding them and analyzing the situation of a lapse, conflict issues that may be linked to a lapse. The patient denies any abnormal behavioral or mental changes at this time. The patient will continue with  therapy sessions and medication monitoring by " ROEL. Prescribed medication management will be by physician.    Diagnosis: F17.210    Next Visit: 1 week

## 2022-09-23 ENCOUNTER — CLINICAL SUPPORT (OUTPATIENT)
Dept: FAMILY MEDICINE | Facility: CLINIC | Age: 62
End: 2022-09-23
Payer: COMMERCIAL

## 2022-09-23 DIAGNOSIS — Z23 IMMUNIZATION DUE: Primary | ICD-10-CM

## 2022-09-23 DIAGNOSIS — Z23 NEED FOR PNEUMOCOCCAL VACCINE: ICD-10-CM

## 2022-09-23 PROCEDURE — 99999 PR PBB SHADOW E&M-EST. PATIENT-LVL II: CPT | Mod: PBBFAC,,,

## 2022-09-23 PROCEDURE — 90471 IMMUNIZATION ADMIN: CPT | Mod: S$GLB,,, | Performed by: FAMILY MEDICINE

## 2022-09-23 PROCEDURE — 90677 PCV20 VACCINE IM: CPT | Mod: S$GLB,,, | Performed by: FAMILY MEDICINE

## 2022-09-23 PROCEDURE — 99999 PR PBB SHADOW E&M-EST. PATIENT-LVL II: ICD-10-PCS | Mod: PBBFAC,,,

## 2022-09-23 PROCEDURE — 90471 PNEUMOCOCCAL CONJUGATE VACCINE 20-VALENT: ICD-10-PCS | Mod: S$GLB,,, | Performed by: FAMILY MEDICINE

## 2022-09-23 PROCEDURE — 90677 PNEUMOCOCCAL CONJUGATE VACCINE 20-VALENT: ICD-10-PCS | Mod: S$GLB,,, | Performed by: FAMILY MEDICINE

## 2022-09-27 ENCOUNTER — CLINICAL SUPPORT (OUTPATIENT)
Dept: SMOKING CESSATION | Facility: CLINIC | Age: 62
End: 2022-09-27
Payer: COMMERCIAL

## 2022-09-27 DIAGNOSIS — F17.200 NICOTINE DEPENDENCE: Primary | ICD-10-CM

## 2022-09-27 PROCEDURE — 99404 PREV MED CNSL INDIV APPRX 60: CPT | Mod: S$GLB,,,

## 2022-09-27 PROCEDURE — 99999 PR PBB SHADOW E&M-EST. PATIENT-LVL II: ICD-10-PCS | Mod: PBBFAC,,,

## 2022-09-27 PROCEDURE — 99404 PR PREVENT COUNSEL,INDIV,60 MIN: ICD-10-PCS | Mod: S$GLB,,,

## 2022-09-27 PROCEDURE — 99999 PR PBB SHADOW E&M-EST. PATIENT-LVL II: CPT | Mod: PBBFAC,,,

## 2022-09-27 NOTE — Clinical Note
The patient was seen in the clinic for smoking cessation follow up. He states he smokes 4 cigarettes per day. Per Smokerlyzer CO 7  ppm, last smoked 4 hours prior to meeting. He has decided on a quit date of 9/30/22 or when this pack runs out (4 days). Commended him for this. The patient remains on the prescribed tobacco cessation medication regimen of 150 mg Wellbutrin and 1 mg Chantix BID without any negative side effects at this time. He states he feels the Wellbutrin helping already. Session Focus: completion of TCRS (Tobacco Cessation Rating Scale) reviewed strategies, habitual behavior, high risks situations, understanding urges and cravings, stress and relaxation with open discussion and additional interventions, Introduced lapses, relapses, understanding them and analyzing the situation of a lapse, conflict issues that may be linked to a lapse. The patient denies any abnormal behavioral or mental changes at this time. The patient will continue with  therapy sessions and medication monitoring by CTTS.

## 2022-09-27 NOTE — PROGRESS NOTES
Individual Follow-Up Form    9/27/2022    Quit Date: tba    Clinical Status of Patient: Outpatient    Length of Service: 60 minutes    Continuing Medication: yes  Chantix or Wellbutrin    Other Medications: none     Target Symptoms: Withdrawal and medication side effects. The following were  rated moderate (3) to severe (4) on TCRS:  Moderate (3): none  Severe (4): none    Comments: The patient was seen in the clinic for smoking cessation follow up. He states he smokes 4 cigarettes per day. Per Smokerlyzer CO 7  ppm, last smoked 4 hours prior to meeting. He has decided on a quit date of 9/30/22 or when this pack runs out (4 days). Commended him for this. The patient remains on the prescribed tobacco cessation medication regimen of 150 mg Wellbutrin and 1 mg Chantix BID without any negative side effects at this time. He states he feels the Wellbutrin helping already. Session Focus: completion of TCRS (Tobacco Cessation Rating Scale) reviewed strategies, habitual behavior, high risks situations, understanding urges and cravings, stress and relaxation with open discussion and additional interventions, Introduced lapses, relapses, understanding them and analyzing the situation of a lapse, conflict issues that may be linked to a lapse. The patient denies any abnormal behavioral or mental changes at this time. The patient will continue with  therapy sessions and medication monitoring by CTTS. Prescribed medication management will be by physician.    Diagnosis: F17.210    Next Visit: 1 week

## 2022-10-04 ENCOUNTER — CLINICAL SUPPORT (OUTPATIENT)
Dept: SMOKING CESSATION | Facility: CLINIC | Age: 62
End: 2022-10-04
Payer: COMMERCIAL

## 2022-10-04 DIAGNOSIS — F17.200 NICOTINE DEPENDENCE: Primary | ICD-10-CM

## 2022-10-04 DIAGNOSIS — F17.210 HEAVY SMOKER (MORE THAN 20 CIGARETTES PER DAY): ICD-10-CM

## 2022-10-04 PROCEDURE — 99999 PR PBB SHADOW E&M-EST. PATIENT-LVL II: ICD-10-PCS | Mod: PBBFAC,,,

## 2022-10-04 PROCEDURE — 90853 PR GROUP PSYCHOTHERAPY: ICD-10-PCS | Mod: S$GLB,,,

## 2022-10-04 PROCEDURE — 90853 GROUP PSYCHOTHERAPY: CPT | Mod: S$GLB,,,

## 2022-10-04 PROCEDURE — 99999 PR PBB SHADOW E&M-EST. PATIENT-LVL II: CPT | Mod: PBBFAC,,,

## 2022-10-04 RX ORDER — VARENICLINE TARTRATE 1 MG/1
1 TABLET, FILM COATED ORAL 2 TIMES DAILY
Qty: 56 TABLET | Refills: 0 | Status: SHIPPED | OUTPATIENT
Start: 2022-10-04 | End: 2022-10-26 | Stop reason: SDUPTHER

## 2022-10-04 RX ORDER — BUPROPION HYDROCHLORIDE 150 MG/1
150 TABLET, EXTENDED RELEASE ORAL 2 TIMES DAILY
Qty: 58 TABLET | Refills: 0 | Status: SHIPPED | OUTPATIENT
Start: 2022-10-04 | End: 2022-10-26 | Stop reason: SDUPTHER

## 2022-10-04 NOTE — Clinical Note
The patient was seen in the clinic for smoking cessation follow up.  Patient remains quit as of  9/29/2022.  Commended patient for this continued quit. The patient remains on the prescribed tobacco cessation medication regimen of 1 mg Chantix BID an d 150 mg Wellbutrin without any negative side effects at this time. Per Smokerlyzer CO 2 ppm, <6 is a non-smoker. Session Focus: completion of TCRS (Tobacco Cessation Rating Scale) reviewed strategies, habitual behavior, high risks situations, understanding urges and cravings, stress and relaxation with open discussion and additional interventions, Introduced lapses, relapses, understanding them and analyzing the situation of a lapse, conflict issues that may be linked to a lapse. The patient denies any abnormal behavioral or mental changes at this time. The patient will continue with  therapy sessions and medication monitoring by CTTS. Prescribed medication management will be by physician.

## 2022-10-04 NOTE — PROGRESS NOTES
"Addendum created to order medications.     Smoking Cessation Group Session #5    Site: [facility]  Date:  10/4/2022  Clinical Status of Patient: Outpatient   Length of Service and Code: 60 minutes - 93080   Number in Attendance: 2  Group Activities/Focus of Group:  Sharing last weeks challenges, triggers, and coping activities to remain quit and/ or keep making progress toward cessation, completion of TCRS (Tobacco Cessation Rating Scale) learned addiction model, personal reasons for quitting, medications, goals, quit date.    Specific session focus: completion of TCRS (Tobacco Cessation Rating Scale) reviewed strategies, habitual behavior, high risks situations, understanding urges and cravings, stress and relaxation with open discussion and additional interventions, Introduced lapses, relapses, understanding them and analyzing the situation of a lapse, conflict issues that may be linked to a lapse.     Target symptoms:  withdrawal and medication side effects             The following were rated moderate (3) to severe (4) on TCRS:       Moderate 3: none     Severe 4:   none  Patient's Response to Intervention: The patient was seen in the clinic for smoking cessation follow up.  Patient remains quit as of  9/29/2022.  Commended patient for this continued quit. The patient remains on the prescribed tobacco cessation medication regimen of 1 mg Chantix BID an d 150 mg Wellbutrin without any negative side effects at this time. Per Smokerlyzer CO 1 ppm, <6 is a non-smoker.  He states his coordinations feels off lately. He feels the cravings are not too horriable but are there. He states "it was easier than he thought". Session Focus: completion of TCRS (Tobacco Cessation Rating Scale) reviewed strategies, habitual behavior, high risks situations, understanding urges and cravings, stress and relaxation with open discussion and additional interventions, Introduced lapses, relapses, understanding them and analyzing the situation " of a lapse, conflict issues that may be linked to a lapse. The patient denies any abnormal behavioral or mental changes at this time. The patient will continue with  therapy sessions and medication monitoring by CTTS. Prescribed medication management will be by physician.    Progress Toward Goals and Other Mental Status Changes: The patient denies any abnormal behavioral or mental changes at this time.     Diagnosis: Z72.0  Plan: The patient will continue with group therapy sessions and medication regimen prescribed with management by physician or by the Cessation Clinic Provider. Patient will inform Smoking Cessation Counselor of symptoms as rated high on TCRS.    Return to Clinic: 1 week    Quit Date: 9/29/22   Planned Quit Date: 9/29/22

## 2022-10-11 ENCOUNTER — CLINICAL SUPPORT (OUTPATIENT)
Dept: SMOKING CESSATION | Facility: CLINIC | Age: 62
End: 2022-10-11
Payer: COMMERCIAL

## 2022-10-11 DIAGNOSIS — F17.200 NICOTINE DEPENDENCE: Primary | ICD-10-CM

## 2022-10-11 PROCEDURE — 99999 PR PBB SHADOW E&M-EST. PATIENT-LVL II: ICD-10-PCS | Mod: PBBFAC,,,

## 2022-10-11 PROCEDURE — 90853 PR GROUP PSYCHOTHERAPY: ICD-10-PCS | Mod: S$GLB,,,

## 2022-10-11 PROCEDURE — 99999 PR PBB SHADOW E&M-EST. PATIENT-LVL II: CPT | Mod: PBBFAC,,,

## 2022-10-11 PROCEDURE — 90853 GROUP PSYCHOTHERAPY: CPT | Mod: S$GLB,,,

## 2022-10-11 NOTE — PROGRESS NOTES
Smoking Cessation Group Session #5    Site: [facility]  Date:  10/11/2022  Clinical Status of Patient: Outpatient   Length of Service and Code: 60 minutes - 17476   Number in Attendance: 2  Group Activities/Focus of Group:  Sharing last weeks challenges, triggers, and coping activities to remain quit and/ or keep making progress toward cessation, completion of TCRS (Tobacco Cessation Rating Scale) learned addiction model, personal reasons for quitting, medications, goals, quit date.    Specific session focus: completion of TCRS (Tobacco Cessation Rating Scale) reviewed strategies, habitual behavior, high risks situations, understanding urges and cravings, stress and relaxation with open discussion and additional interventions, Introduced lapses, relapses, understanding them and analyzing the situation of a lapse, conflict issues that may be linked to a lapse.     Target symptoms:  withdrawal and medication side effects             The following were rated moderate (3) to severe (4) on TCRS:       Moderate 3: none     Severe 4:   none    Patient's Response to Intervention: The patient was seen in the clinic for smoking cessation follow up.  Patient remains quit as of 9/29/22. Commended patient for this continued quit. Per Smokerlyzer CO 0 ppm. The patient remains on the prescribed tobacco cessation medication regimen of 1 mg Chantix BID and 150 mg Wellbutrin Sr. BID without any negative side effects at this time. He states he never thought he would be quit and he is happier than ever about it. He will be going on a trip soon and is happy to not have to worry about smoking while traveling, he feels free. Session Focus: completion of TCRS (Tobacco Cessation Rating Scale) reviewed strategies, habitual behavior, high risks situations, understanding urges and cravings, stress and relaxation with open discussion and additional interventions, Introduced lapses, relapses, understanding them and analyzing the situation of a lapse,  conflict issues that may be linked to a lapse. The patient denies any abnormal behavioral or mental changes at this time. The patient will continue with  therapy sessions and medication monitoring by CTTS. Prescribed medication management will be by physician.     Progress Toward Goals and Other Mental Status Changes: The patient denies any abnormal behavioral or mental changes at this time.     Diagnosis: Z72.0  Plan: The patient will continue with group therapy sessions and medication regimen prescribed with management by physician or by the Cessation Clinic Provider. Patient will inform Smoking Cessation Counselor of symptoms as rated high on TCRS.    Return to Clinic: 1 week    Quit Date: 9/29/22   Planned Quit Date: 9/29/22

## 2022-10-11 NOTE — Clinical Note
The patient was seen in the clinic for smoking cessation follow up.  Patient remains quit as of 9/29/22. Commended patient for this continued quit. Per Smokerlyzer CO 0 ppm. The patient remains on the prescribed tobacco cessation medication regimen of 1 mg Chantix BID and 150 mg Wellbutrin Sr. BID without any negative side effects at this time. He states he never thought he would be quit and he is happier than ever about it. He will be going on a trip soon and is happy to not have to worry about smoking while traveling, he feels free. Session Focus: completion of TCRS (Tobacco Cessation Rating Scale) reviewed strategies, habitual behavior, high risks situations, understanding urges and cravings, stress and relaxation with open discussion and additional interventions, Introduced lapses, relapses, understanding them and analyzing the situation of a lapse, conflict issues that may be linked to a lapse. The patient denies any abnormal behavioral or mental changes at this time.

## 2022-10-26 ENCOUNTER — PATIENT MESSAGE (OUTPATIENT)
Dept: CARDIOLOGY | Facility: CLINIC | Age: 62
End: 2022-10-26
Payer: COMMERCIAL

## 2022-10-26 ENCOUNTER — CLINICAL SUPPORT (OUTPATIENT)
Dept: SMOKING CESSATION | Facility: CLINIC | Age: 62
End: 2022-10-26
Payer: COMMERCIAL

## 2022-10-26 ENCOUNTER — TELEPHONE (OUTPATIENT)
Dept: FAMILY MEDICINE | Facility: CLINIC | Age: 62
End: 2022-10-26
Payer: COMMERCIAL

## 2022-10-26 DIAGNOSIS — F17.210 HEAVY SMOKER (MORE THAN 20 CIGARETTES PER DAY): ICD-10-CM

## 2022-10-26 DIAGNOSIS — F17.200 NICOTINE DEPENDENCE: Primary | ICD-10-CM

## 2022-10-26 PROCEDURE — 99999 PR PBB SHADOW E&M-EST. PATIENT-LVL II: ICD-10-PCS | Mod: PBBFAC,,,

## 2022-10-26 PROCEDURE — 90853 GROUP PSYCHOTHERAPY: CPT | Mod: S$GLB,,,

## 2022-10-26 PROCEDURE — 99999 PR PBB SHADOW E&M-EST. PATIENT-LVL II: CPT | Mod: PBBFAC,,,

## 2022-10-26 PROCEDURE — 90853 PR GROUP PSYCHOTHERAPY: ICD-10-PCS | Mod: S$GLB,,,

## 2022-10-26 RX ORDER — BUPROPION HYDROCHLORIDE 150 MG/1
150 TABLET, EXTENDED RELEASE ORAL 2 TIMES DAILY
Qty: 58 TABLET | Refills: 1 | Status: SHIPPED | OUTPATIENT
Start: 2022-10-26 | End: 2022-11-30 | Stop reason: SDUPTHER

## 2022-10-26 RX ORDER — VARENICLINE TARTRATE 1 MG/1
1 TABLET, FILM COATED ORAL 2 TIMES DAILY
Qty: 56 TABLET | Refills: 1 | Status: SHIPPED | OUTPATIENT
Start: 2022-10-26 | End: 2022-11-30 | Stop reason: SDUPTHER

## 2022-10-26 NOTE — TELEPHONE ENCOUNTER
Spoke with [facility] in [place] and she is refaxing the Surgery clearance form for this patient.

## 2022-10-26 NOTE — PROGRESS NOTES
"Smoking Cessation Group Session #5    Site: [facility]  Date:  10/26/2022  Clinical Status of Patient: Outpatient   Length of Service and Code: 60 minutes - [id number]   Number in Attendance: 2  Group Activities/Focus of Group:  Sharing last weeks challenges, triggers, and coping activities to remain quit and/ or keep making progress toward cessation, completion of TCRS (Tobacco Cessation Rating Scale) learned addiction model, personal reasons for quitting, medications, goals, quit date.    Specific session focus: completion of TCRS (Tobacco Cessation Rating Scale) reviewed strategies, habitual behavior, high risks situations, understanding urges and cravings, stress and relaxation with open discussion and additional interventions, Introduced lapses, relapses, understanding them and analyzing the situation of a lapse, conflict issues that may be linked to a lapse.     Target symptoms:  withdrawal and medication side effects             The following were rated moderate (3) to severe (4) on TCRS:       Moderate 3: none     Severe 4:   none    Patient's Response to Intervention: The patient was seen in the clinic for smoking cessation follow up.  Patient remains quit as of 9/29/22. Commended patient for this continued quit. Per Smokerlyzer CO 0 ppm, (<6 is a non-smoker). The patient remains on the prescribed tobacco cessation medication regimen of 1 mg Chantix BID and 150 mg Wellbutrin Sr. BID without any negative side effects at this time. Today is this patients last day for his benefit periods for the year. His urges are manageable and he feels confident he will be fine on his own. He states this program has "saved his life". I commended him for all his hard work. He states this was the third attempt with a program to quit. He is very proud of himself.  Session Focus: completion of TCRS (Tobacco Cessation Rating Scale) reviewed strategies, habitual behavior, high risks situations, understanding urges and cravings, stress and " relaxation with open discussion and additional interventions, Introduced lapses, relapses, understanding them and analyzing the situation of a lapse, conflict issues that may be linked to a lapse. The patient denies any abnormal behavioral or mental changes at this time. The patient will continue with  therapy sessions and medication monitoring by CTTS. Prescribed medication management will be by physician.    Progress Toward Goals and Other Mental Status Changes: The patient denies any abnormal behavioral or mental changes at this time.     Diagnosis: Z72.0  Plan: The patient will continue with group therapy sessions and medication regimen prescribed with management by physician or by the Cessation Clinic Provider. Patient will inform Smoking Cessation Counselor of symptoms as rated high on TCRS.    Return to Clinic: 6+ weeks    Quit Date: 9/29/22   Planned Quit Date: 9/29/22

## 2022-10-26 NOTE — Clinical Note
"The patient was seen in the clinic for smoking cessation follow up. Patient remains quit as of 9/29/22. Commended patient for this continued quit. Per Smokerlyzer CO 0 ppm, (<6 is a nonsmoker). The patient remains on the prescribed tobacco cessation medication regimen of 1 mg Chantix BID and 150 mg Wellbutrin Sr.bid without any negative side effects at this time. Today is this patients last day for his benefit periods for the year. His urges are manageable and he feels confident he will be fine on his own. He states this program has "saved his life". I commended him for all his hard work. He states this was the third attempt with a program to quit. He is very proud of himself. Session Focus: completion of TCRS reviewed strategies, habitual behavior, high risks situations, understanding urges and cravings, stress and relaxation with open discussion and additional interventions, Introduced lapses, relapses, understanding them and analyzing the situation of a lapse, conflict issues that may be linked to a lapse."

## 2022-10-26 NOTE — TELEPHONE ENCOUNTER
----- Message from Theresa Ag sent at 10/26/2022 10:31 AM CDT -----  Please call Reba/Dr KHANH Bobby @ [phone] regarding pt pre op clearance

## 2022-11-04 ENCOUNTER — IMMUNIZATION (OUTPATIENT)
Dept: FAMILY MEDICINE | Facility: CLINIC | Age: 62
End: 2022-11-04
Payer: COMMERCIAL

## 2022-11-04 PROCEDURE — 90471 FLU VACCINE (QUAD) GREATER THAN OR EQUAL TO 3YO PRESERVATIVE FREE IM: ICD-10-PCS | Mod: S$GLB,,, | Performed by: FAMILY MEDICINE

## 2022-11-04 PROCEDURE — 90686 IIV4 VACC NO PRSV 0.5 ML IM: CPT | Mod: S$GLB,,, | Performed by: FAMILY MEDICINE

## 2022-11-04 PROCEDURE — 90686 FLU VACCINE (QUAD) GREATER THAN OR EQUAL TO 3YO PRESERVATIVE FREE IM: ICD-10-PCS | Mod: S$GLB,,, | Performed by: FAMILY MEDICINE

## 2022-11-04 PROCEDURE — 90471 IMMUNIZATION ADMIN: CPT | Mod: S$GLB,,, | Performed by: FAMILY MEDICINE

## 2022-11-07 ENCOUNTER — PATIENT MESSAGE (OUTPATIENT)
Dept: CARDIOLOGY | Facility: CLINIC | Age: 62
End: 2022-11-07
Payer: COMMERCIAL

## 2022-11-09 ENCOUNTER — CLINICAL SUPPORT (OUTPATIENT)
Dept: SMOKING CESSATION | Facility: CLINIC | Age: 62
End: 2022-11-09
Payer: COMMERCIAL

## 2022-11-09 DIAGNOSIS — F17.200 NICOTINE DEPENDENCE: Primary | ICD-10-CM

## 2022-11-09 PROCEDURE — 99407 PR TOBACCO USE CESSATION INTENSIVE >10 MINUTES: ICD-10-PCS | Mod: S$GLB,,, | Performed by: GENERAL PRACTICE

## 2022-11-09 PROCEDURE — 99407 BEHAV CHNG SMOKING > 10 MIN: CPT | Mod: S$GLB,,, | Performed by: GENERAL PRACTICE

## 2022-11-09 PROCEDURE — 99999 PR PBB SHADOW E&M-EST. PATIENT-LVL I: CPT | Mod: PBBFAC,,,

## 2022-11-09 PROCEDURE — 99999 PR PBB SHADOW E&M-EST. PATIENT-LVL I: ICD-10-PCS | Mod: PBBFAC,,,

## 2022-11-09 NOTE — PROGRESS NOTES
Spoke with patient today in regard to smoking cessation progress 6 month telephone follow up, he states that he is tobacco free.  Commended patient on the accomplishments thus far.  Patient stated that he needs a refill on his Wellbutrin prescription.  Patient will contact referral coordinator to schedule patient follow up appointment. Informed patient of benefit period, future follow up, and contact information if any further help or support is needed.  Will complete smart form for 6 month follow up on Quit attempt #2

## 2022-11-10 ENCOUNTER — CLINICAL SUPPORT (OUTPATIENT)
Dept: SMOKING CESSATION | Facility: CLINIC | Age: 62
End: 2022-11-10
Payer: COMMERCIAL

## 2022-11-10 DIAGNOSIS — F17.200 NICOTINE DEPENDENCE: Primary | ICD-10-CM

## 2022-11-10 PROCEDURE — 99407 BEHAV CHNG SMOKING > 10 MIN: CPT | Mod: S$GLB,,, | Performed by: GENERAL PRACTICE

## 2022-11-10 PROCEDURE — 99407 PR TOBACCO USE CESSATION INTENSIVE >10 MINUTES: ICD-10-PCS | Mod: S$GLB,,, | Performed by: GENERAL PRACTICE

## 2022-11-23 ENCOUNTER — PATIENT MESSAGE (OUTPATIENT)
Dept: CARDIOLOGY | Facility: CLINIC | Age: 62
End: 2022-11-23
Payer: COMMERCIAL

## 2022-11-23 DIAGNOSIS — E78.5 DYSLIPIDEMIA (HIGH LDL; LOW HDL): Primary | ICD-10-CM

## 2022-11-23 DIAGNOSIS — I10 ESSENTIAL HYPERTENSION: ICD-10-CM

## 2022-11-23 DIAGNOSIS — I73.9 PAD (PERIPHERAL ARTERY DISEASE): ICD-10-CM

## 2022-11-25 ENCOUNTER — PATIENT MESSAGE (OUTPATIENT)
Dept: CARDIOLOGY | Facility: CLINIC | Age: 62
End: 2022-11-25
Payer: COMMERCIAL

## 2022-11-26 ENCOUNTER — PATIENT MESSAGE (OUTPATIENT)
Dept: FAMILY MEDICINE | Facility: CLINIC | Age: 62
End: 2022-11-26
Payer: COMMERCIAL

## 2022-11-28 ENCOUNTER — PATIENT MESSAGE (OUTPATIENT)
Dept: FAMILY MEDICINE | Facility: CLINIC | Age: 62
End: 2022-11-28
Payer: COMMERCIAL

## 2022-11-29 ENCOUNTER — TELEPHONE (OUTPATIENT)
Dept: SMOKING CESSATION | Facility: CLINIC | Age: 62
End: 2022-11-29
Payer: COMMERCIAL

## 2022-11-30 ENCOUNTER — PATIENT MESSAGE (OUTPATIENT)
Dept: FAMILY MEDICINE | Facility: CLINIC | Age: 62
End: 2022-11-30
Payer: COMMERCIAL

## 2022-11-30 ENCOUNTER — OFFICE VISIT (OUTPATIENT)
Dept: UROLOGY | Facility: CLINIC | Age: 62
End: 2022-11-30
Payer: COMMERCIAL

## 2022-11-30 DIAGNOSIS — F17.210 HEAVY SMOKER (MORE THAN 20 CIGARETTES PER DAY): ICD-10-CM

## 2022-11-30 DIAGNOSIS — Z87.440 HISTORY OF UTI: ICD-10-CM

## 2022-11-30 DIAGNOSIS — R39.198 URINE STREAM SPRAYING: Primary | ICD-10-CM

## 2022-11-30 DIAGNOSIS — R30.0 DYSURIA: ICD-10-CM

## 2022-11-30 DIAGNOSIS — F17.200 NICOTINE DEPENDENCE: ICD-10-CM

## 2022-11-30 DIAGNOSIS — N52.01 ERECTILE DYSFUNCTION DUE TO ARTERIAL INSUFFICIENCY: ICD-10-CM

## 2022-11-30 LAB
BILIRUBIN, UA POC OHS: NEGATIVE
BLOOD, UA POC OHS: ABNORMAL
CLARITY, UA POC OHS: CLEAR
COLOR, UA POC OHS: YELLOW
GLUCOSE, UA POC OHS: NEGATIVE
KETONES, UA POC OHS: NEGATIVE
LEUKOCYTES, UA POC OHS: ABNORMAL
NITRITE, UA POC OHS: NEGATIVE
PH, UA POC OHS: 6.5
PROTEIN, UA POC OHS: NEGATIVE
SPECIFIC GRAVITY, UA POC OHS: <=1.005
UROBILINOGEN, UA POC OHS: 0.2

## 2022-11-30 PROCEDURE — 81003 POCT URINALYSIS(INSTRUMENT): ICD-10-PCS | Mod: QW,S$GLB,, | Performed by: STUDENT IN AN ORGANIZED HEALTH CARE EDUCATION/TRAINING PROGRAM

## 2022-11-30 PROCEDURE — 4010F ACE/ARB THERAPY RXD/TAKEN: CPT | Mod: CPTII,S$GLB,, | Performed by: STUDENT IN AN ORGANIZED HEALTH CARE EDUCATION/TRAINING PROGRAM

## 2022-11-30 PROCEDURE — 1160F PR REVIEW ALL MEDS BY PRESCRIBER/CLIN PHARMACIST DOCUMENTED: ICD-10-PCS | Mod: CPTII,S$GLB,, | Performed by: STUDENT IN AN ORGANIZED HEALTH CARE EDUCATION/TRAINING PROGRAM

## 2022-11-30 PROCEDURE — 1160F RVW MEDS BY RX/DR IN RCRD: CPT | Mod: CPTII,S$GLB,, | Performed by: STUDENT IN AN ORGANIZED HEALTH CARE EDUCATION/TRAINING PROGRAM

## 2022-11-30 PROCEDURE — 87086 URINE CULTURE/COLONY COUNT: CPT | Performed by: STUDENT IN AN ORGANIZED HEALTH CARE EDUCATION/TRAINING PROGRAM

## 2022-11-30 PROCEDURE — 99204 PR OFFICE/OUTPT VISIT, NEW, LEVL IV, 45-59 MIN: ICD-10-PCS | Mod: S$GLB,,, | Performed by: STUDENT IN AN ORGANIZED HEALTH CARE EDUCATION/TRAINING PROGRAM

## 2022-11-30 PROCEDURE — 87147 CULTURE TYPE IMMUNOLOGIC: CPT | Performed by: STUDENT IN AN ORGANIZED HEALTH CARE EDUCATION/TRAINING PROGRAM

## 2022-11-30 PROCEDURE — 87184 SC STD DISK METHOD PER PLATE: CPT | Performed by: STUDENT IN AN ORGANIZED HEALTH CARE EDUCATION/TRAINING PROGRAM

## 2022-11-30 PROCEDURE — 99999 PR PBB SHADOW E&M-EST. PATIENT-LVL III: CPT | Mod: PBBFAC,,, | Performed by: STUDENT IN AN ORGANIZED HEALTH CARE EDUCATION/TRAINING PROGRAM

## 2022-11-30 PROCEDURE — 1159F MED LIST DOCD IN RCRD: CPT | Mod: CPTII,S$GLB,, | Performed by: STUDENT IN AN ORGANIZED HEALTH CARE EDUCATION/TRAINING PROGRAM

## 2022-11-30 PROCEDURE — 81003 URINALYSIS AUTO W/O SCOPE: CPT | Mod: QW,S$GLB,, | Performed by: STUDENT IN AN ORGANIZED HEALTH CARE EDUCATION/TRAINING PROGRAM

## 2022-11-30 PROCEDURE — 1159F PR MEDICATION LIST DOCUMENTED IN MEDICAL RECORD: ICD-10-PCS | Mod: CPTII,S$GLB,, | Performed by: STUDENT IN AN ORGANIZED HEALTH CARE EDUCATION/TRAINING PROGRAM

## 2022-11-30 PROCEDURE — 3044F PR MOST RECENT HEMOGLOBIN A1C LEVEL <7.0%: ICD-10-PCS | Mod: CPTII,S$GLB,, | Performed by: STUDENT IN AN ORGANIZED HEALTH CARE EDUCATION/TRAINING PROGRAM

## 2022-11-30 PROCEDURE — 87088 URINE BACTERIA CULTURE: CPT | Performed by: STUDENT IN AN ORGANIZED HEALTH CARE EDUCATION/TRAINING PROGRAM

## 2022-11-30 PROCEDURE — 3044F HG A1C LEVEL LT 7.0%: CPT | Mod: CPTII,S$GLB,, | Performed by: STUDENT IN AN ORGANIZED HEALTH CARE EDUCATION/TRAINING PROGRAM

## 2022-11-30 PROCEDURE — 99204 OFFICE O/P NEW MOD 45 MIN: CPT | Mod: S$GLB,,, | Performed by: STUDENT IN AN ORGANIZED HEALTH CARE EDUCATION/TRAINING PROGRAM

## 2022-11-30 PROCEDURE — 4010F PR ACE/ARB THEARPY RXD/TAKEN: ICD-10-PCS | Mod: CPTII,S$GLB,, | Performed by: STUDENT IN AN ORGANIZED HEALTH CARE EDUCATION/TRAINING PROGRAM

## 2022-11-30 PROCEDURE — 99999 PR PBB SHADOW E&M-EST. PATIENT-LVL III: ICD-10-PCS | Mod: PBBFAC,,, | Performed by: STUDENT IN AN ORGANIZED HEALTH CARE EDUCATION/TRAINING PROGRAM

## 2022-11-30 RX ORDER — GABAPENTIN 300 MG/1
300 CAPSULE ORAL 3 TIMES DAILY
COMMUNITY
End: 2022-12-07

## 2022-11-30 RX ORDER — TADALAFIL 20 MG/1
20 TABLET ORAL DAILY PRN
Qty: 10 TABLET | Refills: 11 | Status: SHIPPED | OUTPATIENT
Start: 2022-11-30 | End: 2022-12-12 | Stop reason: SDUPTHER

## 2022-11-30 RX ORDER — DIAZEPAM 5 MG/1
5 TABLET ORAL EVERY 6 HOURS PRN
Qty: 2 TABLET | Refills: 0 | Status: SHIPPED | OUTPATIENT
Start: 2022-11-30 | End: 2023-01-03

## 2022-11-30 RX ORDER — TADALAFIL 20 MG/1
20 TABLET ORAL DAILY PRN
Qty: 10 TABLET | Refills: 11 | Status: SHIPPED | OUTPATIENT
Start: 2022-11-30 | End: 2023-02-15

## 2022-11-30 RX ORDER — VARENICLINE TARTRATE 1 MG/1
1 TABLET, FILM COATED ORAL 2 TIMES DAILY
Qty: 56 TABLET | Refills: 1 | Status: SHIPPED | OUTPATIENT
Start: 2022-11-30 | End: 2022-12-21

## 2022-11-30 RX ORDER — BUPROPION HYDROCHLORIDE 150 MG/1
150 TABLET, EXTENDED RELEASE ORAL 2 TIMES DAILY
Qty: 58 TABLET | Refills: 1 | Status: SHIPPED | OUTPATIENT
Start: 2022-11-30 | End: 2022-12-06 | Stop reason: SDUPTHER

## 2022-11-30 NOTE — PROGRESS NOTES
"[facility]   Clinic Note    Subjective:     Chief Complaint: weak stream and Nocturia      History of Present Illness:  Sal Villarreal Jr. is a 62 y.o. male who presents to clinic for evaluation and management of LUTS and ED. He is new to our clinic referred by Aaareferral Self    He reports LUTS with a weak stream and urine spraying in different direction onset about 1 year ago. History of what sounds like a urethroplasty when he was 10 years old. He underwent a cystoscopy in 2014 for hematuria evaluation. He drinks plenty of fluids either cokes or water.     IPSS Questionnaire (AUA-SS):  1)  Incomplete Emptying 0 - Not at all   2)  Frequency 4 - More than half the time   3)  Intermittency 3 - About half the time   4) Urgency 3 - About half the time   5) Weak Stream  5 - Almost always   6) Straining 5 - Almost always   7) Nocturia 3 - 3 times   Total score:  0-7 mild, 8-19 mod, 20-35 severe 23   Quality of Life:  4 - Mostly Dissatisfied        Also with ED reasonable results with viagra.     He denies a family history of prostate cancer     Past medical, family, surgical and social history reviewed as documented in chart with pertinent positive medical, family, surgical and social history detailed in HPI.    A review systems was conducted with pertinent positive and negative findings documented in HPI.    Anticoagulation/Antiplatelets:  Yes aspirin or plavix    Objective:     Estimated body mass index is 23.29 kg/m² as calculated from the following:    Height as of 8/29/22: 5' 6" (1.676 m).    Weight as of 8/29/22: 65.5 kg (144 lb 4.7 oz).    Vital Signs (Most Recent)       Physical Exam  Vitals and nursing note reviewed.   Constitutional:       General: He is not in acute distress.     Appearance: Normal appearance. He is well-developed. He is not ill-appearing or toxic-appearing.   Pulmonary:      Effort: Pulmonary effort is normal. No accessory muscle usage or respiratory distress.   Neurological: "      General: No focal deficit present.      Mental Status: He is alert and oriented to person, place, and time. Mental status is at baseline.   Psychiatric:         Mood and Affect: Mood normal.         Behavior: Behavior is cooperative.         Thought Content: Thought content normal.         Judgment: Judgment normal.       Lab Results   Component Value Date    BUN 5 (L) 08/25/2022    CREATININE 0.9 08/25/2022    WBC 7.01 08/25/2022    HGB 12.5 (L) 08/25/2022    HCT 37.0 (L) 08/25/2022     08/25/2022    AST 36 08/25/2022    ALT 37 08/25/2022    ALKPHOS 80 08/25/2022    ALBUMIN 3.9 08/25/2022    HGBA1C 5.5 08/25/2022        Lab Results   Component Value Date    PSA 1.2 08/25/2022    PSA 1.2 11/29/2021    PSA 1.1 11/29/2021      Urine dipstick shows positive for leukocytes, red blood cells.     Assessment:     1. Urine stream spraying    2. Dysuria    3. History of UTI    4. Erectile dysfunction due to arterial insufficiency      Plan:     Urine culture.   Cystoscopy. Explained the procedure. He had a bad experience in the past. Valium given to take prior to the procedure.   LONNY at the time    Discussed ED options. Oral meds, ICI, IPP.   Cialis was prescribed. This can be taken regardless of food intake and provides a longer window of opportunity (24-36 hours) but takes at least 60 minutes before maximal effects take place. Possible side effects include: headache and flushing, less commonly nasal congestion, visual changes, or priapism.      Douglas Holden MD

## 2022-11-30 NOTE — TELEPHONE ENCOUNTER
No new care gaps identified.  Health system Embedded Care Gaps. Reference number: [id number]. 11/30/2022   11:27:01 AM CST

## 2022-12-01 ENCOUNTER — LAB VISIT (OUTPATIENT)
Dept: LAB | Facility: HOSPITAL | Age: 62
End: 2022-12-01
Attending: INTERNAL MEDICINE
Payer: COMMERCIAL

## 2022-12-01 DIAGNOSIS — I10 ESSENTIAL HYPERTENSION: ICD-10-CM

## 2022-12-01 DIAGNOSIS — E78.5 DYSLIPIDEMIA (HIGH LDL; LOW HDL): ICD-10-CM

## 2022-12-01 DIAGNOSIS — I73.9 PAD (PERIPHERAL ARTERY DISEASE): ICD-10-CM

## 2022-12-01 LAB
ALBUMIN SERPL BCP-MCNC: 3.7 G/DL (ref 3.5–5.2)
ALP SERPL-CCNC: 115 U/L (ref 55–135)
ALT SERPL W/O P-5'-P-CCNC: 22 U/L (ref 10–44)
ANION GAP SERPL CALC-SCNC: 9 MMOL/L (ref 8–16)
AST SERPL-CCNC: 19 U/L (ref 10–40)
BASOPHILS # BLD AUTO: 0.02 K/UL (ref 0–0.2)
BASOPHILS NFR BLD: 0.2 % (ref 0–1.9)
BILIRUB SERPL-MCNC: 0.3 MG/DL (ref 0.1–1)
BUN SERPL-MCNC: 8 MG/DL (ref 8–23)
CALCIUM SERPL-MCNC: 9.2 MG/DL (ref 8.7–10.5)
CHLORIDE SERPL-SCNC: 104 MMOL/L (ref 95–110)
CHOLEST SERPL-MCNC: 109 MG/DL (ref 120–199)
CHOLEST/HDLC SERPL: 4 {RATIO} (ref 2–5)
CO2 SERPL-SCNC: 27 MMOL/L (ref 23–29)
CREAT SERPL-MCNC: 1.1 MG/DL (ref 0.5–1.4)
DIFFERENTIAL METHOD: ABNORMAL
EOSINOPHIL # BLD AUTO: 0.1 K/UL (ref 0–0.5)
EOSINOPHIL NFR BLD: 0.4 % (ref 0–8)
ERYTHROCYTE [DISTWIDTH] IN BLOOD BY AUTOMATED COUNT: 12.9 % (ref 11.5–14.5)
EST. GFR  (NO RACE VARIABLE): >60 ML/MIN/1.73 M^2
GLUCOSE SERPL-MCNC: 88 MG/DL (ref 70–110)
HCT VFR BLD AUTO: 38.4 % (ref 40–54)
HDLC SERPL-MCNC: 27 MG/DL (ref 40–75)
HDLC SERPL: 24.8 % (ref 20–50)
HGB BLD-MCNC: 12.4 G/DL (ref 14–18)
IMM GRANULOCYTES # BLD AUTO: 0.04 K/UL (ref 0–0.04)
IMM GRANULOCYTES NFR BLD AUTO: 0.4 % (ref 0–0.5)
LDLC SERPL CALC-MCNC: 30 MG/DL (ref 63–159)
LYMPHOCYTES # BLD AUTO: 1.6 K/UL (ref 1–4.8)
LYMPHOCYTES NFR BLD: 13.6 % (ref 18–48)
MCH RBC QN AUTO: 30.2 PG (ref 27–31)
MCHC RBC AUTO-ENTMCNC: 32.3 G/DL (ref 32–36)
MCV RBC AUTO: 94 FL (ref 82–98)
MONOCYTES # BLD AUTO: 0.8 K/UL (ref 0.3–1)
MONOCYTES NFR BLD: 7 % (ref 4–15)
NEUTROPHILS # BLD AUTO: 8.9 K/UL (ref 1.8–7.7)
NEUTROPHILS NFR BLD: 78.4 % (ref 38–73)
NONHDLC SERPL-MCNC: 82 MG/DL
NRBC BLD-RTO: 0 /100 WBC
PLATELET # BLD AUTO: 194 K/UL (ref 150–450)
PMV BLD AUTO: 11.1 FL (ref 9.2–12.9)
POTASSIUM SERPL-SCNC: 4.3 MMOL/L (ref 3.5–5.1)
PROT SERPL-MCNC: 6.7 G/DL (ref 6–8.4)
RBC # BLD AUTO: 4.1 M/UL (ref 4.6–6.2)
SODIUM SERPL-SCNC: 140 MMOL/L (ref 136–145)
TRIGL SERPL-MCNC: 260 MG/DL (ref 30–150)
WBC # BLD AUTO: 11.39 K/UL (ref 3.9–12.7)

## 2022-12-01 PROCEDURE — 85025 COMPLETE CBC W/AUTO DIFF WBC: CPT | Performed by: INTERNAL MEDICINE

## 2022-12-01 PROCEDURE — 36415 COLL VENOUS BLD VENIPUNCTURE: CPT | Mod: PO | Performed by: INTERNAL MEDICINE

## 2022-12-01 PROCEDURE — 80053 COMPREHEN METABOLIC PANEL: CPT | Performed by: INTERNAL MEDICINE

## 2022-12-01 PROCEDURE — 80061 LIPID PANEL: CPT | Performed by: INTERNAL MEDICINE

## 2022-12-02 ENCOUNTER — PATIENT MESSAGE (OUTPATIENT)
Dept: FAMILY MEDICINE | Facility: CLINIC | Age: 62
End: 2022-12-02

## 2022-12-02 ENCOUNTER — TELEPHONE (OUTPATIENT)
Dept: VASCULAR SURGERY | Facility: CLINIC | Age: 62
End: 2022-12-02
Payer: COMMERCIAL

## 2022-12-02 ENCOUNTER — E-VISIT (OUTPATIENT)
Dept: FAMILY MEDICINE | Facility: CLINIC | Age: 62
End: 2022-12-02
Payer: COMMERCIAL

## 2022-12-02 DIAGNOSIS — J41.0 SMOKERS' COUGH: Primary | ICD-10-CM

## 2022-12-02 PROCEDURE — 99421 PR E&M, ONLINE DIGIT, EST, < 7 DAYS, 5-10 MINS: ICD-10-PCS | Mod: S$GLB,,, | Performed by: FAMILY MEDICINE

## 2022-12-02 PROCEDURE — 99421 OL DIG E/M SVC 5-10 MIN: CPT | Mod: S$GLB,,, | Performed by: FAMILY MEDICINE

## 2022-12-02 RX ORDER — ALBUTEROL SULFATE 90 UG/1
2 AEROSOL, METERED RESPIRATORY (INHALATION) EVERY 6 HOURS PRN
Qty: 18 G | Refills: 1 | Status: SHIPPED | OUTPATIENT
Start: 2022-12-02 | End: 2023-03-01

## 2022-12-02 RX ORDER — BENZONATATE 200 MG/1
200 CAPSULE ORAL 3 TIMES DAILY PRN
Qty: 20 CAPSULE | Refills: 0 | Status: SHIPPED | OUTPATIENT
Start: 2022-12-02 | End: 2022-12-17

## 2022-12-02 RX ORDER — PROMETHAZINE HYDROCHLORIDE AND DEXTROMETHORPHAN HYDROBROMIDE 6.25; 15 MG/5ML; MG/5ML
5 SYRUP ORAL EVERY 4 HOURS PRN
Qty: 120 ML | Refills: 0 | Status: SHIPPED | OUTPATIENT
Start: 2022-12-02 | End: 2022-12-12

## 2022-12-02 NOTE — PROGRESS NOTES
Patient ID: Sal Villarreal Jr. is a 62 y.o. male.    Chief Complaint: Cough    The patient initiated a request through [facility] on 12/2/2022 for evaluation and management with a chief complaint of Cough     I evaluated the questionnaire submission on 12/02/2022  .    [facility] Peq Evisit Upper Respitatory/Cough Questionnaire    12/2/2022  2:12 PM CST - Filed by Patient   Do you agree to participate in an E-Visit? Yes   If you have any of the following symptoms, please present to your local ER or call 911:  I acknowledge   What is the main issue that you would like for your doctor to address today? I quit smoking 2 months ago. For about a week I have what I call a smokers coughI was told to expect this when the cilia in my lungs start to grow back. Im wondering if this is whats going on and is there something to ease the situation.   Are you able to take your vital signs? No   What symptoms do you currently have?  Cough   Have you had a fever? No   When did your symptoms first appear? 11/25/2022   In the last two weeks, have you been in close contact with someone who has COVID-19 or the Flu? No   In the last two weeks, have you worked or volunteered in a healthcare facility or as a ? Healthcare facilities include a hospital, medical or dental clinic, long-term care facility, or nursing home No   Do you live in a long-term care facility, nursing home, or homeless shelter? No   List what you have done or taken to help your symptoms. Nothing   How severe are your symptoms? Moderate   Have you taken an at home Covid test? No   Have you taken a Flu test? No   Have you been fully vaccinated for COVID? (2 Pfizer, 2 Moderna or 1 Bhanu & Bhanu vaccine injections) Yes   Have you received a booster? Yes   Have you recieved a Flu shot? Yes   When did you recieve your Flu shot? 11/17/2022   Do you have transportation to get tested for COVID if it is indicated and ordered for you at an [facility] location? Yes    Provide any information you feel is important to your history not asked above    Please attach any relevant images or files           Active Problem List with Overview Notes    Diagnosis Date Noted    History of UTI 11/30/2022    Recurrent major depressive disorder, in partial remission 08/29/2022     Chronic.  Stable.  Patient declines any medication.  He is doing well with smoking cessation.  He is going on a trip in October 2 [place] and would like to be tobacco free by then.  Denies SI HI or hallucinations.      Nausea 08/29/2022     Associated with Chantix usage.      Vitamin D deficiency 08/29/2022     Chronic. Vit d deficiency. Takes vitamin d supplement. No SE reported.  Patient with chronic low back pain.  No results found for: [id number]         Fatigue 08/29/2022     Chronic.  Intermittent control.  Lab Results   Component Value Date    WBC 7.01 08/25/2022    HGB 12.5 (L) 08/25/2022    HCT 37.0 (L) 08/25/2022    MCV 91 08/25/2022     08/25/2022       No results found for: IRON, TIBC, FERRITIN, SATURATEDIRO  Lab Results   Component Value Date    RLZUQJPM11 290 10/04/2005     Lab Results   Component Value Date    FOLATE 10.3 11/07/2005     Lab Results   Component Value Date    TSH 1.059 08/25/2022           Olecranon bursitis of right elbow 02/25/2022     Subacute.  Started weeks ago.  Patient was seen here and had x-ray.  He has been applying compression.  Located on the right elbow where he puts most of his pressure own during driving.  No trauma noted.    X-Ray Elbow Complete Right  Narrative: EXAMINATION:  XR ELBOW COMPLETE 3 VIEW RIGHT    CLINICAL HISTORY:  . Pain in right elbow    TECHNIQUE:  AP, lateral, and oblique views of the right elbow were performed.    COMPARISON:  None    FINDINGS:  There is nonspecific localized soft tissue prominence seen along the dorsal surface of the elbow and proximal forearm.  No acute fractures or dislocations visualized.  No definite joint effusion  demonstrated.  Joint spaces are preserved. Visualized osseous structures appear diffusely osteopenic.  Impression: As above    Electronically signed by: Scar Sanders MD  Date:    02/08/2022  Time:    08:07          Erectile dysfunction due to arterial insufficiency 02/25/2022     Chronic.  Stable.  Patient has tolerated sildenafil in the past.  Requesting refill.      Pre-op exam 02/25/2022     Patient coming in for preop for upcoming back surgery.  He is unsure what they are doing.  I do not have any records at this time.  He states that he has full labs, EKG chest x-ray preop coming up at [facility].       Bleeding nose 02/25/2022     Chronic.  Worsening.  Patient recently on plavix.       Right elbow pain 02/08/2022     Patient is in clinic today as an established patient here for pain and swelling to right elbow. Report onset x4 days ago. Worse with palpation. He has had no localized redness or warmth. There has been no fever, chills, sweats. He denies limitation in ROM. Denies known injury or trauma. He has tried using ACE compression wrap which has provided no improvement in symptoms.     Will obtain imaging today. Trial of steroids as prescribed. Supportive care- rest, ice, heat, compression, avoid heavy lifting, limit strenuous activity. Follow up with PCP as scheduled in 2 weeks.       Carotid stenosis, asymptomatic, right 01/14/2022    Mild ascending aorta dilatation 01/14/2022    Tobacco abuse counseling 01/14/2022    Long term (current) use of antithrombotics/antiplatelets 01/14/2022    S/P femoral-femoral bypass surgery 12/01/2021    Dyslipidemia (high LDL; low HDL) 10/22/2021     CHRONIC.  August 2022: LDL is very low on rosuvastatin 10 milligrams daily.  STABLE. Lab analysis reviewed.   (-) CP, SOB, abdominal pain, N/V/D, constipation, jaundice, skin changes.  (-) Myalgias  Lab Results   Component Value Date    CHOL 76 (L) 08/25/2022    CHOL 81 (L) 06/06/2022    CHOL 120 11/29/2021     Lab Results    Component Value Date    HDL 18 (L) 08/25/2022    HDL 27 (L) 06/06/2022    HDL 23 (L) 11/29/2021     Lab Results   Component Value Date    LDLCALC 22.4 (L) 08/25/2022    LDLCALC 30.6 (L) 06/06/2022    LDLCALC 63.4 11/29/2021     Lab Results   Component Value Date    TRIG 178 (H) 08/25/2022    TRIG 117 06/06/2022    TRIG 168 (H) 11/29/2021     Lab Results   Component Value Date    CHOLHDL 23.7 08/25/2022    CHOLHDL 33.3 06/06/2022    CHOLHDL 19.2 (L) 11/29/2021     Lab Results   Component Value Date    TOTALCHOLEST 4.2 08/25/2022    TOTALCHOLEST 3.0 06/06/2022    TOTALCHOLEST 5.2 (H) 11/29/2021     Lab Results   Component Value Date    ALT 37 08/25/2022    AST 36 08/25/2022    ALKPHOS 80 08/25/2022    BILITOT 0.5 08/25/2022   ======================================================        Pre-operative cardiovascular examination 10/22/2021    Undiagnosed cardiac murmurs 10/22/2021    Bruit of right carotid artery 10/22/2021    Status post lumbar spinal fusion 08/25/2021     SURGEON: Kelly Ayala M.D.     ASSISTING SURGEON: Momo Briggs M.D.     APPROACH AND CLOSURE SURGEON: Momo Briggs M.D.     PREOPERATIVE DIAGNOSES:  1. Lumbar disk disorder with radiculopathy at L5-S1.  2. Connective tissue and disc stenosis of the lumbar foramina at L5-S1.   3. Lumbar spondylolisthesis.      POSTOPERATIVE DIAGNOSES:  1. Lumbar disk disorder with radiculopathy at L5-S1.  2. Connective tissue and disc stenosis of the lumbar foramina at L5-S1.   3. Lumbar spondylolisthesis.      PROCEDURES:  1. Anterior lumbar interbody arthrodesis at L5-S1.  2. Anterior lumbar diskectomy of L5-S1 with preparation of endplates for  arthrodesis and distraction.  3. Insertion of Titanium-coated PEEK interbody device at L5-S1.  4. Anterior lumbar instrumentation at L5-S1 with 3 vertebral body screws.  5. Kinderhook of small iliac crest bone graft from the right anterior iliac crest side.  6. Placement of morcellized autograft from a different incision  into the fusion  site at L5-S1, mixed with OsteoSponge allograft.  7. Insertion of BMP Infuse.  8. Use of C-Arm fluoroscopy for image guidance.  9. Intraoperative monitoring with SSEPs and spontaneous EMG with sphincter monitoring.     ESTIMATED BLOOD LOSS: 200 mL.     PROCEDURE NOTE: Please note a separate operative report for the  exposure will be dictated by Dr Briggs.     PROCEDURE IN DETAIL: The patient was wheeled into the Operating Theater and  placed under general anesthesia with endotracheal intubation. At this point,  the entire abdomen including the right ASIS of the iliac crest was exposed and  prepped and draped in standard sterile fashion. At this point, a small incision  on the right ASIS, but slightly behind it was made with #15 knife followed by  dissection all the way to the anterior iliac crest border avoiding any neural  structures. At this point, a small button of iliac crest bone harvest was  obtained and sent for processing, and hemostasis was achieved with Surgifoam.  Closure was done after irrigation with 2-0 Vicryl suture followed by Dermabond.  Then the area was dressed appropriately.     At this point, a midline abdominal incision was marked by Dr Briggs and injected  with 1% Xylocaine and Marcaine with epinephrine. At this  point, the incision was made for the approach with anterior lumbar interbody  fusion with #10 knife, and the approach was performed by Dr. Briggs to  expose the anterior L5-S1 level voiding all vascular structures, the ureter and  intraabdominal organs. Please refer to Dr. Briggs dictation for details on  this approach.     After exposure, the L5-S1 level was confirmed with x-ray guidance. At this point,   a #15 knife was made to incise the anterior annulus of the L5-S1 disk  and a Styles elevator was used to elevate the disk off the endplates at L5 and S1.   The disk was then removed mostly en bloc, but the remainder of the disk was then  removed using sequential pituitary  jenny. Please note that the disk was   fragmented and came off easily off the endplates.  Discectomy was achieved   until the PLL was visualized, and foraminal decompression achieved as well.    The ALL was prepped sequentially to accommodate a 30mm footprint anterior   interbody Titanium plasma-sprayed PEEK device, and the distraction was achieved by   from 11mm to 13mm using trials. At this point, the appropriate size interbody device   was prepped and filled with autograft obtained from the separate iliac crest   incision, as well as allograft with OsteoSponge and Infuse BMP, and placed in the   interbody space under compression. The cage inserted has a 12 deg lordosis.   Subsequently, anterior instrumentation was achieved with the attached plate with   3 vertebral body screws at L5 and S1.      At this point, copious irrigation of the area was achieved with normal saline and   bacitracin solution.  Closure was performed by Dr. Briggs. Please refer his dictation  for details of the closure. Staple were used for skin closure. Please note that all  counts were correct at the end of the procedure.        Kelly Ayala MD         Electronically signed by Kelly Ayala MD at 3/24/2021  3:50 PM        Peripheral arterial occlusive disease 07/27/2021     Date of Service: 07/27/2021  DATE OF PROCEDURE:  07/27/2021.     PREOPERATIVE DIAGNOSES:  1.  Peripheral arterial occlusive disease.  2.  Pain of the right hip.  3.  Severe intermittent claudication of the right lower extremity affecting   activities of daily living.     POSTOPERATIVE DIAGNOSES:  1.  Peripheral arterial occlusive disease.  2.  Pain of the right hip  3.  Severe intermittent claudication of the right lower extremity affecting   activities of daily living.     OPERATIONS:  1.  Aortogram with bilateral renal arteriogram.  2.  Bilateral common iliac arteriograms with bilateral lower extremity runoff.  3.  Measurement of pressures in the aorta  and the left iliac artery.     SURGEON:  Momo Briggs M.D., F.A.C.S.     ANESTHESIA:  Lidocaine 1% for local and intravenous sedation using 4 mg of   Versed and 100 mcg of fentanyl IV.  The patient's level of consciousness was   monitored by the registered nurse from 10:59-11:30 a.m.  Other monitors included   blood pressure, pulse oximetry, heart rate and respiratory rate.     PROCEDURE IN DETAIL:  With the patient in the supine position on the cardiac   cath table, bilateral groins were prepped and draped in a sterile fashion.  CT   angiography had showed an occluded right external iliac artery and a 50%   stenosis of the left common iliac artery.  Access to the left common femoral   artery was obtained using an 18-gauge access needle and a guidewire was passed   through this.  A 5-Turkish sheath was passed over the guidewire and the guidewire   and dilator were removed and the sheath was aspirated and flushed.  A J-tip   guidewire was passed through the sheath and advanced up into the suprarenal   aorta under fluoroscopy.  The guidewire was removed.  Contrast was injected and   digital subtraction angiography was performed and an abdominal aortogram with   bilateral renal arteriograms was done.  The renal arteries were patent.  The   infrarenal aorta had calcified plaque with some mild luminal irregularities, but   no significant stenosis.     The angiographic catheter was pulled down into the distal aorta just above the   aortic bifurcation.  Contrast was injected and cineangiography was performed,   and bilateral common iliac arteriograms with bilateral lower extremity runoff   were performed.  The right common iliac artery was patent.  The right   hypogastric artery was patent and large, giving off a lot of collaterals around   the hip.  The right external iliac artery and the right common femoral artery   were both occluded.  The very distal common femoral artery reconstituted a   couple of millimeters above the  femoral bifurcation.  The profunda femoris and   the superficial femoral artery and the right popliteal artery were all patent.    Runoff was via 3 vessels.     The left common iliac artery had some luminal irregularities, but did not seem   to have high-grade stenosis.  The left hypogastric and the left external iliac   arteries were patent, although the left external iliac artery seemed to have   about 20% stenosis distally.  The left common femoral, the left profunda, the   left superficial femoral and the left popliteal arteries were patent.  Runoff   was via 3 vessels.  Bilateral oblique arteriograms of the iliac arteries were   then performed to see if there was any stenosis that could not be seen with the   anteroposterior projections.  No high-grade stenosis in the left common iliac   artery could be seen.  The Omniflush angiographic catheter was then exchanged   for a straight Glidecath.  Pressures were then measured in the infrarenal aorta   and the catheter was pulled back with continuous blood pressure measurements   down into the left common iliac and then into the left external iliac artery.    There was absolutely no change in pressure from the aorta into the common iliac   or into the external iliac artery.  No intervention was performed.  The sheath   was pulled from the left common femoral artery and pressure was held for about   20 minutes.  At the end of that time, there was no bleeding and no hematoma.    Sterile dressing was placed.  The patient tolerated the procedure and left the   Cardiac Cath Lab in satisfactory and stable condition.        NIMO/VIVIAN  dd: 07/27/2021 11:52:36 (CDT)  td: 07/27/2021 13:12:39 (CDT)  Doc ID   #[id number]  Job ID #[id number]     CC:          Last signed by: Jim Briggs MD at 7/27/2021  3:08 PM           Bilateral lower extremity edema 06/08/2021     New problem.  No improvement.      Right leg claudication 06/08/2021    Cigarette smoker 06/08/2021     Assistance with  smoking cessation was offered, including:  [x]  Medications  [x]  Counseling  [x]  Printed Information on Smoking Cessation  [x]  Referral to a Smoking Cessation Program    Patient was counseled regarding smoking for 3-10 minutes.          Spondylolisthesis at L5-S1 level 03/25/2021    Intervertebral disc disorder with radiculopathy of lumbar region 03/23/2021    Right leg pain 08/24/2020    Encounter for long-term (current) use of medications 08/24/2020     CHRONIC. Stable. Compliant with medications for managed conditions. See medication list. No SE reported.   Routine lab analysis is being monitored. Refills were addressed.  Lab Results   Component Value Date    WBC 7.01 08/25/2022    HGB 12.5 (L) 08/25/2022    HCT 37.0 (L) 08/25/2022    MCV 91 08/25/2022     08/25/2022       Chemistry        Component Value Date/Time     08/25/2022 0706    K 4.2 08/25/2022 0706     08/25/2022 0706    CO2 25 08/25/2022 0706    BUN 5 (L) 08/25/2022 0706    CREATININE 0.9 08/25/2022 0706    GLU 91 08/25/2022 0706        Component Value Date/Time    CALCIUM 9.5 08/25/2022 0706    ALKPHOS 80 08/25/2022 0706    AST 36 08/25/2022 0706    ALT 37 08/25/2022 0706    BILITOT 0.5 08/25/2022 0706    ESTGFRAFRICA >60.0 06/06/2022 1037    EGFRNONAA >60.0 06/06/2022 1037          Lab Results   Component Value Date    TSH 1.059 08/25/2022         Lumbar degenerative disc disease 08/24/2020    Essential hypertension 01/08/2018     CHRONIC. STABLE. BP Reviewed.  Compliant with BP medications. No SE reported.   (-) CP, SOB, palpitations, dizziness, lightheadedness, HA, arm numbness, tingling or weakness, syncope.  Creatinine   Date Value Ref Range Status   08/25/2022 0.9 0.5 - 1.4 mg/dL Final         Anxiety 11/12/2013    Depression 11/12/2013    Alcohol dependence 11/12/2013      Recent Labs Obtained:  Lab Visit on 12/01/2022   Component Date Value Ref Range Status    Sodium 12/01/2022 140  136 - 145 mmol/L Final    Potassium  12/01/2022 4.3  3.5 - 5.1 mmol/L Final    Chloride 12/01/2022 104  95 - 110 mmol/L Final    CO2 12/01/2022 27  23 - 29 mmol/L Final    Glucose 12/01/2022 88  70 - 110 mg/dL Final    BUN 12/01/2022 8  8 - 23 mg/dL Final    Creatinine 12/01/2022 1.1  0.5 - 1.4 mg/dL Final    Calcium 12/01/2022 9.2  8.7 - 10.5 mg/dL Final    Total Protein 12/01/2022 6.7  6.0 - 8.4 g/dL Final    Albumin 12/01/2022 3.7  3.5 - 5.2 g/dL Final    Total Bilirubin 12/01/2022 0.3  0.1 - 1.0 mg/dL Final    Comment: For infants and newborns, interpretation of results should be based  on gestational age, weight and in agreement with clinical  observations.    Premature Infant recommended reference ranges:  Up to 24 hours.............<8.0 mg/dL  Up to 48 hours............<12.0 mg/dL  3-5 days..................<15.0 mg/dL  6-29 days.................<15.0 mg/dL      Alkaline Phosphatase 12/01/2022 115  55 - 135 U/L Final    AST 12/01/2022 19  10 - 40 U/L Final    ALT 12/01/2022 22  10 - 44 U/L Final    Anion Gap 12/01/2022 9  8 - 16 mmol/L Final    eGFR 12/01/2022 >60.0  >60 mL/min/1.73 m^2 Final    WBC 12/01/2022 11.39  3.90 - 12.70 K/uL Final    RBC 12/01/2022 4.10 (L)  4.60 - 6.20 M/uL Final    Hemoglobin 12/01/2022 12.4 (L)  14.0 - 18.0 g/dL Final    Hematocrit 12/01/2022 38.4 (L)  40.0 - 54.0 % Final    MCV 12/01/2022 94  82 - 98 fL Final    MCH 12/01/2022 30.2  27.0 - 31.0 pg Final    MCHC 12/01/2022 32.3  32.0 - 36.0 g/dL Final    RDW 12/01/2022 12.9  11.5 - 14.5 % Final    Platelets 12/01/2022 194  150 - 450 K/uL Final    MPV 12/01/2022 11.1  9.2 - 12.9 fL Final    Immature Granulocytes 12/01/2022 0.4  0.0 - 0.5 % Final    Gran # (ANC) 12/01/2022 8.9 (H)  1.8 - 7.7 K/uL Final    Immature Grans (Abs) 12/01/2022 0.04  0.00 - 0.04 K/uL Final    Comment: Mild elevation in immature granulocytes is non specific and   can be seen in a variety of conditions including stress response,   acute inflammation, trauma and pregnancy. Correlation with other    laboratory and clinical findings is essential.      Lymph # 12/01/2022 1.6  1.0 - 4.8 K/uL Final    Mono # 12/01/2022 0.8  0.3 - 1.0 K/uL Final    Eos # 12/01/2022 0.1  0.0 - 0.5 K/uL Final    Baso # 12/01/2022 0.02  0.00 - 0.20 K/uL Final    nRBC 12/01/2022 0  0 /100 WBC Final    Gran % 12/01/2022 78.4 (H)  38.0 - 73.0 % Final    Lymph % 12/01/2022 13.6 (L)  18.0 - 48.0 % Final    Mono % 12/01/2022 7.0  4.0 - 15.0 % Final    Eosinophil % 12/01/2022 0.4  0.0 - 8.0 % Final    Basophil % 12/01/2022 0.2  0.0 - 1.9 % Final    Differential Method 12/01/2022 Automated   Final    Cholesterol 12/01/2022 109 (L)  120 - 199 mg/dL Final    Comment: The National Cholesterol Education Program (NCEP) has set the  following guidelines (reference ranges) for Cholesterol:  Optimal.....................<200 mg/dL  Borderline High.............200-239 mg/dL  High........................> or = 240 mg/dL      Triglycerides 12/01/2022 260 (H)  30 - 150 mg/dL Final    Comment: The National Cholesterol Education Program (NCEP) has set the  following guidelines (reference values) for triglycerides:  Normal......................<150 mg/dL  Borderline High.............150-199 mg/dL  High........................200-499 mg/dL      HDL 12/01/2022 27 (L)  40 - 75 mg/dL Final    Comment: The National Cholesterol Education Program (NCEP) has set the  following guidelines (reference values) for HDL Cholesterol:  Low...............<40 mg/dL  Optimal...........>60 mg/dL      LDL Cholesterol 12/01/2022 30.0 (L)  63.0 - 159.0 mg/dL Final    Comment: The National Cholesterol Education Program (NCEP) has set the  following guidelines (reference values) for LDL Cholesterol:  Optimal.......................<130 mg/dL  Borderline High...............130-159 mg/dL  High..........................160-189 mg/dL  Very High.....................>190 mg/dL      HDL/Cholesterol Ratio 12/01/2022 24.8  20.0 - 50.0 % Final    Total Cholesterol/HDL Ratio 12/01/2022 4.0   2.0 - 5.0 Final    Non-HDL Cholesterol 12/01/2022 82  mg/dL Final    Comment: Risk category and Non-HDL cholesterol goals:  Coronary heart disease (CHD)or equivalent (10-year risk of CHD >20%):  Non-HDL cholesterol goal     <130 mg/dL  Two or more CHD risk factors and 10-year risk of CHD <= 20%:  Non-HDL cholesterol goal     <160 mg/dL  0 to 1 CHD risk factor:  Non-HDL cholesterol goal     <190 mg/dL     Office Visit on 11/30/2022   Component Date Value Ref Range Status    Color, POC UA 11/30/2022 Yellow  Yellow, Straw, Colorless Final    Clarity, POC UA 11/30/2022 Clear  Clear Final    Glucose, POC UA 11/30/2022 Negative  Negative Final    Bilirubin, POC UA 11/30/2022 Negative  Negative Final    Ketones, POC UA 11/30/2022 Negative  Negative Final    Spec Grav POC UA 11/30/2022 <=1.005  1.005 - 1.030 Final    Blood, POC UA 11/30/2022 Small (A)  Negative Final    pH, POC UA 11/30/2022 6.5  5.0 - 8.0 Final    Protein, POC UA 11/30/2022 Negative  Negative Final    Urobilinogen, POC UA 11/30/2022 0.2  <=1.0 Final    Nitrite, POC UA 11/30/2022 Negative  Negative Final    WBC, POC UA 11/30/2022 Large (A)  Negative Final    Urine Culture, Routine 11/30/2022  (A)   Final                    Value:STREPTOCOCCUS AGALACTIAE (GROUP B)  50,000 - 99,999 cfu/ml  Beta-hemolytic streptococci are routinely susceptible to   penicillins,cephalosporins and carbapenems.  Susceptibility testing not routinely performed         Encounter Diagnosis   Name Primary?    Smokers' cough Yes        No orders of the defined types were placed in this encounter.     Medications Ordered This Encounter   Medications    albuterol (PROVENTIL/VENTOLIN HFA) 90 mcg/actuation inhaler     Sig: Inhale 2 puffs into the lungs every 6 (six) hours as needed for Wheezing.     Dispense:  18 g     Refill:  1    benzonatate (TESSALON) 200 MG capsule     Sig: Take 1 capsule (200 mg total) by mouth 3 (three) times daily as needed for Cough.     Dispense:  20 capsule      Refill:  0    promethazine-dextromethorphan (PROMETHAZINE-DM) 6.25-15 mg/5 mL Syrp     Sig: Take 5 mLs by mouth every 4 (four) hours as needed (cough).     Dispense:  120 mL     Refill:  0        E-Visit Time Tracking:    Day 1 Time (in minutes): 7     Total Time (in minutes): 7

## 2022-12-03 ENCOUNTER — PATIENT MESSAGE (OUTPATIENT)
Dept: FAMILY MEDICINE | Facility: CLINIC | Age: 62
End: 2022-12-03
Payer: COMMERCIAL

## 2022-12-04 ENCOUNTER — PATIENT MESSAGE (OUTPATIENT)
Dept: FAMILY MEDICINE | Facility: CLINIC | Age: 62
End: 2022-12-04
Payer: COMMERCIAL

## 2022-12-04 DIAGNOSIS — I10 ESSENTIAL HYPERTENSION: ICD-10-CM

## 2022-12-04 NOTE — TELEPHONE ENCOUNTER
No new care gaps identified.  [facility] Embedded Care Gaps. Reference number: [id number]. 12/04/2022   11:11:50 AM CST

## 2022-12-05 DIAGNOSIS — N30.00 ACUTE CYSTITIS WITHOUT HEMATURIA: Primary | ICD-10-CM

## 2022-12-05 RX ORDER — CEPHALEXIN 500 MG/1
500 CAPSULE ORAL EVERY 8 HOURS
Qty: 21 CAPSULE | Refills: 0 | Status: SHIPPED | OUTPATIENT
Start: 2022-12-05 | End: 2022-12-12

## 2022-12-05 RX ORDER — ENALAPRIL MALEATE 20 MG/1
TABLET ORAL
Qty: 90 TABLET | Refills: 4 | OUTPATIENT
Start: 2022-12-05

## 2022-12-05 NOTE — TELEPHONE ENCOUNTER
Refill Decision Note   Sal Phil  is requesting a refill authorization.  Brief Assessment and Rationale for Refill:  Defer     Medication Therapy Plan:       Medication Reconciliation Completed: No   Comments:     No Care Gaps recommended.     Note composed:4:34 PM 12/05/2022

## 2022-12-06 ENCOUNTER — OFFICE VISIT (OUTPATIENT)
Dept: CARDIOLOGY | Facility: CLINIC | Age: 62
End: 2022-12-06
Payer: COMMERCIAL

## 2022-12-06 ENCOUNTER — PATIENT MESSAGE (OUTPATIENT)
Dept: UROLOGY | Facility: CLINIC | Age: 62
End: 2022-12-06
Payer: COMMERCIAL

## 2022-12-06 VITALS
SYSTOLIC BLOOD PRESSURE: 147 MMHG | HEIGHT: 66 IN | BODY MASS INDEX: 24.41 KG/M2 | DIASTOLIC BLOOD PRESSURE: 95 MMHG | WEIGHT: 151.88 LBS | HEART RATE: 69 BPM

## 2022-12-06 DIAGNOSIS — I10 ESSENTIAL HYPERTENSION: Chronic | ICD-10-CM

## 2022-12-06 DIAGNOSIS — I77.810 MILD ASCENDING AORTA DILATATION: Chronic | ICD-10-CM

## 2022-12-06 DIAGNOSIS — I77.9 PERIPHERAL ARTERIAL OCCLUSIVE DISEASE: Primary | Chronic | ICD-10-CM

## 2022-12-06 DIAGNOSIS — Z95.828 S/P FEMORAL-FEMORAL BYPASS SURGERY: Chronic | ICD-10-CM

## 2022-12-06 DIAGNOSIS — Z79.02 LONG TERM (CURRENT) USE OF ANTITHROMBOTICS/ANTIPLATELETS: Chronic | ICD-10-CM

## 2022-12-06 LAB — BACTERIA UR CULT: ABNORMAL

## 2022-12-06 PROCEDURE — 4010F ACE/ARB THERAPY RXD/TAKEN: CPT | Mod: CPTII,S$GLB,, | Performed by: INTERNAL MEDICINE

## 2022-12-06 PROCEDURE — 3044F PR MOST RECENT HEMOGLOBIN A1C LEVEL <7.0%: ICD-10-PCS | Mod: CPTII,S$GLB,, | Performed by: INTERNAL MEDICINE

## 2022-12-06 PROCEDURE — 3080F PR MOST RECENT DIASTOLIC BLOOD PRESSURE >= 90 MM HG: ICD-10-PCS | Mod: CPTII,S$GLB,, | Performed by: INTERNAL MEDICINE

## 2022-12-06 PROCEDURE — 99214 PR OFFICE/OUTPT VISIT, EST, LEVL IV, 30-39 MIN: ICD-10-PCS | Mod: S$GLB,,, | Performed by: INTERNAL MEDICINE

## 2022-12-06 PROCEDURE — 3080F DIAST BP >= 90 MM HG: CPT | Mod: CPTII,S$GLB,, | Performed by: INTERNAL MEDICINE

## 2022-12-06 PROCEDURE — 3077F PR MOST RECENT SYSTOLIC BLOOD PRESSURE >= 140 MM HG: ICD-10-PCS | Mod: CPTII,S$GLB,, | Performed by: INTERNAL MEDICINE

## 2022-12-06 PROCEDURE — 3077F SYST BP >= 140 MM HG: CPT | Mod: CPTII,S$GLB,, | Performed by: INTERNAL MEDICINE

## 2022-12-06 PROCEDURE — 4010F PR ACE/ARB THEARPY RXD/TAKEN: ICD-10-PCS | Mod: CPTII,S$GLB,, | Performed by: INTERNAL MEDICINE

## 2022-12-06 PROCEDURE — 99999 PR PBB SHADOW E&M-EST. PATIENT-LVL IV: ICD-10-PCS | Mod: PBBFAC,,, | Performed by: INTERNAL MEDICINE

## 2022-12-06 PROCEDURE — 99999 PR PBB SHADOW E&M-EST. PATIENT-LVL IV: CPT | Mod: PBBFAC,,, | Performed by: INTERNAL MEDICINE

## 2022-12-06 PROCEDURE — 3044F HG A1C LEVEL LT 7.0%: CPT | Mod: CPTII,S$GLB,, | Performed by: INTERNAL MEDICINE

## 2022-12-06 PROCEDURE — 99214 OFFICE O/P EST MOD 30 MIN: CPT | Mod: S$GLB,,, | Performed by: INTERNAL MEDICINE

## 2022-12-06 PROCEDURE — 3008F BODY MASS INDEX DOCD: CPT | Mod: CPTII,S$GLB,, | Performed by: INTERNAL MEDICINE

## 2022-12-06 PROCEDURE — 3008F PR BODY MASS INDEX (BMI) DOCUMENTED: ICD-10-PCS | Mod: CPTII,S$GLB,, | Performed by: INTERNAL MEDICINE

## 2022-12-06 RX ORDER — CLOPIDOGREL BISULFATE 75 MG/1
75 TABLET ORAL DAILY
Qty: 90 TABLET | Refills: 0 | Status: SHIPPED | OUTPATIENT
Start: 2022-12-06 | End: 2023-03-03 | Stop reason: SDUPTHER

## 2022-12-06 RX ORDER — LISINOPRIL 5 MG/1
5 TABLET ORAL DAILY
Qty: 90 TABLET | Refills: 0 | Status: SHIPPED | OUTPATIENT
Start: 2022-12-06 | End: 2023-02-27

## 2022-12-06 NOTE — PROGRESS NOTES
Subjective:    Patient ID:  Sal Villarreal Jr. is a 62 y.o. male who presents for Peripheral arterial occlusive disease        HPI  DISCUSSED LABS AND GOALS CMP OK HEMOGLOBIN 12.4 STABLE, LDL 30 HDL 27, TRIGLYCERIDE 260, OFF ENALAPRIL WHEN WAS ON PAIN MEDS FOR BACK, BP UP, SEE ROS    Past Medical History:   Diagnosis Date    Anxiety     Arthritis     Depression     Digestive disorder     ulcers    Fatigue     History of acute bronchitis     History of psychiatric care     Hypertension     Intervertebral disc disorder with radiculopathy of lumbar region 3/23/2021    Psychiatric problem     PVD (peripheral vascular disease)     S/P femoral-femoral bypass surgery 12/1/2021     Past Surgical History:   Procedure Laterality Date    ANTERIOR LUMBAR INTERBODY FUSION (ALIF) Right 03/23/2021    Procedure: FUSION, SPINE, LUMBAR, ALIF RIGHT L5-S1;  Surgeon: Kelly Ayala MD;  Location: [facility] OR;  Service: Neurosurgery;  Laterality: Right;    AORTOGRAPHY WITH EXTREMITY RUNOFF N/A 07/27/2021    Procedure: AORTOGRAM, WITH EXTREMITY RUNOFF;  Surgeon: Jim Briggs MD;  Location: [facility] CATH;  Service: Cardiovascular;  Laterality: N/A;    AORTOGRAPHY WITH SERIALOGRAPHY Bilateral 07/27/2021    Procedure: AORTOGRAM, WITH SERIALOGRAPHY;  Surgeon: Jim Briggs MD;  Location: [facility] CATH;  Service: Cardiovascular;  Laterality: Bilateral;    CREATION OF FEMORAL-FEMORAL ARTERY BYPASS WITH GRAFT Left 11/30/2021    Procedure: CREATION, BYPASS, ARTERIAL, FEMORAL TO FEMORAL, USING GRAFT;  Surgeon: Jim Briggs MD;  Location: [facility] OR;  Service: Cardiovascular;  Laterality: Left;    ENDARTERECTOMY OF FEMORAL ARTERY Bilateral 11/30/2021    Procedure: ENDARTERECTOMY, FEMORAL;  Surgeon: Jim Briggs MD;  Location: [facility] OR;  Service: Cardiovascular;  Laterality: Bilateral;    FUSION OF LUMBAR SPINE BY ANTERIOR APPROACH  03/23/2021    Procedure: FUSION, SPINE, LUMBAR, ANTERIOR APPROACH;  Surgeon: Jim Briggs MD;  Location: [facility] OR;  Service:  Cardiovascular;;    HARVESTING OF BONE GRAFT N/A 2021    Procedure: SURGICAL PROCUREMENT, BONE GRAFT-Iliac;  Surgeon: Kelly Ayala MD;  Location: [facility] OR;  Service: Neurosurgery;  Laterality: N/A;    HYPOSPADIAS CORRECTION      SPINE SURGERY  3/22    VASECTOMY       Family History   Problem Relation Age of Onset    Diabetes Mother     Hypertension Mother     Diabetes Father     Hypertension Father     Cancer Sister     Muscular dystrophy Son     Arthritis Paternal Grandfather     Heart disease Paternal Grandfather     Birth defects Brother     Cancer Brother     Heart disease Maternal Aunt     Heart disease Paternal Uncle     Suicide Neg Hx     Sexual abuse Neg Hx     Self injury Neg Hx     Seizures Neg Hx     Schizophrenia Neg Hx     Physical abuse Neg Hx     Paranoid behavior Neg Hx     OCD Neg Hx     Drug abuse Neg Hx     Depression Neg Hx     Dementia Neg Hx     Bipolar disorder Neg Hx     Anxiety disorder Neg Hx     Alcohol abuse Neg Hx      Social History     Socioeconomic History    Marital status:    Occupational History    Occupation: unemployed     Employer: [employer]   Tobacco Use    Smoking status: Former     Packs/day: 0.25     Years: 42.00     Pack years: 10.50     Types: Cigarettes     Quit date: 2022     Years since quittin.1     Passive exposure: Never    Smokeless tobacco: Never   Substance and Sexual Activity    Alcohol use: Not Currently     Comment: stopped recently - last drink 3/21    Drug use: No    Sexual activity: Yes     Partners: Female, Male     Birth control/protection: Partner-Vasectomy   Other Topics Concern    Patient feels they ought to cut down on drinking/drug use Yes    Patient annoyed by others criticizing their drinking/drug use Yes    Patient has felt bad or guilty about drinking/drug use Yes    Patient has had a drink/used drugs as an eye opener in the AM No     Social Determinants of Health     Financial Resource Strain: Low Risk      Difficulty of Paying Living Expenses: Not hard at all   Food Insecurity: No Food Insecurity    Worried About Running Out of Food in the Last Year: Never true    Ran Out of Food in the Last Year: Never true   Transportation Needs: Unmet Transportation Needs    Lack of Transportation (Medical): Yes    Lack of Transportation (Non-Medical): Yes   Physical Activity: Sufficiently Active    Days of Exercise per Week: 6 days    Minutes of Exercise per Session: 150+ min   Stress: No Stress Concern Present    Feeling of Stress : Not at all   Social Connections: Unknown    Frequency of Communication with Friends and Family: Once a week    Frequency of Social Gatherings with Friends and Family: Three times a week    Active Member of Clubs or Organizations: Yes    Attends Club or Organization Meetings: More than 4 times per year    Marital Status:    Housing Stability: Low Risk     Unable to Pay for Housing in the Last Year: No    Number of Places Lived in the Last Year: 1    Unstable Housing in the Last Year: No       Review of patient's allergies indicates:   Allergen Reactions    Blueberry        Current Outpatient Medications:     acetaminophen-codeine 300-60mg (TYLENOL #4) 300-60 mg Tab, Take by mouth., Disp: , Rfl:     albuterol (PROVENTIL/VENTOLIN HFA) 90 mcg/actuation inhaler, Inhale 2 puffs into the lungs every 6 (six) hours as needed for Wheezing., Disp: 18 g, Rfl: 1    aspirin (ECOTRIN) 81 MG EC tablet, Take 1 tablet (81 mg total) by mouth once daily., Disp: 90 tablet, Rfl: 0    atenoloL (TENORMIN) 25 MG tablet, TAKE 1 TABLET BY MOUTH EVERY DAY IN THE MORNING, Disp: 90 tablet, Rfl: 3    benzonatate (TESSALON) 200 MG capsule, Take 1 capsule (200 mg total) by mouth 3 (three) times daily as needed for Cough., Disp: 20 capsule, Rfl: 0    buPROPion (WELLBUTRIN SR) 150 MG TBSR 12 hr tablet, Take 1 tablet (150 mg total) by mouth once a day x 1 week then take 1 tablet twice daily., Disp: 58 tablet, Rfl: 1     cephALEXin (KEFLEX) 500 MG capsule, Take 1 capsule (500 mg total) by mouth every 8 (eight) hours. for 7 days, Disp: 21 capsule, Rfl: 0    DENTA 5000 PLUS 1.1 % Crea, Take 1 application by mouth 2 (two) times daily., Disp: , Rfl:     diazePAM (VALIUM) 5 MG tablet, Take 1 tablet (5 mg total) by mouth every 6 (six) hours as needed for Anxiety (prior to procedure). Take 30-60 minutes prior to procedure, Disp: 2 tablet, Rfl: 0    famotidine (PEPCID) 20 MG tablet, Take 20 mg by mouth every evening., Disp: , Rfl:     multivitamin (THERAGRAN) per tablet, Take 1 tablet by mouth once daily., Disp: , Rfl:     ondansetron (ZOFRAN-ODT) 4 MG TbDL, Take 1 tablet (4 mg total) by mouth every 8 (eight) hours as needed (nausea/vomiting)., Disp: 30 tablet, Rfl: 3    pantoprazole (PROTONIX) 40 MG tablet, TAKE 1 TABLET BY MOUTH EVERY DAY, Disp: 90 tablet, Rfl: 4    promethazine-dextromethorphan (PROMETHAZINE-DM) 6.25-15 mg/5 mL Syrp, Take 5 mLs by mouth every 4 (four) hours as needed (cough)., Disp: 120 mL, Rfl: 0    rosuvastatin (CRESTOR) 5 MG tablet, Take 1 tablet (5 mg total) by mouth every evening., Disp: 90 tablet, Rfl: 4    tadalafiL (CIALIS) 20 MG Tab, Take 1 tablet (20 mg total) by mouth daily as needed (take 1/4, 1/2, or 1 full tablet as needed for erectile dysfunction)., Disp: 10 tablet, Rfl: 11    varenicline (CHANTIX) 1 mg Tab, Take 1 tablet by mouth twice daily., Disp: 56 tablet, Rfl: 1    vitamin D3-folic acid 250 mcg (10,000 unit)-1 mg Tab, Take by mouth Daily., Disp: , Rfl:     clopidogreL (PLAVIX) 75 mg tablet, Take 1 tablet (75 mg total) by mouth once daily., Disp: 90 tablet, Rfl: 0    gabapentin enacarbil (HORIZANT) 600 mg TbSR, , Disp: , Rfl:     lisinopriL (PRINIVIL,ZESTRIL) 5 MG tablet, Take 1 tablet (5 mg total) by mouth once daily., Disp: 90 tablet, Rfl: 0    tamsulosin (FLOMAX) 0.4 mg Cap, Take 1 capsule (0.4 mg total) by mouth every evening., Disp: 30 capsule, Rfl: 1    Review of Systems   Constitutional:  "Negative for chills, diaphoresis, fever, malaise/fatigue and night sweats.   HENT:  Negative for congestion and nosebleeds.    Eyes:  Negative for blurred vision and visual disturbance.   Cardiovascular:  Negative for chest pain, claudication (MILD), cyanosis, dyspnea on exertion, irregular heartbeat, leg swelling, near-syncope, orthopnea, palpitations, paroxysmal nocturnal dyspnea and syncope.   Respiratory:  Negative for cough, hemoptysis, shortness of breath and wheezing.    Endocrine: Negative.    Hematologic/Lymphatic: Negative for adenopathy. Does not bruise/bleed easily.   Skin:  Negative for color change and rash.   Musculoskeletal:  Negative for back pain (SOME) and falls.   Gastrointestinal:  Negative for abdominal pain, change in bowel habit, dysphagia, jaundice, melena and nausea (WITH CHANTIX).   Genitourinary:  Negative for dysuria and flank pain.   Neurological:  Negative for brief paralysis, focal weakness, headaches, light-headedness, loss of balance, numbness, paresthesias and weakness.   Psychiatric/Behavioral:  Negative for altered mental status and depression.    Allergic/Immunologic: Negative.       Objective:      Vitals:    12/06/22 1006   BP: (!) 147/95   Pulse: 69   Weight: 68.9 kg (151 lb 14.4 oz)   Height: 5' 6" (1.676 m)   PainSc: 0-No pain     Body mass index is 24.52 kg/m².    Physical Exam  HENT:      Head: Normocephalic and atraumatic.   Eyes:      General: No scleral icterus.     Pupils: Pupils are equal, round, and reactive to light.   Neck:      Vascular: Carotid bruit present.   Cardiovascular:      Rate and Rhythm: Normal rate and regular rhythm.      Pulses:           Carotid pulses are 2+ on the right side with bruit and 2+ on the left side.       Radial pulses are 2+ on the right side and 2+ on the left side.        Femoral pulses are 2+ on the right side and 2+ on the left side with bruit.       Posterior tibial pulses are 1+ on the right side and 1+ on the left side.      " Heart sounds: Murmur heard.   Systolic murmur is present with a grade of 1/6.     No friction rub. No gallop.   Pulmonary:      Effort: Pulmonary effort is normal. No respiratory distress.      Breath sounds: Normal air entry. No rales.   Abdominal:      Palpations: Abdomen is soft.      Tenderness: There is no abdominal tenderness.       Musculoskeletal:      Cervical back: Neck supple.      Right lower leg: No edema.      Left lower leg: No edema.   Skin:     General: Skin is warm and dry.      Capillary Refill: Capillary refill takes less than 2 seconds.   Neurological:      General: No focal deficit present.      Mental Status: He is alert.   Psychiatric:         Mood and Affect: Mood normal.         Speech: Speech normal.         Behavior: Behavior normal.               ..    Chemistry        Component Value Date/Time     12/01/2022 0733    K 4.3 12/01/2022 0733     12/01/2022 0733    CO2 27 12/01/2022 0733    BUN 8 12/01/2022 0733    CREATININE 1.1 12/01/2022 0733    GLU 88 12/01/2022 0733        Component Value Date/Time    CALCIUM 9.2 12/01/2022 0733    ALKPHOS 115 12/01/2022 0733    AST 19 12/01/2022 0733    ALT 22 12/01/2022 0733    BILITOT 0.3 12/01/2022 0733    ESTGFRAFRICA >60.0 06/06/2022 1037    EGFRNONAA >60.0 06/06/2022 1037            ..  Lab Results   Component Value Date    CHOL 109 (L) 12/01/2022    CHOL 76 (L) 08/25/2022    CHOL 81 (L) 06/06/2022     Lab Results   Component Value Date    HDL 27 (L) 12/01/2022    HDL 18 (L) 08/25/2022    HDL 27 (L) 06/06/2022     Lab Results   Component Value Date    LDLCALC 30.0 (L) 12/01/2022    LDLCALC 22.4 (L) 08/25/2022    LDLCALC 30.6 (L) 06/06/2022     Lab Results   Component Value Date    TRIG 260 (H) 12/01/2022    TRIG 178 (H) 08/25/2022    TRIG 117 06/06/2022     Lab Results   Component Value Date    CHOLHDL 24.8 12/01/2022    CHOLHDL 23.7 08/25/2022    CHOLHDL 33.3 06/06/2022     ..  Lab Results   Component Value Date    WBC 11.39  12/01/2022    HGB 12.4 (L) 12/01/2022    HCT 38.4 (L) 12/01/2022    MCV 94 12/01/2022     12/01/2022       Test(s) Reviewed  I have reviewed the following in detail:  [] Stress test   [] Angiography   [] Echocardiogram   [x] Labs   [] Other:       Assessment:         ICD-10-CM ICD-9-CM   1. Peripheral arterial occlusive disease  I77.9 444.22   2. Long term (current) use of antithrombotics/antiplatelets  Z79.02 V58.63   3. Essential hypertension  I10 401.9   4. S/P femoral-femoral bypass surgery  Z95.828 V45.89   5. Mild ascending aorta dilatation  I77.810 447.71     Problem List Items Addressed This Visit          Cardiac/Vascular    Essential hypertension (Chronic)    Overview     CHRONIC. STABLE. BP Reviewed.  Compliant with BP medications. No SE reported.   (-) CP, SOB, palpitations, dizziness, lightheadedness, HA, arm numbness, tingling or weakness, syncope.  Creatinine   Date Value Ref Range Status   08/25/2022 0.9 0.5 - 1.4 mg/dL Final            Peripheral arterial occlusive disease - Primary    Overview     Date of Service: 07/27/2021  DATE OF PROCEDURE:  07/27/2021.     PREOPERATIVE DIAGNOSES:  1.  Peripheral arterial occlusive disease.  2.  Pain of the right hip.  3.  Severe intermittent claudication of the right lower extremity affecting   activities of daily living.     POSTOPERATIVE DIAGNOSES:  1.  Peripheral arterial occlusive disease.  2.  Pain of the right hip  3.  Severe intermittent claudication of the right lower extremity affecting   activities of daily living.     OPERATIONS:  1.  Aortogram with bilateral renal arteriogram.  2.  Bilateral common iliac arteriograms with bilateral lower extremity runoff.  3.  Measurement of pressures in the aorta and the left iliac artery.     SURGEON:  Momo Briggs M.D., F.A.C.S.     ANESTHESIA:  Lidocaine 1% for local and intravenous sedation using 4 mg of   Versed and 100 mcg of fentanyl IV.  The patient's level of consciousness was   monitored by the  registered nurse from 10:59-11:30 a.m.  Other monitors included   blood pressure, pulse oximetry, heart rate and respiratory rate.     PROCEDURE IN DETAIL:  With the patient in the supine position on the cardiac   cath table, bilateral groins were prepped and draped in a sterile fashion.  CT   angiography had showed an occluded right external iliac artery and a 50%   stenosis of the left common iliac artery.  Access to the left common femoral   artery was obtained using an 18-gauge access needle and a guidewire was passed   through this.  A 5-Hebrew sheath was passed over the guidewire and the guidewire   and dilator were removed and the sheath was aspirated and flushed.  A J-tip   guidewire was passed through the sheath and advanced up into the suprarenal   aorta under fluoroscopy.  The guidewire was removed.  Contrast was injected and   digital subtraction angiography was performed and an abdominal aortogram with   bilateral renal arteriograms was done.  The renal arteries were patent.  The   infrarenal aorta had calcified plaque with some mild luminal irregularities, but   no significant stenosis.     The angiographic catheter was pulled down into the distal aorta just above the   aortic bifurcation.  Contrast was injected and cineangiography was performed,   and bilateral common iliac arteriograms with bilateral lower extremity runoff   were performed.  The right common iliac artery was patent.  The right   hypogastric artery was patent and large, giving off a lot of collaterals around   the hip.  The right external iliac artery and the right common femoral artery   were both occluded.  The very distal common femoral artery reconstituted a   couple of millimeters above the femoral bifurcation.  The profunda femoris and   the superficial femoral artery and the right popliteal artery were all patent.    Runoff was via 3 vessels.     The left common iliac artery had some luminal irregularities, but did not seem    to have high-grade stenosis.  The left hypogastric and the left external iliac   arteries were patent, although the left external iliac artery seemed to have   about 20% stenosis distally.  The left common femoral, the left profunda, the   left superficial femoral and the left popliteal arteries were patent.  Runoff   was via 3 vessels.  Bilateral oblique arteriograms of the iliac arteries were   then performed to see if there was any stenosis that could not be seen with the   anteroposterior projections.  No high-grade stenosis in the left common iliac   artery could be seen.  The Omniflush angiographic catheter was then exchanged   for a straight Glidecath.  Pressures were then measured in the infrarenal aorta   and the catheter was pulled back with continuous blood pressure measurements   down into the left common iliac and then into the left external iliac artery.    There was absolutely no change in pressure from the aorta into the common iliac   or into the external iliac artery.  No intervention was performed.  The sheath   was pulled from the left common femoral artery and pressure was held for about   20 minutes.  At the end of that time, there was no bleeding and no hematoma.    Sterile dressing was placed.  The patient tolerated the procedure and left the   Cardiac Cath Lab in satisfactory and stable condition.        NIMO/VIVIAN  dd: 07/27/2021 11:52:36 (CDT)  td: 07/27/2021 13:12:39 (CDT)  Doc ID   #[id number]  Job ID #[id number]     CC:          Last signed by: Jim Briggs MD at 7/27/2021  3:08 PM              Relevant Orders    CV Ultrasound doppler arterial legs bilat    S/P femoral-femoral bypass surgery    Relevant Orders    CV Ultrasound doppler arterial legs bilat    Mild ascending aorta dilatation       Hematology    Long term (current) use of antithrombotics/antiplatelets        Plan:         INCREASE LISINOPRIL TO 5 MG, AND WATCH BLOOD PRESSURE, WALKING EXERCISE PROGRAM.ALL OTHER CV CLINICALLY STABLE, NO  ANGINA, NO HF, NO TIA, NO CLINICAL ARRHYTHMIA,CONTINUE CURRENT MEDS, EDUCATION, DIET, EXERCISE , RETURN TO CLINIC IN 3 MO WITH ARTERIAL DOPPLER ASSESS PERIPHERAL VASCULAR DISEASE POST SURGERY HISTORY FOLLOW-UP WITH VASCULAR SURGERY.  Peripheral arterial occlusive disease  -     CV Ultrasound doppler arterial legs bilat; Future; Expected date: 03/06/2023    Long term (current) use of antithrombotics/antiplatelets    Essential hypertension  Comments:  ADJUST MEDS    S/P femoral-femoral bypass surgery  -     CV Ultrasound doppler arterial legs bilat; Future; Expected date: 03/06/2023    Mild ascending aorta dilatation    Other orders  -     lisinopriL (PRINIVIL,ZESTRIL) 5 MG tablet; Take 1 tablet (5 mg total) by mouth once daily.  Dispense: 90 tablet; Refill: 0  -     clopidogreL (PLAVIX) 75 mg tablet; Take 1 tablet (75 mg total) by mouth once daily.  Dispense: 90 tablet; Refill: 0  RTC Low level/low impact aerobic exercise 5x's/wk. Heart healthy diet and risk factor modification.    See labs and med orders.    Aerobic exercise 5x's/wk. Heart healthy diet and risk factor modification.    See labs and med orders.

## 2022-12-07 ENCOUNTER — PROCEDURE VISIT (OUTPATIENT)
Dept: UROLOGY | Facility: CLINIC | Age: 62
End: 2022-12-07
Payer: COMMERCIAL

## 2022-12-07 DIAGNOSIS — R39.198 URINE STREAM SPRAYING: ICD-10-CM

## 2022-12-07 DIAGNOSIS — N13.8 BENIGN PROSTATIC HYPERPLASIA WITH URINARY OBSTRUCTION: Primary | ICD-10-CM

## 2022-12-07 DIAGNOSIS — N52.01 ERECTILE DYSFUNCTION DUE TO ARTERIAL INSUFFICIENCY: ICD-10-CM

## 2022-12-07 DIAGNOSIS — Z87.440 HISTORY OF UTI: ICD-10-CM

## 2022-12-07 DIAGNOSIS — R30.0 DYSURIA: ICD-10-CM

## 2022-12-07 DIAGNOSIS — N40.1 BENIGN PROSTATIC HYPERPLASIA WITH URINARY OBSTRUCTION: Primary | ICD-10-CM

## 2022-12-07 PROCEDURE — 52000 CYSTOURETHROSCOPY: CPT | Mod: S$GLB,,, | Performed by: STUDENT IN AN ORGANIZED HEALTH CARE EDUCATION/TRAINING PROGRAM

## 2022-12-07 PROCEDURE — 52000 CYSTOSCOPY: ICD-10-PCS | Mod: S$GLB,,, | Performed by: STUDENT IN AN ORGANIZED HEALTH CARE EDUCATION/TRAINING PROGRAM

## 2022-12-07 RX ORDER — TAMSULOSIN HYDROCHLORIDE 0.4 MG/1
0.4 CAPSULE ORAL NIGHTLY
Qty: 30 CAPSULE | Refills: 1 | Status: SHIPPED | OUTPATIENT
Start: 2022-12-07 | End: 2022-12-12 | Stop reason: SDUPTHER

## 2022-12-07 RX ORDER — GABAPENTIN ENACARBIL 600 MG/1
TABLET, EXTENDED RELEASE ORAL
COMMUNITY
Start: 2022-12-03 | End: 2023-03-01

## 2022-12-07 NOTE — PROCEDURES
Cystoscopy    Date/Time: 12/7/2022 10:00 AM  Performed by: Douglas Holden MD  Authorized by: Douglas Holden MD     Procedure:   Flexible cysto-uretheroscopy    Pre Procedure Diagnosis:  BPH, LUTS, and history of prior urologic surgery    Post Procedure Diagnosis:  Same    Surgeon: Douglas Holden MD     Anesthesia: 2% uro-jet lidocaine jelly for local analgesia    Procedure note:  The risks and benefits were explained and informed consent was obtained. 2% lidocaine urojet was used for local analgesia. The genitalia was prepped and draped in the sterile fashion.    The flexible scope was advanced into the urethra and into the bladder. There was evidence of previous urethroplasty but no recurrent stricture. There was a mild outpouching representing a small urethral diverticulum.     The bladder was completely surveyed in a systematic fashion. The bilateral ureteral orifices were identified in their normal orthotopic locations bilaterally. The remainder of the bladder was evaluated. There were no foreign bodies, stones, diverticula, or trabeculations. No bladder tumors or lesions suspicious for malignancy were seen.     Upon retroflexion there was no significant median lobe enlargement. As the flexible cystoscope was being withdrawn, the prostate was evaluated carefully. The lateral lobes of the prostate were mildly enlarged. The estimated prostatic urethral length was 4 cm.     The patient tolerated the procedure well without complication.     Findings in summary:  BPH, history of urethral stricture                Assessment: 62 y.o. male with BPH, LUTS, and history of prior urologic surgery     IPSS was 23/4. PVR was measured and noted to be 96 mL.   Estimated prostate volume from CT 6/21 - 29 cc  Interested in further intervention and possibly urolift    Plan:  Trial of Flomax  Follow up in 4 weeks for symptom check, PVR, and discussion of outlet procedure

## 2022-12-07 NOTE — TELEPHONE ENCOUNTER
Patient was advised in person to not take sedative prescribed until  , after signing his consent for the cystoscopy he is having to day . Patient expressed understanding.

## 2022-12-10 ENCOUNTER — PATIENT MESSAGE (OUTPATIENT)
Dept: UROLOGY | Facility: CLINIC | Age: 62
End: 2022-12-10
Payer: COMMERCIAL

## 2022-12-10 DIAGNOSIS — N40.1 BENIGN PROSTATIC HYPERPLASIA WITH URINARY OBSTRUCTION: ICD-10-CM

## 2022-12-10 DIAGNOSIS — N13.8 BENIGN PROSTATIC HYPERPLASIA WITH URINARY OBSTRUCTION: ICD-10-CM

## 2022-12-10 DIAGNOSIS — N52.01 ERECTILE DYSFUNCTION DUE TO ARTERIAL INSUFFICIENCY: ICD-10-CM

## 2022-12-12 ENCOUNTER — PATIENT MESSAGE (OUTPATIENT)
Dept: UROLOGY | Facility: CLINIC | Age: 62
End: 2022-12-12
Payer: COMMERCIAL

## 2022-12-12 RX ORDER — TADALAFIL 20 MG/1
20 TABLET ORAL DAILY PRN
Qty: 10 TABLET | Refills: 11 | Status: SHIPPED | OUTPATIENT
Start: 2022-12-12 | End: 2023-02-14 | Stop reason: SDUPTHER

## 2022-12-12 RX ORDER — TAMSULOSIN HYDROCHLORIDE 0.4 MG/1
0.4 CAPSULE ORAL NIGHTLY
Qty: 90 CAPSULE | Refills: 3 | Status: SHIPPED | OUTPATIENT
Start: 2022-12-12 | End: 2023-01-03

## 2022-12-18 ENCOUNTER — PATIENT MESSAGE (OUTPATIENT)
Dept: FAMILY MEDICINE | Facility: CLINIC | Age: 62
End: 2022-12-18
Payer: COMMERCIAL

## 2022-12-21 ENCOUNTER — PATIENT MESSAGE (OUTPATIENT)
Dept: FAMILY MEDICINE | Facility: CLINIC | Age: 62
End: 2022-12-21
Payer: COMMERCIAL

## 2022-12-21 DIAGNOSIS — F17.200 NICOTINE DEPENDENCE: ICD-10-CM

## 2022-12-22 ENCOUNTER — PATIENT MESSAGE (OUTPATIENT)
Dept: FAMILY MEDICINE | Facility: CLINIC | Age: 62
End: 2022-12-22
Payer: COMMERCIAL

## 2022-12-22 RX ORDER — BUPROPION HYDROCHLORIDE 150 MG/1
TABLET, EXTENDED RELEASE ORAL
Qty: 10 TABLET | Refills: 0 | Status: SHIPPED | OUTPATIENT
Start: 2022-12-22 | End: 2023-02-16

## 2022-12-28 ENCOUNTER — HOSPITAL ENCOUNTER (OUTPATIENT)
Dept: RADIOLOGY | Facility: HOSPITAL | Age: 62
Discharge: HOME | End: 2022-12-28
Attending: STUDENT IN AN ORGANIZED HEALTH CARE EDUCATION/TRAINING PROGRAM
Payer: COMMERCIAL

## 2022-12-28 DIAGNOSIS — N13.8 BENIGN PROSTATIC HYPERPLASIA WITH URINARY OBSTRUCTION: ICD-10-CM

## 2022-12-28 DIAGNOSIS — N40.1 BENIGN PROSTATIC HYPERPLASIA WITH URINARY OBSTRUCTION: ICD-10-CM

## 2022-12-28 PROCEDURE — 76857 US EXAM PELVIC LIMITED: CPT | Mod: TC,PO

## 2022-12-28 PROCEDURE — 76857 US EXAM PELVIC LIMITED: CPT | Mod: 26,,, | Performed by: RADIOLOGY

## 2022-12-28 PROCEDURE — 76857 US PELVIS LIMITED NON OB: ICD-10-PCS | Mod: 26,,, | Performed by: RADIOLOGY

## 2023-01-03 ENCOUNTER — OFFICE VISIT (OUTPATIENT)
Dept: UROLOGY | Facility: CLINIC | Age: 63
End: 2023-01-03
Payer: COMMERCIAL

## 2023-01-03 VITALS — HEIGHT: 66 IN | WEIGHT: 156.19 LBS | BODY MASS INDEX: 25.1 KG/M2

## 2023-01-03 DIAGNOSIS — N40.1 BENIGN PROSTATIC HYPERPLASIA WITH URINARY OBSTRUCTION: Primary | ICD-10-CM

## 2023-01-03 DIAGNOSIS — N53.14 RETROGRADE EJACULATION: ICD-10-CM

## 2023-01-03 DIAGNOSIS — N52.01 ERECTILE DYSFUNCTION DUE TO ARTERIAL INSUFFICIENCY: ICD-10-CM

## 2023-01-03 DIAGNOSIS — Z87.448 PERSONAL HISTORY OF URETHRAL STRICTURE: ICD-10-CM

## 2023-01-03 DIAGNOSIS — N13.8 BENIGN PROSTATIC HYPERPLASIA WITH URINARY OBSTRUCTION: Primary | ICD-10-CM

## 2023-01-03 PROCEDURE — 1159F PR MEDICATION LIST DOCUMENTED IN MEDICAL RECORD: ICD-10-PCS | Mod: CPTII,S$GLB,, | Performed by: STUDENT IN AN ORGANIZED HEALTH CARE EDUCATION/TRAINING PROGRAM

## 2023-01-03 PROCEDURE — 99214 PR OFFICE/OUTPT VISIT, EST, LEVL IV, 30-39 MIN: ICD-10-PCS | Mod: S$GLB,,, | Performed by: STUDENT IN AN ORGANIZED HEALTH CARE EDUCATION/TRAINING PROGRAM

## 2023-01-03 PROCEDURE — 99999 PR PBB SHADOW E&M-EST. PATIENT-LVL III: CPT | Mod: PBBFAC,,, | Performed by: STUDENT IN AN ORGANIZED HEALTH CARE EDUCATION/TRAINING PROGRAM

## 2023-01-03 PROCEDURE — 99999 PR PBB SHADOW E&M-EST. PATIENT-LVL III: ICD-10-PCS | Mod: PBBFAC,,, | Performed by: STUDENT IN AN ORGANIZED HEALTH CARE EDUCATION/TRAINING PROGRAM

## 2023-01-03 PROCEDURE — 1160F RVW MEDS BY RX/DR IN RCRD: CPT | Mod: CPTII,S$GLB,, | Performed by: STUDENT IN AN ORGANIZED HEALTH CARE EDUCATION/TRAINING PROGRAM

## 2023-01-03 PROCEDURE — 3008F PR BODY MASS INDEX (BMI) DOCUMENTED: ICD-10-PCS | Mod: CPTII,S$GLB,, | Performed by: STUDENT IN AN ORGANIZED HEALTH CARE EDUCATION/TRAINING PROGRAM

## 2023-01-03 PROCEDURE — 99214 OFFICE O/P EST MOD 30 MIN: CPT | Mod: S$GLB,,, | Performed by: STUDENT IN AN ORGANIZED HEALTH CARE EDUCATION/TRAINING PROGRAM

## 2023-01-03 PROCEDURE — 1160F PR REVIEW ALL MEDS BY PRESCRIBER/CLIN PHARMACIST DOCUMENTED: ICD-10-PCS | Mod: CPTII,S$GLB,, | Performed by: STUDENT IN AN ORGANIZED HEALTH CARE EDUCATION/TRAINING PROGRAM

## 2023-01-03 PROCEDURE — 3008F BODY MASS INDEX DOCD: CPT | Mod: CPTII,S$GLB,, | Performed by: STUDENT IN AN ORGANIZED HEALTH CARE EDUCATION/TRAINING PROGRAM

## 2023-01-03 PROCEDURE — 1159F MED LIST DOCD IN RCRD: CPT | Mod: CPTII,S$GLB,, | Performed by: STUDENT IN AN ORGANIZED HEALTH CARE EDUCATION/TRAINING PROGRAM

## 2023-01-03 RX ORDER — ALFUZOSIN HYDROCHLORIDE 10 MG/1
10 TABLET, EXTENDED RELEASE ORAL
Qty: 30 TABLET | Refills: 11 | Status: SHIPPED | OUTPATIENT
Start: 2023-01-03 | End: 2023-10-16

## 2023-01-03 RX ORDER — ALFUZOSIN HYDROCHLORIDE 10 MG/1
10 TABLET, EXTENDED RELEASE ORAL
Qty: 30 TABLET | Refills: 11 | Status: SHIPPED | OUTPATIENT
Start: 2023-01-03 | End: 2023-01-03

## 2023-01-03 NOTE — PROGRESS NOTES
"[place] - Urology   Clinic Note    Subjective:     Chief Complaint: Pelvic Ultrasound Results      History of Present Illness:  Sal Villarreal Jr. is a 62 y.o. male who presents to clinic for evaluation and management of LUTS and ED.     He has a history of a urethroplasty when he was 10 years old. He underwent cystoscopy with me and showed no urethral stricture with mildly enlarged prostate. He was started on flomax and his nocturia has resolved completely and reports significant improvement in urinary symptoms. He reports retrograde ejaculation after starting flomax however. He underwent transabdominal prostate US showing a volume of 26 cc.     Previous IPSS was 23/4.     IPSS Questionnaire (AUA-SS):  1)  Incomplete Emptying 5 - Almost always   2)  Frequency 1 - Less than 1 time in 5   3)  Intermittency 0 - Not at all   4) Urgency 1 - Less than 1 time in 5   5) Weak Stream  0 - Not at all   6) Straining 0 - Not at all   7) Nocturia 1 - 1 time   Total score:  0-7 mild, 8-19 mod, 20-35 severe 2   Quality of Life:  5 - Unhappy           He has ED and has had mixed results with the Cialis.     He denies a family history of prostate cancer     Past medical, family, surgical and social history reviewed as documented in chart with pertinent positive medical, family, surgical and social history detailed in HPI.    A review systems was conducted with pertinent positive and negative findings documented in HPI.    Anticoagulation/Antiplatelets:  Yes aspirin or plavix    Objective:     Estimated body mass index is 25.21 kg/m² as calculated from the following:    Height as of this encounter: 5' 6" (1.676 m).    Weight as of this encounter: 70.9 kg (156 lb 3.2 oz).    Vital Signs (Most Recent)       Physical Exam  Vitals and nursing note reviewed.   Constitutional:       General: He is not in acute distress.     Appearance: Normal appearance. He is well-developed. He is not ill-appearing or toxic-appearing.   Pulmonary: "      Effort: Pulmonary effort is normal. No accessory muscle usage or respiratory distress.   Neurological:      General: No focal deficit present.      Mental Status: He is alert and oriented to person, place, and time. Mental status is at baseline.   Psychiatric:         Mood and Affect: Mood normal.         Behavior: Behavior is cooperative.         Thought Content: Thought content normal.         Judgment: Judgment normal.       Lab Results   Component Value Date    BUN 8 12/01/2022    CREATININE 1.1 12/01/2022    WBC 11.39 12/01/2022    HGB 12.4 (L) 12/01/2022    HCT 38.4 (L) 12/01/2022     12/01/2022    AST 19 12/01/2022    ALT 22 12/01/2022    ALKPHOS 115 12/01/2022    ALBUMIN 3.7 12/01/2022    HGBA1C 5.5 08/25/2022        Lab Results   Component Value Date    PSA 1.2 08/25/2022    PSA 1.2 11/29/2021    PSA 1.1 11/29/2021      Assessment:     1. Benign prostatic hyperplasia with urinary obstruction    2. Personal history of urethral stricture    3. Retrograde ejaculation    4. Erectile dysfunction due to arterial insufficiency        Plan:     1. Benign prostatic hyperplasia with urinary obstruction  Discussed tamsulosin in the side effects of retrograde ejaculation.  Discussed changing to Uroxatral which he is interested in as this has lower incidence of retrograde ejaculation.  Also discussed outlet procedures as an option to stop taking medications.  He would prefer medications at this time.  2. Personal history of urethral stricture  Doing well with no evidence of recurrent stricture at the time of cystoscopy  3. Retrograde ejaculation  As above  - alfuzosin (UROXATRAL) 10 mg Tb24; Take 1 tablet (10 mg total) by mouth daily with breakfast.  Dispense: 30 tablet; Refill: 11  4. Erectile dysfunction due to arterial insufficiency  Discussed ED options. Oral meds, ICI, IPP.  Not interested in injections or surgery.  Discussed taking Cialis more than 1 hour before given the longer window of  opportunity.      Douglas Holden MD

## 2023-01-19 ENCOUNTER — PATIENT MESSAGE (OUTPATIENT)
Dept: CARDIOLOGY | Facility: CLINIC | Age: 63
End: 2023-01-19
Payer: COMMERCIAL

## 2023-01-19 DIAGNOSIS — R60.9 EDEMA, UNSPECIFIED TYPE: Primary | ICD-10-CM

## 2023-01-26 ENCOUNTER — HOSPITAL ENCOUNTER (OUTPATIENT)
Dept: RADIOLOGY | Facility: HOSPITAL | Age: 63
Discharge: HOME | End: 2023-01-26
Attending: THORACIC SURGERY (CARDIOTHORACIC VASCULAR SURGERY)
Payer: COMMERCIAL

## 2023-01-26 DIAGNOSIS — I73.9 PAD (PERIPHERAL ARTERY DISEASE): ICD-10-CM

## 2023-01-26 PROCEDURE — 93925 LOWER EXTREMITY STUDY: CPT | Mod: 26,,, | Performed by: RADIOLOGY

## 2023-01-26 PROCEDURE — 93922 US ARTERIAL LOWER EXTREMITY BILAT WITH ABI (XPD): ICD-10-PCS | Mod: 26,,, | Performed by: RADIOLOGY

## 2023-01-26 PROCEDURE — 93922 UPR/L XTREMITY ART 2 LEVELS: CPT | Mod: 26,,, | Performed by: RADIOLOGY

## 2023-01-26 PROCEDURE — 93925 LOWER EXTREMITY STUDY: CPT | Mod: TC,PO

## 2023-01-26 PROCEDURE — 93925 US ARTERIAL LOWER EXTREMITY BILAT WITH ABI (XPD): ICD-10-PCS | Mod: 26,,, | Performed by: RADIOLOGY

## 2023-01-30 ENCOUNTER — CLINICAL SUPPORT (OUTPATIENT)
Dept: CARDIOLOGY | Facility: HOSPITAL | Age: 63
End: 2023-01-30
Attending: INTERNAL MEDICINE
Payer: COMMERCIAL

## 2023-01-30 DIAGNOSIS — R60.9 EDEMA, UNSPECIFIED TYPE: ICD-10-CM

## 2023-01-30 PROCEDURE — 93970 CV US DOPPLER VENOUS LEGS BILATERAL (CUPID ONLY): ICD-10-PCS | Mod: 26,,, | Performed by: INTERNAL MEDICINE

## 2023-01-30 PROCEDURE — 93970 EXTREMITY STUDY: CPT | Mod: PO

## 2023-01-30 PROCEDURE — 93970 EXTREMITY STUDY: CPT | Mod: 26,,, | Performed by: INTERNAL MEDICINE

## 2023-02-05 ENCOUNTER — PATIENT MESSAGE (OUTPATIENT)
Dept: UROLOGY | Facility: CLINIC | Age: 63
End: 2023-02-05
Payer: COMMERCIAL

## 2023-02-08 ENCOUNTER — PATIENT MESSAGE (OUTPATIENT)
Dept: DERMATOLOGY | Facility: CLINIC | Age: 63
End: 2023-02-08
Payer: COMMERCIAL

## 2023-02-08 ENCOUNTER — TELEPHONE (OUTPATIENT)
Dept: FAMILY MEDICINE | Facility: CLINIC | Age: 63
End: 2023-02-08
Payer: COMMERCIAL

## 2023-02-08 DIAGNOSIS — C44.90 SKIN CANCER: Primary | ICD-10-CM

## 2023-02-08 NOTE — TELEPHONE ENCOUNTER
I have signed for the following orders AND/OR meds.  Please call the patient and ask the patient to schedule the testing AND/OR inform about any medications that were sent.      Orders Placed This Encounter   Procedures    Ambulatory referral/consult to Dermatology     Standing Status:   Future     Standing Expiration Date:   3/8/2024     Referral Priority:   Routine     Referral Type:   Consultation     Referral Reason:   Specialty Services Required     Requested Specialty:   Dermatology     Number of Visits Requested:   1

## 2023-02-10 ENCOUNTER — PATIENT MESSAGE (OUTPATIENT)
Dept: CARDIOLOGY | Facility: CLINIC | Age: 63
End: 2023-02-10
Payer: COMMERCIAL

## 2023-02-13 ENCOUNTER — PATIENT MESSAGE (OUTPATIENT)
Dept: CARDIOLOGY | Facility: CLINIC | Age: 63
End: 2023-02-13
Payer: COMMERCIAL

## 2023-02-14 ENCOUNTER — PATIENT MESSAGE (OUTPATIENT)
Dept: CARDIOLOGY | Facility: CLINIC | Age: 63
End: 2023-02-14
Payer: COMMERCIAL

## 2023-02-14 DIAGNOSIS — N52.01 ERECTILE DYSFUNCTION DUE TO ARTERIAL INSUFFICIENCY: ICD-10-CM

## 2023-02-15 RX ORDER — TADALAFIL 20 MG/1
20 TABLET ORAL DAILY PRN
Qty: 10 TABLET | Refills: 11 | Status: SHIPPED | OUTPATIENT
Start: 2023-02-15 | End: 2023-04-28 | Stop reason: SDUPTHER

## 2023-02-15 RX ORDER — TADALAFIL 20 MG/1
20 TABLET ORAL DAILY PRN
Qty: 10 TABLET | Refills: 11 | Status: SHIPPED | OUTPATIENT
Start: 2023-02-15 | End: 2023-02-15 | Stop reason: SDUPTHER

## 2023-02-16 ENCOUNTER — OFFICE VISIT (OUTPATIENT)
Dept: UROLOGY | Facility: CLINIC | Age: 63
End: 2023-02-16
Payer: COMMERCIAL

## 2023-02-16 VITALS — BODY MASS INDEX: 26.58 KG/M2 | WEIGHT: 165.38 LBS | HEIGHT: 66 IN

## 2023-02-16 DIAGNOSIS — N13.8 BENIGN PROSTATIC HYPERPLASIA WITH URINARY OBSTRUCTION: Primary | ICD-10-CM

## 2023-02-16 DIAGNOSIS — N40.1 BENIGN PROSTATIC HYPERPLASIA WITH URINARY OBSTRUCTION: Primary | ICD-10-CM

## 2023-02-16 DIAGNOSIS — N52.01 ERECTILE DYSFUNCTION DUE TO ARTERIAL INSUFFICIENCY: ICD-10-CM

## 2023-02-16 PROCEDURE — 4010F ACE/ARB THERAPY RXD/TAKEN: CPT | Mod: CPTII,S$GLB,, | Performed by: STUDENT IN AN ORGANIZED HEALTH CARE EDUCATION/TRAINING PROGRAM

## 2023-02-16 PROCEDURE — 99214 PR OFFICE/OUTPT VISIT, EST, LEVL IV, 30-39 MIN: ICD-10-PCS | Mod: S$GLB,,, | Performed by: STUDENT IN AN ORGANIZED HEALTH CARE EDUCATION/TRAINING PROGRAM

## 2023-02-16 PROCEDURE — 4010F PR ACE/ARB THEARPY RXD/TAKEN: ICD-10-PCS | Mod: CPTII,S$GLB,, | Performed by: STUDENT IN AN ORGANIZED HEALTH CARE EDUCATION/TRAINING PROGRAM

## 2023-02-16 PROCEDURE — 1159F MED LIST DOCD IN RCRD: CPT | Mod: CPTII,S$GLB,, | Performed by: STUDENT IN AN ORGANIZED HEALTH CARE EDUCATION/TRAINING PROGRAM

## 2023-02-16 PROCEDURE — 99214 OFFICE O/P EST MOD 30 MIN: CPT | Mod: S$GLB,,, | Performed by: STUDENT IN AN ORGANIZED HEALTH CARE EDUCATION/TRAINING PROGRAM

## 2023-02-16 PROCEDURE — 3008F BODY MASS INDEX DOCD: CPT | Mod: CPTII,S$GLB,, | Performed by: STUDENT IN AN ORGANIZED HEALTH CARE EDUCATION/TRAINING PROGRAM

## 2023-02-16 PROCEDURE — 1159F PR MEDICATION LIST DOCUMENTED IN MEDICAL RECORD: ICD-10-PCS | Mod: CPTII,S$GLB,, | Performed by: STUDENT IN AN ORGANIZED HEALTH CARE EDUCATION/TRAINING PROGRAM

## 2023-02-16 PROCEDURE — 1160F RVW MEDS BY RX/DR IN RCRD: CPT | Mod: CPTII,S$GLB,, | Performed by: STUDENT IN AN ORGANIZED HEALTH CARE EDUCATION/TRAINING PROGRAM

## 2023-02-16 PROCEDURE — 3008F PR BODY MASS INDEX (BMI) DOCUMENTED: ICD-10-PCS | Mod: CPTII,S$GLB,, | Performed by: STUDENT IN AN ORGANIZED HEALTH CARE EDUCATION/TRAINING PROGRAM

## 2023-02-16 PROCEDURE — 99999 PR PBB SHADOW E&M-EST. PATIENT-LVL III: CPT | Mod: PBBFAC,,, | Performed by: STUDENT IN AN ORGANIZED HEALTH CARE EDUCATION/TRAINING PROGRAM

## 2023-02-16 PROCEDURE — 99999 PR PBB SHADOW E&M-EST. PATIENT-LVL III: ICD-10-PCS | Mod: PBBFAC,,, | Performed by: STUDENT IN AN ORGANIZED HEALTH CARE EDUCATION/TRAINING PROGRAM

## 2023-02-16 PROCEDURE — 1160F PR REVIEW ALL MEDS BY PRESCRIBER/CLIN PHARMACIST DOCUMENTED: ICD-10-PCS | Mod: CPTII,S$GLB,, | Performed by: STUDENT IN AN ORGANIZED HEALTH CARE EDUCATION/TRAINING PROGRAM

## 2023-02-16 NOTE — PROGRESS NOTES
"[place] - Urology   Clinic Note    Subjective:     Chief Complaint: Benign Prostatic Hypertrophy (Discuss urolift and ED) and Erectile Dysfunction      History of Present Illness:  Sal Villarreal Jr. is a 63 y.o. male who presents to clinic for evaluation and management of LUTS and ED.     Previous IPSS was 23/4. He was initially started flomax then switched to alfuzosin due to retrograde ejaculation with flomax. His urinary symptoms have improved significantly.   IPSS Questionnaire (AUA-SS):  1)  Incomplete Emptying 0 - Not at all   2)  Frequency 3 - About half the time   3)  Intermittency 1 - Less than 1 time in 5   4) Urgency 3 - About half the time   5) Weak Stream  1 - Less than 1 time in 5   6) Straining 0 - Not at all   7) Nocturia 1 - 1 time   Total score:  0-7 mild, 8-19 mod, 20-35 severe 9   Quality of Life:  2 - Mostly Satisfied     He has a history of a urethroplasty when he was 10 years old. He underwent cystoscopy with me and showed no urethral stricture with mildly enlarged prostate. He underwent transabdominal prostate US showing a volume of 26 cc.     He has ED and has had poor results with the Cialis.    He denies a family history of prostate cancer     Past medical, family, surgical and social history reviewed as documented in chart with pertinent positive medical, family, surgical and social history detailed in HPI.    A review systems was conducted with pertinent positive and negative findings documented in HPI.    Anticoagulation/Antiplatelets:  Yes aspirin or plavix    Objective:     Estimated body mass index is 26.69 kg/m² as calculated from the following:    Height as of this encounter: 5' 6" (1.676 m).    Weight as of this encounter: 75 kg (165 lb 5.5 oz).    Vital Signs (Most Recent)       Physical Exam  Vitals and nursing note reviewed.   Constitutional:       General: He is not in acute distress.     Appearance: Normal appearance. He is well-developed. He is not ill-appearing or " toxic-appearing.   Pulmonary:      Effort: Pulmonary effort is normal. No accessory muscle usage or respiratory distress.   Neurological:      General: No focal deficit present.      Mental Status: He is alert and oriented to person, place, and time. Mental status is at baseline.   Psychiatric:         Mood and Affect: Mood normal.         Behavior: Behavior is cooperative.         Thought Content: Thought content normal.         Judgment: Judgment normal.       Lab Results   Component Value Date    BUN 8 12/01/2022    CREATININE 1.1 12/01/2022    WBC 11.39 12/01/2022    HGB 12.4 (L) 12/01/2022    HCT 38.4 (L) 12/01/2022     12/01/2022    AST 19 12/01/2022    ALT 22 12/01/2022    ALKPHOS 115 12/01/2022    ALBUMIN 3.7 12/01/2022    HGBA1C 5.5 08/25/2022        Lab Results   Component Value Date    PSA 1.2 08/25/2022    PSA 1.2 11/29/2021    PSA 1.1 11/29/2021      Assessment:     1. Benign prostatic hyperplasia with urinary obstruction    2. Erectile dysfunction due to arterial insufficiency      Plan:     1. Benign prostatic hyperplasia with urinary obstruction  - continue alfuzosin. Given his prostate size, he may not benefit from much from a urolift. He is doing very well on meds. Discussed the option of a TUIP. I would recommend UDS prior to any outlet procedures.     2. Erectile dysfunction due to arterial insufficiency  Discussed ED options. Oral meds, ICI, IPP.  Not interested in injections or surgery. I think he would not be a candidate for IPP given his fem-pop bypass.   Discussed taking Cialis more than 1 hour before given the longer window of opportunity.  Encouraged him to reach out if he'd like to move onto ICI injections    Douglas Holden MD

## 2023-02-24 ENCOUNTER — LAB VISIT (OUTPATIENT)
Dept: LAB | Facility: HOSPITAL | Age: 63
End: 2023-02-24
Attending: FAMILY MEDICINE
Payer: COMMERCIAL

## 2023-02-24 DIAGNOSIS — E55.9 VITAMIN D DEFICIENCY: ICD-10-CM

## 2023-02-24 DIAGNOSIS — I10 ESSENTIAL HYPERTENSION: Chronic | ICD-10-CM

## 2023-02-24 DIAGNOSIS — Z13.6 ENCOUNTER FOR LIPID SCREENING FOR CARDIOVASCULAR DISEASE: ICD-10-CM

## 2023-02-24 DIAGNOSIS — R53.83 FATIGUE, UNSPECIFIED TYPE: ICD-10-CM

## 2023-02-24 DIAGNOSIS — Z79.899 ENCOUNTER FOR LONG-TERM (CURRENT) USE OF MEDICATIONS: ICD-10-CM

## 2023-02-24 DIAGNOSIS — E78.5 DYSLIPIDEMIA (HIGH LDL; LOW HDL): Chronic | ICD-10-CM

## 2023-02-24 DIAGNOSIS — Z00.00 WELL ADULT EXAM: ICD-10-CM

## 2023-02-24 DIAGNOSIS — Z12.5 ENCOUNTER FOR PROSTATE CANCER SCREENING: ICD-10-CM

## 2023-02-24 DIAGNOSIS — I77.9 PERIPHERAL ARTERIAL OCCLUSIVE DISEASE: Chronic | ICD-10-CM

## 2023-02-24 DIAGNOSIS — Z13.220 ENCOUNTER FOR LIPID SCREENING FOR CARDIOVASCULAR DISEASE: ICD-10-CM

## 2023-02-24 LAB
25(OH)D3+25(OH)D2 SERPL-MCNC: 119 NG/ML (ref 30–96)
ALBUMIN SERPL BCP-MCNC: 3.9 G/DL (ref 3.5–5.2)
ALBUMIN SERPL BCP-MCNC: 3.9 G/DL (ref 3.5–5.2)
ALP SERPL-CCNC: 83 U/L (ref 55–135)
ALP SERPL-CCNC: 83 U/L (ref 55–135)
ALT SERPL W/O P-5'-P-CCNC: 18 U/L (ref 10–44)
ALT SERPL W/O P-5'-P-CCNC: 18 U/L (ref 10–44)
ANION GAP SERPL CALC-SCNC: 9 MMOL/L (ref 8–16)
ANION GAP SERPL CALC-SCNC: 9 MMOL/L (ref 8–16)
AST SERPL-CCNC: 27 U/L (ref 10–40)
AST SERPL-CCNC: 27 U/L (ref 10–40)
BILIRUB SERPL-MCNC: 0.4 MG/DL (ref 0.1–1)
BILIRUB SERPL-MCNC: 0.4 MG/DL (ref 0.1–1)
BUN SERPL-MCNC: 3 MG/DL (ref 8–23)
BUN SERPL-MCNC: 3 MG/DL (ref 8–23)
CALCIUM SERPL-MCNC: 8.8 MG/DL (ref 8.7–10.5)
CALCIUM SERPL-MCNC: 8.8 MG/DL (ref 8.7–10.5)
CHLORIDE SERPL-SCNC: 109 MMOL/L (ref 95–110)
CHLORIDE SERPL-SCNC: 109 MMOL/L (ref 95–110)
CHOLEST SERPL-MCNC: 86 MG/DL (ref 120–199)
CHOLEST SERPL-MCNC: 86 MG/DL (ref 120–199)
CHOLEST/HDLC SERPL: 4.1 {RATIO} (ref 2–5)
CHOLEST/HDLC SERPL: 4.1 {RATIO} (ref 2–5)
CO2 SERPL-SCNC: 23 MMOL/L (ref 23–29)
CO2 SERPL-SCNC: 23 MMOL/L (ref 23–29)
COMPLEXED PSA SERPL-MCNC: 0.83 NG/ML (ref 0–4)
CREAT SERPL-MCNC: 1.1 MG/DL (ref 0.5–1.4)
CREAT SERPL-MCNC: 1.1 MG/DL (ref 0.5–1.4)
ERYTHROCYTE [DISTWIDTH] IN BLOOD BY AUTOMATED COUNT: 12.7 % (ref 11.5–14.5)
EST. GFR  (NO RACE VARIABLE): >60 ML/MIN/1.73 M^2
EST. GFR  (NO RACE VARIABLE): >60 ML/MIN/1.73 M^2
ESTIMATED AVG GLUCOSE: 103 MG/DL (ref 68–131)
GLUCOSE SERPL-MCNC: 92 MG/DL (ref 70–110)
GLUCOSE SERPL-MCNC: 92 MG/DL (ref 70–110)
HBA1C MFR BLD: 5.2 % (ref 4–5.6)
HCT VFR BLD AUTO: 35.1 % (ref 40–54)
HDLC SERPL-MCNC: 21 MG/DL (ref 40–75)
HDLC SERPL-MCNC: 21 MG/DL (ref 40–75)
HDLC SERPL: 24.4 % (ref 20–50)
HDLC SERPL: 24.4 % (ref 20–50)
HGB BLD-MCNC: 11.1 G/DL (ref 14–18)
LDLC SERPL CALC-MCNC: 25.4 MG/DL (ref 63–159)
LDLC SERPL CALC-MCNC: 25.4 MG/DL (ref 63–159)
MCH RBC QN AUTO: 29.3 PG (ref 27–31)
MCHC RBC AUTO-ENTMCNC: 31.6 G/DL (ref 32–36)
MCV RBC AUTO: 93 FL (ref 82–98)
NONHDLC SERPL-MCNC: 65 MG/DL
NONHDLC SERPL-MCNC: 65 MG/DL
PLATELET # BLD AUTO: 168 K/UL (ref 150–450)
PMV BLD AUTO: 11 FL (ref 9.2–12.9)
POTASSIUM SERPL-SCNC: 4.5 MMOL/L (ref 3.5–5.1)
POTASSIUM SERPL-SCNC: 4.5 MMOL/L (ref 3.5–5.1)
PROT SERPL-MCNC: 6.2 G/DL (ref 6–8.4)
PROT SERPL-MCNC: 6.2 G/DL (ref 6–8.4)
RBC # BLD AUTO: 3.79 M/UL (ref 4.6–6.2)
SODIUM SERPL-SCNC: 141 MMOL/L (ref 136–145)
SODIUM SERPL-SCNC: 141 MMOL/L (ref 136–145)
TESTOST SERPL-MCNC: 822 NG/DL (ref 304–1227)
TRIGL SERPL-MCNC: 198 MG/DL (ref 30–150)
TRIGL SERPL-MCNC: 198 MG/DL (ref 30–150)
TSH SERPL DL<=0.005 MIU/L-ACNC: 1.54 UIU/ML (ref 0.4–4)
WBC # BLD AUTO: 5.25 K/UL (ref 3.9–12.7)

## 2023-02-24 PROCEDURE — 84403 ASSAY OF TOTAL TESTOSTERONE: CPT | Performed by: FAMILY MEDICINE

## 2023-02-24 PROCEDURE — 85027 COMPLETE CBC AUTOMATED: CPT | Performed by: FAMILY MEDICINE

## 2023-02-24 PROCEDURE — 82306 VITAMIN D 25 HYDROXY: CPT | Performed by: FAMILY MEDICINE

## 2023-02-24 PROCEDURE — 80053 COMPREHEN METABOLIC PANEL: CPT | Performed by: FAMILY MEDICINE

## 2023-02-24 PROCEDURE — 80061 LIPID PANEL: CPT | Performed by: FAMILY MEDICINE

## 2023-02-24 PROCEDURE — 83036 HEMOGLOBIN GLYCOSYLATED A1C: CPT | Performed by: FAMILY MEDICINE

## 2023-02-24 PROCEDURE — 36415 COLL VENOUS BLD VENIPUNCTURE: CPT | Mod: PO | Performed by: FAMILY MEDICINE

## 2023-02-24 PROCEDURE — 84153 ASSAY OF PSA TOTAL: CPT | Performed by: FAMILY MEDICINE

## 2023-02-24 PROCEDURE — 84443 ASSAY THYROID STIM HORMONE: CPT | Performed by: FAMILY MEDICINE

## 2023-02-28 ENCOUNTER — CLINICAL SUPPORT (OUTPATIENT)
Dept: SMOKING CESSATION | Facility: CLINIC | Age: 63
End: 2023-02-28
Payer: COMMERCIAL

## 2023-02-28 DIAGNOSIS — F17.200 NICOTINE DEPENDENCE: Primary | ICD-10-CM

## 2023-02-28 PROCEDURE — 99999 PR PBB SHADOW E&M-EST. PATIENT-LVL III: CPT | Mod: PBBFAC,,,

## 2023-02-28 PROCEDURE — 99999 PR PBB SHADOW E&M-EST. PATIENT-LVL II: CPT | Mod: PBBFAC,,,

## 2023-02-28 PROCEDURE — 99404 PR PREVENT COUNSEL,INDIV,60 MIN: ICD-10-PCS | Mod: S$GLB,,,

## 2023-02-28 PROCEDURE — 99999 PR PBB SHADOW E&M-EST. PATIENT-LVL III: ICD-10-PCS | Mod: PBBFAC,,,

## 2023-02-28 PROCEDURE — 99404 PREV MED CNSL INDIV APPRX 60: CPT | Mod: S$GLB,,,

## 2023-02-28 PROCEDURE — 99999 PR PBB SHADOW E&M-EST. PATIENT-LVL II: ICD-10-PCS | Mod: PBBFAC,,,

## 2023-02-28 NOTE — Clinical Note
Patient was an active participant in group discussions. Patient remains quit as of 9/29/23. Commended patient for this continued quit. He is off all cessation medications however he is back in the program to possibly get back on medications. Commended him for reaching out to me to get back into the program. He is determined to stay quit but states his temper is shorter than ever and his wife is getting aggravated with him. He will be meeting with his dr tomorrow 3/1/23 to discuss medication. Session Focus:  orientation, client introductions, completion of TCRS (Tobacco Cessation Rating Scale) learned addiction model, cues/triggers, personal reasons for quitting, medications, goals, quit date 9/29/23. The patient denies any abnormal behavioral or mental changes at this time. The patient will continue with  therapy sessions and medication monitoring by CTTS. Prescribed medication management will be by physician.

## 2023-02-28 NOTE — Clinical Note
Patient returns to the smoking program to help get support to stay quit. He's been quit as of 9/29/22 (5 months) however he states he is concerned about his mood. He is short tempered and irritable more than he would like. He states he has a  appointment tomorrow and will discuss with his PCP getting back on Wellbutrin. He was interested in starting back on Chantix, we discussed that being a very last resort. He will begin to go to group session to get support. FTND of   indicates a high level of tobacco/nicotine dependency; CESD of 14 is preceived as no mental distress or depression at this time.

## 2023-03-01 ENCOUNTER — OFFICE VISIT (OUTPATIENT)
Dept: FAMILY MEDICINE | Facility: CLINIC | Age: 63
End: 2023-03-01
Payer: COMMERCIAL

## 2023-03-01 VITALS
WEIGHT: 165 LBS | OXYGEN SATURATION: 97 % | DIASTOLIC BLOOD PRESSURE: 79 MMHG | HEIGHT: 66 IN | BODY MASS INDEX: 26.52 KG/M2 | SYSTOLIC BLOOD PRESSURE: 131 MMHG | OXYGEN SATURATION: 97 % | HEART RATE: 80 BPM

## 2023-03-01 VITALS
HEART RATE: 75 BPM | DIASTOLIC BLOOD PRESSURE: 82 MMHG | OXYGEN SATURATION: 98 % | HEIGHT: 66 IN | TEMPERATURE: 98 F | SYSTOLIC BLOOD PRESSURE: 132 MMHG | WEIGHT: 164 LBS | BODY MASS INDEX: 26.36 KG/M2

## 2023-03-01 DIAGNOSIS — I77.9 PERIPHERAL ARTERIAL OCCLUSIVE DISEASE: ICD-10-CM

## 2023-03-01 DIAGNOSIS — Z79.899 ENCOUNTER FOR LONG-TERM (CURRENT) USE OF MEDICATIONS: ICD-10-CM

## 2023-03-01 DIAGNOSIS — E78.5 DYSLIPIDEMIA (HIGH LDL; LOW HDL): ICD-10-CM

## 2023-03-01 DIAGNOSIS — D64.9 ANEMIA, UNSPECIFIED TYPE: Primary | ICD-10-CM

## 2023-03-01 DIAGNOSIS — R79.89 HIGH SERUM VITAMIN D: ICD-10-CM

## 2023-03-01 DIAGNOSIS — F33.41 RECURRENT MAJOR DEPRESSIVE DISORDER, IN PARTIAL REMISSION: ICD-10-CM

## 2023-03-01 DIAGNOSIS — I10 ESSENTIAL HYPERTENSION: ICD-10-CM

## 2023-03-01 PROCEDURE — 3079F DIAST BP 80-89 MM HG: CPT | Mod: CPTII,S$GLB,, | Performed by: FAMILY MEDICINE

## 2023-03-01 PROCEDURE — 3075F PR MOST RECENT SYSTOLIC BLOOD PRESS GE 130-139MM HG: ICD-10-PCS | Mod: CPTII,S$GLB,, | Performed by: FAMILY MEDICINE

## 2023-03-01 PROCEDURE — 3075F SYST BP GE 130 - 139MM HG: CPT | Mod: CPTII,S$GLB,, | Performed by: FAMILY MEDICINE

## 2023-03-01 PROCEDURE — 99999 PR PBB SHADOW E&M-EST. PATIENT-LVL V: CPT | Mod: PBBFAC,,, | Performed by: FAMILY MEDICINE

## 2023-03-01 PROCEDURE — 99214 PR OFFICE/OUTPT VISIT, EST, LEVL IV, 30-39 MIN: ICD-10-PCS | Mod: S$GLB,,, | Performed by: FAMILY MEDICINE

## 2023-03-01 PROCEDURE — 4010F PR ACE/ARB THEARPY RXD/TAKEN: ICD-10-PCS | Mod: CPTII,S$GLB,, | Performed by: FAMILY MEDICINE

## 2023-03-01 PROCEDURE — 3044F HG A1C LEVEL LT 7.0%: CPT | Mod: CPTII,S$GLB,, | Performed by: FAMILY MEDICINE

## 2023-03-01 PROCEDURE — 3008F BODY MASS INDEX DOCD: CPT | Mod: CPTII,S$GLB,, | Performed by: FAMILY MEDICINE

## 2023-03-01 PROCEDURE — 1160F RVW MEDS BY RX/DR IN RCRD: CPT | Mod: CPTII,S$GLB,, | Performed by: FAMILY MEDICINE

## 2023-03-01 PROCEDURE — 99999 PR PBB SHADOW E&M-EST. PATIENT-LVL V: ICD-10-PCS | Mod: PBBFAC,,, | Performed by: FAMILY MEDICINE

## 2023-03-01 PROCEDURE — 1160F PR REVIEW ALL MEDS BY PRESCRIBER/CLIN PHARMACIST DOCUMENTED: ICD-10-PCS | Mod: CPTII,S$GLB,, | Performed by: FAMILY MEDICINE

## 2023-03-01 PROCEDURE — 3008F PR BODY MASS INDEX (BMI) DOCUMENTED: ICD-10-PCS | Mod: CPTII,S$GLB,, | Performed by: FAMILY MEDICINE

## 2023-03-01 PROCEDURE — 1159F MED LIST DOCD IN RCRD: CPT | Mod: CPTII,S$GLB,, | Performed by: FAMILY MEDICINE

## 2023-03-01 PROCEDURE — 3079F PR MOST RECENT DIASTOLIC BLOOD PRESSURE 80-89 MM HG: ICD-10-PCS | Mod: CPTII,S$GLB,, | Performed by: FAMILY MEDICINE

## 2023-03-01 PROCEDURE — 4010F ACE/ARB THERAPY RXD/TAKEN: CPT | Mod: CPTII,S$GLB,, | Performed by: FAMILY MEDICINE

## 2023-03-01 PROCEDURE — 99214 OFFICE O/P EST MOD 30 MIN: CPT | Mod: S$GLB,,, | Performed by: FAMILY MEDICINE

## 2023-03-01 PROCEDURE — 3044F PR MOST RECENT HEMOGLOBIN A1C LEVEL <7.0%: ICD-10-PCS | Mod: CPTII,S$GLB,, | Performed by: FAMILY MEDICINE

## 2023-03-01 PROCEDURE — 1159F PR MEDICATION LIST DOCUMENTED IN MEDICAL RECORD: ICD-10-PCS | Mod: CPTII,S$GLB,, | Performed by: FAMILY MEDICINE

## 2023-03-01 RX ORDER — TIZANIDINE 4 MG/1
TABLET ORAL
COMMUNITY
Start: 2023-02-16 | End: 2023-05-10

## 2023-03-01 RX ORDER — BUPROPION HYDROCHLORIDE 150 MG/1
150 TABLET ORAL DAILY
Qty: 30 TABLET | Refills: 11 | Status: SHIPPED | OUTPATIENT
Start: 2023-03-01 | End: 2024-02-20

## 2023-03-01 NOTE — ASSESSMENT & PLAN NOTE
Continue current regimen.  Follow-up with Cardiology.  Counseled on hyperlipidemia disease course, healthy diet and increased need for exercise.  Please be advised of the risk of cardiovascular disease, increase stroke and heart attack risk with uncontrolled/untreated hyperlipidemia.     Patient voiced understanding and understood the treatment plan. All questions were answered.

## 2023-03-01 NOTE — ASSESSMENT & PLAN NOTE
Patient has be B12 deficient in the past.  Check anemia workup.  Colonoscopy is up-to-date.  Patient denies any bleeding from other sites.    Anemia precautions.

## 2023-03-01 NOTE — ASSESSMENT & PLAN NOTE
Vitamin-D level is too high.  Patient is taking 89652 units daily.  He will reduce the dose to 5000 units daily.

## 2023-03-01 NOTE — PROGRESS NOTES
PLAN:      Problem List Items Addressed This Visit       Essential hypertension (Chronic)     Follow-up with Cardiology.  Continue current medication regimen.Counseled on importance of hypertension disease course, I recommend ongoing Education for DASH-diet and exercise.  Counseled on medication regimen importance of treating high blood pressure.  Please be advised of risk of untreated blood pressure as discussed.  Please advised of ER precautions were given for symptoms of hypertensive urgency and emergency.           Encounter for long-term (current) use of medications (Chronic)     Complete history and physical was completed today.  Complete and thorough medication reconciliation was performed.  Discussed risks and benefits of medications.  Advised patient on orders and health maintenance.  We discussed old records and old labs if available.  Will request any records not available through epic.  Continue current medications listed on your summary sheet.           Dyslipidemia (high LDL; low HDL) (Chronic)     Continue current regimen.  Follow-up with Cardiology.  Counseled on hyperlipidemia disease course, healthy diet and increased need for exercise.  Please be advised of the risk of cardiovascular disease, increase stroke and heart attack risk with uncontrolled/untreated hyperlipidemia.     Patient voiced understanding and understood the treatment plan. All questions were answered.              Recurrent major depressive disorder, in partial remission (Chronic)     Restart Wellbutrin.Please be advised of condition course.  SNRI/SSRI is first-line treatment for this condition.  Please be advised of the risk of discontinuing this medication without tapering/contacting MD.  Patient has been advised of side effects, and all questions were answered.  Patient voiced understanding.  Patient will follow up routinely and notify us if having any side effects or worsening or persistent symptoms.  ER precautions were  given. Antidepressant/Antianxiety Medication Initiation:  Patient informed of risks, benefits, and potential side effects of medication and accepts informed consent.  Common side effects include nausea, fatigue, headache, insomnia, etc see medication insert for complete side effect profile.  Most importantly be advised of the possibility of new or worsening suicidal thoughts/depression/anxiety etcetera.  Please be advised to stop the medication immediately and seek urgent treatment if this occurs.  Therefore please do not to abruptly discontinue medication without physician guidance except in cases of sudden onset or worsening of SI.            Relevant Medications    buPROPion (WELLBUTRIN XL) 150 MG TB24 tablet    Peripheral arterial occlusive disease     Follow-up with vascular surgery.  ER precautions.         Anemia - Primary     Patient has be B12 deficient in the past.  Check anemia workup.  Colonoscopy is up-to-date.  Patient denies any bleeding from other sites.    Anemia precautions.         Relevant Orders    Iron and TIBC    Ferritin    Vitamin B12    Folate       Future Appointments       Date Provider Specialty Appt Notes    3/3/2023 Gurwinder Cramer MD Pain Medicine Neck pain.    3/16/2023  Cardiology     3/23/2023 Samm Wagner MD Cardiology 3 mo fu     9/1/2023  Lab            Medication Management for assessment above:   Medication List with Changes/Refills   New Medications    BUPROPION (WELLBUTRIN XL) 150 MG TB24 TABLET    Take 1 tablet (150 mg total) by mouth once daily.   Current Medications    ALFUZOSIN (UROXATRAL) 10 MG TB24    Take 1 tablet (10 mg total) by mouth daily with breakfast.    ASPIRIN (ECOTRIN) 81 MG EC TABLET    Take 1 tablet (81 mg total) by mouth once daily.    ATENOLOL (TENORMIN) 25 MG TABLET    TAKE 1 TABLET BY MOUTH EVERY DAY IN THE MORNING    CLOPIDOGREL (PLAVIX) 75 MG TABLET    Take 1 tablet (75 mg total) by mouth once daily.    DENTA 5000 PLUS 1.1 % CREA    Take 1  application by mouth 2 (two) times daily.    FAMOTIDINE (PEPCID) 20 MG TABLET    Take 20 mg by mouth every evening.    GABAPENTIN (GRALISE 30-DAY STARTER PACK) 300 MG (9)- 600 MG (69) TB24        LISINOPRIL (PRINIVIL,ZESTRIL) 5 MG TABLET    TAKE 1 TABLET BY MOUTH EVERY DAY    MULTIVITAMIN (THERAGRAN) PER TABLET    Take 1 tablet by mouth once daily.    PANTOPRAZOLE (PROTONIX) 40 MG TABLET    TAKE 1 TABLET BY MOUTH EVERY DAY    ROSUVASTATIN (CRESTOR) 5 MG TABLET    Take 1 tablet (5 mg total) by mouth every evening.    TADALAFIL (CIALIS) 20 MG TAB    Take 1 tablet (20 mg total) by mouth daily as needed (take 1/4, 1/2, or 1 full tablet as needed for erectile dysfunction).    TIZANIDINE (ZANAFLEX) 4 MG TABLET        VITAMIN D3-FOLIC ACID 250 MCG (10,000 UNIT)-1 MG TAB    Take by mouth Daily.   Discontinued Medications    ALBUTEROL (PROVENTIL/VENTOLIN HFA) 90 MCG/ACTUATION INHALER    Inhale 2 puffs into the lungs every 6 (six) hours as needed for Wheezing.    GABAPENTIN ENACARBIL (HORIZANT) 600 MG TBSR           Bipin Penaloza M.D.  ==========================================================================  Subjective:   Patient ID: Sal Villarreal . is a 63 y.o. male.  has a past medical history of Anxiety, Arthritis, Depression, Digestive disorder, Fatigue, History of acute bronchitis, History of psychiatric care, Hypertension, Intervertebral disc disorder with radiculopathy of lumbar region (3/23/2021), Psychiatric problem, PVD (peripheral vascular disease), and S/P femoral-femoral bypass surgery (12/1/2021).   Chief Complaint: Follow-up        Problem List Items Addressed This Visit       Essential hypertension (Chronic)    Overview     CHRONIC.  March 2023:  Blood pressure well controlled.  Hypertension Medications               atenoloL (TENORMIN) 25 MG tablet TAKE 1 TABLET BY MOUTH EVERY DAY IN THE MORNING    lisinopriL (PRINIVIL,ZESTRIL) 5 MG tablet TAKE 1 TABLET BY MOUTH EVERY DAY     STABLE. BP  Reviewed.  Compliant with BP medications. No SE reported.   (-) CP, SOB, palpitations, dizziness, lightheadedness, HA, arm numbness, tingling or weakness, syncope.  Creatinine   Date Value Ref Range Status   02/24/2023 1.1 0.5 - 1.4 mg/dL Final   02/24/2023 1.1 0.5 - 1.4 mg/dL Final     Lab Results   Component Value Date    K 4.5 02/24/2023    K 4.5 02/24/2023     Results for orders placed or performed during the hospital encounter of 11/29/21   EKG 12-LEAD    Collection Time: 11/29/21  2:48 PM    Narrative    Test Reason : I77.9,    Vent. Rate : 061 BPM     Atrial Rate : 061 BPM     P-R Int : 156 ms          QRS Dur : 086 ms      QT Int : 416 ms       P-R-T Axes : 072 063 052 degrees     QTc Int : 418 ms    Normal sinus rhythm  Normal ECG  When compared with ECG of 28-OCT-2021 22:49,  Previous ECG has undetermined rhythm, needs review  Criteria for Anterior infarct are no longer Present  Confirmed by Deshawn CONCEPCION, Arnold JORDAN ([id number]) on 11/29/2021 2:56:48 PM    Referred By: VAISHALI DELA CRUZ           Confirmed By:Arnold Hess MD            Current Assessment & Plan     Follow-up with Cardiology.  Continue current medication regimen.Counseled on importance of hypertension disease course, I recommend ongoing Education for DASH-diet and exercise.  Counseled on medication regimen importance of treating high blood pressure.  Please be advised of risk of untreated blood pressure as discussed.  Please advised of ER precautions were given for symptoms of hypertensive urgency and emergency.           Encounter for long-term (current) use of medications (Chronic)    Overview     March 2023: Reviewed labs.  CHRONIC. Stable. Compliant with medications for managed conditions. See medication list. No SE reported.   Routine lab analysis is being monitored. Refills were addressed.  Lab Results   Component Value Date    WBC 5.25 02/24/2023    HGB 11.1 (L) 02/24/2023    HCT 35.1 (L) 02/24/2023    MCV 93 02/24/2023     02/24/2023        Chemistry        Component Value Date/Time     02/24/2023 0725     02/24/2023 0725    K 4.5 02/24/2023 0725    K 4.5 02/24/2023 0725     02/24/2023 0725     02/24/2023 0725    CO2 23 02/24/2023 0725    CO2 23 02/24/2023 0725    BUN 3 (L) 02/24/2023 0725    BUN 3 (L) 02/24/2023 0725    CREATININE 1.1 02/24/2023 0725    CREATININE 1.1 02/24/2023 0725    GLU 92 02/24/2023 0725    GLU 92 02/24/2023 0725        Component Value Date/Time    CALCIUM 8.8 02/24/2023 0725    CALCIUM 8.8 02/24/2023 0725    ALKPHOS 83 02/24/2023 0725    ALKPHOS 83 02/24/2023 0725    AST 27 02/24/2023 0725    AST 27 02/24/2023 0725    ALT 18 02/24/2023 0725    ALT 18 02/24/2023 0725    BILITOT 0.4 02/24/2023 0725    BILITOT 0.4 02/24/2023 0725    ESTGFRAFRICA >60.0 06/06/2022 1037    EGFRNONAA >60.0 06/06/2022 1037          Lab Results   Component Value Date    TSH 1.545 02/24/2023            Current Assessment & Plan     Complete history and physical was completed today.  Complete and thorough medication reconciliation was performed.  Discussed risks and benefits of medications.  Advised patient on orders and health maintenance.  We discussed old records and old labs if available.  Will request any records not available through epic.  Continue current medications listed on your summary sheet.           Dyslipidemia (high LDL; low HDL) (Chronic)    Overview     CHRONIC.  March 2023: Reports compliance with rosuvastatin 5 milligrams daily.  August 2022: LDL is very low on rosuvastatin 10 milligrams daily.  STABLE. Lab analysis reviewed.   (-) CP, SOB, abdominal pain, N/V/D, constipation, jaundice, skin changes.  (-) Myalgias  Lab Results   Component Value Date    CHOL 86 (L) 02/24/2023    CHOL 86 (L) 02/24/2023    CHOL 109 (L) 12/01/2022     Lab Results   Component Value Date    HDL 21 (L) 02/24/2023    HDL 21 (L) 02/24/2023    HDL 27 (L) 12/01/2022     Lab Results   Component Value Date    LDLCALC 25.4 (L) 02/24/2023     LDLCALC 25.4 (L) 02/24/2023    LDLCALC 30.0 (L) 12/01/2022     Lab Results   Component Value Date    TRIG 198 (H) 02/24/2023    TRIG 198 (H) 02/24/2023    TRIG 260 (H) 12/01/2022     Lab Results   Component Value Date    CHOLHDL 24.4 02/24/2023    CHOLHDL 24.4 02/24/2023    CHOLHDL 24.8 12/01/2022     Lab Results   Component Value Date    TOTALCHOLEST 4.1 02/24/2023    TOTALCHOLEST 4.1 02/24/2023    TOTALCHOLEST 4.0 12/01/2022     Lab Results   Component Value Date    ALT 18 02/24/2023    ALT 18 02/24/2023    AST 27 02/24/2023    AST 27 02/24/2023    ALKPHOS 83 02/24/2023    ALKPHOS 83 02/24/2023    BILITOT 0.4 02/24/2023    BILITOT 0.4 02/24/2023   ======================================================  The ASCVD Risk score (Clara ONOFRE, et al., 2019) failed to calculate for the following reasons:    The valid total cholesterol range is 130 to 320 mg/dL           Current Assessment & Plan     Continue current regimen.  Follow-up with Cardiology.  Counseled on hyperlipidemia disease course, healthy diet and increased need for exercise.  Please be advised of the risk of cardiovascular disease, increase stroke and heart attack risk with uncontrolled/untreated hyperlipidemia.     Patient voiced understanding and understood the treatment plan. All questions were answered.              Recurrent major depressive disorder, in partial remission (Chronic)    Overview     Chronic.  March 2023: Patient was doing better when he was on Wellbutrin for smoking cessation.  He reports that since being off of this medication he has been more irritable.  Denies any SI HI or hallucinations    Previous history:  Stable.  Patient declines any medication.  He is doing well with smoking cessation.  He is going on a trip in October 2 [place] and would like to be tobacco free by then.  Denies SI HI or hallucinations.         Current Assessment & Plan     Restart Wellbutrin.Please be advised of condition course.  SNRI/SSRI is first-line  treatment for this condition.  Please be advised of the risk of discontinuing this medication without tapering/contacting MD.  Patient has been advised of side effects, and all questions were answered.  Patient voiced understanding.  Patient will follow up routinely and notify us if having any side effects or worsening or persistent symptoms.  ER precautions were given. Antidepressant/Antianxiety Medication Initiation:  Patient informed of risks, benefits, and potential side effects of medication and accepts informed consent.  Common side effects include nausea, fatigue, headache, insomnia, etc see medication insert for complete side effect profile.  Most importantly be advised of the possibility of new or worsening suicidal thoughts/depression/anxiety etcetera.  Please be advised to stop the medication immediately and seek urgent treatment if this occurs.  Therefore please do not to abruptly discontinue medication without physician guidance except in cases of sudden onset or worsening of SI.            Peripheral arterial occlusive disease    Overview     Chronic.  Intermittent control.  Patient following with vascular surgery.  Date of Service: 07/27/2021  DATE OF PROCEDURE:  07/27/2021.     PREOPERATIVE DIAGNOSES:  1.  Peripheral arterial occlusive disease.  2.  Pain of the right hip.  3.  Severe intermittent claudication of the right lower extremity affecting   activities of daily living.     POSTOPERATIVE DIAGNOSES:  1.  Peripheral arterial occlusive disease.  2.  Pain of the right hip  3.  Severe intermittent claudication of the right lower extremity affecting   activities of daily living.     OPERATIONS:  1.  Aortogram with bilateral renal arteriogram.  2.  Bilateral common iliac arteriograms with bilateral lower extremity runoff.  3.  Measurement of pressures in the aorta and the left iliac artery.     SURGEON:  Momo Briggs M.D., F.A.C.S.     ANESTHESIA:  Lidocaine 1% for local and intravenous sedation using  4 mg of   Versed and 100 mcg of fentanyl IV.  The patient's level of consciousness was   monitored by the registered nurse from 10:59-11:30 a.m.  Other monitors included   blood pressure, pulse oximetry, heart rate and respiratory rate.     PROCEDURE IN DETAIL:  With the patient in the supine position on the cardiac   cath table, bilateral groins were prepped and draped in a sterile fashion.  CT   angiography had showed an occluded right external iliac artery and a 50%   stenosis of the left common iliac artery.  Access to the left common femoral   artery was obtained using an 18-gauge access needle and a guidewire was passed   through this.  A 5-[place]istani sheath was passed over the guidewire and the guidewire   and dilator were removed and the sheath was aspirated and flushed.  A J-tip   guidewire was passed through the sheath and advanced up into the suprarenal   aorta under fluoroscopy.  The guidewire was removed.  Contrast was injected and   digital subtraction angiography was performed and an abdominal aortogram with   bilateral renal arteriograms was done.  The renal arteries were patent.  The   infrarenal aorta had calcified plaque with some mild luminal irregularities, but   no significant stenosis.     The angiographic catheter was pulled down into the distal aorta just above the   aortic bifurcation.  Contrast was injected and cineangiography was performed,   and bilateral common iliac arteriograms with bilateral lower extremity runoff   were performed.  The right common iliac artery was patent.  The right   hypogastric artery was patent and large, giving off a lot of collaterals around   the hip.  The right external iliac artery and the right common femoral artery   were both occluded.  The very distal common femoral artery reconstituted a   couple of millimeters above the femoral bifurcation.  The profunda femoris and   the superficial femoral artery and the right popliteal artery were all patent.    Runoff  was via 3 vessels.     The left common iliac artery had some luminal irregularities, but did not seem   to have high-grade stenosis.  The left hypogastric and the left external iliac   arteries were patent, although the left external iliac artery seemed to have   about 20% stenosis distally.  The left common femoral, the left profunda, the   left superficial femoral and the left popliteal arteries were patent.  Runoff   was via 3 vessels.  Bilateral oblique arteriograms of the iliac arteries were   then performed to see if there was any stenosis that could not be seen with the   anteroposterior projections.  No high-grade stenosis in the left common iliac   artery could be seen.  The Omniflush angiographic catheter was then exchanged   for a straight Glidecath.  Pressures were then measured in the infrarenal aorta   and the catheter was pulled back with continuous blood pressure measurements   down into the left common iliac and then into the left external iliac artery.    There was absolutely no change in pressure from the aorta into the common iliac   or into the external iliac artery.  No intervention was performed.  The sheath   was pulled from the left common femoral artery and pressure was held for about   20 minutes.  At the end of that time, there was no bleeding and no hematoma.    Sterile dressing was placed.  The patient tolerated the procedure and left the   Cardiac Cath Lab in satisfactory and stable condition.        NIMO/VIVIAN  dd: 07/27/2021 11:52:36 (CDT)  td: 07/27/2021 13:12:39 (CDT)  Doc ID   #[id number]  Job ID #[id number]     CC:          Last signed by: Jim Briggs MD at 7/27/2021  3:08 PM              Current Assessment & Plan     Follow-up with vascular surgery.  ER precautions.         Anemia - Primary    Overview     Chronic.  Control is uncertain.  Patient is up-to-date on colonoscopy.    Lab Results   Component Value Date    WBC 5.25 02/24/2023    HGB 11.1 (L) 02/24/2023    HCT 35.1 (L) 02/24/2023     MCV 93 02/24/2023     02/24/2023       No results found for: UIBC, IRON, IRON, TRANS, TRANSFERRIN, TIBC, TIBC, LABIRON, FESATURATED   Lab Results   Component Value Date    JJREGVQF24 290 10/04/2005     Lab Results   Component Value Date    FOLATE 10.3 11/07/2005              Current Assessment & Plan     Patient has be B12 deficient in the past.  Check anemia workup.  Colonoscopy is up-to-date.  Patient denies any bleeding from other sites.    Anemia precautions.               Review of patient's allergies indicates:   Allergen Reactions    Blueberry      Current Outpatient Medications   Medication Instructions    alfuzosin (UROXATRAL) 10 mg, Oral, With breakfast    aspirin (ECOTRIN) 81 mg, Oral, Daily    atenoloL (TENORMIN) 25 MG tablet TAKE 1 TABLET BY MOUTH EVERY DAY IN THE MORNING    buPROPion (WELLBUTRIN XL) 150 mg, Oral, Daily    clopidogreL (PLAVIX) 75 mg, Oral, Daily    DENTA 5000 PLUS 1.1 % Crea 1 application, Oral, 2 times daily    famotidine (PEPCID) 20 mg, Oral, Nightly    gabapentin (GRALISE 30-DAY STARTER PACK) 300 mg (9)- 600 mg (69) Tb24 No dose, route, or frequency recorded.    lisinopriL (PRINIVIL,ZESTRIL) 5 MG tablet TAKE 1 TABLET BY MOUTH EVERY DAY    multivitamin (THERAGRAN) per tablet 1 tablet, Oral, Daily    pantoprazole (PROTONIX) 40 MG tablet TAKE 1 TABLET BY MOUTH EVERY DAY    rosuvastatin (CRESTOR) 5 mg, Oral, Nightly    tadalafiL (CIALIS) 20 mg, Oral, Daily PRN    tiZANidine (ZANAFLEX) 4 MG tablet No dose, route, or frequency recorded.    vitamin D3-folic acid 250 mcg (10,000 unit)-1 mg Tab Oral, Daily      I have reviewed the PMH, social history, FamilyHx, surgical history, allergies and medications documented / confirmed by the patient at the time of this visit.  Review of Systems   Constitutional:  Positive for fatigue. Negative for activity change and unexpected weight change.   HENT:  Negative for hearing loss, rhinorrhea and trouble swallowing.    Eyes:  Negative for  "discharge and visual disturbance.   Respiratory:  Negative for chest tightness and wheezing.    Cardiovascular:  Negative for chest pain and palpitations.   Gastrointestinal:  Negative for blood in stool, constipation, diarrhea and vomiting.   Endocrine: Negative for polydipsia and polyuria.   Genitourinary:  Negative for difficulty urinating, hematuria and urgency.   Musculoskeletal:  Positive for arthralgias, back pain, gait problem and neck pain. Negative for joint swelling.   Neurological:  Negative for weakness and headaches.   Psychiatric/Behavioral:  Negative for confusion and dysphoric mood.    Objective:   /82   Pulse 75   Temp 98.1 °F (36.7 °C)   Ht 5' 6" (1.676 m)   Wt 74.4 kg (164 lb)   SpO2 98%   BMI 26.47 kg/m²   Physical Exam  Vitals and nursing note reviewed.   Constitutional:       General: He is not in acute distress.     Appearance: He is well-developed. He is not diaphoretic.   HENT:      Head: Normocephalic and atraumatic.      Right Ear: External ear normal.      Left Ear: External ear normal.      Nose: Nose normal. No rhinorrhea.   Eyes:      Extraocular Movements: Extraocular movements intact.      Pupils: Pupils are equal, round, and reactive to light.   Cardiovascular:      Rate and Rhythm: Normal rate.      Pulses: Normal pulses.   Pulmonary:      Effort: Pulmonary effort is normal. No respiratory distress.      Breath sounds: Normal breath sounds.   Musculoskeletal:         General: Tenderness and deformity present. No swelling. Normal range of motion.      Cervical back: Normal range of motion and neck supple.   Skin:     General: Skin is warm and dry.      Capillary Refill: Capillary refill takes less than 2 seconds.      Findings: No rash.   Neurological:      General: No focal deficit present.      Mental Status: He is alert and oriented to person, place, and time.   Psychiatric:         Attention and Perception: He is attentive.         Mood and Affect: Mood is " depressed. Mood is not anxious. Affect is not labile, blunt, angry or inappropriate.         Speech: He is communicative. Speech is not rapid and pressured, delayed, slurred or tangential.         Behavior: Behavior normal. Behavior is not agitated, slowed, aggressive, withdrawn, hyperactive or combative.         Thought Content: Thought content normal. Thought content is not paranoid or delusional. Thought content does not include homicidal or suicidal ideation. Thought content does not include homicidal or suicidal plan.         Cognition and Memory: Memory is not impaired.         Judgment: Judgment normal. Judgment is not impulsive or inappropriate.     Assessment:     1. Anemia, unspecified type    2. Recurrent major depressive disorder, in partial remission    3. Peripheral arterial occlusive disease    4. Encounter for long-term (current) use of medications    5. Essential hypertension    6. Dyslipidemia (high LDL; low HDL)        MDM:   Moderate complexity.  Moderate risk.  Total time: 33 minutes.  This includes total time spent on the encounter, which includes face to face time and non-face to face time preparing to see the patient (eg, review of previous medical records, tests), Obtaining and/or reviewing separately obtained history, documenting clinical information in the electronic or other health record, independently interpreting results (not separately reported)/communicating results to the patient/family/caregiver, and/or care coordination (not separately reported).    I have Reviewed and summarized old records.  I have performed thorough medication reconciliation today and discussed risk and benefits of medications.  I have reviewed labs and discussed with patient.  All questions were answered.  I am requesting old records and will review them once they are available.  Cardiology, neuro surgery    I have signed for the following orders AND/OR meds.  Orders Placed This Encounter   Procedures    Iron  and TIBC     Standing Status:   Future     Standing Expiration Date:   4/29/2024    Ferritin     Standing Status:   Future     Standing Expiration Date:   4/29/2024    Vitamin B12     Standing Status:   Future     Standing Expiration Date:   4/29/2024    Folate     Standing Status:   Future     Standing Expiration Date:   4/29/2024       Medications Ordered This Encounter   Medications    buPROPion (WELLBUTRIN XL) 150 MG TB24 tablet     Sig: Take 1 tablet (150 mg total) by mouth once daily.     Dispense:  30 tablet     Refill:  11            Follow up in about 6 months (around 9/1/2023), or if symptoms worsen or fail to improve, for Annual Wellness Exam.    If no improvement in symptoms or symptoms worsen, advised to call/follow-up at clinic or go to ER. Patient voiced understanding and all questions/concerns were addressed.   DISCLAIMER: This note was compiled by using a speech recognition dictation system and therefore please be aware that typographical / speech recognition errors can and do occur.  Please contact me if you see any errors specifically.    Bipin Penaloza M.D.       Office: [phone]   [address] [place] [address]  FAX: [phone]

## 2023-03-01 NOTE — PATIENT INSTRUCTIONS
Follow up in about 6 months (around 9/1/2023), or if symptoms worsen or fail to improve, for Annual Wellness Exam.     Dear patient,   As a result of recent federal legislation (The Federal Cures Act), you may receive lab or pathology results from your visit in your [facility] account before your physician is able to contact you. Your physician or their representative will relay the results to you with their recommendations at their soonest availability.     If no improvement in symptoms or symptoms worsen, please be advised to call MD, follow-up at clinic and/or go to ER if becomes severe.    Bipin Penaloza M.D.        We Offer TELEHEALTH & Same Day Appointments!   Book your Telehealth appointment with me through my nurse or   Clinic appointments on [facility]!    [id number] [place] [address]    Office: [phone]   FAX: [phone]    Check out my Facebook Page and Follow Me at: [email or web address]    Check out my website at [employer] by clicking on: [email or web address]    To Schedule appointments online, go to [facility]: [email or web address]

## 2023-03-01 NOTE — ASSESSMENT & PLAN NOTE
Follow-up with Cardiology.  Continue current medication regimen.Counseled on importance of hypertension disease course, I recommend ongoing Education for DASH-diet and exercise.  Counseled on medication regimen importance of treating high blood pressure.  Please be advised of risk of untreated blood pressure as discussed.  Please advised of ER precautions were given for symptoms of hypertensive urgency and emergency.

## 2023-03-01 NOTE — ASSESSMENT & PLAN NOTE
Restart Wellbutrin.Please be advised of condition course.  SNRI/SSRI is first-line treatment for this condition.  Please be advised of the risk of discontinuing this medication without tapering/contacting MD.  Patient has been advised of side effects, and all questions were answered.  Patient voiced understanding.  Patient will follow up routinely and notify us if having any side effects or worsening or persistent symptoms.  ER precautions were given. Antidepressant/Antianxiety Medication Initiation:  Patient informed of risks, benefits, and potential side effects of medication and accepts informed consent.  Common side effects include nausea, fatigue, headache, insomnia, etc see medication insert for complete side effect profile.  Most importantly be advised of the possibility of new or worsening suicidal thoughts/depression/anxiety etcetera.  Please be advised to stop the medication immediately and seek urgent treatment if this occurs.  Therefore please do not to abruptly discontinue medication without physician guidance except in cases of sudden onset or worsening of SI.

## 2023-03-03 ENCOUNTER — OFFICE VISIT (OUTPATIENT)
Dept: PAIN MEDICINE | Facility: CLINIC | Age: 63
End: 2023-03-03
Payer: COMMERCIAL

## 2023-03-03 VITALS
WEIGHT: 162 LBS | HEART RATE: 65 BPM | BODY MASS INDEX: 26.03 KG/M2 | DIASTOLIC BLOOD PRESSURE: 62 MMHG | SYSTOLIC BLOOD PRESSURE: 152 MMHG | RESPIRATION RATE: 17 BRPM | HEIGHT: 66 IN

## 2023-03-03 DIAGNOSIS — M54.12 CERVICAL RADICULOPATHY: ICD-10-CM

## 2023-03-03 DIAGNOSIS — M47.812 CERVICAL SPONDYLOSIS: ICD-10-CM

## 2023-03-03 DIAGNOSIS — M50.30 DDD (DEGENERATIVE DISC DISEASE), CERVICAL: Primary | ICD-10-CM

## 2023-03-03 DIAGNOSIS — M48.02 CERVICAL STENOSIS OF SPINAL CANAL: ICD-10-CM

## 2023-03-03 PROCEDURE — 3044F PR MOST RECENT HEMOGLOBIN A1C LEVEL <7.0%: ICD-10-PCS | Mod: CPTII,S$GLB,, | Performed by: PHYSICAL MEDICINE & REHABILITATION

## 2023-03-03 PROCEDURE — 3008F PR BODY MASS INDEX (BMI) DOCUMENTED: ICD-10-PCS | Mod: CPTII,S$GLB,, | Performed by: PHYSICAL MEDICINE & REHABILITATION

## 2023-03-03 PROCEDURE — 4010F ACE/ARB THERAPY RXD/TAKEN: CPT | Mod: CPTII,S$GLB,, | Performed by: PHYSICAL MEDICINE & REHABILITATION

## 2023-03-03 PROCEDURE — 99999 PR PBB SHADOW E&M-EST. PATIENT-LVL IV: ICD-10-PCS | Mod: PBBFAC,,, | Performed by: PHYSICAL MEDICINE & REHABILITATION

## 2023-03-03 PROCEDURE — 1159F PR MEDICATION LIST DOCUMENTED IN MEDICAL RECORD: ICD-10-PCS | Mod: CPTII,S$GLB,, | Performed by: PHYSICAL MEDICINE & REHABILITATION

## 2023-03-03 PROCEDURE — 99204 PR OFFICE/OUTPT VISIT, NEW, LEVL IV, 45-59 MIN: ICD-10-PCS | Mod: S$GLB,,, | Performed by: PHYSICAL MEDICINE & REHABILITATION

## 2023-03-03 PROCEDURE — 1159F MED LIST DOCD IN RCRD: CPT | Mod: CPTII,S$GLB,, | Performed by: PHYSICAL MEDICINE & REHABILITATION

## 2023-03-03 PROCEDURE — 3078F PR MOST RECENT DIASTOLIC BLOOD PRESSURE < 80 MM HG: ICD-10-PCS | Mod: CPTII,S$GLB,, | Performed by: PHYSICAL MEDICINE & REHABILITATION

## 2023-03-03 PROCEDURE — 99204 OFFICE O/P NEW MOD 45 MIN: CPT | Mod: S$GLB,,, | Performed by: PHYSICAL MEDICINE & REHABILITATION

## 2023-03-03 PROCEDURE — 4010F PR ACE/ARB THEARPY RXD/TAKEN: ICD-10-PCS | Mod: CPTII,S$GLB,, | Performed by: PHYSICAL MEDICINE & REHABILITATION

## 2023-03-03 PROCEDURE — 3008F BODY MASS INDEX DOCD: CPT | Mod: CPTII,S$GLB,, | Performed by: PHYSICAL MEDICINE & REHABILITATION

## 2023-03-03 PROCEDURE — 3044F HG A1C LEVEL LT 7.0%: CPT | Mod: CPTII,S$GLB,, | Performed by: PHYSICAL MEDICINE & REHABILITATION

## 2023-03-03 PROCEDURE — 3078F DIAST BP <80 MM HG: CPT | Mod: CPTII,S$GLB,, | Performed by: PHYSICAL MEDICINE & REHABILITATION

## 2023-03-03 PROCEDURE — 3077F SYST BP >= 140 MM HG: CPT | Mod: CPTII,S$GLB,, | Performed by: PHYSICAL MEDICINE & REHABILITATION

## 2023-03-03 PROCEDURE — 99999 PR PBB SHADOW E&M-EST. PATIENT-LVL IV: CPT | Mod: PBBFAC,,, | Performed by: PHYSICAL MEDICINE & REHABILITATION

## 2023-03-03 PROCEDURE — 3077F PR MOST RECENT SYSTOLIC BLOOD PRESSURE >= 140 MM HG: ICD-10-PCS | Mod: CPTII,S$GLB,, | Performed by: PHYSICAL MEDICINE & REHABILITATION

## 2023-03-03 RX ORDER — AMITRIPTYLINE HYDROCHLORIDE 25 MG/1
25 TABLET, FILM COATED ORAL NIGHTLY PRN
Qty: 30 TABLET | Refills: 2 | Status: SHIPPED | OUTPATIENT
Start: 2023-03-03 | End: 2023-05-31

## 2023-03-03 NOTE — PROGRESS NOTES
New Patient Chronic Pain Note (Initial Visit)    Referring Physician: No ref. provider found    PCP: Bipin Penaloza MD    Chief Complaint:   Chief Complaint   Patient presents with    Neck Pain        SUBJECTIVE:    Sal Villarreal Jr. is a 63 y.o. male, left-hand dominant, who presents to the clinic for the evaluation of neck pain.  He is self referred.  His past medical history of lumbar fusion, lumbar DDD, anxiety, depression, hypertension, peripheral vascular disease, dyslipidemia, s/p femoral bypass surgery, BPH, anemia, polyarthritis, tobacco abuse, multiple other medical comorbidities as listed in his chart.  The pain started several months ago following his 2nd lumbar spine surgery and symptoms have been worsening.The pain is located in the cervical myofascial area and radiates to the bilateral upper extremities, right worse than left.  The pain is described as  sharp, stabbing, burning, aching  and is rated as 5/10. The pain is rated with a score of  4/10 on the BEST day and a score of 10/10 on the WORST day.  Symptoms interfere with daily activity, sleeping, and work. The pain is exacerbated by activities, sitting up, and nighttime.  The pain is mitigated by gentle movement.  He works in pest control.    Patient denies night fever/night sweats, urinary incontinence, bowel incontinence, significant weight loss, significant motor weakness, and loss of sensations.    Pain Disability Index Review:     Last 3 PDI Scores 3/3/2023   Pain Disability Index (PDI) 46       Non-Pharmacologic Treatments:  Physical Therapy/Home Exercise: yes, currently active  Ice/Heat:yes  TENS: no  Acupuncture: no  Massage: yes  Chiropractic: no    Other: no      Pain Medications:  - Opioids:  Percocet, Norco  - Adjuvant Medications:  Gabapentin, Gralise, Horizant, Valium, Ativan, Wellbutrin, Zanaflex  - Anti-Coagulants: Aspirin and Plavix     report:  Reviewed and consistent with medication use as prescribed.    Pain  Procedures:   -previous lumbar surgery x2   -previous cervical interventional pain procedures, likely GEORGIE, no relief      Imaging:   MRI cervical spine 08/26/2022:          X-ray cervical spine 08/26/2022:      X-ray lumbar spine 09/10/2021:   Five lumbar type vertebral bodies.  L5-S1 anterior fusion without evidence of hardware loosening.  4 mm retrolisthesis of L2 on L3, 3 mm at retrolisthesis of L3 on L4 and 2 mm retrolisthesis of L4 on L5 which are unchanged upon flexion and extension.  Dextro scoliosis with a Styles angle of 17.6 degrees using the superior T11 and inferior L4 endplates.  Vertebral body heights are maintained.  No acute fracture.  Minor disc height loss in the upper lumbar spine.  Mild multilevel endplate changes.  Mild hypertrophic facet changes.  No unexpected radiodense soft tissue foreign body.  Atherosclerotic calcifications are noted.  Sacroiliac joints are symmetric and not widened.    MRI lumbar spine 07/21/2021:  NOMENCLATURE: Five lumbar type vertebral bodies.     CORD/CAUDA EQUINA: Conus has normal size and signal and ends at a normal level of L1.     ALIGNMENT: Trace retrolisthesis of L1 on L2, L2 on L3 and L3 on L4.  mild dextroconvex curvature.     BONES: Postsurgical changes of L5-S1 anterior fusion are again demonstrated.  Stable minimal chronic anterior wedging of the T12 vertebral body.  No aggressive bone marrow signal.     PARASPINAL AREA: Normal.     LUMBAR DISC LEVELS:     T12-L1: Minimal disc bulge.  Disc height loss.  Minimal narrowing of the left lateral recess.  No significant spinal canal or foraminal stenosis.     L1-L2: Trace retrolisthesis.  Minimal disc bulge.  Minimal narrowing of the left lateral recess.  No significant spinal canal or foraminal stenosis.     L2-L3: Trace retrolisthesis.  Mild disc bulge.  Mild bilateral facet hypertrophy.  Ligamentum flavum thickening.  Mild narrowing of the bilateral lateral recesses.  No significant spinal canal stenosis.  Mild  bilateral foraminal stenosis.     L3-L4: Trace retrolisthesis.  Minimal disc bulge.  Mild bilateral facet hypertrophy.  Minimal narrowing of the bilateral lateral recesses.  No significant spinal canal stenosis. Mild bilateral foraminal stenosis.     L4-L5: Mild disc bulge. Mild bilateral facet hypertrophy.  Mild left ligamentum flavum thickening.  Minimal narrowing of the bilateral lateral recesses. No significant spinal canal stenosis. Mild bilateral foraminal stenosis.     L5-S1: Fused.  Central disc protrusion and osteophytic ridging.  Mild bilateral facet hypertrophy.  Minimal narrowing of the bilateral lateral recesses.  No significant spinal canal stenosis.  Mild right greater than left foraminal stenosis.       Past Medical History:   Diagnosis Date    Anxiety     Arthritis     Depression     Digestive disorder     ulcers    Fatigue     History of acute bronchitis     History of psychiatric care     Hypertension     Intervertebral disc disorder with radiculopathy of lumbar region 3/23/2021    Psychiatric problem     PVD (peripheral vascular disease)     S/P femoral-femoral bypass surgery 12/1/2021     Past Surgical History:   Procedure Laterality Date    ANTERIOR LUMBAR INTERBODY FUSION (ALIF) Right 03/23/2021    Procedure: FUSION, SPINE, LUMBAR, ALIF RIGHT L5-S1;  Surgeon: Kelly Ayala MD;  Location: [facility] OR;  Service: Neurosurgery;  Laterality: Right;    AORTOGRAPHY WITH EXTREMITY RUNOFF N/A 07/27/2021    Procedure: AORTOGRAM, WITH EXTREMITY RUNOFF;  Surgeon: Jim Briggs MD;  Location: [facility] CATH;  Service: Cardiovascular;  Laterality: N/A;    AORTOGRAPHY WITH SERIALOGRAPHY Bilateral 07/27/2021    Procedure: AORTOGRAM, WITH SERIALOGRAPHY;  Surgeon: Jim Briggs MD;  Location: [facility] CATH;  Service: Cardiovascular;  Laterality: Bilateral;    CREATION OF FEMORAL-FEMORAL ARTERY BYPASS WITH GRAFT Left 11/30/2021    Procedure: CREATION, BYPASS, ARTERIAL, FEMORAL TO FEMORAL, USING GRAFT;  Surgeon: Jim  MD Brigitte;  Location: [facility] OR;  Service: Cardiovascular;  Laterality: Left;    ENDARTERECTOMY OF FEMORAL ARTERY Bilateral 2021    Procedure: ENDARTERECTOMY, FEMORAL;  Surgeon: Jim Briggs MD;  Location: [facility] OR;  Service: Cardiovascular;  Laterality: Bilateral;    FUSION OF LUMBAR SPINE BY ANTERIOR APPROACH  2021    Procedure: FUSION, SPINE, LUMBAR, ANTERIOR APPROACH;  Surgeon: Jmi Briggs MD;  Location: [facility] OR;  Service: Cardiovascular;;    HARVESTING OF BONE GRAFT N/A 2021    Procedure: SURGICAL PROCUREMENT, BONE GRAFT-Iliac;  Surgeon: Kelly Ayala MD;  Location: [facility] OR;  Service: Neurosurgery;  Laterality: N/A;    HYPOSPADIAS CORRECTION      SPINE SURGERY  3/22    VASECTOMY       Social History     Socioeconomic History    Marital status:    Occupational History    Occupation: unemployed     Employer: [employer]   Tobacco Use    Smoking status: Former     Packs/day: 0.25     Years: 42.00     Pack years: 10.50     Types: Cigarettes     Quit date: 2022     Years since quittin.4     Passive exposure: Never    Smokeless tobacco: Never   Substance and Sexual Activity    Alcohol use: Not Currently     Comment: stopped recently - last drink 3/21    Drug use: No    Sexual activity: Yes     Partners: Female, Male     Birth control/protection: Partner-Vasectomy   Other Topics Concern    Patient feels they ought to cut down on drinking/drug use Yes    Patient annoyed by others criticizing their drinking/drug use Yes    Patient has felt bad or guilty about drinking/drug use Yes    Patient has had a drink/used drugs as an eye opener in the AM No     Social Determinants of Health     Financial Resource Strain: Low Risk     Difficulty of Paying Living Expenses: Not hard at all   Food Insecurity: No Food Insecurity    Worried About Running Out of Food in the Last Year: Never true    Ran Out of Food in the Last Year: Never true   Transportation Needs: No Transportation Needs     Lack of Transportation (Medical): No    Lack of Transportation (Non-Medical): No   Physical Activity: Sufficiently Active    Days of Exercise per Week: 5 days    Minutes of Exercise per Session: 150+ min   Stress: No Stress Concern Present    Feeling of Stress : Only a little   Social Connections: Unknown    Frequency of Communication with Friends and Family: More than three times a week    Frequency of Social Gatherings with Friends and Family: Three times a week    Active Member of Clubs or Organizations: No    Attends Club or Organization Meetings: More than 4 times per year    Marital Status:    Housing Stability: Low Risk     Unable to Pay for Housing in the Last Year: No    Number of Places Lived in the Last Year: 1    Unstable Housing in the Last Year: No     Family History   Problem Relation Age of Onset    Diabetes Mother     Hypertension Mother     Diabetes Father     Hypertension Father     Cancer Sister     Muscular dystrophy Son     Arthritis Paternal Grandfather     Heart disease Paternal Grandfather     Birth defects Brother     Cancer Brother     Heart disease Maternal Aunt     Heart disease Paternal Uncle     Suicide Neg Hx     Sexual abuse Neg Hx     Self injury Neg Hx     Seizures Neg Hx     Schizophrenia Neg Hx     Physical abuse Neg Hx     Paranoid behavior Neg Hx     OCD Neg Hx     Drug abuse Neg Hx     Depression Neg Hx     Dementia Neg Hx     Bipolar disorder Neg Hx     Anxiety disorder Neg Hx     Alcohol abuse Neg Hx        Review of patient's allergies indicates:   Allergen Reactions    Blueberry        Current Outpatient Medications   Medication Sig    alfuzosin (UROXATRAL) 10 mg Tb24 Take 1 tablet (10 mg total) by mouth daily with breakfast.    aspirin (ECOTRIN) 81 MG EC tablet Take 1 tablet (81 mg total) by mouth once daily.    atenoloL (TENORMIN) 25 MG tablet TAKE 1 TABLET BY MOUTH EVERY DAY IN THE MORNING    buPROPion (WELLBUTRIN XL) 150 MG TB24 tablet Take 1 tablet (150 mg  total) by mouth once daily.    clopidogreL (PLAVIX) 75 mg tablet TAKE 1 TABLET BY MOUTH EVERY DAY    DENTA 5000 PLUS 1.1 % Crea Take 1 application by mouth 2 (two) times daily.    famotidine (PEPCID) 20 MG tablet Take 20 mg by mouth every evening.    gabapentin (GRALISE 30-DAY STARTER PACK) 300 mg (9)- 600 mg (69) Tb24     lisinopriL (PRINIVIL,ZESTRIL) 5 MG tablet TAKE 1 TABLET BY MOUTH EVERY DAY    multivitamin (THERAGRAN) per tablet Take 1 tablet by mouth once daily.    pantoprazole (PROTONIX) 40 MG tablet TAKE 1 TABLET BY MOUTH EVERY DAY    rosuvastatin (CRESTOR) 5 MG tablet Take 1 tablet (5 mg total) by mouth every evening.    tadalafiL (CIALIS) 20 MG Tab Take 1 tablet (20 mg total) by mouth daily as needed (take 1/4, 1/2, or 1 full tablet as needed for erectile dysfunction).    tiZANidine (ZANAFLEX) 4 MG tablet     vitamin D3-folic acid 250 mcg (10,000 unit)-1 mg Tab Take by mouth Daily.    amitriptyline (ELAVIL) 25 MG tablet Take 1 tablet (25 mg total) by mouth nightly as needed for Insomnia.     No current facility-administered medications for this visit.       Review of Systems     GENERAL:  No weight loss, malaise or fevers.  HEENT:   No recent changes in vision or hearing  NECK:  Negative for lumps, no difficulty with swallowing.  RESPIRATORY:  Negative for cough, wheezing or shortness of breath, patient denies any recent URI.  CARDIOVASCULAR:  Negative for chest pain, leg swelling or palpitations.  GI:  Negative for abdominal discomfort, blood in stools or black stools or change in bowel habits.  MUSCULOSKELETAL:  See HPI.  SKIN:  Negative for lesions, rash, and itching.  PSYCH:  No mood disorder or recent psychosocial stressors.  Patients sleep is not disturbed secondary to pain.  HEMATOLOGY/LYMPHOLOGY:  Negative for prolonged bleeding, bruising easily or swollen nodes.  Patient is currently taking anti-coagulants-aspirin and Plavix  NEURO:   No history of headaches, syncope, paralysis, seizures or  "tremors.  All other reviewed and negative other than HPI.    OBJECTIVE:    BP (!) 152/62   Pulse 65   Resp 17   Ht 5' 6" (1.676 m)   Wt 73.5 kg (162 lb)   BMI 26.15 kg/m²         Physical Exam    GENERAL: Well appearing, in no acute distress, alert and oriented x3.  PSYCH:  Mood and affect appropriate.  SKIN: Skin color, texture, turgor normal, no rashes or lesions.  HEAD/FACE:  Normocephalic, atraumatic. Cranial nerves grossly intact.  NECK:  Moderate pain to palpation over the cervical paraspinous muscles. Spurling negative to radicular pain.  Moderate pain with neck flexion, extension, and lateral flexion.  Axial loading positive bilaterally.  CV: RRR with palpation of the radial artery.  PULM: No evidence of respiratory difficulty, symmetric chest rise.  GI:  Soft and non-tender.  BACK:Normal range of motion without pain reproduction.  EXTREMITIES: Peripheral joint ROM is full and pain free without obvious instability or laxity in all four extremities. No deformities, edema, or skin discoloration. Good capillary refill.  MUSCULOSKELETAL:  Mild impingement signs of the left shoulder with Palomino, Neer's, and empty can..  Bilateral upper and lower extremity strength is normal and symmetric.  No atrophy or tone abnormalities are noted.  NEURO: Bilateral upper and lower extremity coordination and muscle stretch reflexes are physiologic and symmetric.  Plantar response are downgoing.  Negative Zbigniew.  No clonus.  No loss of sensation is noted.  GAIT: normal.      LABS:  Lab Results   Component Value Date    WBC 5.25 02/24/2023    HGB 11.1 (L) 02/24/2023    HCT 35.1 (L) 02/24/2023    MCV 93 02/24/2023     02/24/2023       CMP  Sodium   Date Value Ref Range Status   02/24/2023 141 136 - 145 mmol/L Final   02/24/2023 141 136 - 145 mmol/L Final     Potassium   Date Value Ref Range Status   02/24/2023 4.5 3.5 - 5.1 mmol/L Final   02/24/2023 4.5 3.5 - 5.1 mmol/L Final     Chloride   Date Value Ref Range " Status   02/24/2023 109 95 - 110 mmol/L Final   02/24/2023 109 95 - 110 mmol/L Final     CO2   Date Value Ref Range Status   02/24/2023 23 23 - 29 mmol/L Final   02/24/2023 23 23 - 29 mmol/L Final     Glucose   Date Value Ref Range Status   02/24/2023 92 70 - 110 mg/dL Final   02/24/2023 92 70 - 110 mg/dL Final     BUN   Date Value Ref Range Status   02/24/2023 3 (L) 8 - 23 mg/dL Final   02/24/2023 3 (L) 8 - 23 mg/dL Final     Creatinine   Date Value Ref Range Status   02/24/2023 1.1 0.5 - 1.4 mg/dL Final   02/24/2023 1.1 0.5 - 1.4 mg/dL Final     Calcium   Date Value Ref Range Status   02/24/2023 8.8 8.7 - 10.5 mg/dL Final   02/24/2023 8.8 8.7 - 10.5 mg/dL Final     Total Protein   Date Value Ref Range Status   02/24/2023 6.2 6.0 - 8.4 g/dL Final   02/24/2023 6.2 6.0 - 8.4 g/dL Final     Albumin   Date Value Ref Range Status   02/24/2023 3.9 3.5 - 5.2 g/dL Final   02/24/2023 3.9 3.5 - 5.2 g/dL Final     Total Bilirubin   Date Value Ref Range Status   02/24/2023 0.4 0.1 - 1.0 mg/dL Final     Comment:     For infants and newborns, interpretation of results should be based  on gestational age, weight and in agreement with clinical  observations.    Premature Infant recommended reference ranges:  Up to 24 hours.............<8.0 mg/dL  Up to 48 hours............<12.0 mg/dL  3-5 days..................<15.0 mg/dL  6-29 days.................<15.0 mg/dL     02/24/2023 0.4 0.1 - 1.0 mg/dL Final     Comment:     For infants and newborns, interpretation of results should be based  on gestational age, weight and in agreement with clinical  observations.    Premature Infant recommended reference ranges:  Up to 24 hours.............<8.0 mg/dL  Up to 48 hours............<12.0 mg/dL  3-5 days..................<15.0 mg/dL  6-29 days.................<15.0 mg/dL       Alkaline Phosphatase   Date Value Ref Range Status   02/24/2023 83 55 - 135 U/L Final   02/24/2023 83 55 - 135 U/L Final     AST   Date Value Ref Range Status    02/24/2023 27 10 - 40 U/L Final   02/24/2023 27 10 - 40 U/L Final     ALT   Date Value Ref Range Status   02/24/2023 18 10 - 44 U/L Final   02/24/2023 18 10 - 44 U/L Final     Anion Gap   Date Value Ref Range Status   02/24/2023 9 8 - 16 mmol/L Final   02/24/2023 9 8 - 16 mmol/L Final     eGFR if    Date Value Ref Range Status   06/06/2022 >60.0 >60 mL/min/1.73 m^2 Final     eGFR if non    Date Value Ref Range Status   06/06/2022 >60.0 >60 mL/min/1.73 m^2 Final     Comment:     Calculation used to obtain the estimated glomerular filtration  rate (eGFR) is the CKD-EPI equation.          Lab Results   Component Value Date    HGBA1C 5.2 02/24/2023             ASSESSMENT: 63 y.o. year old male with neck and upper extremity pain, consistent with     1. DDD (degenerative disc disease), cervical        2. Cervical spondylosis        3. Cervical stenosis of spinal canal        4. Cervical radiculopathy              PLAN:   - Interventions:   Obtaining records from outside pain clinic to determine what types of injections patient recently had on the cervical spine.  If he has had cervical GEORGIE that has failed, will consider cervical medial branch blocks for diagnostic purposes targeting C4-6 bilaterally.  If those provide significant relief we will then consider cervical RFA at the same levels.  If there is no relief with the cervical medial branch blocks, will likely refer to spine surgery for cervical decompression and fusion due to his concurrent pain likely related to cervical stenosis and radiculopathy.  Patient like to avoid surgery if possible though.    - Anticoagulation use: Patient is taking  aspirin and Plavix    - Medications: I have stressed the importance of physical activity and a home exercise plan to help with pain and improve health. and Patient can continue with medications for now since they are providing benefits, using them appropriately, and without side effects.      Add Elavil 25 mg nightly to help with neuropathic pain and sleep disturbance        - Therapy:  Advised patient continue with activities and therapy as tolerated    - Labs:  Reviewed    - Imaging: Reviewed available imaging with patient and answered any questions they had regarding study.    - Consults/Referrals:  None at this time    - Records:  Attempt to obtain records from Dr. Sabino Barragan.  Reviewed/Obtain old records from outside physicians and imaging    - Follow up visit: return to clinic as needed    - Counseled patient regarding the importance of activity modification and physical therapy    - This condition does not require this patient to take time off of work, and the primary goal of our Pain Management services is to improve the patient's functional capacity.    - Patient Questions: Answered all of the patient's questions regarding diagnosis, therapy, and treatment        The above plan and management options were discussed at length with patient. Patient is in agreement with the above and verbalized understanding.    I discussed the goals of interventional chronic pain management with the patient on today's visit.  I explained the utility of injections for diagnostic and therapeutic purposes.  We discussed a multimodal approach to pain including treating the patient's given worst pain at any given time.  We will use a systematic approach to addressing pain.  We will also adopt a multimodal approach that includes injections, adjuvant medications, physical therapy, at times psychiatry.  There may be a limited role for opioid use intermittently in the treatment of pain, more particularly for acute pain although no one approach can be used as a sole treatment modality.    I emphasized the importance of regular exercise, core strengthening and stretching, diet and weight loss as a cornerstone of long-term pain management.      Gurwinder Cramer MD  Interventional Pain Management  [facility]  [place]    Disclaimer:  This note was prepared using voice recognition system and is likely to have sound alike errors that may have been overlooked even after proof reading.  Please call me with any questions

## 2023-03-05 ENCOUNTER — PATIENT MESSAGE (OUTPATIENT)
Dept: PAIN MEDICINE | Facility: CLINIC | Age: 63
End: 2023-03-05
Payer: COMMERCIAL

## 2023-03-06 ENCOUNTER — PATIENT MESSAGE (OUTPATIENT)
Dept: PAIN MEDICINE | Facility: CLINIC | Age: 63
End: 2023-03-06
Payer: COMMERCIAL

## 2023-03-06 PROBLEM — Z87.440 HISTORY OF UTI: Status: RESOLVED | Noted: 2022-11-30 | Resolved: 2023-03-06

## 2023-03-07 ENCOUNTER — CLINICAL SUPPORT (OUTPATIENT)
Dept: SMOKING CESSATION | Facility: CLINIC | Age: 63
End: 2023-03-07
Payer: COMMERCIAL

## 2023-03-07 DIAGNOSIS — F17.200 NICOTINE DEPENDENCE: Primary | ICD-10-CM

## 2023-03-07 PROCEDURE — 99404 PR PREVENT COUNSEL,INDIV,60 MIN: ICD-10-PCS | Mod: S$GLB,,,

## 2023-03-07 PROCEDURE — 99999 PR PBB SHADOW E&M-EST. PATIENT-LVL II: ICD-10-PCS | Mod: PBBFAC,,,

## 2023-03-07 PROCEDURE — 99404 PREV MED CNSL INDIV APPRX 60: CPT | Mod: S$GLB,,,

## 2023-03-07 PROCEDURE — 99999 PR PBB SHADOW E&M-EST. PATIENT-LVL II: CPT | Mod: PBBFAC,,,

## 2023-03-07 NOTE — PROGRESS NOTES
Individual Follow-Up Form    3/7/2023    Quit Date: 9/29/22    Clinical Status of Patient: Outpatient    Length of Service: 60 minutes    Continuing Medication: no    Other Medications: Wellbutrin (through his PCP)     Target Symptoms: Withdrawal and medication side effects. The following were  rated moderate (3) to severe (4) on TCRS:  Moderate (3): none  Severe (4): none    Comments: The patient was seen in the clinic for smoking cessation follow up.  Patient remains quit as of 9/29/22. Commended patient for this continued quit. Per Smokerlyzer CO 2 ppm,(<6 is a non-smoker). Patient recently went to his PCP to request getting on Wellbutrin for everyday use as he is done taking it for smoking cessation. He has started taking the medication and already feels some relief. Session Focus: completion of TCRS (Tobacco Cessation Rating Scale) reviewed strategies, controlling environment, cues, triggers, new goals set. Introduced high risk situations with preparation interventions, caffeine similarities with withdrawal issues of habit and nicotine, Alcohol, Understanding urges, cravings, stress and relaxation. Open discussion with intervention discussion.The patient denies any abnormal behavioral or mental changes at this time. The patient will continue with  therapy sessions and medication monitoring by CTTS.       Diagnosis: F17.210    Next Visit: 1 week

## 2023-03-07 NOTE — Clinical Note
The patient was seen in the clinic for group smoking cessation follow up.  Patient remains quit as of 9/29/22. Commended patient for this continued quit. Per Smokerlyzer CO 2 ppm,(<6 is a non-smoker). Patient recently went to his PCP to request getting on Wellbutrin for everyday use as he is done taking it for smoking cessation. He has started taking the medication and already feels some relief. Session Focus: completion of TCRS (Tobacco Cessation Rating Scale) reviewed strategies, controlling environment, cues, triggers, new goals set. Introduced high risk situations with preparation interventions, caffeine similarities with withdrawal issues of habit and nicotine, Alcohol, Understanding urges, cravings, stress and relaxation. Open discussion with intervention discussion.The patient denies any abnormal behavioral or mental changes at this time. The patient will continue with  therapy sessions and medication monitoring by CTTS.

## 2023-03-13 ENCOUNTER — PATIENT MESSAGE (OUTPATIENT)
Dept: PAIN MEDICINE | Facility: CLINIC | Age: 63
End: 2023-03-13
Payer: COMMERCIAL

## 2023-03-15 ENCOUNTER — TELEPHONE (OUTPATIENT)
Dept: CARDIOLOGY | Facility: CLINIC | Age: 63
End: 2023-03-15
Payer: COMMERCIAL

## 2023-03-15 NOTE — TELEPHONE ENCOUNTER
----- Message from Boone Paniagua sent at 3/15/2023  8:09 AM CDT -----  Who Called: wife         What is the reqeust in detail: Requesting call back to discuss if ultrasound was pre approved.          Can the clinic reply by [facility]? no         Best Call Back Number: [phone]           Additional Information:

## 2023-03-16 ENCOUNTER — PATIENT MESSAGE (OUTPATIENT)
Dept: PAIN MEDICINE | Facility: CLINIC | Age: 63
End: 2023-03-16
Payer: COMMERCIAL

## 2023-03-16 ENCOUNTER — CLINICAL SUPPORT (OUTPATIENT)
Dept: CARDIOLOGY | Facility: HOSPITAL | Age: 63
End: 2023-03-16
Attending: INTERNAL MEDICINE
Payer: COMMERCIAL

## 2023-03-16 DIAGNOSIS — Z95.828 S/P FEMORAL-FEMORAL BYPASS SURGERY: ICD-10-CM

## 2023-03-16 DIAGNOSIS — I77.9 PERIPHERAL ARTERIAL OCCLUSIVE DISEASE: ICD-10-CM

## 2023-03-16 PROCEDURE — 93925 LOWER EXTREMITY STUDY: CPT | Mod: 26,,, | Performed by: INTERNAL MEDICINE

## 2023-03-16 PROCEDURE — 93925 LOWER EXTREMITY STUDY: CPT | Mod: PO

## 2023-03-16 PROCEDURE — 93925 CV US DOPPLER ARTERIAL LEGS BILATERAL (CUPID ONLY): ICD-10-PCS | Mod: 26,,, | Performed by: INTERNAL MEDICINE

## 2023-03-17 LAB
LEFT ANT TIBIAL SYS PSV: 49 CM/S
LEFT CFA PSV: 196 CM/S
LEFT PERONEAL SYS PSV: 25 CM/S
LEFT POPLITEAL PSV: 41 CM/S
LEFT POST TIBIAL SYS PSV: 37 CM/S
LEFT PROFUNDA SYS PSV: 77 CM/S
LEFT SUPER FEMORAL DIST SYS PSV: 48 CM/S
LEFT SUPER FEMORAL MID SYS PSV: 61 CM/S
LEFT SUPER FEMORAL OSTIAL SYS PSV: 137 CM/S
LEFT SUPER FEMORAL PROX SYS PSV: 50 CM/S
LEFT TIB/PER TRUNK SYS PSV: 53 CM/S
OHS CV LEFT LOWER EXTREMITY ABI (NO CALC): 0.9
OHS CV LOWER EXTREMITY GRAFT MEASUREMENTS - RIGHT - G1 - D: 64
OHS CV LOWER EXTREMITY GRAFT MEASUREMENTS - RIGHT - G1 - DA: 59
OHS CV LOWER EXTREMITY GRAFT MEASUREMENTS - RIGHT - G1 - M: 54
OHS CV LOWER EXTREMITY GRAFT MEASUREMENTS - RIGHT - G1 - P: 63
OHS CV LOWER EXTREMITY GRAFT MEASUREMENTS - RIGHT - G1 - PA: 69
OHS CV RIGHT ABI LOWER EXTREMITY (NO CALC): 0.7
RIGHT ANT TIBIAL SYS PSV: 47 CM/S
RIGHT CFA PSV: 196 CM/S
RIGHT DIST ANA PSV: 59 CM/S
RIGHT DIST GRAFT PSV: 64 CM/S
RIGHT INFLOW PSV: 191 CM/S
RIGHT MID GRAFT PSV: 54 CM/S
RIGHT OUTFLOW PSV: 87 CM/S
RIGHT PERONEAL SYS PSV: 38 CM/S
RIGHT POPLITEAL PSV: 80 CM/S
RIGHT POST TIBIAL SYS PSV: 53 CM/S
RIGHT PROFUNDA SYS PSV: 77 CM/S
RIGHT PROX ANA PSV: 69 CM/S
RIGHT PROX GRAFT PSV: 63 CM/S
RIGHT SUPER FEMORAL DIST SYS PSV: 46 CM/S
RIGHT SUPER FEMORAL MID SYS PSV: 58 CM/S
RIGHT SUPER FEMORAL OSTIAL SYS PSV: 31 CM/S
RIGHT SUPER FEMORAL PROX SYS PSV: 60 CM/S
RIGHT TIB/PER TRUNK SYS PSV: 61 CM/S

## 2023-03-23 ENCOUNTER — OFFICE VISIT (OUTPATIENT)
Dept: CARDIOLOGY | Facility: CLINIC | Age: 63
End: 2023-03-23
Payer: COMMERCIAL

## 2023-03-23 VITALS
BODY MASS INDEX: 25.51 KG/M2 | HEIGHT: 66 IN | DIASTOLIC BLOOD PRESSURE: 72 MMHG | WEIGHT: 158.75 LBS | HEART RATE: 83 BPM | SYSTOLIC BLOOD PRESSURE: 130 MMHG

## 2023-03-23 DIAGNOSIS — I77.9 PERIPHERAL ARTERIAL OCCLUSIVE DISEASE: Primary | Chronic | ICD-10-CM

## 2023-03-23 DIAGNOSIS — I10 ESSENTIAL HYPERTENSION: Chronic | ICD-10-CM

## 2023-03-23 DIAGNOSIS — Z79.02 LONG TERM (CURRENT) USE OF ANTITHROMBOTICS/ANTIPLATELETS: Chronic | ICD-10-CM

## 2023-03-23 DIAGNOSIS — E78.5 DYSLIPIDEMIA (HIGH LDL; LOW HDL): Chronic | ICD-10-CM

## 2023-03-23 DIAGNOSIS — I65.21 CAROTID STENOSIS, ASYMPTOMATIC, RIGHT: Chronic | ICD-10-CM

## 2023-03-23 DIAGNOSIS — I77.810 MILD ASCENDING AORTA DILATATION: Chronic | ICD-10-CM

## 2023-03-23 PROCEDURE — 99214 OFFICE O/P EST MOD 30 MIN: CPT | Mod: S$GLB,,, | Performed by: INTERNAL MEDICINE

## 2023-03-23 PROCEDURE — 99214 PR OFFICE/OUTPT VISIT, EST, LEVL IV, 30-39 MIN: ICD-10-PCS | Mod: S$GLB,,, | Performed by: INTERNAL MEDICINE

## 2023-03-23 PROCEDURE — 4010F ACE/ARB THERAPY RXD/TAKEN: CPT | Mod: CPTII,S$GLB,, | Performed by: INTERNAL MEDICINE

## 2023-03-23 PROCEDURE — 99999 PR PBB SHADOW E&M-EST. PATIENT-LVL IV: CPT | Mod: PBBFAC,,, | Performed by: INTERNAL MEDICINE

## 2023-03-23 PROCEDURE — 1159F MED LIST DOCD IN RCRD: CPT | Mod: CPTII,S$GLB,, | Performed by: INTERNAL MEDICINE

## 2023-03-23 PROCEDURE — 3075F SYST BP GE 130 - 139MM HG: CPT | Mod: CPTII,S$GLB,, | Performed by: INTERNAL MEDICINE

## 2023-03-23 PROCEDURE — 4010F PR ACE/ARB THEARPY RXD/TAKEN: ICD-10-PCS | Mod: CPTII,S$GLB,, | Performed by: INTERNAL MEDICINE

## 2023-03-23 PROCEDURE — 3008F BODY MASS INDEX DOCD: CPT | Mod: CPTII,S$GLB,, | Performed by: INTERNAL MEDICINE

## 2023-03-23 PROCEDURE — 3075F PR MOST RECENT SYSTOLIC BLOOD PRESS GE 130-139MM HG: ICD-10-PCS | Mod: CPTII,S$GLB,, | Performed by: INTERNAL MEDICINE

## 2023-03-23 PROCEDURE — 3008F PR BODY MASS INDEX (BMI) DOCUMENTED: ICD-10-PCS | Mod: CPTII,S$GLB,, | Performed by: INTERNAL MEDICINE

## 2023-03-23 PROCEDURE — 99999 PR PBB SHADOW E&M-EST. PATIENT-LVL IV: ICD-10-PCS | Mod: PBBFAC,,, | Performed by: INTERNAL MEDICINE

## 2023-03-23 PROCEDURE — 3078F DIAST BP <80 MM HG: CPT | Mod: CPTII,S$GLB,, | Performed by: INTERNAL MEDICINE

## 2023-03-23 PROCEDURE — 3044F HG A1C LEVEL LT 7.0%: CPT | Mod: CPTII,S$GLB,, | Performed by: INTERNAL MEDICINE

## 2023-03-23 PROCEDURE — 1159F PR MEDICATION LIST DOCUMENTED IN MEDICAL RECORD: ICD-10-PCS | Mod: CPTII,S$GLB,, | Performed by: INTERNAL MEDICINE

## 2023-03-23 PROCEDURE — 3044F PR MOST RECENT HEMOGLOBIN A1C LEVEL <7.0%: ICD-10-PCS | Mod: CPTII,S$GLB,, | Performed by: INTERNAL MEDICINE

## 2023-03-23 PROCEDURE — 3078F PR MOST RECENT DIASTOLIC BLOOD PRESSURE < 80 MM HG: ICD-10-PCS | Mod: CPTII,S$GLB,, | Performed by: INTERNAL MEDICINE

## 2023-03-23 NOTE — PROGRESS NOTES
Subjective:    Patient ID:  Sal Villarreal Jr. is a 63 y.o. male who presents for Peripheral arterial occlusive disease        HPI    DISCUSSED TEST CMP OK LDL A 25 HDL 21, TRIGLYCERIDE, NEGATIVE VENOUS DOPPLER FOR EDEMA, ARTERIAL DOPPLER PATENT FEM-FEM BYPASS RASHEED 0.7 ON THE RIGHT 0.9 ON THE LEFT, DOING OK, LESS EDEMA,STOPPED SMOKING, SEE ROS  Past Medical History:   Diagnosis Date    Anxiety     Arthritis     Depression     Digestive disorder     ulcers    Fatigue     History of acute bronchitis     History of psychiatric care     Hypertension     Intervertebral disc disorder with radiculopathy of lumbar region 3/23/2021    Psychiatric problem     PVD (peripheral vascular disease)     S/P femoral-femoral bypass surgery 12/1/2021     Past Surgical History:   Procedure Laterality Date    ANTERIOR LUMBAR INTERBODY FUSION (ALIF) Right 03/23/2021    Procedure: FUSION, SPINE, LUMBAR, ALIF RIGHT L5-S1;  Surgeon: Kelly Ayala MD;  Location: [facility];  Service: Neurosurgery;  Laterality: Right;    AORTOGRAPHY WITH EXTREMITY RUNOFF N/A 07/27/2021    Procedure: AORTOGRAM, WITH EXTREMITY RUNOFF;  Surgeon: Jim Briggs MD;  Location: [facility] CATH;  Service: Cardiovascular;  Laterality: N/A;    AORTOGRAPHY WITH SERIALOGRAPHY Bilateral 07/27/2021    Procedure: AORTOGRAM, WITH SERIALOGRAPHY;  Surgeon: Jim Briggs MD;  Location: [facility] CATH;  Service: Cardiovascular;  Laterality: Bilateral;    CREATION OF FEMORAL-FEMORAL ARTERY BYPASS WITH GRAFT Left 11/30/2021    Procedure: CREATION, BYPASS, ARTERIAL, FEMORAL TO FEMORAL, USING GRAFT;  Surgeon: Jim Briggs MD;  Location: [facility] OR;  Service: Cardiovascular;  Laterality: Left;    ENDARTERECTOMY OF FEMORAL ARTERY Bilateral 11/30/2021    Procedure: ENDARTERECTOMY, FEMORAL;  Surgeon: Jim Briggs MD;  Location: [facility];  Service: Cardiovascular;  Laterality: Bilateral;    FUSION OF LUMBAR SPINE BY ANTERIOR APPROACH  03/23/2021    Procedure: FUSION, SPINE, LUMBAR,  ANTERIOR APPROACH;  Surgeon: Jim Briggs MD;  Location: [facility] OR;  Service: Cardiovascular;;    HARVESTING OF BONE GRAFT N/A 2021    Procedure: SURGICAL PROCUREMENT, BONE GRAFT-Iliac;  Surgeon: Kelly Ayala MD;  Location: [facility] OR;  Service: Neurosurgery;  Laterality: N/A;    HYPOSPADIAS CORRECTION      SPINE SURGERY  3/22    VASECTOMY       Family History   Problem Relation Age of Onset    Diabetes Mother     Hypertension Mother     Diabetes Father     Hypertension Father     Cancer Sister     Muscular dystrophy Son     Arthritis Paternal Grandfather     Heart disease Paternal Grandfather     Birth defects Brother     Cancer Brother     Heart disease Maternal Aunt     Heart disease Paternal Uncle     Suicide Neg Hx     Sexual abuse Neg Hx     Self injury Neg Hx     Seizures Neg Hx     Schizophrenia Neg Hx     Physical abuse Neg Hx     Paranoid behavior Neg Hx     OCD Neg Hx     Drug abuse Neg Hx     Depression Neg Hx     Dementia Neg Hx     Bipolar disorder Neg Hx     Anxiety disorder Neg Hx     Alcohol abuse Neg Hx      Social History     Socioeconomic History    Marital status:    Occupational History    Occupation: unemployed     Employer: [employer]   Tobacco Use    Smoking status: Former     Packs/day: 0.25     Years: 42.00     Pack years: 10.50     Types: Cigarettes     Quit date: 2022     Years since quittin.4     Passive exposure: Never    Smokeless tobacco: Never   Substance and Sexual Activity    Alcohol use: Not Currently     Comment: stopped recently - last drink 3/21    Drug use: No    Sexual activity: Yes     Partners: Female, Male     Birth control/protection: Partner-Vasectomy   Other Topics Concern    Patient feels they ought to cut down on drinking/drug use Yes    Patient annoyed by others criticizing their drinking/drug use Yes    Patient has felt bad or guilty about drinking/drug use Yes    Patient has had a  drink/used drugs as an eye opener in the AM No     Social Determinants of Health     Financial Resource Strain: Low Risk     Difficulty of Paying Living Expenses: Not hard at all   Food Insecurity: No Food Insecurity    Worried About Running Out of Food in the Last Year: Never true    Ran Out of Food in the Last Year: Never true   Transportation Needs: No Transportation Needs    Lack of Transportation (Medical): No    Lack of Transportation (Non-Medical): No   Physical Activity: Insufficiently Active    Days of Exercise per Week: 5 days    Minutes of Exercise per Session: 20 min   Stress: No Stress Concern Present    Feeling of Stress : Only a little   Social Connections: Unknown    Frequency of Communication with Friends and Family: Twice a week    Frequency of Social Gatherings with Friends and Family: Once a week    Active Member of Clubs or Organizations: Yes    Attends Club or Organization Meetings: More than 4 times per year    Marital Status:    Housing Stability: Low Risk     Unable to Pay for Housing in the Last Year: No    Number of Places Lived in the Last Year: 1    Unstable Housing in the Last Year: No       Review of patient's allergies indicates:   Allergen Reactions    Blueberry        Current Outpatient Medications:     alfuzosin (UROXATRAL) 10 mg Tb24, Take 1 tablet (10 mg total) by mouth daily with breakfast., Disp: 30 tablet, Rfl: 11    amitriptyline (ELAVIL) 25 MG tablet, Take 1 tablet (25 mg total) by mouth nightly as needed for Insomnia., Disp: 30 tablet, Rfl: 2    aspirin (ECOTRIN) 81 MG EC tablet, Take 1 tablet (81 mg total) by mouth once daily., Disp: 90 tablet, Rfl: 0    atenoloL (TENORMIN) 25 MG tablet, TAKE 1 TABLET BY MOUTH EVERY DAY IN THE MORNING, Disp: 90 tablet, Rfl: 3    buPROPion (WELLBUTRIN XL) 150 MG TB24 tablet, Take 1 tablet (150 mg total) by mouth once daily., Disp: 30 tablet, Rfl: 11    clopidogreL (PLAVIX) 75 mg tablet, TAKE 1 TABLET BY MOUTH  EVERY DAY, Disp: 90 tablet, Rfl: 3    DENTA 5000 PLUS 1.1 % Crea, Take 1 application by mouth 2 (two) times daily., Disp: , Rfl:     famotidine (PEPCID) 20 MG tablet, Take 20 mg by mouth every evening., Disp: , Rfl:     gabapentin (GRALISE 30-DAY STARTER PACK) 300 mg (9)- 600 mg (69) Tb24, , Disp: , Rfl:     lisinopriL (PRINIVIL,ZESTRIL) 5 MG tablet, TAKE 1 TABLET BY MOUTH EVERY DAY, Disp: 90 tablet, Rfl: 0    multivitamin (THERAGRAN) per tablet, Take 1 tablet by mouth once daily., Disp: , Rfl:     pantoprazole (PROTONIX) 40 MG tablet, TAKE 1 TABLET BY MOUTH EVERY DAY, Disp: 90 tablet, Rfl: 4    rosuvastatin (CRESTOR) 5 MG tablet, Take 1 tablet (5 mg total) by mouth every evening., Disp: 90 tablet, Rfl: 4    tadalafiL (CIALIS) 20 MG Tab, Take 1 tablet (20 mg total) by mouth daily as needed (take 1/4, 1/2, or 1 full tablet as needed for erectile dysfunction)., Disp: 10 tablet, Rfl: 11    tiZANidine (ZANAFLEX) 4 MG tablet, , Disp: , Rfl:     vitamin D3-folic acid 250 mcg (10,000 unit)-1 mg Tab, Take by mouth Daily., Disp: , Rfl:     Review of Systems   Constitutional: Negative for chills, diaphoresis, fever, malaise/fatigue and night sweats.   HENT:  Negative for congestion and nosebleeds.    Eyes:  Negative for blurred vision and visual disturbance.   Cardiovascular:  Negative for chest pain, claudication (MILD), cyanosis, dyspnea on exertion, irregular heartbeat, leg swelling (LESS), near-syncope, orthopnea, palpitations, paroxysmal nocturnal dyspnea and syncope.   Respiratory:  Negative for cough, hemoptysis, shortness of breath and wheezing.    Endocrine: Negative.    Hematologic/Lymphatic: Negative for adenopathy. Does not bruise/bleed easily.   Skin:  Negative for color change and rash.   Musculoskeletal:  Positive for neck pain (HAD INJECTIONS). Negative for back pain (SOME) and falls.   Gastrointestinal:  Negative for abdominal pain, change in bowel habit, dysphagia, jaundice, melena and nausea.  "  Genitourinary:  Negative for dysuria and flank pain.   Neurological:  Negative for brief paralysis, focal weakness, headaches, light-headedness, loss of balance, paresthesias and weakness.   Psychiatric/Behavioral:  Negative for altered mental status and depression.    Allergic/Immunologic: Negative for hives and persistent infections.      Objective:      Vitals:    03/23/23 1605 03/23/23 1622   BP: (!) 141/94 130/72   Pulse: 83    Weight: 72 kg (158 lb 11.7 oz)    Height: 5' 6" (1.676 m)    PainSc: 0-No pain      Body mass index is 25.62 kg/m².    Physical Exam  HENT:      Head: Normocephalic and atraumatic.   Eyes:      Conjunctiva/sclera: Conjunctivae normal.      Pupils: Pupils are equal, round, and reactive to light.   Cardiovascular:      Rate and Rhythm: Normal rate and regular rhythm. No extrasystoles are present.     Pulses:           Carotid pulses are 2+ on the right side with bruit and 2+ on the left side.       Radial pulses are 2+ on the right side and 2+ on the left side.        Posterior tibial pulses are 1+ on the right side and 1+ on the left side.      Heart sounds: Murmur heard.   Systolic murmur is present.     No friction rub. No gallop.   Pulmonary:      Effort: Pulmonary effort is normal. No respiratory distress.      Breath sounds: Normal breath sounds and air entry.   Abdominal:      Palpations: Abdomen is soft.      Tenderness: There is no abdominal tenderness.       Musculoskeletal:      Cervical back: Neck supple.      Right lower leg: No edema.      Left lower leg: No edema.   Skin:     General: Skin is warm and dry.      Capillary Refill: Capillary refill takes less than 2 seconds.   Neurological:      General: No focal deficit present.      Mental Status: He is alert.   Psychiatric:         Mood and Affect: Mood normal.         Speech: Speech normal.         Behavior: Behavior normal.               ..    Chemistry        Component Value Date/Time     02/24/2023 0725     " 02/24/2023 0725    K 4.5 02/24/2023 0725    K 4.5 02/24/2023 0725     02/24/2023 0725     02/24/2023 0725    CO2 23 02/24/2023 0725    CO2 23 02/24/2023 0725    BUN 3 (L) 02/24/2023 0725    BUN 3 (L) 02/24/2023 0725    CREATININE 1.1 02/24/2023 0725    CREATININE 1.1 02/24/2023 0725    GLU 92 02/24/2023 0725    GLU 92 02/24/2023 0725        Component Value Date/Time    CALCIUM 8.8 02/24/2023 0725    CALCIUM 8.8 02/24/2023 0725    ALKPHOS 83 02/24/2023 0725    ALKPHOS 83 02/24/2023 0725    AST 27 02/24/2023 0725    AST 27 02/24/2023 0725    ALT 18 02/24/2023 0725    ALT 18 02/24/2023 0725    BILITOT 0.4 02/24/2023 0725    BILITOT 0.4 02/24/2023 0725    ESTGFRAFRICA >60.0 06/06/2022 1037    EGFRNONAA >60.0 06/06/2022 1037            ..  Lab Results   Component Value Date    CHOL 86 (L) 02/24/2023    CHOL 86 (L) 02/24/2023    CHOL 109 (L) 12/01/2022     Lab Results   Component Value Date    HDL 21 (L) 02/24/2023    HDL 21 (L) 02/24/2023    HDL 27 (L) 12/01/2022     Lab Results   Component Value Date    LDLCALC 25.4 (L) 02/24/2023    LDLCALC 25.4 (L) 02/24/2023    LDLCALC 30.0 (L) 12/01/2022     Lab Results   Component Value Date    TRIG 198 (H) 02/24/2023    TRIG 198 (H) 02/24/2023    TRIG 260 (H) 12/01/2022     Lab Results   Component Value Date    CHOLHDL 24.4 02/24/2023    CHOLHDL 24.4 02/24/2023    CHOLHDL 24.8 12/01/2022     ..  Lab Results   Component Value Date    WBC 5.25 02/24/2023    HGB 11.1 (L) 02/24/2023    HCT 35.1 (L) 02/24/2023    MCV 93 02/24/2023     02/24/2023       Test(s) Reviewed  I have reviewed the following in detail:  [] Stress test   [] Angiography   [] Echocardiogram   [x] Labs   [x] Other:       Assessment:         ICD-10-CM ICD-9-CM   1. Peripheral arterial occlusive disease  I77.9 444.22   2. Mild ascending aorta dilatation  I77.810 447.71   3. Carotid stenosis, asymptomatic, right  I65.21 433.10   4. Essential hypertension  I10 401.9   5. Dyslipidemia (high LDL; low  HDL)  E78.5 272.4   6. Long term (current) use of antithrombotics/antiplatelets  Z79.02 V58.63     Problem List Items Addressed This Visit          Cardiac/Vascular    Essential hypertension (Chronic)    Overview     CHRONIC.  March 2023:  Blood pressure well controlled.  Hypertension Medications               atenoloL (TENORMIN) 25 MG tablet TAKE 1 TABLET BY MOUTH EVERY DAY IN THE MORNING    lisinopriL (PRINIVIL,ZESTRIL) 5 MG tablet TAKE 1 TABLET BY MOUTH EVERY DAY     STABLE. BP Reviewed.  Compliant with BP medications. No SE reported.   (-) CP, SOB, palpitations, dizziness, lightheadedness, HA, arm numbness, tingling or weakness, syncope.  Creatinine   Date Value Ref Range Status   02/24/2023 1.1 0.5 - 1.4 mg/dL Final   02/24/2023 1.1 0.5 - 1.4 mg/dL Final     Lab Results   Component Value Date    K 4.5 02/24/2023    K 4.5 02/24/2023     Results for orders placed or performed during the hospital encounter of 11/29/21   EKG 12-LEAD    Collection Time: 11/29/21  2:48 PM    Narrative    Test Reason : I77.9,    Vent. Rate : 061 BPM     Atrial Rate : 061 BPM     P-R Int : 156 ms          QRS Dur : 086 ms      QT Int : 416 ms       P-R-T Axes : 072 063 052 degrees     QTc Int : 418 ms    Normal sinus rhythm  Normal ECG  When compared with ECG of 28-OCT-2021 22:49,  Previous ECG has undetermined rhythm, needs review  Criteria for Anterior infarct are no longer Present  Confirmed by Deshawn CONCEPCION, Arnold JORDAN ([id number]) on 11/29/2021 2:56:48 PM    Referred By: VAISHALI DELA CRUZ           Confirmed By:Arnold Hess MD            Relevant Orders    Comprehensive Metabolic Panel    Dyslipidemia (high LDL; low HDL) (Chronic)    Overview     CHRONIC.  March 2023: Reports compliance with rosuvastatin 5 milligrams daily.  August 2022: LDL is very low on rosuvastatin 10 milligrams daily.  STABLE. Lab analysis reviewed.   (-) CP, SOB, abdominal pain, N/V/D, constipation, jaundice, skin changes.  (-) Myalgias  Lab Results   Component Value Date     CHOL 86 (L) 02/24/2023    CHOL 86 (L) 02/24/2023    CHOL 109 (L) 12/01/2022     Lab Results   Component Value Date    HDL 21 (L) 02/24/2023    HDL 21 (L) 02/24/2023    HDL 27 (L) 12/01/2022     Lab Results   Component Value Date    LDLCALC 25.4 (L) 02/24/2023    LDLCALC 25.4 (L) 02/24/2023    LDLCALC 30.0 (L) 12/01/2022     Lab Results   Component Value Date    TRIG 198 (H) 02/24/2023    TRIG 198 (H) 02/24/2023    TRIG 260 (H) 12/01/2022     Lab Results   Component Value Date    CHOLHDL 24.4 02/24/2023    CHOLHDL 24.4 02/24/2023    CHOLHDL 24.8 12/01/2022     Lab Results   Component Value Date    TOTALCHOLEST 4.1 02/24/2023    TOTALCHOLEST 4.1 02/24/2023    TOTALCHOLEST 4.0 12/01/2022     Lab Results   Component Value Date    ALT 18 02/24/2023    ALT 18 02/24/2023    AST 27 02/24/2023    AST 27 02/24/2023    ALKPHOS 83 02/24/2023    ALKPHOS 83 02/24/2023    BILITOT 0.4 02/24/2023    BILITOT 0.4 02/24/2023   ======================================================  The ASCVD Risk score (Clara ONOFRE, et al., 2019) failed to calculate for the following reasons:    The valid total cholesterol range is 130 to 320 mg/dL           Relevant Orders    Comprehensive Metabolic Panel    Peripheral arterial occlusive disease - Primary    Overview     Chronic.  Intermittent control.  Patient following with vascular surgery.  Date of Service: 07/27/2021  DATE OF PROCEDURE:  07/27/2021.     PREOPERATIVE DIAGNOSES:  1.  Peripheral arterial occlusive disease.  2.  Pain of the right hip.  3.  Severe intermittent claudication of the right lower extremity affecting   activities of daily living.     POSTOPERATIVE DIAGNOSES:  1.  Peripheral arterial occlusive disease.  2.  Pain of the right hip  3.  Severe intermittent claudication of the right lower extremity affecting   activities of daily living.     OPERATIONS:  1.  Aortogram with bilateral renal arteriogram.  2.  Bilateral common iliac arteriograms with bilateral lower extremity  runoff.  3.  Measurement of pressures in the aorta and the left iliac artery.     SURGEON:  Momo Briggs M.D., F.A.C.S.     ANESTHESIA:  Lidocaine 1% for local and intravenous sedation using 4 mg of   Versed and 100 mcg of fentanyl IV.  The patient's level of consciousness was   monitored by the registered nurse from 10:59-11:30 a.m.  Other monitors included   blood pressure, pulse oximetry, heart rate and respiratory rate.     PROCEDURE IN DETAIL:  With the patient in the supine position on the cardiac   cath table, bilateral groins were prepped and draped in a sterile fashion.  CT   angiography had showed an occluded right external iliac artery and a 50%   stenosis of the left common iliac artery.  Access to the left common femoral   artery was obtained using an 18-gauge access needle and a guidewire was passed   through this.  A 5-Indonesian sheath was passed over the guidewire and the guidewire   and dilator were removed and the sheath was aspirated and flushed.  A J-tip   guidewire was passed through the sheath and advanced up into the suprarenal   aorta under fluoroscopy.  The guidewire was removed.  Contrast was injected and   digital subtraction angiography was performed and an abdominal aortogram with   bilateral renal arteriograms was done.  The renal arteries were patent.  The   infrarenal aorta had calcified plaque with some mild luminal irregularities, but   no significant stenosis.     The angiographic catheter was pulled down into the distal aorta just above the   aortic bifurcation.  Contrast was injected and cineangiography was performed,   and bilateral common iliac arteriograms with bilateral lower extremity runoff   were performed.  The right common iliac artery was patent.  The right   hypogastric artery was patent and large, giving off a lot of collaterals around   the hip.  The right external iliac artery and the right common femoral artery   were both occluded.  The very distal common femoral  artery reconstituted a   couple of millimeters above the femoral bifurcation.  The profunda femoris and   the superficial femoral artery and the right popliteal artery were all patent.    Runoff was via 3 vessels.     The left common iliac artery had some luminal irregularities, but did not seem   to have high-grade stenosis.  The left hypogastric and the left external iliac   arteries were patent, although the left external iliac artery seemed to have   about 20% stenosis distally.  The left common femoral, the left profunda, the   left superficial femoral and the left popliteal arteries were patent.  Runoff   was via 3 vessels.  Bilateral oblique arteriograms of the iliac arteries were   then performed to see if there was any stenosis that could not be seen with the   anteroposterior projections.  No high-grade stenosis in the left common iliac   artery could be seen.  The Omniflush angiographic catheter was then exchanged   for a straight Glidecath.  Pressures were then measured in the infrarenal aorta   and the catheter was pulled back with continuous blood pressure measurements   down into the left common iliac and then into the left external iliac artery.    There was absolutely no change in pressure from the aorta into the common iliac   or into the external iliac artery.  No intervention was performed.  The sheath   was pulled from the left common femoral artery and pressure was held for about   20 minutes.  At the end of that time, there was no bleeding and no hematoma.    Sterile dressing was placed.  The patient tolerated the procedure and left the   Cardiac Cath Lab in satisfactory and stable condition.        NIMO/VIVIAN  dd: 07/27/2021 11:52:36 (CDT)  td: 07/27/2021 13:12:39 (CDT)  Doc ID   #[id number]  Job ID #[id number]     CC:          Last signed by: Jim Briggs MD at 7/27/2021  3:08 PM              Carotid stenosis, asymptomatic, right    Mild ascending aorta dilatation       Hematology    Long term (current)  use of antithrombotics/antiplatelets    Relevant Orders    Comprehensive Metabolic Panel    CBC Auto Differential        Plan:     WALKING EXERCISE PROGRAM, CONTINUE TO ABSTAIN FROM ANY TOBACCO,ALL CV CLINICALLY STABLE, NO ANGINA, NO HF, NO TIA, NO CLINICAL ARRHYTHMIA,CONTINUE CURRENT MEDS, EDUCATION, DIET, EXERCISE , RETURN TO CLINIC IN 6 MO WITH LABS, DISCUSSED PLAN WITH THE PATIENT AND HIS WIFE      Peripheral arterial occlusive disease  Comments:  WALKING EXERCISE    Mild ascending aorta dilatation    Carotid stenosis, asymptomatic, right    Essential hypertension  -     Comprehensive Metabolic Panel; Future; Expected date: 09/23/2023    Dyslipidemia (high LDL; low HDL)  -     Comprehensive Metabolic Panel; Future; Expected date: 09/23/2023    Long term (current) use of antithrombotics/antiplatelets  -     Comprehensive Metabolic Panel; Future; Expected date: 09/23/2023  -     CBC Auto Differential; Future; Expected date: 09/23/2023    RTC Low level/low impact aerobic exercise 5x's/wk. Heart healthy diet and risk factor modification.    See labs and med orders.    Aerobic exercise 5x's/wk. Heart healthy diet and risk factor modification.    See labs and med orders.

## 2023-03-28 ENCOUNTER — PATIENT MESSAGE (OUTPATIENT)
Dept: FAMILY MEDICINE | Facility: CLINIC | Age: 63
End: 2023-03-28
Payer: COMMERCIAL

## 2023-03-30 ENCOUNTER — PATIENT MESSAGE (OUTPATIENT)
Dept: PAIN MEDICINE | Facility: CLINIC | Age: 63
End: 2023-03-30
Payer: COMMERCIAL

## 2023-04-09 ENCOUNTER — PATIENT MESSAGE (OUTPATIENT)
Dept: UROLOGY | Facility: CLINIC | Age: 63
End: 2023-04-09
Payer: COMMERCIAL

## 2023-04-10 ENCOUNTER — PATIENT MESSAGE (OUTPATIENT)
Dept: PAIN MEDICINE | Facility: CLINIC | Age: 63
End: 2023-04-10
Payer: COMMERCIAL

## 2023-04-24 ENCOUNTER — PATIENT MESSAGE (OUTPATIENT)
Dept: UROLOGY | Facility: CLINIC | Age: 63
End: 2023-04-24
Payer: COMMERCIAL

## 2023-04-24 ENCOUNTER — PATIENT MESSAGE (OUTPATIENT)
Dept: PAIN MEDICINE | Facility: CLINIC | Age: 63
End: 2023-04-24
Payer: COMMERCIAL

## 2023-04-24 DIAGNOSIS — N53.14 RETROGRADE EJACULATION: ICD-10-CM

## 2023-04-24 RX ORDER — ALFUZOSIN HYDROCHLORIDE 10 MG/1
10 TABLET, EXTENDED RELEASE ORAL
Qty: 30 TABLET | Refills: 11 | Status: SHIPPED | OUTPATIENT
Start: 2023-04-24 | End: 2023-10-16

## 2023-04-24 NOTE — TELEPHONE ENCOUNTER
Called Dr.Barrett Barragan office at ( [phone] they then forward me over to to another line  at [phone]. And they said I have to fax over a release of info again.  To [phone] because his [place] [facility] is not affiliated with the [facility].       Nilo Young   Medical

## 2023-04-26 ENCOUNTER — PATIENT MESSAGE (OUTPATIENT)
Dept: PAIN MEDICINE | Facility: CLINIC | Age: 63
End: 2023-04-26
Payer: COMMERCIAL

## 2023-04-26 ENCOUNTER — TELEPHONE (OUTPATIENT)
Dept: PAIN MEDICINE | Facility: CLINIC | Age: 63
End: 2023-04-26
Payer: COMMERCIAL

## 2023-04-26 NOTE — TELEPHONE ENCOUNTER
Called  [facility] in regards of getting the pt medical records faxed over at [phone].The lady said she see where we requested on 3/28/23 but it was sent to the incorrect fax number.. Gave her the [phone].     I waited for the fax to come for 3 hours but I didn't receive it. Gave the lady a call back and she stated that  it failed to send the first time and she is sending it over again. She also said she will check on the status before she leaving again today to make sure it did not fail again and it should be in no later than tomorrow morning.

## 2023-04-27 ENCOUNTER — TELEPHONE (OUTPATIENT)
Dept: PAIN MEDICINE | Facility: CLINIC | Age: 63
End: 2023-04-27
Payer: COMMERCIAL

## 2023-04-27 ENCOUNTER — PATIENT MESSAGE (OUTPATIENT)
Dept: UROLOGY | Facility: CLINIC | Age: 63
End: 2023-04-27
Payer: COMMERCIAL

## 2023-04-27 DIAGNOSIS — N52.01 ERECTILE DYSFUNCTION DUE TO ARTERIAL INSUFFICIENCY: ICD-10-CM

## 2023-04-27 NOTE — TELEPHONE ENCOUNTER
Reach out to the pt to inform, him that I called  [facility] in regards of getting the pt medical records faxed over at [phone].  said that it failed 3 times to send to there fax and she is now mailing it to [facility]. Pt was very understanding  and he just want to be updated through the process.      Nilo Young   Medical Assistant

## 2023-04-28 ENCOUNTER — PATIENT MESSAGE (OUTPATIENT)
Dept: UROLOGY | Facility: CLINIC | Age: 63
End: 2023-04-28
Payer: COMMERCIAL

## 2023-04-28 RX ORDER — TADALAFIL 20 MG/1
20 TABLET ORAL DAILY PRN
Qty: 30 TABLET | Refills: 11 | Status: SHIPPED | OUTPATIENT
Start: 2023-04-28 | End: 2023-07-05

## 2023-05-02 ENCOUNTER — PATIENT MESSAGE (OUTPATIENT)
Dept: PAIN MEDICINE | Facility: CLINIC | Age: 63
End: 2023-05-02
Payer: COMMERCIAL

## 2023-05-03 ENCOUNTER — CLINICAL SUPPORT (OUTPATIENT)
Dept: SMOKING CESSATION | Facility: CLINIC | Age: 63
End: 2023-05-03
Payer: COMMERCIAL

## 2023-05-03 DIAGNOSIS — F17.200 NICOTINE DEPENDENCE: Primary | ICD-10-CM

## 2023-05-03 PROCEDURE — 99999 PR PBB SHADOW E&M-EST. PATIENT-LVL I: ICD-10-PCS | Mod: PBBFAC,,,

## 2023-05-03 PROCEDURE — 99407 PR TOBACCO USE CESSATION INTENSIVE >10 MINUTES: ICD-10-PCS | Mod: S$GLB,,, | Performed by: GENERAL PRACTICE

## 2023-05-03 PROCEDURE — 99999 PR PBB SHADOW E&M-EST. PATIENT-LVL I: CPT | Mod: PBBFAC,,,

## 2023-05-03 PROCEDURE — 99407 BEHAV CHNG SMOKING > 10 MIN: CPT | Mod: S$GLB,,, | Performed by: GENERAL PRACTICE

## 2023-05-03 NOTE — PROGRESS NOTES
Spoke with patient today in regard to smoking cessation progress 12 month telephone follow up, he states that he is tobacco free.  Commended patient on the accomplishments thus far.  Informed patient of benefit period, future follow up, and contact information if any further help or support is needed.  Will complete smart form for 12 month follow up on Quit attempt #2 and resolve episode.

## 2023-05-09 ENCOUNTER — TELEPHONE (OUTPATIENT)
Dept: UROLOGY | Facility: CLINIC | Age: 63
End: 2023-05-09
Payer: COMMERCIAL

## 2023-05-09 ENCOUNTER — PATIENT MESSAGE (OUTPATIENT)
Dept: PAIN MEDICINE | Facility: CLINIC | Age: 63
End: 2023-05-09
Payer: COMMERCIAL

## 2023-05-10 ENCOUNTER — PATIENT MESSAGE (OUTPATIENT)
Dept: UROLOGY | Facility: CLINIC | Age: 63
End: 2023-05-10

## 2023-05-10 ENCOUNTER — OFFICE VISIT (OUTPATIENT)
Dept: UROLOGY | Facility: CLINIC | Age: 63
End: 2023-05-10
Payer: COMMERCIAL

## 2023-05-10 VITALS — WEIGHT: 161.38 LBS | BODY MASS INDEX: 25.94 KG/M2 | HEIGHT: 66 IN

## 2023-05-10 DIAGNOSIS — N52.01 ERECTILE DYSFUNCTION DUE TO ARTERIAL INSUFFICIENCY: Primary | ICD-10-CM

## 2023-05-10 PROCEDURE — 99214 OFFICE O/P EST MOD 30 MIN: CPT | Mod: S$GLB,,, | Performed by: STUDENT IN AN ORGANIZED HEALTH CARE EDUCATION/TRAINING PROGRAM

## 2023-05-10 PROCEDURE — 4010F PR ACE/ARB THEARPY RXD/TAKEN: ICD-10-PCS | Mod: CPTII,S$GLB,, | Performed by: STUDENT IN AN ORGANIZED HEALTH CARE EDUCATION/TRAINING PROGRAM

## 2023-05-10 PROCEDURE — 3008F BODY MASS INDEX DOCD: CPT | Mod: CPTII,S$GLB,, | Performed by: STUDENT IN AN ORGANIZED HEALTH CARE EDUCATION/TRAINING PROGRAM

## 2023-05-10 PROCEDURE — 3044F PR MOST RECENT HEMOGLOBIN A1C LEVEL <7.0%: ICD-10-PCS | Mod: CPTII,S$GLB,, | Performed by: STUDENT IN AN ORGANIZED HEALTH CARE EDUCATION/TRAINING PROGRAM

## 2023-05-10 PROCEDURE — 4010F ACE/ARB THERAPY RXD/TAKEN: CPT | Mod: CPTII,S$GLB,, | Performed by: STUDENT IN AN ORGANIZED HEALTH CARE EDUCATION/TRAINING PROGRAM

## 2023-05-10 PROCEDURE — 99999 PR PBB SHADOW E&M-EST. PATIENT-LVL IV: ICD-10-PCS | Mod: PBBFAC,,, | Performed by: STUDENT IN AN ORGANIZED HEALTH CARE EDUCATION/TRAINING PROGRAM

## 2023-05-10 PROCEDURE — 1160F PR REVIEW ALL MEDS BY PRESCRIBER/CLIN PHARMACIST DOCUMENTED: ICD-10-PCS | Mod: CPTII,S$GLB,, | Performed by: STUDENT IN AN ORGANIZED HEALTH CARE EDUCATION/TRAINING PROGRAM

## 2023-05-10 PROCEDURE — 1159F MED LIST DOCD IN RCRD: CPT | Mod: CPTII,S$GLB,, | Performed by: STUDENT IN AN ORGANIZED HEALTH CARE EDUCATION/TRAINING PROGRAM

## 2023-05-10 PROCEDURE — 3008F PR BODY MASS INDEX (BMI) DOCUMENTED: ICD-10-PCS | Mod: CPTII,S$GLB,, | Performed by: STUDENT IN AN ORGANIZED HEALTH CARE EDUCATION/TRAINING PROGRAM

## 2023-05-10 PROCEDURE — 1159F PR MEDICATION LIST DOCUMENTED IN MEDICAL RECORD: ICD-10-PCS | Mod: CPTII,S$GLB,, | Performed by: STUDENT IN AN ORGANIZED HEALTH CARE EDUCATION/TRAINING PROGRAM

## 2023-05-10 PROCEDURE — 1160F RVW MEDS BY RX/DR IN RCRD: CPT | Mod: CPTII,S$GLB,, | Performed by: STUDENT IN AN ORGANIZED HEALTH CARE EDUCATION/TRAINING PROGRAM

## 2023-05-10 PROCEDURE — 3044F HG A1C LEVEL LT 7.0%: CPT | Mod: CPTII,S$GLB,, | Performed by: STUDENT IN AN ORGANIZED HEALTH CARE EDUCATION/TRAINING PROGRAM

## 2023-05-10 PROCEDURE — 99214 PR OFFICE/OUTPT VISIT, EST, LEVL IV, 30-39 MIN: ICD-10-PCS | Mod: S$GLB,,, | Performed by: STUDENT IN AN ORGANIZED HEALTH CARE EDUCATION/TRAINING PROGRAM

## 2023-05-10 PROCEDURE — 99999 PR PBB SHADOW E&M-EST. PATIENT-LVL IV: CPT | Mod: PBBFAC,,, | Performed by: STUDENT IN AN ORGANIZED HEALTH CARE EDUCATION/TRAINING PROGRAM

## 2023-05-10 RX ORDER — PAPAVERINE HYDROCHLORIDE 30 MG/ML
INJECTION INTRAMUSCULAR; INTRAVENOUS
Qty: 10 ML | Refills: 11 | Status: SHIPPED | OUTPATIENT
Start: 2023-05-10

## 2023-05-10 RX ORDER — PAPAVERINE HYDROCHLORIDE 30 MG/ML
INJECTION INTRAMUSCULAR; INTRAVENOUS
Qty: 10 ML | Refills: 11 | Status: SHIPPED | OUTPATIENT
Start: 2023-05-10 | End: 2023-05-10

## 2023-05-10 NOTE — PATIENT INSTRUCTIONS
Penile Self-Injection Procedure  Self-injection is a good option for many men with erectile dysfunction (ED). A tiny needle is used to inject medication into the penis. This helps your penis get hard and stay that way long enough for sex. And sex and orgasm will feel as good as always. You may be nervous about doing self-injection at first. But with practice, it will get easier. Your healthcare provider will show you how to do self-injection the first time. The simple steps are outlined on this sheet.  Preparing for Injection  Wash your hands well with soap and water.  Prepare the medication (if needed).  Sit or  a comfortable position in a warm, well-lit room. If you need to, sit or  front of a mirror.  Find an injection site on one side of your penis, in a place with no visible veins. (Dont inject into the top, bottom, or head of the penis.)  Clean the injection site with an alcohol swab. Grasp the head of your penis firmly with your thumb and forefinger (dont just pinch the skin). Stretch the penis so the skin on the shaft is taut.  Injecting the Medication  Rest your penis against your inner thigh and pull it gently toward your knee. Dont twist or rotate it. This way youll be sure to inject the medication into the spot you chose and cleaned before.  Hold the syringe between your thumb and fingers, like youre holding a pen. Rest your forearm on your thigh for support.  Insert the needle at a 90-degree angle (perpendicular) to the shaft. Do this quickly to reduce discomfort. (The needle should go in easily. If it doesnt, stop right away.)  Move your thumb to the plunger. Press down to inject the medication, counting to 5.  Remove the needle and dispose of it safely.         The injection site can be in any part of the shaded area.     Insert the needle straight into the penis.    Gaining an Erection  Apply pressure to the injection site for a few minutes. This prevents swelling and bruising  and helps spread the medication.   Stand up. This may help your erection develop. Foreplay often helps, too.  Your penis should become firm within 10-20 minutes. The erection will last long enough for sex, and maybe longer.  Call Your Doctor If You Have:   An erection that lasts longer than 3-4  hours  Bleeding or bruising  Severe pain  Scarring or curvature of the penis       © 2503-3902 [facility], [address] [place] [address], [place] [address]. All rights reserved. This information is not intended as a substitute for professional medical care. Always follow your healthcare professional's instructions.

## 2023-05-10 NOTE — PROGRESS NOTES
"[place] - Urology   Clinic Note    Subjective:     Chief Complaint: Erectile Dysfunction (Injection discussion; went to [facility] in [employer] for an injection and it worked)      History of Present Illness:  Sal Villarreal Jr. is a 63 y.o. male who presents to clinic for evaluation and management of LUTS and ED.     He has ED and has had poor results with the Cialis. This is a chronic issue which is worsening. He was offered intracavernosal injections but was not interested. He explored options for penile shockwave and was given a trimix injection which worked. The cost was too much for the shockwave and he presents today to discuss trimix injections.     From previous:  Previous IPSS was 23/4. He was initially started flomax then switched to alfuzosin due to retrograde ejaculation with flomax. His urinary symptoms have improved significantly. IPSS down to 9/2    He has a history of a urethroplasty when he was 10 years old. He underwent cystoscopy with me and showed no urethral stricture with mildly enlarged prostate. He underwent transabdominal prostate US showing a volume of 26 cc.     He denies a family history of prostate cancer     Past medical, family, surgical and social history reviewed as documented in chart with pertinent positive medical, family, surgical and social history detailed in HPI.    A review systems was conducted with pertinent positive and negative findings documented in HPI.    Anticoagulation/Antiplatelets:  Yes aspirin or plavix    Objective:     Estimated body mass index is 26.05 kg/m² as calculated from the following:    Height as of this encounter: 5' 6" (1.676 m).    Weight as of this encounter: 73.2 kg (161 lb 6 oz).    Vital Signs (Most Recent)       Physical Exam  Vitals and nursing note reviewed.   Constitutional:       General: He is not in acute distress.     Appearance: Normal appearance. He is well-developed. He is not ill-appearing or toxic-appearing.   Pulmonary:      " Effort: Pulmonary effort is normal. No accessory muscle usage or respiratory distress.   Neurological:      General: No focal deficit present.      Mental Status: He is alert and oriented to person, place, and time. Mental status is at baseline.   Psychiatric:         Mood and Affect: Mood normal.         Behavior: Behavior is cooperative.         Thought Content: Thought content normal.         Judgment: Judgment normal.       Lab Results   Component Value Date    BUN 3 (L) 02/24/2023    BUN 3 (L) 02/24/2023    CREATININE 1.1 02/24/2023    CREATININE 1.1 02/24/2023    WBC 5.25 02/24/2023    HGB 11.1 (L) 02/24/2023    HCT 35.1 (L) 02/24/2023     02/24/2023    AST 27 02/24/2023    AST 27 02/24/2023    ALT 18 02/24/2023    ALT 18 02/24/2023    ALKPHOS 83 02/24/2023    ALKPHOS 83 02/24/2023    ALBUMIN 3.9 02/24/2023    ALBUMIN 3.9 02/24/2023    HGBA1C 5.2 02/24/2023        Lab Results   Component Value Date    PSA 0.83 02/24/2023    PSA 1.2 08/25/2022    PSA 1.2 11/29/2021    PSA 1.1 11/29/2021      Assessment:     1. Erectile dysfunction due to arterial insufficiency      Plan:     1. Erectile dysfunction due to arterial insufficiency  We discussed etiologies and options for the ED. We discussed oral PDE5 inhibitors, intracavernosal injections, vacuum erection devices, or inflatable penile prosthesis.  We reviewed the usage, risks, and benefits of each therapy.   He will shop around for prices and come back for injection teaching.   - papaverine 30 mg/mL injection; Add phentolamine 1 mg/mL, add alprostadil 20 micrograms/mL  Dispense: 10 mL; Refill: 11        Douglas Holden MD

## 2023-05-22 ENCOUNTER — PATIENT MESSAGE (OUTPATIENT)
Dept: PAIN MEDICINE | Facility: CLINIC | Age: 63
End: 2023-05-22
Payer: COMMERCIAL

## 2023-05-23 ENCOUNTER — PATIENT MESSAGE (OUTPATIENT)
Dept: UROLOGY | Facility: CLINIC | Age: 63
End: 2023-05-23
Payer: COMMERCIAL

## 2023-05-23 DIAGNOSIS — M54.12 CERVICAL RADICULOPATHY: ICD-10-CM

## 2023-05-23 DIAGNOSIS — M48.02 CERVICAL STENOSIS OF SPINAL CANAL: ICD-10-CM

## 2023-05-23 DIAGNOSIS — M47.812 CERVICAL SPONDYLOSIS: Primary | ICD-10-CM

## 2023-05-23 DIAGNOSIS — M50.30 DDD (DEGENERATIVE DISC DISEASE), CERVICAL: ICD-10-CM

## 2023-05-23 RX ORDER — GABAPENTIN 600 MG/1
600 TABLET, FILM COATED ORAL 3 TIMES DAILY
Qty: 90 TABLET | Refills: 2 | Status: SHIPPED | OUTPATIENT
Start: 2023-05-23 | End: 2023-09-29

## 2023-05-23 NOTE — TELEPHONE ENCOUNTER
Gralise- Gabapentin 1800mg QHS    LOV 3/3/23  F/U none    Please send refill on behalf of Dr. Cramer

## 2023-05-26 DIAGNOSIS — I10 ESSENTIAL HYPERTENSION: Primary | Chronic | ICD-10-CM

## 2023-05-26 RX ORDER — LISINOPRIL 5 MG/1
TABLET ORAL
Qty: 90 TABLET | Refills: 1 | Status: SHIPPED | OUTPATIENT
Start: 2023-05-26 | End: 2023-10-16 | Stop reason: DRUGHIGH

## 2023-05-29 ENCOUNTER — PATIENT MESSAGE (OUTPATIENT)
Dept: PAIN MEDICINE | Facility: CLINIC | Age: 63
End: 2023-05-29
Payer: COMMERCIAL

## 2023-05-31 ENCOUNTER — CLINICAL SUPPORT (OUTPATIENT)
Dept: SMOKING CESSATION | Facility: CLINIC | Age: 63
End: 2023-05-31
Payer: COMMERCIAL

## 2023-05-31 DIAGNOSIS — F17.200 NICOTINE DEPENDENCE: Primary | ICD-10-CM

## 2023-05-31 PROCEDURE — 99999 PR PBB SHADOW E&M-EST. PATIENT-LVL I: ICD-10-PCS | Mod: PBBFAC,,,

## 2023-05-31 PROCEDURE — 99407 BEHAV CHNG SMOKING > 10 MIN: CPT | Mod: S$GLB,,,

## 2023-05-31 PROCEDURE — 99999 PR PBB SHADOW E&M-EST. PATIENT-LVL I: CPT | Mod: PBBFAC,,,

## 2023-05-31 PROCEDURE — 99407 PR TOBACCO USE CESSATION INTENSIVE >10 MINUTES: ICD-10-PCS | Mod: S$GLB,,,

## 2023-05-31 NOTE — PROGRESS NOTES
Called pt to f/u on his 3 month smoking cessation quit status. Pt stated he remains tobacco free. For 8 months now.  Congratulated him on his hard work and success. Informed him of benefit period, phone follow ups, and contact information. Will complete smart form and will continue to follow up on quit #3 episode.

## 2023-06-06 ENCOUNTER — OFFICE VISIT (OUTPATIENT)
Dept: UROLOGY | Facility: CLINIC | Age: 63
End: 2023-06-06
Payer: COMMERCIAL

## 2023-06-06 VITALS — HEIGHT: 66 IN | WEIGHT: 159.19 LBS | BODY MASS INDEX: 25.58 KG/M2

## 2023-06-06 DIAGNOSIS — N52.01 ERECTILE DYSFUNCTION DUE TO ARTERIAL INSUFFICIENCY: Primary | ICD-10-CM

## 2023-06-06 PROCEDURE — 99213 PR OFFICE/OUTPT VISIT, EST, LEVL III, 20-29 MIN: ICD-10-PCS | Mod: S$GLB,,, | Performed by: STUDENT IN AN ORGANIZED HEALTH CARE EDUCATION/TRAINING PROGRAM

## 2023-06-06 PROCEDURE — 3044F HG A1C LEVEL LT 7.0%: CPT | Mod: CPTII,S$GLB,, | Performed by: STUDENT IN AN ORGANIZED HEALTH CARE EDUCATION/TRAINING PROGRAM

## 2023-06-06 PROCEDURE — 3008F PR BODY MASS INDEX (BMI) DOCUMENTED: ICD-10-PCS | Mod: CPTII,S$GLB,, | Performed by: STUDENT IN AN ORGANIZED HEALTH CARE EDUCATION/TRAINING PROGRAM

## 2023-06-06 PROCEDURE — 4010F PR ACE/ARB THEARPY RXD/TAKEN: ICD-10-PCS | Mod: CPTII,S$GLB,, | Performed by: STUDENT IN AN ORGANIZED HEALTH CARE EDUCATION/TRAINING PROGRAM

## 2023-06-06 PROCEDURE — 1160F PR REVIEW ALL MEDS BY PRESCRIBER/CLIN PHARMACIST DOCUMENTED: ICD-10-PCS | Mod: CPTII,S$GLB,, | Performed by: STUDENT IN AN ORGANIZED HEALTH CARE EDUCATION/TRAINING PROGRAM

## 2023-06-06 PROCEDURE — 1160F RVW MEDS BY RX/DR IN RCRD: CPT | Mod: CPTII,S$GLB,, | Performed by: STUDENT IN AN ORGANIZED HEALTH CARE EDUCATION/TRAINING PROGRAM

## 2023-06-06 PROCEDURE — 99213 OFFICE O/P EST LOW 20 MIN: CPT | Mod: S$GLB,,, | Performed by: STUDENT IN AN ORGANIZED HEALTH CARE EDUCATION/TRAINING PROGRAM

## 2023-06-06 PROCEDURE — 1159F PR MEDICATION LIST DOCUMENTED IN MEDICAL RECORD: ICD-10-PCS | Mod: CPTII,S$GLB,, | Performed by: STUDENT IN AN ORGANIZED HEALTH CARE EDUCATION/TRAINING PROGRAM

## 2023-06-06 PROCEDURE — 3008F BODY MASS INDEX DOCD: CPT | Mod: CPTII,S$GLB,, | Performed by: STUDENT IN AN ORGANIZED HEALTH CARE EDUCATION/TRAINING PROGRAM

## 2023-06-06 PROCEDURE — 1159F MED LIST DOCD IN RCRD: CPT | Mod: CPTII,S$GLB,, | Performed by: STUDENT IN AN ORGANIZED HEALTH CARE EDUCATION/TRAINING PROGRAM

## 2023-06-06 PROCEDURE — 99999 PR PBB SHADOW E&M-EST. PATIENT-LVL IV: ICD-10-PCS | Mod: PBBFAC,,, | Performed by: STUDENT IN AN ORGANIZED HEALTH CARE EDUCATION/TRAINING PROGRAM

## 2023-06-06 PROCEDURE — 99999 PR PBB SHADOW E&M-EST. PATIENT-LVL IV: CPT | Mod: PBBFAC,,, | Performed by: STUDENT IN AN ORGANIZED HEALTH CARE EDUCATION/TRAINING PROGRAM

## 2023-06-06 PROCEDURE — 3044F PR MOST RECENT HEMOGLOBIN A1C LEVEL <7.0%: ICD-10-PCS | Mod: CPTII,S$GLB,, | Performed by: STUDENT IN AN ORGANIZED HEALTH CARE EDUCATION/TRAINING PROGRAM

## 2023-06-06 PROCEDURE — 4010F ACE/ARB THERAPY RXD/TAKEN: CPT | Mod: CPTII,S$GLB,, | Performed by: STUDENT IN AN ORGANIZED HEALTH CARE EDUCATION/TRAINING PROGRAM

## 2023-06-06 RX ORDER — ATENOLOL 25 MG/1
25 TABLET ORAL
COMMUNITY
End: 2023-07-05 | Stop reason: SDUPTHER

## 2023-06-06 NOTE — PATIENT INSTRUCTIONS
Penile Self-Injection Procedure  Self-injection is a good option for many men with erectile dysfunction (ED). A tiny needle is used to inject medication into the penis. This helps your penis get hard and stay that way long enough for sex. And sex and orgasm will feel as good as always. You may be nervous about doing self-injection at first. But with practice, it will get easier. Your healthcare provider will show you how to do self-injection the first time. The simple steps are outlined on this sheet.  Preparing for Injection  Wash your hands well with soap and water.  Prepare the medication (if needed).  Sit or  a comfortable position in a warm, well-lit room. If you need to, sit or  front of a mirror.  Find an injection site on one side of your penis, in a place with no visible veins. (Dont inject into the top, bottom, or head of the penis.)  Clean the injection site with an alcohol swab. Grasp the head of your penis firmly with your thumb and forefinger (dont just pinch the skin). Stretch the penis so the skin on the shaft is taut.  Injecting the Medication  Rest your penis against your inner thigh and pull it gently toward your knee. Dont twist or rotate it. This way youll be sure to inject the medication into the spot you chose and cleaned before.  Hold the syringe between your thumb and fingers, like youre holding a pen. Rest your forearm on your thigh for support.  Insert the needle at a 90-degree angle (perpendicular) to the shaft. Do this quickly to reduce discomfort. (The needle should go in easily. If it doesnt, stop right away.)  Move your thumb to the plunger. Press down to inject the medication, counting to 5.  Remove the needle and dispose of it safely.         The injection site can be in any part of the shaded area.     Insert the needle straight into the penis.    Gaining an Erection  Apply pressure to the injection site for a few minutes. This prevents swelling and bruising  and helps spread the medication.   Stand up. This may help your erection develop. Foreplay often helps, too.  Your penis should become firm within 10-20 minutes. The erection will last long enough for sex, and maybe longer.  Call Your Doctor If You Have:   An erection that lasts longer than 3-4  hours  Bleeding or bruising  Severe pain  Scarring or curvature of the penis       © 6815-2683 [facility], [address] [place] [address], [place] [address]. All rights reserved. This information is not intended as a substitute for professional medical care. Always follow your healthcare professional's instructions.

## 2023-06-06 NOTE — PROGRESS NOTES
"[place] - Urology   Clinic Note    Subjective:     Chief Complaint: Trimix Teaching      History of Present Illness:  Sal Villarreal Jr. is a 63 y.o. male who presents to clinic for evaluation and management of LUTS and ED.    He has ED and has had poor results with the Cialis. He presents today for trimix teaching.     From previous:  Previous IPSS was 23/4. He was initially started flomax then switched to alfuzosin due to retrograde ejaculation with flomax. His urinary symptoms have improved significantly. IPSS down to 9/2    He has a history of a urethroplasty when he was 10 years old. He underwent cystoscopy with me and showed no urethral stricture with mildly enlarged prostate. He underwent transabdominal prostate US showing a volume of 26 cc.     He denies a family history of prostate cancer     Past medical, family, surgical and social history reviewed as documented in chart with pertinent positive medical, family, surgical and social history detailed in HPI.    A review systems was conducted with pertinent positive and negative findings documented in HPI.    Anticoagulation/Antiplatelets:  Yes aspirin or plavix    Objective:     Estimated body mass index is 25.69 kg/m² as calculated from the following:    Height as of this encounter: 5' 6" (1.676 m).    Weight as of this encounter: 72.2 kg (159 lb 2.8 oz).    Vital Signs (Most Recent)       Physical Exam  Vitals and nursing note reviewed.   Constitutional:       General: He is not in acute distress.     Appearance: Normal appearance. He is well-developed. He is not ill-appearing or toxic-appearing.   Pulmonary:      Effort: Pulmonary effort is normal. No accessory muscle usage or respiratory distress.   Neurological:      General: No focal deficit present.      Mental Status: He is alert and oriented to person, place, and time. Mental status is at baseline.   Psychiatric:         Mood and Affect: Mood normal.         Behavior: Behavior is cooperative.  "        Thought Content: Thought content normal.         Judgment: Judgment normal.       Lab Results   Component Value Date    BUN 3 (L) 02/24/2023    BUN 3 (L) 02/24/2023    CREATININE 1.1 02/24/2023    CREATININE 1.1 02/24/2023    WBC 5.25 02/24/2023    HGB 11.1 (L) 02/24/2023    HCT 35.1 (L) 02/24/2023     02/24/2023    AST 27 02/24/2023    AST 27 02/24/2023    ALT 18 02/24/2023    ALT 18 02/24/2023    ALKPHOS 83 02/24/2023    ALKPHOS 83 02/24/2023    ALBUMIN 3.9 02/24/2023    ALBUMIN 3.9 02/24/2023    HGBA1C 5.2 02/24/2023        Lab Results   Component Value Date    PSA 0.83 02/24/2023    PSA 1.2 08/25/2022    PSA 1.2 11/29/2021    PSA 1.1 11/29/2021      Assessment:     1. Erectile dysfunction due to arterial insufficiency        Plan:     1. Erectile dysfunction due to arterial insufficiency  I demonstrated proper technique and injected 0.2 cc of Trimix solution (20-30-1) into the the corpora. He had no immediate adverse reaction. I discussed dose titration. I explained the risk of priapism and he knows to return to clinic or the ED with any adverse reactions.      Douglas Holden MD

## 2023-06-12 ENCOUNTER — PATIENT MESSAGE (OUTPATIENT)
Dept: UROLOGY | Facility: CLINIC | Age: 63
End: 2023-06-12
Payer: COMMERCIAL

## 2023-06-26 RX ORDER — PANTOPRAZOLE SODIUM 40 MG/1
TABLET, DELAYED RELEASE ORAL
Qty: 90 TABLET | Refills: 3 | OUTPATIENT
Start: 2023-06-26

## 2023-06-26 NOTE — TELEPHONE ENCOUNTER
No care due was identified.  [facility] Embedded Care Due Messages. Reference number: [id number].   6/26/2023 8:57:21 AM CDT

## 2023-06-26 NOTE — TELEPHONE ENCOUNTER
Provider Staff:  Action required for this patient     Please see care gap opportunities below in Care Due Message.    Thanks!  [facility]     Appointments      Date Provider   Last Visit   3/1/2023 Bipin Penaloza MD   Next Visit   Visit date not found Bipin Penaloza MD     Refill Decision Note   Sal Villarreal  is requesting a refill authorization.  Brief Assessment and Rationale for Refill:  Quick Discontinue     Medication Therapy Plan:  PA REQUEST      Comments:     Note composed:10:25 AM 06/26/2023             Appointments     Last Visit   3/1/2023 Bipin Penaloza MD   Next Visit   Visit date not found Bipin Penaloza MD

## 2023-06-26 NOTE — TELEPHONE ENCOUNTER
Pharmacy is requesting that a PRIOR AUTHORIZATION be completed for the PANTOPRAZOLE 40 MG. Please forward this request to the staff member handling PAs for your clinic. Thank you.      Note composed:10:24 AM 06/26/2023

## 2023-07-05 ENCOUNTER — PATIENT MESSAGE (OUTPATIENT)
Dept: PAIN MEDICINE | Facility: CLINIC | Age: 63
End: 2023-07-05
Payer: COMMERCIAL

## 2023-07-05 ENCOUNTER — TELEPHONE (OUTPATIENT)
Dept: PHYSICAL MEDICINE AND REHAB | Facility: CLINIC | Age: 63
End: 2023-07-05
Payer: COMMERCIAL

## 2023-07-05 ENCOUNTER — OFFICE VISIT (OUTPATIENT)
Dept: PAIN MEDICINE | Facility: CLINIC | Age: 63
End: 2023-07-05
Payer: COMMERCIAL

## 2023-07-05 VITALS
HEIGHT: 66 IN | WEIGHT: 163 LBS | BODY MASS INDEX: 26.2 KG/M2 | HEART RATE: 86 BPM | SYSTOLIC BLOOD PRESSURE: 135 MMHG | DIASTOLIC BLOOD PRESSURE: 89 MMHG

## 2023-07-05 DIAGNOSIS — G56.00 CARPAL TUNNEL SYNDROME, UNSPECIFIED LATERALITY: ICD-10-CM

## 2023-07-05 DIAGNOSIS — M54.12 CERVICAL RADICULOPATHY: Primary | ICD-10-CM

## 2023-07-05 PROCEDURE — 4010F PR ACE/ARB THEARPY RXD/TAKEN: ICD-10-PCS | Mod: CPTII,S$GLB,, | Performed by: PHYSICAL MEDICINE & REHABILITATION

## 2023-07-05 PROCEDURE — 3079F PR MOST RECENT DIASTOLIC BLOOD PRESSURE 80-89 MM HG: ICD-10-PCS | Mod: CPTII,S$GLB,, | Performed by: PHYSICAL MEDICINE & REHABILITATION

## 2023-07-05 PROCEDURE — 99214 PR OFFICE/OUTPT VISIT, EST, LEVL IV, 30-39 MIN: ICD-10-PCS | Mod: S$GLB,,, | Performed by: PHYSICAL MEDICINE & REHABILITATION

## 2023-07-05 PROCEDURE — 4010F ACE/ARB THERAPY RXD/TAKEN: CPT | Mod: CPTII,S$GLB,, | Performed by: PHYSICAL MEDICINE & REHABILITATION

## 2023-07-05 PROCEDURE — 3075F PR MOST RECENT SYSTOLIC BLOOD PRESS GE 130-139MM HG: ICD-10-PCS | Mod: CPTII,S$GLB,, | Performed by: PHYSICAL MEDICINE & REHABILITATION

## 2023-07-05 PROCEDURE — 1159F MED LIST DOCD IN RCRD: CPT | Mod: CPTII,S$GLB,, | Performed by: PHYSICAL MEDICINE & REHABILITATION

## 2023-07-05 PROCEDURE — 99999 PR PBB SHADOW E&M-EST. PATIENT-LVL III: ICD-10-PCS | Mod: PBBFAC,,, | Performed by: PHYSICAL MEDICINE & REHABILITATION

## 2023-07-05 PROCEDURE — 3044F PR MOST RECENT HEMOGLOBIN A1C LEVEL <7.0%: ICD-10-PCS | Mod: CPTII,S$GLB,, | Performed by: PHYSICAL MEDICINE & REHABILITATION

## 2023-07-05 PROCEDURE — 3008F BODY MASS INDEX DOCD: CPT | Mod: CPTII,S$GLB,, | Performed by: PHYSICAL MEDICINE & REHABILITATION

## 2023-07-05 PROCEDURE — 99999 PR PBB SHADOW E&M-EST. PATIENT-LVL III: CPT | Mod: PBBFAC,,, | Performed by: PHYSICAL MEDICINE & REHABILITATION

## 2023-07-05 PROCEDURE — 3008F PR BODY MASS INDEX (BMI) DOCUMENTED: ICD-10-PCS | Mod: CPTII,S$GLB,, | Performed by: PHYSICAL MEDICINE & REHABILITATION

## 2023-07-05 PROCEDURE — 1159F PR MEDICATION LIST DOCUMENTED IN MEDICAL RECORD: ICD-10-PCS | Mod: CPTII,S$GLB,, | Performed by: PHYSICAL MEDICINE & REHABILITATION

## 2023-07-05 PROCEDURE — 3079F DIAST BP 80-89 MM HG: CPT | Mod: CPTII,S$GLB,, | Performed by: PHYSICAL MEDICINE & REHABILITATION

## 2023-07-05 PROCEDURE — 3075F SYST BP GE 130 - 139MM HG: CPT | Mod: CPTII,S$GLB,, | Performed by: PHYSICAL MEDICINE & REHABILITATION

## 2023-07-05 PROCEDURE — 99214 OFFICE O/P EST MOD 30 MIN: CPT | Mod: S$GLB,,, | Performed by: PHYSICAL MEDICINE & REHABILITATION

## 2023-07-05 PROCEDURE — 3044F HG A1C LEVEL LT 7.0%: CPT | Mod: CPTII,S$GLB,, | Performed by: PHYSICAL MEDICINE & REHABILITATION

## 2023-07-05 RX ORDER — AMITRIPTYLINE HYDROCHLORIDE 25 MG/1
25 TABLET, FILM COATED ORAL NIGHTLY
Qty: 90 TABLET | Refills: 1 | Status: SHIPPED | OUTPATIENT
Start: 2023-07-05

## 2023-07-05 NOTE — TELEPHONE ENCOUNTER
----- Message from Gurwinder Cramer MD sent at 7/5/2023 11:21 AM CDT -----  Please assist with scheduling EMG/NCS of the bilateral upper extremities to eval for CTS vs cervical radic

## 2023-07-05 NOTE — Clinical Note
Please assist with scheduling EMG/NCS of the bilateral upper extremities to eval for CTS vs cervical radic

## 2023-07-05 NOTE — PROGRESS NOTES
Established Patient Chronic Pain Note (Follow up visit)    Chief Complaint:   Chief Complaint   Patient presents with    Neck Pain       SUBJECTIVE:    Sal Villarreal Jr. is a 63 y.o. male who presents to the clinic for a follow-up appointment for neck pain and worsening paresthesias of the bilateral hands. Since the last visit, Sal Villarreal Jr. states the pain has been progressing. Current pain intensity is 5/10.  He reports that his hand finger pain has been progressive with the amounts of numbness and tingling that has been bothering him over the past few months.  He does report that the Elavil has been beneficial with his nighttime symptoms, but still has daily activities affected by his pain.      Patient denies night fever/night sweats, urinary incontinence, bowel incontinence, significant weight loss, significant motor weakness, and loss of sensations.    Pain Disability Index Review:  Last 3 PDI Scores 3/3/2023   Pain Disability Index (PDI) 46         Initial HPI 03/03/2023:  Sal Villarreal Jr. is a 63 y.o. male, left-hand dominant, who presents to the clinic for the evaluation of neck pain.  He is self referred.  His past medical history of lumbar fusion, lumbar DDD, anxiety, depression, hypertension, peripheral vascular disease, dyslipidemia, s/p femoral bypass surgery, BPH, anemia, polyarthritis, tobacco abuse, multiple other medical comorbidities as listed in his chart.  The pain started several months ago following his 2nd lumbar spine surgery and symptoms have been worsening.The pain is located in the cervical myofascial area and radiates to the bilateral upper extremities, right worse than left.  The pain is described as  sharp, stabbing, burning, aching  and is rated as 5/10. The pain is rated with a score of  4/10 on the BEST day and a score of 10/10 on the WORST day.  Symptoms interfere with daily activity, sleeping, and work. The pain is exacerbated by activities, sitting up, and  nighttime.  The pain is mitigated by gentle movement.  He works in pest control.        Non-Pharmacologic Treatments:  Physical Therapy/Home Exercise: yes, currently active  Ice/Heat:yes  TENS: no  Acupuncture: no  Massage: yes  Chiropractic: no    Other: no        Pain Medications:  - Opioids:  Percocet, Norco  - Adjuvant Medications:  Gabapentin, Gralise, Horizant, Valium, Ativan, Wellbutrin, Zanaflex  - Anti-Coagulants: Aspirin and Plavix      report:  Reviewed and consistent with medication use as prescribed.     Pain Procedures:   -previous lumbar surgery x2   -previous cervical interventional pain procedures, likely GEORGIE, no relief        Imaging:   MRI cervical spine 08/26/2022:            X-ray cervical spine 08/26/2022:       X-ray lumbar spine 09/10/2021:   Five lumbar type vertebral bodies.  L5-S1 anterior fusion without evidence of hardware loosening.  4 mm retrolisthesis of L2 on L3, 3 mm at retrolisthesis of L3 on L4 and 2 mm retrolisthesis of L4 on L5 which are unchanged upon flexion and extension.  Dextro scoliosis with a Styles angle of 17.6 degrees using the superior T11 and inferior L4 endplates.  Vertebral body heights are maintained.  No acute fracture.  Minor disc height loss in the upper lumbar spine.  Mild multilevel endplate changes.  Mild hypertrophic facet changes.  No unexpected radiodense soft tissue foreign body.  Atherosclerotic calcifications are noted.  Sacroiliac joints are symmetric and not widened.    MRI lumbar spine 07/21/2021:  NOMENCLATURE: Five lumbar type vertebral bodies.     CORD/CAUDA EQUINA: Conus has normal size and signal and ends at a normal level of L1.     ALIGNMENT: Trace retrolisthesis of L1 on L2, L2 on L3 and L3 on L4.  mild dextroconvex curvature.     BONES: Postsurgical changes of L5-S1 anterior fusion are again demonstrated.  Stable minimal chronic anterior wedging of the T12 vertebral body.  No aggressive bone marrow signal.     PARASPINAL AREA: Normal.      LUMBAR DISC LEVELS:     T12-L1: Minimal disc bulge.  Disc height loss.  Minimal narrowing of the left lateral recess.  No significant spinal canal or foraminal stenosis.     L1-L2: Trace retrolisthesis.  Minimal disc bulge.  Minimal narrowing of the left lateral recess.  No significant spinal canal or foraminal stenosis.     L2-L3: Trace retrolisthesis.  Mild disc bulge.  Mild bilateral facet hypertrophy.  Ligamentum flavum thickening.  Mild narrowing of the bilateral lateral recesses.  No significant spinal canal stenosis.  Mild bilateral foraminal stenosis.     L3-L4: Trace retrolisthesis.  Minimal disc bulge.  Mild bilateral facet hypertrophy.  Minimal narrowing of the bilateral lateral recesses.  No significant spinal canal stenosis. Mild bilateral foraminal stenosis.     L4-L5: Mild disc bulge. Mild bilateral facet hypertrophy.  Mild left ligamentum flavum thickening.  Minimal narrowing of the bilateral lateral recesses. No significant spinal canal stenosis. Mild bilateral foraminal stenosis.     L5-S1: Fused.  Central disc protrusion and osteophytic ridging.  Mild bilateral facet hypertrophy.  Minimal narrowing of the bilateral lateral recesses.  No significant spinal canal stenosis.  Mild right greater than left foraminal stenosis.       PMHx,PSHx, Social history, and Family history:  I have reviewed the patient's medical, surgical, social, and family history in detail and updated the computerized patient record.    Review of patient's allergies indicates:   Allergen Reactions    Blueberry        Current Outpatient Medications   Medication Sig    alfuzosin (UROXATRAL) 10 mg Tb24 Take 1 tablet (10 mg total) by mouth daily with breakfast.    aspirin (ECOTRIN) 81 MG EC tablet Take 1 tablet (81 mg total) by mouth once daily.    atenoloL (TENORMIN) 25 MG tablet TAKE 1 TABLET BY MOUTH EVERY DAY IN THE MORNING    buPROPion (WELLBUTRIN XL) 150 MG TB24 tablet Take 1 tablet (150 mg total) by mouth once daily.     clopidogreL (PLAVIX) 75 mg tablet TAKE 1 TABLET BY MOUTH EVERY DAY    DENTA 5000 PLUS 1.1 % Crea Take 1 application by mouth 2 (two) times daily.    famotidine (PEPCID) 20 MG tablet Take 20 mg by mouth every evening.    gabapentin (GRALISE) 600 mg Tb24 Take 1 tablet (600 mg total) by mouth 3 (three) times daily.    lisinopriL (PRINIVIL,ZESTRIL) 5 MG tablet TAKE 1 TABLET BY MOUTH EVERY DAY    multivitamin (THERAGRAN) per tablet Take 1 tablet by mouth once daily.    pantoprazole (PROTONIX) 40 MG tablet TAKE 1 TABLET BY MOUTH EVERY DAY    papaverine 30 mg/mL injection Add phentolamine 1 mg/mL, add alprostadil 20 micrograms/mL    rosuvastatin (CRESTOR) 5 MG tablet Take 1 tablet (5 mg total) by mouth every evening.    vitamin D3-folic acid 250 mcg (10,000 unit)-1 mg Tab Take by mouth Daily.    amitriptyline (ELAVIL) 25 MG tablet Take 1 tablet (25 mg total) by mouth every evening.    atenoloL (TENORMIN) 25 MG tablet Take 25 mg by mouth.    tadalafiL (CIALIS) 20 MG Tab Take 1 tablet (20 mg total) by mouth daily as needed (take 1/4, 1/2, or 1 full tablet as needed for erectile dysfunction). (Patient not taking: Reported on 7/5/2023)     No current facility-administered medications for this visit.         REVIEW OF SYSTEMS:    GENERAL:  No weight loss, malaise or fevers.  HEENT:   No recent changes in vision or hearing  NECK:  Negative for lumps, no difficulty with swallowing.  RESPIRATORY:  Negative for cough, wheezing or shortness of breath, patient denies any recent URI.  CARDIOVASCULAR:  Negative for chest pain, leg swelling or palpitations.  GI:  Negative for abdominal discomfort, blood in stools or black stools or change in bowel habits.  MUSCULOSKELETAL:  See HPI.  SKIN:  Negative for lesions, rash, and itching.  PSYCH:  No mood disorder or recent psychosocial stressors.  Patients sleep is not disturbed secondary to pain.  HEMATOLOGY/LYMPHOLOGY:  Negative for prolonged bleeding, bruising easily or swollen nodes.   "Patient is currently taking anti-coagulants-aspirin and Plavix  NEURO:   No history of headaches, syncope, paralysis, seizures or tremors.  All other reviewed and negative other than HPI.    OBJECTIVE:    /89   Pulse 86   Ht 5' 6" (1.676 m)   Wt 73.9 kg (163 lb 0.5 oz)   BMI 26.31 kg/m²     PHYSICAL EXAMINATION:    GENERAL: Well appearing, in no acute distress, alert and oriented x3.  PSYCH:  Mood and affect appropriate.  SKIN: Skin color, texture, turgor normal, no rashes or lesions.  HEAD/FACE:  Normocephalic, atraumatic. Cranial nerves grossly intact.  NECK:  Moderate pain to palpation over the cervical paraspinous muscles. Spurling negative to radicular pain.  Moderate pain with neck flexion, extension, and lateral flexion.  Axial loading positive bilaterally.  CV: RRR with palpation of the radial artery.  PULM: No evidence of respiratory difficulty, symmetric chest rise.  GI:  Soft and non-tender.  BACK:Normal range of motion without pain reproduction.  EXTREMITIES: Peripheral joint ROM is full and pain free without obvious instability or laxity in all four extremities. No deformities, edema, or skin discoloration. Good capillary refill.  MUSCULOSKELETAL:  Mild impingement signs of the left shoulder with Palomino, Neer's, and empty can..  Bilateral upper and lower extremity strength is normal and symmetric.  No atrophy or tone abnormalities are noted.  NEURO: Bilateral upper and lower extremity coordination and muscle stretch reflexes are physiologic and symmetric.  Plantar response are downgoing.  Negative Zbigniew.  No clonus.  No loss of sensation is noted.  GAIT: normal.            LABS:  Lab Results   Component Value Date    WBC 5.25 02/24/2023    HGB 11.1 (L) 02/24/2023    HCT 35.1 (L) 02/24/2023    MCV 93 02/24/2023     02/24/2023       CMP  Sodium   Date Value Ref Range Status   03/29/2023 139 136 - 144 mmol/L Final   02/24/2023 141 136 - 145 mmol/L Final   02/24/2023 141 136 - 145 " mmol/L Final     Potassium   Date Value Ref Range Status   03/29/2023 4.3 3.6 - 5.1 mmol/L Final   02/24/2023 4.5 3.5 - 5.1 mmol/L Final   02/24/2023 4.5 3.5 - 5.1 mmol/L Final     Chloride   Date Value Ref Range Status   02/24/2023 109 95 - 110 mmol/L Final   02/24/2023 109 95 - 110 mmol/L Final     CO2   Date Value Ref Range Status   03/29/2023 25 22 - 32 mmol/L Final   02/24/2023 23 23 - 29 mmol/L Final   02/24/2023 23 23 - 29 mmol/L Final     Glucose   Date Value Ref Range Status   02/24/2023 92 70 - 110 mg/dL Final   02/24/2023 92 70 - 110 mg/dL Final     BUN   Date Value Ref Range Status   02/24/2023 3 (L) 8 - 23 mg/dL Final   02/24/2023 3 (L) 8 - 23 mg/dL Final     Blood Urea Nitrogen   Date Value Ref Range Status   03/29/2023 8 8 - 20 mg/dL Final     Creatinine   Date Value Ref Range Status   03/29/2023 1.11 0.90 - 1.30 mg/dL Final   02/24/2023 1.1 0.5 - 1.4 mg/dL Final   02/24/2023 1.1 0.5 - 1.4 mg/dL Final     Calcium   Date Value Ref Range Status   03/29/2023 8.9 8.9 - 10.3 mg/dL Final   02/24/2023 8.8 8.7 - 10.5 mg/dL Final   02/24/2023 8.8 8.7 - 10.5 mg/dL Final     Total Protein   Date Value Ref Range Status   03/28/2023 8.0 (H) 6.1 - 7.9 g/dL Final   02/24/2023 6.2 6.0 - 8.4 g/dL Final   02/24/2023 6.2 6.0 - 8.4 g/dL Final     Albumin   Date Value Ref Range Status   03/28/2023 4.9 (H) 3.5 - 4.8 g/dL Final   02/24/2023 3.9 3.5 - 5.2 g/dL Final   02/24/2023 3.9 3.5 - 5.2 g/dL Final     Total Bilirubin   Date Value Ref Range Status   03/28/2023 0.5 0.4 - 2.0 mg/dL Final   02/24/2023 0.4 0.1 - 1.0 mg/dL Final     Comment:     For infants and newborns, interpretation of results should be based  on gestational age, weight and in agreement with clinical  observations.    Premature Infant recommended reference ranges:  Up to 24 hours.............<8.0 mg/dL  Up to 48 hours............<12.0 mg/dL  3-5 days..................<15.0 mg/dL  6-29 days.................<15.0 mg/dL     02/24/2023 0.4 0.1 - 1.0 mg/dL  Final     Comment:     For infants and newborns, interpretation of results should be based  on gestational age, weight and in agreement with clinical  observations.    Premature Infant recommended reference ranges:  Up to 24 hours.............<8.0 mg/dL  Up to 48 hours............<12.0 mg/dL  3-5 days..................<15.0 mg/dL  6-29 days.................<15.0 mg/dL       Alkaline Phosphatase   Date Value Ref Range Status   03/28/2023 108 28 - 116 U/L Final   02/24/2023 83 55 - 135 U/L Final   02/24/2023 83 55 - 135 U/L Final     AST   Date Value Ref Range Status   03/28/2023 23 12 - 40 U/L Final   02/24/2023 27 10 - 40 U/L Final   02/24/2023 27 10 - 40 U/L Final     ALT   Date Value Ref Range Status   03/28/2023 21 5 - 56 U/L Final   02/24/2023 18 10 - 44 U/L Final   02/24/2023 18 10 - 44 U/L Final     Anion Gap   Date Value Ref Range Status   03/29/2023 9 7 - 16 mmol/L Final   02/24/2023 9 8 - 16 mmol/L Final   02/24/2023 9 8 - 16 mmol/L Final     eGFR if    Date Value Ref Range Status   06/06/2022 >60.0 >60 mL/min/1.73 m^2 Final     eGFR if non    Date Value Ref Range Status   06/06/2022 >60.0 >60 mL/min/1.73 m^2 Final     Comment:     Calculation used to obtain the estimated glomerular filtration  rate (eGFR) is the CKD-EPI equation.          Lab Results   Component Value Date    HGBA1C 5.2 02/24/2023             ASSESSMENT: 63 y.o. year old male with neck and upper extremity pain, consistent with     1. Cervical radiculopathy  EMG W/ ULTRASOUND AND NERVE CONDUCTION TEST 2 Extremities      2. Carpal tunnel syndrome, unspecified laterality  EMG W/ ULTRASOUND AND NERVE CONDUCTION TEST 2 Extremities            PLAN:   - Interventions:   Patient has has C6-7 IL GEORGIE and C4-6 medial branch blocks with limited relief     - Anticoagulation use: Patient is taking  aspirin and Plavix     - Medications: I have stressed the importance of physical activity and a home exercise plan to help  with pain and improve health. and Patient can continue with medications for now since they are providing benefits, using them appropriately, and without side effects.      Okay to continue Elavil 25 mg nightly to help with neuropathic pain and sleep disturbance          - Therapy:  Advised patient continue with activities and therapy as tolerated     - Labs:  Reviewed     - Imaging: Reviewed available imaging with patient and answered any questions they had regarding study.  Will obtain EMG/NCS of the bilateral upper extremities     - Consults/Referrals:  None at this time     - Records:  Reviewed/Obtain old records from outside physicians and imaging     - Follow up visit: return to clinic as needed     - Counseled patient regarding the importance of activity modification and physical therapy     - This condition does not require this patient to take time off of work, and the primary goal of our Pain Management services is to improve the patient's functional capacity.     - Patient Questions: Answered all of the patient's questions regarding diagnosis, therapy, and treatment       The above plan and management options were discussed at length with patient. Patient is in agreement with the above and verbalized understanding.      Gurwinder Cramer MD  Interventional Pain Management  [facility]    Disclaimer:  This note was prepared using voice recognition system and is likely to have sound alike errors that may have been overlooked even after proof reading.  Please call me with any questions

## 2023-07-05 NOTE — TELEPHONE ENCOUNTER
Asked if patient wanted to schedule EMG/ Nerve conduction test with Dr. Shrestha in [place]. Pt stated he did and appointment was made. Appointment was added to wait list.

## 2023-08-02 ENCOUNTER — PATIENT MESSAGE (OUTPATIENT)
Dept: PAIN MEDICINE | Facility: CLINIC | Age: 63
End: 2023-08-02
Payer: COMMERCIAL

## 2023-08-29 ENCOUNTER — PATIENT MESSAGE (OUTPATIENT)
Dept: CARDIOLOGY | Facility: CLINIC | Age: 63
End: 2023-08-29
Payer: COMMERCIAL

## 2023-09-03 DIAGNOSIS — I10 ESSENTIAL HYPERTENSION: ICD-10-CM

## 2023-09-03 NOTE — TELEPHONE ENCOUNTER
No care due was identified.  Health [facility] Embedded Care Due Messages. Reference number: [id number].   9/03/2023 7:12:19 AM CDT

## 2023-09-04 RX ORDER — ATENOLOL 25 MG/1
TABLET ORAL
Qty: 90 TABLET | Refills: 1 | Status: SHIPPED | OUTPATIENT
Start: 2023-09-04 | End: 2024-02-29

## 2023-09-04 NOTE — TELEPHONE ENCOUNTER
Refill Decision Note   Sal Villarreal  is requesting a refill authorization.  Brief Assessment and Rationale for Refill:  Approve     Medication Therapy Plan:         Comments:     Note composed:4:11 AM 09/04/2023

## 2023-09-18 ENCOUNTER — PATIENT MESSAGE (OUTPATIENT)
Dept: FAMILY MEDICINE | Facility: CLINIC | Age: 63
End: 2023-09-18
Payer: COMMERCIAL

## 2023-09-22 ENCOUNTER — OFFICE VISIT (OUTPATIENT)
Dept: FAMILY MEDICINE | Facility: CLINIC | Age: 63
End: 2023-09-22
Payer: COMMERCIAL

## 2023-09-22 VITALS
HEIGHT: 66 IN | SYSTOLIC BLOOD PRESSURE: 124 MMHG | WEIGHT: 159 LBS | HEART RATE: 95 BPM | TEMPERATURE: 98 F | BODY MASS INDEX: 25.55 KG/M2 | DIASTOLIC BLOOD PRESSURE: 82 MMHG

## 2023-09-22 DIAGNOSIS — K21.9 GASTROESOPHAGEAL REFLUX DISEASE, UNSPECIFIED WHETHER ESOPHAGITIS PRESENT: Primary | ICD-10-CM

## 2023-09-22 DIAGNOSIS — Z79.899 ENCOUNTER FOR LONG-TERM (CURRENT) USE OF MEDICATIONS: Chronic | ICD-10-CM

## 2023-09-22 PROCEDURE — 1160F PR REVIEW ALL MEDS BY PRESCRIBER/CLIN PHARMACIST DOCUMENTED: ICD-10-PCS | Mod: CPTII,S$GLB,, | Performed by: NURSE PRACTITIONER

## 2023-09-22 PROCEDURE — 99214 PR OFFICE/OUTPT VISIT, EST, LEVL IV, 30-39 MIN: ICD-10-PCS | Mod: S$GLB,,, | Performed by: NURSE PRACTITIONER

## 2023-09-22 PROCEDURE — 4010F PR ACE/ARB THEARPY RXD/TAKEN: ICD-10-PCS | Mod: CPTII,S$GLB,, | Performed by: NURSE PRACTITIONER

## 2023-09-22 PROCEDURE — 1160F RVW MEDS BY RX/DR IN RCRD: CPT | Mod: CPTII,S$GLB,, | Performed by: NURSE PRACTITIONER

## 2023-09-22 PROCEDURE — 4010F ACE/ARB THERAPY RXD/TAKEN: CPT | Mod: CPTII,S$GLB,, | Performed by: NURSE PRACTITIONER

## 2023-09-22 PROCEDURE — 3044F HG A1C LEVEL LT 7.0%: CPT | Mod: CPTII,S$GLB,, | Performed by: NURSE PRACTITIONER

## 2023-09-22 PROCEDURE — 3079F DIAST BP 80-89 MM HG: CPT | Mod: CPTII,S$GLB,, | Performed by: NURSE PRACTITIONER

## 2023-09-22 PROCEDURE — 1159F MED LIST DOCD IN RCRD: CPT | Mod: CPTII,S$GLB,, | Performed by: NURSE PRACTITIONER

## 2023-09-22 PROCEDURE — 3044F PR MOST RECENT HEMOGLOBIN A1C LEVEL <7.0%: ICD-10-PCS | Mod: CPTII,S$GLB,, | Performed by: NURSE PRACTITIONER

## 2023-09-22 PROCEDURE — 99214 OFFICE O/P EST MOD 30 MIN: CPT | Mod: S$GLB,,, | Performed by: NURSE PRACTITIONER

## 2023-09-22 PROCEDURE — 1159F PR MEDICATION LIST DOCUMENTED IN MEDICAL RECORD: ICD-10-PCS | Mod: CPTII,S$GLB,, | Performed by: NURSE PRACTITIONER

## 2023-09-22 PROCEDURE — 3074F SYST BP LT 130 MM HG: CPT | Mod: CPTII,S$GLB,, | Performed by: NURSE PRACTITIONER

## 2023-09-22 PROCEDURE — 99999 PR PBB SHADOW E&M-EST. PATIENT-LVL V: ICD-10-PCS | Mod: PBBFAC,,, | Performed by: NURSE PRACTITIONER

## 2023-09-22 PROCEDURE — 3008F PR BODY MASS INDEX (BMI) DOCUMENTED: ICD-10-PCS | Mod: CPTII,S$GLB,, | Performed by: NURSE PRACTITIONER

## 2023-09-22 PROCEDURE — 99999 PR PBB SHADOW E&M-EST. PATIENT-LVL V: CPT | Mod: PBBFAC,,, | Performed by: NURSE PRACTITIONER

## 2023-09-22 PROCEDURE — 3079F PR MOST RECENT DIASTOLIC BLOOD PRESSURE 80-89 MM HG: ICD-10-PCS | Mod: CPTII,S$GLB,, | Performed by: NURSE PRACTITIONER

## 2023-09-22 PROCEDURE — 3008F BODY MASS INDEX DOCD: CPT | Mod: CPTII,S$GLB,, | Performed by: NURSE PRACTITIONER

## 2023-09-22 PROCEDURE — 3074F PR MOST RECENT SYSTOLIC BLOOD PRESSURE < 130 MM HG: ICD-10-PCS | Mod: CPTII,S$GLB,, | Performed by: NURSE PRACTITIONER

## 2023-09-22 RX ORDER — PANTOPRAZOLE SODIUM 40 MG/1
40 TABLET, DELAYED RELEASE ORAL 2 TIMES DAILY
Qty: 90 TABLET | Refills: 3 | Status: SHIPPED | OUTPATIENT
Start: 2023-09-22

## 2023-09-22 RX ORDER — ACETAMINOPHEN 500 MG
500 TABLET ORAL 2 TIMES DAILY
COMMUNITY

## 2023-09-22 RX ORDER — FAMOTIDINE 20 MG/1
40 TABLET, FILM COATED ORAL 2 TIMES DAILY
Qty: 180 TABLET | Refills: 3 | Status: SHIPPED | OUTPATIENT
Start: 2023-09-22

## 2023-09-22 RX ORDER — SUCRALFATE 1 G/10ML
1 SUSPENSION ORAL
Qty: 414 ML | Refills: 0 | Status: SHIPPED | OUTPATIENT
Start: 2023-09-22 | End: 2023-12-14

## 2023-09-22 NOTE — PROGRESS NOTES
Assessment/Plan:  Problem List Items Addressed This Visit          GI    Gastroesophageal reflux disease - Primary    Overview     Chronic. Uncontrolled. Reports burning, indigestion, belching. Taking Famotidine and Protonix once daily. Former smoker. Denies alcohol use. Denies NSAID use. He does drink at least one 2 liter of Coca [employer]a every day.         Current Assessment & Plan     Increase Famotidine and Protonix to BID. Trial of Carafate if worsening or no improvement. Recommend reducing caffeine intake; discussed less soda and increase hydration with water.     Please be advised of condition course.  - Take PPI in the morning 30-60 minutes before breakfast  - I recommend ongoing Education for lifestyle modifications to help control/reduce reflux/abdominal pain including: avoid large meals, avoid eating within 2-3 hours of bedtime (avoid late night eating & lying down soon after eating), elevate head of bed if nocturnal symptoms are present, smoking cessation (if current smoker), & weight loss (if overweight).   - please be advised to avoid known foods which trigger reflux symptoms & to minimize/avoid high-fat foods, chocolate, caffeine, citrus, alcohol, & tomato products.  - Advised to avoid/limit use of NSAID's, since they can cause GI upset, bleeding, and/or ulcers. If needed, take with food.          Relevant Medications    pantoprazole (PROTONIX) 40 MG tablet    famotidine (PEPCID) 20 MG tablet    sucralfate (CARAFATE) 100 mg/mL suspension       Other    Encounter for long-term (current) use of medications (Chronic)    Overview     Sept 2023: Reviewed labs.  March 2023: Reviewed labs.  CHRONIC. Stable. Compliant with medications for managed conditions. See medication list. No SE reported.   Routine lab analysis is being monitored. Refills were addressed.  Lab Results   Component Value Date    WBC 5.25 02/24/2023    HGB 11.1 (L) 02/24/2023    HCT 35.1 (L) 02/24/2023    MCV 93 02/24/2023     02/24/2023        Chemistry        Component Value Date/Time     03/29/2023 0756     02/24/2023 0725     02/24/2023 0725    K 4.3 03/29/2023 0756    K 4.5 02/24/2023 0725    K 4.5 02/24/2023 0725     02/24/2023 0725     02/24/2023 0725    CO2 25 03/29/2023 0756    CO2 23 02/24/2023 0725    CO2 23 02/24/2023 0725    BUN 8 03/29/2023 0756    BUN 3 (L) 02/24/2023 0725    BUN 3 (L) 02/24/2023 0725    CREATININE 1.11 03/29/2023 0756    CREATININE 1.1 02/24/2023 0725    CREATININE 1.1 02/24/2023 0725    GLU 92 02/24/2023 0725    GLU 92 02/24/2023 0725        Component Value Date/Time    CALCIUM 8.9 03/29/2023 0756    CALCIUM 8.8 02/24/2023 0725    CALCIUM 8.8 02/24/2023 0725    ALKPHOS 108 03/28/2023 1240    ALKPHOS 83 02/24/2023 0725    ALKPHOS 83 02/24/2023 0725    AST 23 03/28/2023 1240    AST 27 02/24/2023 0725    AST 27 02/24/2023 0725    ALT 21 03/28/2023 1240    ALT 18 02/24/2023 0725    ALT 18 02/24/2023 0725    BILITOT 0.5 03/28/2023 1240    BILITOT 0.4 02/24/2023 0725    BILITOT 0.4 02/24/2023 0725    ESTGFRAFRICA >60.0 06/06/2022 1037    EGFRNONAA >60.0 06/06/2022 1037        Lab Results   Component Value Date    TSH 1.545 02/24/2023           Follow up if symptoms worsen or fail to improve.  ER precautions for severe or worsening symptoms.     Helena Azul, TARIK  _____________________________________________________________________________________________________________________________________________________    CC: reflux     HPI: Patient is in clinic today as an established patient here for reflux. Chronic condition has been reviewed. Further details as stated above. Complains of heartburn, indigestion, belching. Denies alarm symptoms, such as dysphagia, weight loss, chest pain, melena, hematemesis, or N/V    Past Medical History:  Past Medical History:   Diagnosis Date    Anxiety     Arthritis     Depression     Digestive disorder     ulcers    Fatigue     History of acute bronchitis      History of psychiatric care     Hypertension     Intervertebral disc disorder with radiculopathy of lumbar region 3/23/2021    Psychiatric problem     PVD (peripheral vascular disease)     S/P femoral-femoral bypass surgery 12/1/2021     Past Surgical History:   Procedure Laterality Date    ANTERIOR LUMBAR INTERBODY FUSION (ALIF) Right 03/23/2021    Procedure: FUSION, SPINE, LUMBAR, ALIF RIGHT L5-S1;  Surgeon: Kelly Ayala MD;  Location: [facility] OR;  Service: Neurosurgery;  Laterality: Right;    AORTOGRAPHY WITH EXTREMITY RUNOFF N/A 07/27/2021    Procedure: AORTOGRAM, WITH EXTREMITY RUNOFF;  Surgeon: Jim Briggs MD;  Location: [facility] CATH;  Service: Cardiovascular;  Laterality: N/A;    AORTOGRAPHY WITH SERIALOGRAPHY Bilateral 07/27/2021    Procedure: AORTOGRAM, WITH SERIALOGRAPHY;  Surgeon: Jim Briggs MD;  Location: [facility] CATH;  Service: Cardiovascular;  Laterality: Bilateral;    APPENDECTOMY  3/23    CREATION OF FEMORAL-FEMORAL ARTERY BYPASS WITH GRAFT Left 11/30/2021    Procedure: CREATION, BYPASS, ARTERIAL, FEMORAL TO FEMORAL, USING GRAFT;  Surgeon: Jim Briggs MD;  Location: [facility] OR;  Service: Cardiovascular;  Laterality: Left;    ENDARTERECTOMY OF FEMORAL ARTERY Bilateral 11/30/2021    Procedure: ENDARTERECTOMY, FEMORAL;  Surgeon: Jim Briggs MD;  Location: [facility] OR;  Service: Cardiovascular;  Laterality: Bilateral;    FUSION OF LUMBAR SPINE BY ANTERIOR APPROACH  03/23/2021    Procedure: FUSION, SPINE, LUMBAR, ANTERIOR APPROACH;  Surgeon: Jim Briggs MD;  Location: [facility] OR;  Service: Cardiovascular;;    HARVESTING OF BONE GRAFT N/A 03/23/2021    Procedure: SURGICAL PROCUREMENT, BONE GRAFT-Iliac;  Surgeon: Kelly Ayala MD;  Location: [facility] OR;  Service: Neurosurgery;  Laterality: N/A;    HYPOSPADIAS CORRECTION      SPINE SURGERY  3/22    VASECTOMY       Review of patient's allergies indicates:   Allergen Reactions    Blueberry      Social History     Tobacco Use    Smoking status: Former     Current  packs/day: 0.00     Average packs/day: 0.3 packs/day for 42.0 years (10.5 ttl pk-yrs)     Types: Cigarettes     Start date: 1980     Quit date: 2022     Years since quittin.9     Passive exposure: Never    Smokeless tobacco: Never   Substance Use Topics    Alcohol use: Not Currently     Comment: stopped recently - last drink 3/21    Drug use: No     Family History   Problem Relation Age of Onset    Diabetes Mother     Hypertension Mother     Diabetes Father     Hypertension Father     Cancer Sister     Muscular dystrophy Son     Arthritis Paternal Grandfather     Heart disease Paternal Grandfather     Birth defects Brother     Cancer Brother     Heart disease Maternal Aunt     Heart disease Paternal Uncle     Suicide Neg Hx     Sexual abuse Neg Hx     Self injury Neg Hx     Seizures Neg Hx     Schizophrenia Neg Hx     Physical abuse Neg Hx     Paranoid behavior Neg Hx     OCD Neg Hx     Drug abuse Neg Hx     Depression Neg Hx     Dementia Neg Hx     Bipolar disorder Neg Hx     Anxiety disorder Neg Hx     Alcohol abuse Neg Hx      Current Outpatient Medications on File Prior to Visit   Medication Sig Dispense Refill    acetaminophen (TYLENOL EXTRA STRENGTH) 500 MG tablet Take 500 mg by mouth 2 (two) times a day.      amitriptyline (ELAVIL) 25 MG tablet Take 1 tablet (25 mg total) by mouth every evening. 90 tablet 1    aspirin (ECOTRIN) 81 MG EC tablet Take 1 tablet (81 mg total) by mouth once daily. 90 tablet 0    atenoloL (TENORMIN) 25 MG tablet TAKE 1 TABLET BY MOUTH EVERY DAY IN THE MORNING 90 tablet 1    buPROPion (WELLBUTRIN XL) 150 MG TB24 tablet Take 1 tablet (150 mg total) by mouth once daily. 30 tablet 11    clopidogreL (PLAVIX) 75 mg tablet TAKE 1 TABLET BY MOUTH EVERY DAY 90 tablet 3    DENTA 5000 PLUS 1.1 % Crea Take 1 application by mouth 2 (two) times daily.      gabapentin (GRALISE) 600 mg Tb24 Take 1 tablet (600 mg total) by mouth 3 (three) times daily. 90 tablet 2    lisinopriL  "(PRINIVIL,ZESTRIL) 5 MG tablet TAKE 1 TABLET BY MOUTH EVERY DAY 90 tablet 1    multivitamin (THERAGRAN) per tablet Take 1 tablet by mouth once daily.      papaverine 30 mg/mL injection Add phentolamine 1 mg/mL, add alprostadil 20 micrograms/mL 10 mL 11    rosuvastatin (CRESTOR) 5 MG tablet Take 1 tablet (5 mg total) by mouth every evening. 90 tablet 4    [DISCONTINUED] famotidine (PEPCID) 20 MG tablet Take 20 mg by mouth every evening.      [DISCONTINUED] pantoprazole (PROTONIX) 40 MG tablet TAKE 1 TABLET BY MOUTH EVERY DAY 90 tablet 3    alfuzosin (UROXATRAL) 10 mg Tb24 Take 1 tablet (10 mg total) by mouth daily with breakfast. (Patient not taking: Reported on 9/22/2023) 30 tablet 11    alfuzosin (UROXATRAL) 10 mg Tb24 Take 1 tablet (10 mg total) by mouth daily with breakfast. (Patient not taking: Reported on 9/22/2023) 30 tablet 11    vitamin D3-folic acid 250 mcg (10,000 unit)-1 mg Tab Take by mouth Daily.       No current facility-administered medications on file prior to visit.     Review of Systems   Constitutional:  Negative for appetite change, chills, fatigue and fever.   HENT:  Negative for congestion, rhinorrhea and sore throat.    Eyes:  Negative for visual disturbance.   Respiratory:  Negative for cough and shortness of breath.    Cardiovascular:  Negative for chest pain, palpitations and leg swelling.   Gastrointestinal:  Negative for abdominal pain, diarrhea and vomiting.        +reflux, belching   Genitourinary:  Negative for difficulty urinating, dysuria and hematuria.   Musculoskeletal:  Negative for arthralgias and myalgias.   Skin:  Negative for rash and wound.   Neurological:  Negative for dizziness and headaches.   Psychiatric/Behavioral:  Negative for behavioral problems. The patient is not nervous/anxious.      Vitals:    09/22/23 0727   BP: 124/82   Pulse: 95   Temp: 98.3 °F (36.8 °C)   Weight: 72.1 kg (159 lb)   Height: 5' 6" (1.676 m)     Wt Readings from Last 3 Encounters:   09/22/23 72.1 " kg (159 lb)   07/05/23 73.9 kg (163 lb 0.5 oz)   06/06/23 72.2 kg (159 lb 2.8 oz)     Physical Exam  Vitals reviewed.   Constitutional:       General: He is not in acute distress.     Appearance: Normal appearance. He is not ill-appearing.   HENT:      Head: Normocephalic and atraumatic.      Right Ear: External ear normal.      Left Ear: External ear normal.      Nose: Nose normal.   Eyes:      Extraocular Movements: Extraocular movements intact.      Conjunctiva/sclera: Conjunctivae normal.   Cardiovascular:      Rate and Rhythm: Normal rate.      Heart sounds: Normal heart sounds.   Pulmonary:      Effort: Pulmonary effort is normal. No respiratory distress.      Breath sounds: Normal breath sounds.   Abdominal:      General: Abdomen is flat. There is no distension.   Musculoskeletal:         General: Normal range of motion.      Cervical back: Normal range of motion.   Skin:     General: Skin is warm and dry.      Capillary Refill: Capillary refill takes less than 2 seconds.      Coloration: Skin is not pale.      Findings: No rash.   Neurological:      General: No focal deficit present.      Mental Status: He is alert and oriented to person, place, and time. Mental status is at baseline.   Psychiatric:         Mood and Affect: Mood normal.         Speech: Speech normal. Speech is not rapid and pressured, delayed or slurred.         Behavior: Behavior normal. Behavior is not agitated, slowed, aggressive, withdrawn, hyperactive or combative. Behavior is cooperative.         Thought Content: Thought content normal.         Judgment: Judgment normal.       Health Maintenance   Topic Date Due    TETANUS VACCINE  Never done    Shingles Vaccine (1 of 2) Never done    PROSTATE-SPECIFIC ANTIGEN  02/24/2024    High Dose Statin  09/22/2024    Aspirin/Antiplatelet Therapy  09/22/2024    Lipid Panel  02/24/2028    Colorectal Cancer Screening  07/25/2029    Hepatitis C Screening  Completed

## 2023-09-22 NOTE — ASSESSMENT & PLAN NOTE
Increase Famotidine and Protonix to BID. Trial of Carafate if worsening or no improvement. Recommend reducing caffeine intake; discussed less soda and increase hydration with water.     Please be advised of condition course.  - Take PPI in the morning 30-60 minutes before breakfast  - I recommend ongoing Education for lifestyle modifications to help control/reduce reflux/abdominal pain including: avoid large meals, avoid eating within 2-3 hours of bedtime (avoid late night eating & lying down soon after eating), elevate head of bed if nocturnal symptoms are present, smoking cessation (if current smoker), & weight loss (if overweight).   - please be advised to avoid known foods which trigger reflux symptoms & to minimize/avoid high-fat foods, chocolate, caffeine, citrus, alcohol, & tomato products.  - Advised to avoid/limit use of NSAID's, since they can cause GI upset, bleeding, and/or ulcers. If needed, take with food.

## 2023-09-25 ENCOUNTER — PATIENT MESSAGE (OUTPATIENT)
Dept: PAIN MEDICINE | Facility: CLINIC | Age: 63
End: 2023-09-25
Payer: COMMERCIAL

## 2023-09-29 ENCOUNTER — OFFICE VISIT (OUTPATIENT)
Dept: PAIN MEDICINE | Facility: CLINIC | Age: 63
End: 2023-09-29
Payer: COMMERCIAL

## 2023-09-29 VITALS
HEART RATE: 82 BPM | HEIGHT: 66 IN | WEIGHT: 157.69 LBS | BODY MASS INDEX: 25.34 KG/M2 | DIASTOLIC BLOOD PRESSURE: 89 MMHG | SYSTOLIC BLOOD PRESSURE: 130 MMHG

## 2023-09-29 DIAGNOSIS — G56.03 CARPAL TUNNEL SYNDROME ON BOTH SIDES: Primary | ICD-10-CM

## 2023-09-29 DIAGNOSIS — M50.30 DDD (DEGENERATIVE DISC DISEASE), CERVICAL: ICD-10-CM

## 2023-09-29 DIAGNOSIS — M47.812 CERVICAL SPONDYLOSIS: ICD-10-CM

## 2023-09-29 DIAGNOSIS — M48.02 CERVICAL STENOSIS OF SPINAL CANAL: ICD-10-CM

## 2023-09-29 PROCEDURE — 4010F ACE/ARB THERAPY RXD/TAKEN: CPT | Mod: CPTII,S$GLB,, | Performed by: PHYSICAL MEDICINE & REHABILITATION

## 2023-09-29 PROCEDURE — 1159F PR MEDICATION LIST DOCUMENTED IN MEDICAL RECORD: ICD-10-PCS | Mod: CPTII,S$GLB,, | Performed by: PHYSICAL MEDICINE & REHABILITATION

## 2023-09-29 PROCEDURE — 99999 PR PBB SHADOW E&M-EST. PATIENT-LVL III: CPT | Mod: PBBFAC,,, | Performed by: PHYSICAL MEDICINE & REHABILITATION

## 2023-09-29 PROCEDURE — 3008F BODY MASS INDEX DOCD: CPT | Mod: CPTII,S$GLB,, | Performed by: PHYSICAL MEDICINE & REHABILITATION

## 2023-09-29 PROCEDURE — 3044F PR MOST RECENT HEMOGLOBIN A1C LEVEL <7.0%: ICD-10-PCS | Mod: CPTII,S$GLB,, | Performed by: PHYSICAL MEDICINE & REHABILITATION

## 2023-09-29 PROCEDURE — 3075F SYST BP GE 130 - 139MM HG: CPT | Mod: CPTII,S$GLB,, | Performed by: PHYSICAL MEDICINE & REHABILITATION

## 2023-09-29 PROCEDURE — 3079F PR MOST RECENT DIASTOLIC BLOOD PRESSURE 80-89 MM HG: ICD-10-PCS | Mod: CPTII,S$GLB,, | Performed by: PHYSICAL MEDICINE & REHABILITATION

## 2023-09-29 PROCEDURE — 3008F PR BODY MASS INDEX (BMI) DOCUMENTED: ICD-10-PCS | Mod: CPTII,S$GLB,, | Performed by: PHYSICAL MEDICINE & REHABILITATION

## 2023-09-29 PROCEDURE — 4010F PR ACE/ARB THEARPY RXD/TAKEN: ICD-10-PCS | Mod: CPTII,S$GLB,, | Performed by: PHYSICAL MEDICINE & REHABILITATION

## 2023-09-29 PROCEDURE — 99214 OFFICE O/P EST MOD 30 MIN: CPT | Mod: S$GLB,,, | Performed by: PHYSICAL MEDICINE & REHABILITATION

## 2023-09-29 PROCEDURE — 99214 PR OFFICE/OUTPT VISIT, EST, LEVL IV, 30-39 MIN: ICD-10-PCS | Mod: S$GLB,,, | Performed by: PHYSICAL MEDICINE & REHABILITATION

## 2023-09-29 PROCEDURE — 3079F DIAST BP 80-89 MM HG: CPT | Mod: CPTII,S$GLB,, | Performed by: PHYSICAL MEDICINE & REHABILITATION

## 2023-09-29 PROCEDURE — 1159F MED LIST DOCD IN RCRD: CPT | Mod: CPTII,S$GLB,, | Performed by: PHYSICAL MEDICINE & REHABILITATION

## 2023-09-29 PROCEDURE — 3075F PR MOST RECENT SYSTOLIC BLOOD PRESS GE 130-139MM HG: ICD-10-PCS | Mod: CPTII,S$GLB,, | Performed by: PHYSICAL MEDICINE & REHABILITATION

## 2023-09-29 PROCEDURE — 99999 PR PBB SHADOW E&M-EST. PATIENT-LVL III: ICD-10-PCS | Mod: PBBFAC,,, | Performed by: PHYSICAL MEDICINE & REHABILITATION

## 2023-09-29 PROCEDURE — 3044F HG A1C LEVEL LT 7.0%: CPT | Mod: CPTII,S$GLB,, | Performed by: PHYSICAL MEDICINE & REHABILITATION

## 2023-09-29 RX ORDER — GABAPENTIN 300 MG/1
900 CAPSULE ORAL 2 TIMES DAILY
Qty: 180 CAPSULE | Refills: 11 | Status: SHIPPED | OUTPATIENT
Start: 2023-09-29 | End: 2024-02-22

## 2023-09-29 NOTE — PROGRESS NOTES
Established Patient Chronic Pain Note (Follow up visit)    Chief Complaint:   No chief complaint on file.      SUBJECTIVE:    Sal Villarreal Jr. is a 63 y.o. male who presents to the clinic for a follow-up appointment for neck pain and worsening paresthesias of the bilateral hands. Since the last visit, Sal Villarreal Jr. states the pain has been progressing.  He recently underwent EMG/NCS and updated cervical imaging.  EMG/NCS did demonstrate cervical radiculopathy, but also severe carpal tunnel syndrome bilaterally.  Current pain intensity is 7/10.  He reports that his hand finger pain has been progressive with the amounts of numbness and tingling that has been bothering him over the past few months.  He does report that the Elavil has been beneficial with his nighttime symptoms, but still has daily activities affected by his pain.      Patient denies night fever/night sweats, urinary incontinence, bowel incontinence, significant weight loss, significant motor weakness, and loss of sensations.    Pain Disability Index Review:      9/29/2023     9:32 AM 3/3/2023     8:07 AM   Last 3 PDI Scores   Pain Disability Index (PDI) 22 46     Interval HPI 07/05/2023:  Sal Villarreal Jr. is a 63 y.o. male who presents to the clinic for a follow-up appointment for neck pain and worsening paresthesias of the bilateral hands. Since the last visit, Sal Villarreal Jr. states the pain has been progressing. Current pain intensity is 5/10.  He reports that his hand finger pain has been progressive with the amounts of numbness and tingling that has been bothering him over the past few months.  He does report that the Elavil has been beneficial with his nighttime symptoms, but still has daily activities affected by his pain.    Initial HPI 03/03/2023:  Sal Villarreal Jr. is a 63 y.o. male, left-hand dominant, who presents to the clinic for the evaluation of neck pain.  He is self referred.  His past medical history  of lumbar fusion, lumbar DDD, anxiety, depression, hypertension, peripheral vascular disease, dyslipidemia, s/p femoral bypass surgery, BPH, anemia, polyarthritis, tobacco abuse, multiple other medical comorbidities as listed in his chart.  The pain started several months ago following his 2nd lumbar spine surgery and symptoms have been worsening.The pain is located in the cervical myofascial area and radiates to the bilateral upper extremities, right worse than left.  The pain is described as  sharp, stabbing, burning, aching  and is rated as 5/10. The pain is rated with a score of  4/10 on the BEST day and a score of 10/10 on the WORST day.  Symptoms interfere with daily activity, sleeping, and work. The pain is exacerbated by activities, sitting up, and nighttime.  The pain is mitigated by gentle movement.  He works in pest control.        Non-Pharmacologic Treatments:  Physical Therapy/Home Exercise: yes, currently active  Ice/Heat:yes  TENS: no  Acupuncture: no  Massage: yes  Chiropractic: no    Other: no        Pain Medications:  - Opioids:  Percocet, Norco  - Adjuvant Medications:  Gabapentin, Gralise, Horizant, Valium, Ativan, Wellbutrin, Zanaflex  - Anti-Coagulants: Aspirin and Plavix      report:  Reviewed and consistent with medication use as prescribed.     Pain Procedures:   -previous lumbar surgery x2   -previous cervical interventional pain procedures, likely GEORGIE, no relief        Imaging:         EMG/NCS 09/25/2023      MRI cervical spine 08/26/2022:            X-ray cervical spine 08/26/2022:       X-ray lumbar spine 09/10/2021:   Five lumbar type vertebral bodies.  L5-S1 anterior fusion without evidence of hardware loosening.  4 mm retrolisthesis of L2 on L3, 3 mm at retrolisthesis of L3 on L4 and 2 mm retrolisthesis of L4 on L5 which are unchanged upon flexion and extension.  Dextro scoliosis with a Styles angle of 17.6 degrees using the superior T11 and inferior L4 endplates.  Vertebral body  heights are maintained.  No acute fracture.  Minor disc height loss in the upper lumbar spine.  Mild multilevel endplate changes.  Mild hypertrophic facet changes.  No unexpected radiodense soft tissue foreign body.  Atherosclerotic calcifications are noted.  Sacroiliac joints are symmetric and not widened.    MRI lumbar spine 07/21/2021:  NOMENCLATURE: Five lumbar type vertebral bodies.     CORD/CAUDA EQUINA: Conus has normal size and signal and ends at a normal level of L1.     ALIGNMENT: Trace retrolisthesis of L1 on L2, L2 on L3 and L3 on L4.  mild dextroconvex curvature.     BONES: Postsurgical changes of L5-S1 anterior fusion are again demonstrated.  Stable minimal chronic anterior wedging of the T12 vertebral body.  No aggressive bone marrow signal.     PARASPINAL AREA: Normal.     LUMBAR DISC LEVELS:     T12-L1: Minimal disc bulge.  Disc height loss.  Minimal narrowing of the left lateral recess.  No significant spinal canal or foraminal stenosis.     L1-L2: Trace retrolisthesis.  Minimal disc bulge.  Minimal narrowing of the left lateral recess.  No significant spinal canal or foraminal stenosis.     L2-L3: Trace retrolisthesis.  Mild disc bulge.  Mild bilateral facet hypertrophy.  Ligamentum flavum thickening.  Mild narrowing of the bilateral lateral recesses.  No significant spinal canal stenosis.  Mild bilateral foraminal stenosis.     L3-L4: Trace retrolisthesis.  Minimal disc bulge.  Mild bilateral facet hypertrophy.  Minimal narrowing of the bilateral lateral recesses.  No significant spinal canal stenosis. Mild bilateral foraminal stenosis.     L4-L5: Mild disc bulge. Mild bilateral facet hypertrophy.  Mild left ligamentum flavum thickening.  Minimal narrowing of the bilateral lateral recesses. No significant spinal canal stenosis. Mild bilateral foraminal stenosis.     L5-S1: Fused.  Central disc protrusion and osteophytic ridging.  Mild bilateral facet hypertrophy.  Minimal narrowing of the  bilateral lateral recesses.  No significant spinal canal stenosis.  Mild right greater than left foraminal stenosis.       PMHx,PSHx, Social history, and Family history:  I have reviewed the patient's medical, surgical, social, and family history in detail and updated the computerized patient record.    Review of patient's allergies indicates:   Allergen Reactions    Blueberry        Current Outpatient Medications   Medication Sig    acetaminophen (TYLENOL EXTRA STRENGTH) 500 MG tablet Take 500 mg by mouth 2 (two) times a day.    amitriptyline (ELAVIL) 25 MG tablet Take 1 tablet (25 mg total) by mouth every evening.    aspirin (ECOTRIN) 81 MG EC tablet Take 1 tablet (81 mg total) by mouth once daily.    atenoloL (TENORMIN) 25 MG tablet TAKE 1 TABLET BY MOUTH EVERY DAY IN THE MORNING    buPROPion (WELLBUTRIN XL) 150 MG TB24 tablet Take 1 tablet (150 mg total) by mouth once daily.    clopidogreL (PLAVIX) 75 mg tablet TAKE 1 TABLET BY MOUTH EVERY DAY    DENTA 5000 PLUS 1.1 % Crea Take 1 application by mouth 2 (two) times daily.    famotidine (PEPCID) 20 MG tablet Take 2 tablets (40 mg total) by mouth 2 (two) times daily.    lisinopriL (PRINIVIL,ZESTRIL) 5 MG tablet TAKE 1 TABLET BY MOUTH EVERY DAY    multivitamin (THERAGRAN) per tablet Take 1 tablet by mouth once daily.    pantoprazole (PROTONIX) 40 MG tablet Take 1 tablet (40 mg total) by mouth 2 (two) times daily.    papaverine 30 mg/mL injection Add phentolamine 1 mg/mL, add alprostadil 20 micrograms/mL    rosuvastatin (CRESTOR) 5 MG tablet Take 1 tablet (5 mg total) by mouth every evening.    sucralfate (CARAFATE) 100 mg/mL suspension Take 10 mLs (1 g total) by mouth 4 (four) times daily before meals and nightly.    vitamin D3-folic acid 250 mcg (10,000 unit)-1 mg Tab Take by mouth Daily.    alfuzosin (UROXATRAL) 10 mg Tb24 Take 1 tablet (10 mg total) by mouth daily with breakfast. (Patient not taking: Reported on 9/22/2023)    alfuzosin (UROXATRAL) 10 mg Tb24 Take  "1 tablet (10 mg total) by mouth daily with breakfast. (Patient not taking: Reported on 9/22/2023)    gabapentin (NEURONTIN) 300 MG capsule Take 3 capsules (900 mg total) by mouth 2 (two) times daily.     No current facility-administered medications for this visit.         REVIEW OF SYSTEMS:    GENERAL:  No weight loss, malaise or fevers.  HEENT:   No recent changes in vision or hearing  NECK:  Negative for lumps, no difficulty with swallowing.  RESPIRATORY:  Negative for cough, wheezing or shortness of breath, patient denies any recent URI.  CARDIOVASCULAR:  Negative for chest pain, leg swelling or palpitations.  GI:  Negative for abdominal discomfort, blood in stools or black stools or change in bowel habits.  MUSCULOSKELETAL:  See HPI.  SKIN:  Negative for lesions, rash, and itching.  PSYCH:  No mood disorder or recent psychosocial stressors.  Patients sleep is not disturbed secondary to pain.  HEMATOLOGY/LYMPHOLOGY:  Negative for prolonged bleeding, bruising easily or swollen nodes.  Patient is currently taking anti-coagulants-aspirin and Plavix  NEURO:   No history of headaches, syncope, paralysis, seizures or tremors.  All other reviewed and negative other than HPI.    OBJECTIVE:    /89   Pulse 82   Ht 5' 6" (1.676 m)   Wt 71.5 kg (157 lb 11.2 oz)   BMI 25.45 kg/m²     PHYSICAL EXAMINATION:    GENERAL: Well appearing, in no acute distress, alert and oriented x3.  PSYCH:  Mood and affect appropriate.  SKIN: Skin color, texture, turgor normal, no rashes or lesions.  HEAD/FACE:  Normocephalic, atraumatic. Cranial nerves grossly intact.  NECK:  Moderate pain to palpation over the cervical paraspinous muscles. Spurling negative to radicular pain.  Moderate pain with neck flexion, extension, and lateral flexion.  Axial loading positive bilaterally.  CV: RRR with palpation of the radial artery.  PULM: No evidence of respiratory difficulty, symmetric chest rise.  GI:  Soft and non-tender.  BACK:Normal range " of motion without pain reproduction.  EXTREMITIES: Peripheral joint ROM is full and pain free without obvious instability or laxity in all four extremities. No deformities, edema, or skin discoloration. Good capillary refill.  MUSCULOSKELETAL:  Mild impingement signs of the left shoulder with Palomino, Neer's, and empty can..  Bilateral upper and lower extremity strength is normal and symmetric.  No atrophy or tone abnormalities are noted.  NEURO: Bilateral upper and lower extremity coordination and muscle stretch reflexes are physiologic and symmetric.  Plantar response are downgoing.  Negative Zbigniew.  No clonus.  No loss of sensation is noted.  GAIT: normal.            LABS:  Lab Results   Component Value Date    WBC 5.25 02/24/2023    HGB 11.1 (L) 02/24/2023    HCT 35.1 (L) 02/24/2023    MCV 93 02/24/2023     02/24/2023       CMP  Sodium   Date Value Ref Range Status   03/29/2023 139 136 - 144 mmol/L Final   02/24/2023 141 136 - 145 mmol/L Final   02/24/2023 141 136 - 145 mmol/L Final     Potassium   Date Value Ref Range Status   03/29/2023 4.3 3.6 - 5.1 mmol/L Final   02/24/2023 4.5 3.5 - 5.1 mmol/L Final   02/24/2023 4.5 3.5 - 5.1 mmol/L Final     Chloride   Date Value Ref Range Status   02/24/2023 109 95 - 110 mmol/L Final   02/24/2023 109 95 - 110 mmol/L Final     CO2   Date Value Ref Range Status   03/29/2023 25 22 - 32 mmol/L Final   02/24/2023 23 23 - 29 mmol/L Final   02/24/2023 23 23 - 29 mmol/L Final     Glucose   Date Value Ref Range Status   02/24/2023 92 70 - 110 mg/dL Final   02/24/2023 92 70 - 110 mg/dL Final     BUN   Date Value Ref Range Status   02/24/2023 3 (L) 8 - 23 mg/dL Final   02/24/2023 3 (L) 8 - 23 mg/dL Final     Blood Urea Nitrogen   Date Value Ref Range Status   03/29/2023 8 8 - 20 mg/dL Final     Creatinine   Date Value Ref Range Status   03/29/2023 1.11 0.90 - 1.30 mg/dL Final   02/24/2023 1.1 0.5 - 1.4 mg/dL Final   02/24/2023 1.1 0.5 - 1.4 mg/dL Final     Calcium   Date  Value Ref Range Status   03/29/2023 8.9 8.9 - 10.3 mg/dL Final   02/24/2023 8.8 8.7 - 10.5 mg/dL Final   02/24/2023 8.8 8.7 - 10.5 mg/dL Final     Total Protein   Date Value Ref Range Status   03/28/2023 8.0 (H) 6.1 - 7.9 g/dL Final   02/24/2023 6.2 6.0 - 8.4 g/dL Final   02/24/2023 6.2 6.0 - 8.4 g/dL Final     Albumin   Date Value Ref Range Status   03/28/2023 4.9 (H) 3.5 - 4.8 g/dL Final   02/24/2023 3.9 3.5 - 5.2 g/dL Final   02/24/2023 3.9 3.5 - 5.2 g/dL Final     Total Bilirubin   Date Value Ref Range Status   03/28/2023 0.5 0.4 - 2.0 mg/dL Final   02/24/2023 0.4 0.1 - 1.0 mg/dL Final     Comment:     For infants and newborns, interpretation of results should be based  on gestational age, weight and in agreement with clinical  observations.    Premature Infant recommended reference ranges:  Up to 24 hours.............<8.0 mg/dL  Up to 48 hours............<12.0 mg/dL  3-5 days..................<15.0 mg/dL  6-29 days.................<15.0 mg/dL     02/24/2023 0.4 0.1 - 1.0 mg/dL Final     Comment:     For infants and newborns, interpretation of results should be based  on gestational age, weight and in agreement with clinical  observations.    Premature Infant recommended reference ranges:  Up to 24 hours.............<8.0 mg/dL  Up to 48 hours............<12.0 mg/dL  3-5 days..................<15.0 mg/dL  6-29 days.................<15.0 mg/dL       Alkaline Phosphatase   Date Value Ref Range Status   03/28/2023 108 28 - 116 U/L Final   02/24/2023 83 55 - 135 U/L Final   02/24/2023 83 55 - 135 U/L Final     AST   Date Value Ref Range Status   03/28/2023 23 12 - 40 U/L Final   02/24/2023 27 10 - 40 U/L Final   02/24/2023 27 10 - 40 U/L Final     ALT   Date Value Ref Range Status   03/28/2023 21 5 - 56 U/L Final   02/24/2023 18 10 - 44 U/L Final   02/24/2023 18 10 - 44 U/L Final     Anion Gap   Date Value Ref Range Status   03/29/2023 9 7 - 16 mmol/L Final   02/24/2023 9 8 - 16 mmol/L Final   02/24/2023 9 8 - 16  mmol/L Final     eGFR if    Date Value Ref Range Status   06/06/2022 >60.0 >60 mL/min/1.73 m^2 Final     eGFR if non    Date Value Ref Range Status   06/06/2022 >60.0 >60 mL/min/1.73 m^2 Final     Comment:     Calculation used to obtain the estimated glomerular filtration  rate (eGFR) is the CKD-EPI equation.          Lab Results   Component Value Date    HGBA1C 5.2 02/24/2023             ASSESSMENT: 63 y.o. year old male with neck and upper extremity pain, consistent with     1. Carpal tunnel syndrome on both sides        2. Cervical stenosis of spinal canal        3. Cervical spondylosis        4. DDD (degenerative disc disease), cervical                PLAN:   - Interventions:   Consider repeat C4-6 medial branch blocks with limited relief     - Anticoagulation use: Patient is taking  aspirin and Plavix     - Medications: I have stressed the importance of physical activity and a home exercise plan to help with pain and improve health. and Patient can continue with medications for now since they are providing benefits, using them appropriately, and without side effects.      Okay to continue Elavil 25 mg nightly to help with neuropathic pain and sleep disturbance  Advised patient he may consider use of turmeric for its anti-inflammatory properties          - Therapy:  Advised patient continue with activities and therapy as tolerated     - Labs:  Reviewed     - Imaging: Reviewed available imaging with patient and answered any questions they had regarding study.  Reviewed EMG/NCS which demonstrates severe carpal tunnel syndrome     - Consults/Referrals:  Expressed the importance of seeing orthopedic hand specialist for carpal tunnel evaluation and management, patient states that he will research different orthopedic surgeons in the area and inform us of which orthopedics specialist he would like to be referred to for management     - Records:  Reviewed/Obtain old records from outside  physicians and imaging     - Follow up visit: return to clinic as needed, patient to get back with us with orthopedic preference     - Counseled patient regarding the importance of activity modification and physical therapy     - This condition does not require this patient to take time off of work, and the primary goal of our Pain Management services is to improve the patient's functional capacity.     - Patient Questions: Answered all of the patient's questions regarding diagnosis, therapy, and treatment       The above plan and management options were discussed at length with patient. Patient is in agreement with the above and verbalized understanding.      Gurwinder Cramer MD  Interventional Pain Management  [facility]    Disclaimer:  This note was prepared using voice recognition system and is likely to have sound alike errors that may have been overlooked even after proof reading.  Please call me with any questions

## 2023-09-29 NOTE — PATIENT INSTRUCTIONS
Dr. Arash Aguiar  at [facility]    Robbie Epstein MD  at [facility]    Anthony Castellanos MD at [facility]            Consider taking 2-3x a day as needed

## 2023-10-06 ENCOUNTER — PATIENT MESSAGE (OUTPATIENT)
Dept: CARDIOLOGY | Facility: CLINIC | Age: 63
End: 2023-10-06
Payer: COMMERCIAL

## 2023-10-10 ENCOUNTER — TELEPHONE (OUTPATIENT)
Dept: CARDIOLOGY | Facility: CLINIC | Age: 63
End: 2023-10-10
Payer: COMMERCIAL

## 2023-10-11 ENCOUNTER — LAB VISIT (OUTPATIENT)
Dept: LAB | Facility: HOSPITAL | Age: 63
End: 2023-10-11
Attending: FAMILY MEDICINE
Payer: COMMERCIAL

## 2023-10-11 DIAGNOSIS — Z12.5 ENCOUNTER FOR PROSTATE CANCER SCREENING: ICD-10-CM

## 2023-10-11 DIAGNOSIS — R79.89 HIGH SERUM VITAMIN D: ICD-10-CM

## 2023-10-11 DIAGNOSIS — Z00.00 WELL ADULT EXAM: ICD-10-CM

## 2023-10-11 DIAGNOSIS — Z13.220 ENCOUNTER FOR LIPID SCREENING FOR CARDIOVASCULAR DISEASE: ICD-10-CM

## 2023-10-11 DIAGNOSIS — Z13.6 ENCOUNTER FOR LIPID SCREENING FOR CARDIOVASCULAR DISEASE: ICD-10-CM

## 2023-10-11 DIAGNOSIS — Z79.899 ENCOUNTER FOR LONG-TERM (CURRENT) USE OF MEDICATIONS: ICD-10-CM

## 2023-10-11 DIAGNOSIS — D64.9 ANEMIA, UNSPECIFIED TYPE: ICD-10-CM

## 2023-10-11 LAB
25(OH)D3+25(OH)D2 SERPL-MCNC: 67 NG/ML (ref 30–96)
ALBUMIN SERPL BCP-MCNC: 3.9 G/DL (ref 3.5–5.2)
ALP SERPL-CCNC: 122 U/L (ref 55–135)
ALT SERPL W/O P-5'-P-CCNC: 24 U/L (ref 10–44)
ANION GAP SERPL CALC-SCNC: 12 MMOL/L (ref 8–16)
AST SERPL-CCNC: 31 U/L (ref 10–40)
BILIRUB SERPL-MCNC: 0.3 MG/DL (ref 0.1–1)
BUN SERPL-MCNC: 5 MG/DL (ref 8–23)
CALCIUM SERPL-MCNC: 8.9 MG/DL (ref 8.7–10.5)
CHLORIDE SERPL-SCNC: 105 MMOL/L (ref 95–110)
CHOLEST SERPL-MCNC: 132 MG/DL (ref 120–199)
CHOLEST/HDLC SERPL: 6.6 {RATIO} (ref 2–5)
CO2 SERPL-SCNC: 24 MMOL/L (ref 23–29)
COMPLEXED PSA SERPL-MCNC: 1.4 NG/ML (ref 0–4)
CREAT SERPL-MCNC: 1.1 MG/DL (ref 0.5–1.4)
EST. GFR  (NO RACE VARIABLE): >60 ML/MIN/1.73 M^2
FERRITIN SERPL-MCNC: 84 NG/ML (ref 20–300)
FOLATE SERPL-MCNC: 14.6 NG/ML (ref 4–24)
GLUCOSE SERPL-MCNC: 94 MG/DL (ref 70–110)
HDLC SERPL-MCNC: 20 MG/DL (ref 40–75)
HDLC SERPL: 15.2 % (ref 20–50)
IRON SERPL-MCNC: 39 UG/DL (ref 45–160)
LDLC SERPL CALC-MCNC: ABNORMAL MG/DL (ref 63–159)
NONHDLC SERPL-MCNC: 112 MG/DL
POTASSIUM SERPL-SCNC: 4 MMOL/L (ref 3.5–5.1)
PROT SERPL-MCNC: 7 G/DL (ref 6–8.4)
SATURATED IRON: 16 % (ref 20–50)
SODIUM SERPL-SCNC: 141 MMOL/L (ref 136–145)
TOTAL IRON BINDING CAPACITY: 249 UG/DL (ref 250–450)
TRANSFERRIN SERPL-MCNC: 168 MG/DL (ref 200–375)
TRIGL SERPL-MCNC: 529 MG/DL (ref 30–150)
TSH SERPL DL<=0.005 MIU/L-ACNC: 2.21 UIU/ML (ref 0.4–4)
VIT B12 SERPL-MCNC: 842 PG/ML (ref 210–950)

## 2023-10-11 PROCEDURE — 82607 VITAMIN B-12: CPT | Performed by: FAMILY MEDICINE

## 2023-10-11 PROCEDURE — 84153 ASSAY OF PSA TOTAL: CPT | Performed by: FAMILY MEDICINE

## 2023-10-11 PROCEDURE — 82746 ASSAY OF FOLIC ACID SERUM: CPT | Performed by: FAMILY MEDICINE

## 2023-10-11 PROCEDURE — 84466 ASSAY OF TRANSFERRIN: CPT | Performed by: FAMILY MEDICINE

## 2023-10-11 PROCEDURE — 36415 COLL VENOUS BLD VENIPUNCTURE: CPT | Mod: PO | Performed by: FAMILY MEDICINE

## 2023-10-11 PROCEDURE — 83036 HEMOGLOBIN GLYCOSYLATED A1C: CPT | Performed by: FAMILY MEDICINE

## 2023-10-11 PROCEDURE — 82306 VITAMIN D 25 HYDROXY: CPT | Performed by: FAMILY MEDICINE

## 2023-10-11 PROCEDURE — 83540 ASSAY OF IRON: CPT | Performed by: FAMILY MEDICINE

## 2023-10-11 PROCEDURE — 80061 LIPID PANEL: CPT | Performed by: FAMILY MEDICINE

## 2023-10-11 PROCEDURE — 82728 ASSAY OF FERRITIN: CPT | Performed by: FAMILY MEDICINE

## 2023-10-11 PROCEDURE — 85027 COMPLETE CBC AUTOMATED: CPT | Performed by: FAMILY MEDICINE

## 2023-10-11 PROCEDURE — 80053 COMPREHEN METABOLIC PANEL: CPT | Performed by: FAMILY MEDICINE

## 2023-10-11 PROCEDURE — 84443 ASSAY THYROID STIM HORMONE: CPT | Performed by: FAMILY MEDICINE

## 2023-10-12 DIAGNOSIS — D50.9 IRON DEFICIENCY ANEMIA, UNSPECIFIED IRON DEFICIENCY ANEMIA TYPE: Primary | ICD-10-CM

## 2023-10-12 LAB
ERYTHROCYTE [DISTWIDTH] IN BLOOD BY AUTOMATED COUNT: 13.3 % (ref 11.5–14.5)
ESTIMATED AVG GLUCOSE: 105 MG/DL (ref 68–131)
HBA1C MFR BLD: 5.3 % (ref 4–5.6)
HCT VFR BLD AUTO: 38.4 % (ref 40–54)
HGB BLD-MCNC: 12.4 G/DL (ref 14–18)
MCH RBC QN AUTO: 30.4 PG (ref 27–31)
MCHC RBC AUTO-ENTMCNC: 32.3 G/DL (ref 32–36)
MCV RBC AUTO: 94 FL (ref 82–98)
PLATELET # BLD AUTO: 174 K/UL (ref 150–450)
PMV BLD AUTO: 11.1 FL (ref 9.2–12.9)
RBC # BLD AUTO: 4.08 M/UL (ref 4.6–6.2)
WBC # BLD AUTO: 5.94 K/UL (ref 3.9–12.7)

## 2023-10-12 RX ORDER — FERROUS SULFATE 325(65) MG
325 TABLET, DELAYED RELEASE (ENTERIC COATED) ORAL DAILY
Qty: 90 TABLET | Refills: 4 | Status: SHIPPED | OUTPATIENT
Start: 2023-10-12

## 2023-10-12 NOTE — PROGRESS NOTES
Make follow-up lab appointment per recommendation below.  Check to see if patient has seen the results through my chart.  If not then,  #CALL THE PATIENT# to discuss results/see if they have questions and document verification of contact. Make F/U appt if needed. [phone]    #My interpretation that was sent to them through [employer] [facility] [employer]:  Art, I have reviewed your recent blood work.     PSA screening for prostate cancer is within normal limits.  Repeat annually.  Your complete blood count is stable.  Your iron levels are low consistent with iron-deficiency.  You need to be taking iron supplement daily.  Order has been sent to the pharmacy.  Folate level is within normal limits.  B12 level was within normal limits.  Vitamin-D level is within normal limits.  Your metabolic panel which shows your glucose, kidney function, electrolytes, and liver function is normal.   Thyroid study is normal.   Your cholesterol is abnormal.  Triglycerides are significantly elevated.  Please follow-up with me or Cardiology to discuss hypertriglyceridemia soon.  Your hemoglobin A1c is normal.  This test is gold standard screening test for diabetes.  It is a measures 3 months of your average blood sugar.  =========================  Also please address any outstanding health maintenance that may be due: TETANUS VACCINE Never done  Shingles Vaccine(1 of 2) Never done  Influenza Vaccine(1) due on 09/01/2023  COVID-19 Vaccine(4 - 2023-24 season) due on 09/01/2023

## 2023-10-16 ENCOUNTER — OFFICE VISIT (OUTPATIENT)
Dept: CARDIOLOGY | Facility: CLINIC | Age: 63
End: 2023-10-16
Payer: COMMERCIAL

## 2023-10-16 VITALS
HEIGHT: 66 IN | WEIGHT: 160.5 LBS | SYSTOLIC BLOOD PRESSURE: 148 MMHG | BODY MASS INDEX: 25.79 KG/M2 | HEART RATE: 92 BPM | DIASTOLIC BLOOD PRESSURE: 84 MMHG

## 2023-10-16 DIAGNOSIS — I10 ESSENTIAL HYPERTENSION: Chronic | ICD-10-CM

## 2023-10-16 DIAGNOSIS — I77.810 MILD ASCENDING AORTA DILATATION: Chronic | ICD-10-CM

## 2023-10-16 DIAGNOSIS — Z79.02 LONG TERM (CURRENT) USE OF ANTITHROMBOTICS/ANTIPLATELETS: Chronic | ICD-10-CM

## 2023-10-16 DIAGNOSIS — I65.21 CAROTID STENOSIS, ASYMPTOMATIC, RIGHT: Chronic | ICD-10-CM

## 2023-10-16 DIAGNOSIS — E78.1 HYPERTRIGLYCERIDEMIA: Primary | Chronic | ICD-10-CM

## 2023-10-16 DIAGNOSIS — I77.9 PERIPHERAL ARTERIAL OCCLUSIVE DISEASE: Chronic | ICD-10-CM

## 2023-10-16 PROCEDURE — 1159F PR MEDICATION LIST DOCUMENTED IN MEDICAL RECORD: ICD-10-PCS | Mod: CPTII,S$GLB,, | Performed by: INTERNAL MEDICINE

## 2023-10-16 PROCEDURE — 99214 PR OFFICE/OUTPT VISIT, EST, LEVL IV, 30-39 MIN: ICD-10-PCS | Mod: S$GLB,,, | Performed by: INTERNAL MEDICINE

## 2023-10-16 PROCEDURE — 3077F PR MOST RECENT SYSTOLIC BLOOD PRESSURE >= 140 MM HG: ICD-10-PCS | Mod: CPTII,S$GLB,, | Performed by: INTERNAL MEDICINE

## 2023-10-16 PROCEDURE — 99214 OFFICE O/P EST MOD 30 MIN: CPT | Mod: S$GLB,,, | Performed by: INTERNAL MEDICINE

## 2023-10-16 PROCEDURE — 3044F HG A1C LEVEL LT 7.0%: CPT | Mod: CPTII,S$GLB,, | Performed by: INTERNAL MEDICINE

## 2023-10-16 PROCEDURE — 99999 PR PBB SHADOW E&M-EST. PATIENT-LVL IV: ICD-10-PCS | Mod: PBBFAC,,, | Performed by: INTERNAL MEDICINE

## 2023-10-16 PROCEDURE — 1159F MED LIST DOCD IN RCRD: CPT | Mod: CPTII,S$GLB,, | Performed by: INTERNAL MEDICINE

## 2023-10-16 PROCEDURE — 4010F ACE/ARB THERAPY RXD/TAKEN: CPT | Mod: CPTII,S$GLB,, | Performed by: INTERNAL MEDICINE

## 2023-10-16 PROCEDURE — 3079F PR MOST RECENT DIASTOLIC BLOOD PRESSURE 80-89 MM HG: ICD-10-PCS | Mod: CPTII,S$GLB,, | Performed by: INTERNAL MEDICINE

## 2023-10-16 PROCEDURE — 3008F PR BODY MASS INDEX (BMI) DOCUMENTED: ICD-10-PCS | Mod: CPTII,S$GLB,, | Performed by: INTERNAL MEDICINE

## 2023-10-16 PROCEDURE — 3079F DIAST BP 80-89 MM HG: CPT | Mod: CPTII,S$GLB,, | Performed by: INTERNAL MEDICINE

## 2023-10-16 PROCEDURE — 99999 PR PBB SHADOW E&M-EST. PATIENT-LVL IV: CPT | Mod: PBBFAC,,, | Performed by: INTERNAL MEDICINE

## 2023-10-16 PROCEDURE — 3008F BODY MASS INDEX DOCD: CPT | Mod: CPTII,S$GLB,, | Performed by: INTERNAL MEDICINE

## 2023-10-16 PROCEDURE — 3077F SYST BP >= 140 MM HG: CPT | Mod: CPTII,S$GLB,, | Performed by: INTERNAL MEDICINE

## 2023-10-16 PROCEDURE — 3044F PR MOST RECENT HEMOGLOBIN A1C LEVEL <7.0%: ICD-10-PCS | Mod: CPTII,S$GLB,, | Performed by: INTERNAL MEDICINE

## 2023-10-16 PROCEDURE — 4010F PR ACE/ARB THEARPY RXD/TAKEN: ICD-10-PCS | Mod: CPTII,S$GLB,, | Performed by: INTERNAL MEDICINE

## 2023-10-16 RX ORDER — ICOSAPENT ETHYL 1000 MG/1
2 CAPSULE ORAL 2 TIMES DAILY
Qty: 360 CAPSULE | Refills: 1 | Status: SHIPPED | OUTPATIENT
Start: 2023-10-16

## 2023-10-16 RX ORDER — LISINOPRIL 10 MG/1
10 TABLET ORAL NIGHTLY
Qty: 90 TABLET | Refills: 1 | Status: SHIPPED | OUTPATIENT
Start: 2023-10-16 | End: 2024-10-15

## 2023-10-16 NOTE — PROGRESS NOTES
Subjective:    Patient ID:  Sal Villarreal Jr. is a 63 y.o. male who presents for Follow-up (RESULTS), Heart Problem, Peripheral Arterial Disease, and Hypertension        HPI  RECENT LABS TRIGLYCERIDE 529 UP FROM 198 HDL 20, TOTAL CHOLESTEROL 132, IN PAIN WITH CARPAL TUNNEL AND NECK PAIN/ DISC, NO SMOKING, WALKS 4 MI/ D,SEE ROS    Past Medical History:   Diagnosis Date    Anxiety     Arthritis     Depression     Digestive disorder     ulcers    Fatigue     History of acute bronchitis     History of psychiatric care     Hypertension     Intervertebral disc disorder with radiculopathy of lumbar region 3/23/2021    Psychiatric problem     PVD (peripheral vascular disease)     S/P femoral-femoral bypass surgery 12/1/2021     Past Surgical History:   Procedure Laterality Date    ANTERIOR LUMBAR INTERBODY FUSION (ALIF) Right 03/23/2021    Procedure: FUSION, SPINE, LUMBAR, ALIF RIGHT L5-S1;  Surgeon: Kelly Ayala MD;  Location: [facility];  Service: Neurosurgery;  Laterality: Right;    AORTOGRAPHY WITH EXTREMITY RUNOFF N/A 07/27/2021    Procedure: AORTOGRAM, WITH EXTREMITY RUNOFF;  Surgeon: Jim Briggs MD;  Location: [facility] CATH;  Service: Cardiovascular;  Laterality: N/A;    AORTOGRAPHY WITH SERIALOGRAPHY Bilateral 07/27/2021    Procedure: AORTOGRAM, WITH SERIALOGRAPHY;  Surgeon: Jim Briggs MD;  Location: [facility] CATH;  Service: Cardiovascular;  Laterality: Bilateral;    APPENDECTOMY  3/23    CREATION OF FEMORAL-FEMORAL ARTERY BYPASS WITH GRAFT Left 11/30/2021    Procedure: CREATION, BYPASS, ARTERIAL, FEMORAL TO FEMORAL, USING GRAFT;  Surgeon: Jim Briggs MD;  Location: [facility] OR;  Service: Cardiovascular;  Laterality: Left;    ENDARTERECTOMY OF FEMORAL ARTERY Bilateral 11/30/2021    Procedure: ENDARTERECTOMY, FEMORAL;  Surgeon: Jim Briggs MD;  Location: [facility];  Service: Cardiovascular;  Laterality: Bilateral;    FUSION OF LUMBAR SPINE BY ANTERIOR APPROACH  03/23/2021    Procedure: FUSION, SPINE, LUMBAR, ANTERIOR  APPROACH;  Surgeon: Jim Briggs MD;  Location: [facility] OR;  Service: Cardiovascular;;    HARVESTING OF BONE GRAFT N/A 2021    Procedure: SURGICAL PROCUREMENT, BONE GRAFT-Iliac;  Surgeon: Kelly Ayala MD;  Location: [facility];  Service: Neurosurgery;  Laterality: N/A;    HYPOSPADIAS CORRECTION      SPINE SURGERY  3/22    VASECTOMY       Family History   Problem Relation Age of Onset    Diabetes Mother     Hypertension Mother     Diabetes Father     Hypertension Father     Cancer Sister     Muscular dystrophy Son     Arthritis Paternal Grandfather     Heart disease Paternal Grandfather     Birth defects Brother     Cancer Brother     Heart disease Maternal Aunt     Heart disease Paternal Uncle     Suicide Neg Hx     Sexual abuse Neg Hx     Self injury Neg Hx     Seizures Neg Hx     Schizophrenia Neg Hx     Physical abuse Neg Hx     Paranoid behavior Neg Hx     OCD Neg Hx     Drug abuse Neg Hx     Depression Neg Hx     Dementia Neg Hx     Bipolar disorder Neg Hx     Anxiety disorder Neg Hx     Alcohol abuse Neg Hx      Social History     Socioeconomic History    Marital status:    Occupational History    Occupation: unemployed     Employer: [employer]   Tobacco Use    Smoking status: Former     Current packs/day: 0.00     Average packs/day: 0.3 packs/day for 42.0 years (10.5 ttl pk-yrs)     Types: Cigarettes     Start date: 1980     Quit date: 2022     Years since quittin.0     Passive exposure: Never    Smokeless tobacco: Never   Substance and Sexual Activity    Alcohol use: Not Currently     Comment: stopped recently - last drink 3/21    Drug use: No    Sexual activity: Yes     Partners: Male     Birth control/protection: Partner-Vasectomy   Other Topics Concern    Patient feels they ought to cut down on drinking/drug use Yes    Patient annoyed by others criticizing their drinking/drug use Yes    Patient has felt bad or guilty about drinking/drug use Yes    Patient has had a  drink/used drugs as an eye opener in the AM No     Social Determinants of Health     Financial Resource Strain: Low Risk  (10/10/2023)    Overall Financial Resource Strain (CARDIA)     Difficulty of Paying Living Expenses: Not hard at all   Food Insecurity: No Food Insecurity (10/10/2023)    Hunger Vital Sign     Worried About Running Out of Food in the Last Year: Never true     Ran Out of Food in the Last Year: Never true   Transportation Needs: No Transportation Needs (10/10/2023)    PRAPARE - Transportation     Lack of Transportation (Medical): No     Lack of Transportation (Non-Medical): No   Physical Activity: Sufficiently Active (10/10/2023)    Exercise Vital Sign     Days of Exercise per Week: 6 days     Minutes of Exercise per Session: 150+ min   Stress: Stress Concern Present (10/10/2023)    [employer] [place] [employer] - Occupational Stress Questionnaire     Feeling of Stress : To some extent   Social Connections: Unknown (10/10/2023)    Social Connection and Isolation Panel [NHANES]     Frequency of Communication with Friends and Family: More than three times a week     Frequency of Social Gatherings with Friends and Family: Once a week     Active Member of Clubs or Organizations: No     Attends Club or Organization Meetings: Never     Marital Status:    Housing Stability: Low Risk  (10/10/2023)    Housing Stability Vital Sign     Unable to Pay for Housing in the Last Year: No     Number of Places Lived in the Last Year: 1     Unstable Housing in the Last Year: No       Review of patient's allergies indicates:   Allergen Reactions    Blueberry        Current Outpatient Medications:     acetaminophen (TYLENOL EXTRA STRENGTH) 500 MG tablet, Take 500 mg by mouth 2 (two) times a day., Disp: , Rfl:     amitriptyline (ELAVIL) 25 MG tablet, Take 1 tablet (25 mg total) by mouth every evening., Disp: 90 tablet, Rfl: 1    aspirin (ECOTRIN) 81 MG EC tablet, Take 1 tablet (81 mg total) by mouth  once daily., Disp: 90 tablet, Rfl: 0    atenoloL (TENORMIN) 25 MG tablet, TAKE 1 TABLET BY MOUTH EVERY DAY IN THE MORNING, Disp: 90 tablet, Rfl: 1    buPROPion (WELLBUTRIN XL) 150 MG TB24 tablet, Take 1 tablet (150 mg total) by mouth once daily., Disp: 30 tablet, Rfl: 11    clopidogreL (PLAVIX) 75 mg tablet, TAKE 1 TABLET BY MOUTH EVERY DAY, Disp: 90 tablet, Rfl: 3    DENTA 5000 PLUS 1.1 % Crea, Take 1 application by mouth 2 (two) times daily., Disp: , Rfl:     famotidine (PEPCID) 20 MG tablet, Take 2 tablets (40 mg total) by mouth 2 (two) times daily., Disp: 180 tablet, Rfl: 3    ferrous sulfate 325 (65 FE) MG EC tablet, Take 1 tablet (325 mg total) by mouth once daily., Disp: 90 tablet, Rfl: 4    gabapentin (NEURONTIN) 300 MG capsule, Take 3 capsules (900 mg total) by mouth 2 (two) times daily., Disp: 180 capsule, Rfl: 11    multivitamin (THERAGRAN) per tablet, Take 1 tablet by mouth once daily., Disp: , Rfl:     pantoprazole (PROTONIX) 40 MG tablet, Take 1 tablet (40 mg total) by mouth 2 (two) times daily., Disp: 90 tablet, Rfl: 3    papaverine 30 mg/mL injection, Add phentolamine 1 mg/mL, add alprostadil 20 micrograms/mL, Disp: 10 mL, Rfl: 11    rosuvastatin (CRESTOR) 5 MG tablet, Take 1 tablet (5 mg total) by mouth every evening., Disp: 90 tablet, Rfl: 4    icosapent ethyL (VASCEPA) 1 gram Cap, Take 2 capsules (2 g total) by mouth 2 (two) times daily., Disp: 360 capsule, Rfl: 1    lisinopriL 10 MG tablet, Take 1 tablet (10 mg total) by mouth every evening., Disp: 90 tablet, Rfl: 1    sucralfate (CARAFATE) 100 mg/mL suspension, Take 10 mLs (1 g total) by mouth 4 (four) times daily before meals and nightly. (Patient not taking: Reported on 10/16/2023), Disp: 414 mL, Rfl: 0    vitamin D3-folic acid 250 mcg (10,000 unit)-1 mg Tab, Take by mouth Daily., Disp: , Rfl:     Review of Systems   Constitutional: Negative for chills, diaphoresis, fever, malaise/fatigue and night sweats.   HENT:  Negative for congestion and  "nosebleeds.    Eyes:  Negative for blurred vision and visual disturbance.   Cardiovascular:  Negative for chest pain, claudication (MILD), cyanosis, dyspnea on exertion, irregular heartbeat, leg swelling, near-syncope, orthopnea, palpitations, paroxysmal nocturnal dyspnea and syncope.   Respiratory:  Negative for cough, hemoptysis, shortness of breath and wheezing.    Endocrine: Negative.    Hematologic/Lymphatic: Negative for adenopathy. Does not bruise/bleed easily.   Skin:  Negative for color change and rash.   Musculoskeletal:  Positive for neck pain (HAD INJECTIONS). Negative for back pain (SOME) and falls.   Gastrointestinal:  Negative for abdominal pain, change in bowel habit, dysphagia, jaundice, melena and nausea.   Genitourinary:  Negative for dysuria and flank pain.        ED   Neurological:  Positive for paresthesias (HANDS). Negative for brief paralysis, focal weakness, headaches, light-headedness, loss of balance and weakness.   Psychiatric/Behavioral:  Negative for altered mental status and depression.    Allergic/Immunologic: Negative for hives and persistent infections.        Objective:      Vitals:    10/16/23 1405 10/16/23 1415   BP: (!) 146/98 (!) 148/84   Pulse: 92    Weight: 72.8 kg (160 lb 7.9 oz)    Height: 5' 6" (1.676 m)    PainSc:   7    PainLoc: Generalized      Body mass index is 25.9 kg/m².    Physical Exam  Constitutional:       Appearance: Normal appearance.   HENT:      Head: Normocephalic and atraumatic.   Eyes:      Extraocular Movements: Extraocular movements intact.      Conjunctiva/sclera: Conjunctivae normal.   Neck:      Vascular: No carotid bruit.   Cardiovascular:      Rate and Rhythm: Normal rate and regular rhythm. No extrasystoles are present.     Pulses:           Carotid pulses are 2+ on the right side with bruit and 2+ on the left side.       Radial pulses are 2+ on the right side and 2+ on the left side.        Femoral pulses are 2+ on the right side and 2+ on the " left side with bruit.       Posterior tibial pulses are 1+ on the right side and 1+ on the left side.      Heart sounds: Murmur heard.      Systolic murmur is present with a grade of 1/6 at the upper right sternal border.      No friction rub. No gallop.   Pulmonary:      Effort: Pulmonary effort is normal.      Breath sounds: Normal breath sounds and air entry.   Abdominal:      Palpations: Abdomen is soft.      Tenderness: There is no abdominal tenderness.       Musculoskeletal:      Cervical back: Neck supple.      Right lower leg: No edema.      Left lower leg: No edema.   Skin:     General: Skin is warm and dry.      Capillary Refill: Capillary refill takes less than 2 seconds.   Neurological:      General: No focal deficit present.      Mental Status: He is alert.   Psychiatric:         Mood and Affect: Mood normal.         Speech: Speech normal.         Behavior: Behavior normal.               ..    Chemistry        Component Value Date/Time     10/11/2023 0729    K 4.0 10/11/2023 0729     10/11/2023 0729    CO2 24 10/11/2023 0729    BUN 5 (L) 10/11/2023 0729    CREATININE 1.1 10/11/2023 0729    GLU 94 10/11/2023 0729        Component Value Date/Time    CALCIUM 8.9 10/11/2023 0729    ALKPHOS 122 10/11/2023 0729    AST 31 10/11/2023 0729    ALT 24 10/11/2023 0729    BILITOT 0.3 10/11/2023 0729    ESTGFRAFRICA >60.0 06/06/2022 1037    EGFRNONAA >60.0 06/06/2022 1037            ..  Lab Results   Component Value Date    CHOL 132 10/11/2023    CHOL 86 (L) 02/24/2023    CHOL 86 (L) 02/24/2023     Lab Results   Component Value Date    HDL 20 (L) 10/11/2023    HDL 21 (L) 02/24/2023    HDL 21 (L) 02/24/2023     Lab Results   Component Value Date    LDLCALC Invalid, Trig>400.0 10/11/2023    LDLCALC 25.4 (L) 02/24/2023    LDLCALC 25.4 (L) 02/24/2023     Lab Results   Component Value Date    TRIG 529 (H) 10/11/2023    TRIG 198 (H) 02/24/2023    TRIG 198 (H) 02/24/2023     Lab Results   Component Value Date     CHOLHDL 15.2 (L) 10/11/2023    CHOLHDL 24.4 02/24/2023    CHOLHDL 24.4 02/24/2023     ..  Lab Results   Component Value Date    WBC 5.94 10/11/2023    HGB 12.4 (L) 10/11/2023    HCT 38.4 (L) 10/11/2023    MCV 94 10/11/2023     10/11/2023       Test(s) Reviewed  I have reviewed the following in detail:  [] Stress test   [] Angiography   [] Echocardiogram   [x] Labs   [] Other:       Assessment:         ICD-10-CM ICD-9-CM   1. Hypertriglyceridemia  E78.1 272.1   2. Long term (current) use of antithrombotics/antiplatelets  Z79.02 V58.63   3. Peripheral arterial occlusive disease  I77.9 444.22   4. Carotid stenosis, asymptomatic, right  I65.21 433.10   5. Mild ascending aorta dilatation  I77.810 447.71   6. Essential hypertension  I10 401.9     Problem List Items Addressed This Visit          Cardiac/Vascular    Essential hypertension (Chronic)    Overview     CHRONIC.  March 2023:  Blood pressure well controlled.  Hypertension Medications               atenoloL (TENORMIN) 25 MG tablet TAKE 1 TABLET BY MOUTH EVERY DAY IN THE MORNING    lisinopriL (PRINIVIL,ZESTRIL) 5 MG tablet TAKE 1 TABLET BY MOUTH EVERY DAY     STABLE. BP Reviewed.  Compliant with BP medications. No SE reported.   (-) CP, SOB, palpitations, dizziness, lightheadedness, HA, arm numbness, tingling or weakness, syncope.  Creatinine   Date Value Ref Range Status   02/24/2023 1.1 0.5 - 1.4 mg/dL Final   02/24/2023 1.1 0.5 - 1.4 mg/dL Final     Lab Results   Component Value Date    K 4.5 02/24/2023    K 4.5 02/24/2023     Results for orders placed or performed during the hospital encounter of 11/29/21   EKG 12-LEAD    Collection Time: 11/29/21  2:48 PM    Narrative    Test Reason : I77.9,    Vent. Rate : 061 BPM     Atrial Rate : 061 BPM     P-R Int : 156 ms          QRS Dur : 086 ms      QT Int : 416 ms       P-R-T Axes : 072 063 052 degrees     QTc Int : 418 ms    Normal sinus rhythm  Normal ECG  When compared with ECG of 28-OCT-2021  22:49,  Previous ECG has undetermined rhythm, needs review  Criteria for Anterior infarct are no longer Present  Confirmed by Deshawn CONCEPCION, Arnold JORDAN ([id number]) on 11/29/2021 2:56:48 PM    Referred By: VAISHALI BRIGGS           Confirmed By:Arnold Hess MD            Relevant Orders    Comprehensive Metabolic Panel    Peripheral arterial occlusive disease    Overview     Chronic.  Intermittent control.  Patient following with vascular surgery.  Date of Service: 07/27/2021  DATE OF PROCEDURE:  07/27/2021.     PREOPERATIVE DIAGNOSES:  1.  Peripheral arterial occlusive disease.  2.  Pain of the right hip.  3.  Severe intermittent claudication of the right lower extremity affecting   activities of daily living.     POSTOPERATIVE DIAGNOSES:  1.  Peripheral arterial occlusive disease.  2.  Pain of the right hip  3.  Severe intermittent claudication of the right lower extremity affecting   activities of daily living.     OPERATIONS:  1.  Aortogram with bilateral renal arteriogram.  2.  Bilateral common iliac arteriograms with bilateral lower extremity runoff.  3.  Measurement of pressures in the aorta and the left iliac artery.     SURGEON:  Momo Briggs M.D., F.A.C.S.     ANESTHESIA:  Lidocaine 1% for local and intravenous sedation using 4 mg of   Versed and 100 mcg of fentanyl IV.  The patient's level of consciousness was   monitored by the registered nurse from 10:59-11:30 a.m.  Other monitors included   blood pressure, pulse oximetry, heart rate and respiratory rate.     PROCEDURE IN DETAIL:  With the patient in the supine position on the cardiac   cath table, bilateral groins were prepped and draped in a sterile fashion.  CT   angiography had showed an occluded right external iliac artery and a 50%   stenosis of the left common iliac artery.  Access to the left common femoral   artery was obtained using an 18-gauge access needle and a guidewire was passed   through this.  A 5-Mongolian sheath was passed over the guidewire and the  guidewire   and dilator were removed and the sheath was aspirated and flushed.  A J-tip   guidewire was passed through the sheath and advanced up into the suprarenal   aorta under fluoroscopy.  The guidewire was removed.  Contrast was injected and   digital subtraction angiography was performed and an abdominal aortogram with   bilateral renal arteriograms was done.  The renal arteries were patent.  The   infrarenal aorta had calcified plaque with some mild luminal irregularities, but   no significant stenosis.     The angiographic catheter was pulled down into the distal aorta just above the   aortic bifurcation.  Contrast was injected and cineangiography was performed,   and bilateral common iliac arteriograms with bilateral lower extremity runoff   were performed.  The right common iliac artery was patent.  The right   hypogastric artery was patent and large, giving off a lot of collaterals around   the hip.  The right external iliac artery and the right common femoral artery   were both occluded.  The very distal common femoral artery reconstituted a   couple of millimeters above the femoral bifurcation.  The profunda femoris and   the superficial femoral artery and the right popliteal artery were all patent.    Runoff was via 3 vessels.     The left common iliac artery had some luminal irregularities, but did not seem   to have high-grade stenosis.  The left hypogastric and the left external iliac   arteries were patent, although the left external iliac artery seemed to have   about 20% stenosis distally.  The left common femoral, the left profunda, the   left superficial femoral and the left popliteal arteries were patent.  Runoff   was via 3 vessels.  Bilateral oblique arteriograms of the iliac arteries were   then performed to see if there was any stenosis that could not be seen with the   anteroposterior projections.  No high-grade stenosis in the left common iliac   artery could be seen.  The Omniflush  angiographic catheter was then exchanged   for a straight Glidecath.  Pressures were then measured in the infrarenal aorta   and the catheter was pulled back with continuous blood pressure measurements   down into the left common iliac and then into the left external iliac artery.    There was absolutely no change in pressure from the aorta into the common iliac   or into the external iliac artery.  No intervention was performed.  The sheath   was pulled from the left common femoral artery and pressure was held for about   20 minutes.  At the end of that time, there was no bleeding and no hematoma.    Sterile dressing was placed.  The patient tolerated the procedure and left the   Cardiac Cath Lab in satisfactory and stable condition.        NIMO/VIVIAN  dd: 07/27/2021 11:52:36 (CDT)  td: 07/27/2021 13:12:39 (CDT)  Doc ID   #[id number]  Job ID #[id number]     CC:          Last signed by: Jim Briggs MD at 7/27/2021  3:08 PM              Relevant Orders    Comprehensive Metabolic Panel    Carotid stenosis, asymptomatic, right    Mild ascending aorta dilatation    Relevant Orders    Comprehensive Metabolic Panel       Hematology    Long term (current) use of antithrombotics/antiplatelets     Other Visit Diagnoses       Hypertriglyceridemia  (Chronic)   -  Primary    WORSE    Relevant Orders    Comprehensive Metabolic Panel    Lipid Panel             Plan:         ADD VASCEPA, FOR HIGH TRIGLYCERIDEMIA INCREASE LISINOPRIL TO 10 MG, FOR BETTER BLOOD PRESSURE CONTROL,ALL OTHER CV CLINICALLY STABLE, NO ANGINA, NO HF, NO TIA, NO CLINICAL ARRHYTHMIA,CONTINUE CURRENT MEDS, EDUCATION, DIET, EXERCISE , WALKING EXERCISE PROGRAM RETURN TO CLINIC 6 MO WITH LABS  Hypertriglyceridemia  Comments:  WORSE  Orders:  -     Comprehensive Metabolic Panel; Future; Expected date: 04/16/2024  -     Lipid Panel; Future; Expected date: 04/16/2024    Long term (current) use of antithrombotics/antiplatelets    Peripheral arterial occlusive disease  -      Comprehensive Metabolic Panel; Future; Expected date: 04/16/2024    Carotid stenosis, asymptomatic, right    Mild ascending aorta dilatation  -     Comprehensive Metabolic Panel; Future; Expected date: 04/16/2024    Essential hypertension  Comments:  ADJUST MEDS  Orders:  -     Comprehensive Metabolic Panel; Future; Expected date: 04/16/2024    Other orders  -     icosapent ethyL (VASCEPA) 1 gram Cap; Take 2 capsules (2 g total) by mouth 2 (two) times daily.  Dispense: 360 capsule; Refill: 1  -     lisinopriL 10 MG tablet; Take 1 tablet (10 mg total) by mouth every evening.  Dispense: 90 tablet; Refill: 1    RTC Low level/low impact aerobic exercise 5x's/wk. Heart healthy diet and risk factor modification.    See labs and med orders.    Aerobic exercise 5x's/wk. Heart healthy diet and risk factor modification.    See labs and med orders.

## 2023-11-07 ENCOUNTER — PATIENT MESSAGE (OUTPATIENT)
Dept: CARDIOLOGY | Facility: CLINIC | Age: 63
End: 2023-11-07
Payer: COMMERCIAL

## 2023-11-10 ENCOUNTER — PATIENT MESSAGE (OUTPATIENT)
Dept: PAIN MEDICINE | Facility: CLINIC | Age: 63
End: 2023-11-10
Payer: COMMERCIAL

## 2023-11-16 DIAGNOSIS — E78.5 DYSLIPIDEMIA (HIGH LDL; LOW HDL): ICD-10-CM

## 2023-11-16 RX ORDER — ROSUVASTATIN CALCIUM 5 MG/1
5 TABLET, COATED ORAL NIGHTLY
Qty: 90 TABLET | Refills: 1 | Status: SHIPPED | OUTPATIENT
Start: 2023-11-16

## 2023-11-16 NOTE — TELEPHONE ENCOUNTER
Refill Decision Note   Sal Villarreal  is requesting a refill authorization.  Brief Assessment and Rationale for Refill:  Approve     Medication Therapy Plan:         Comments:     Note composed:7:57 AM 11/16/2023

## 2023-11-16 NOTE — TELEPHONE ENCOUNTER
No care due was identified.  [facility] Embedded Care Due Messages. Reference number: [id number].   11/16/2023 12:30:33 AM CST

## 2023-11-28 ENCOUNTER — IMMUNIZATION (OUTPATIENT)
Dept: FAMILY MEDICINE | Facility: CLINIC | Age: 63
End: 2023-11-28
Payer: COMMERCIAL

## 2023-11-28 DIAGNOSIS — Z23 NEED FOR VACCINATION: Primary | ICD-10-CM

## 2023-11-28 PROCEDURE — 90686 IIV4 VACC NO PRSV 0.5 ML IM: CPT | Mod: S$GLB,,, | Performed by: STUDENT IN AN ORGANIZED HEALTH CARE EDUCATION/TRAINING PROGRAM

## 2023-11-28 PROCEDURE — 90686 FLU VACCINE (QUAD) GREATER THAN OR EQUAL TO 3YO PRESERVATIVE FREE IM: ICD-10-PCS | Mod: S$GLB,,, | Performed by: STUDENT IN AN ORGANIZED HEALTH CARE EDUCATION/TRAINING PROGRAM

## 2023-11-28 PROCEDURE — 90471 IMMUNIZATION ADMIN: CPT | Mod: S$GLB,,, | Performed by: STUDENT IN AN ORGANIZED HEALTH CARE EDUCATION/TRAINING PROGRAM

## 2023-11-28 PROCEDURE — 90471 FLU VACCINE (QUAD) GREATER THAN OR EQUAL TO 3YO PRESERVATIVE FREE IM: ICD-10-PCS | Mod: S$GLB,,, | Performed by: STUDENT IN AN ORGANIZED HEALTH CARE EDUCATION/TRAINING PROGRAM

## 2023-12-13 ENCOUNTER — PATIENT MESSAGE (OUTPATIENT)
Dept: CARDIOLOGY | Facility: CLINIC | Age: 63
End: 2023-12-13
Payer: COMMERCIAL

## 2023-12-14 ENCOUNTER — PATIENT MESSAGE (OUTPATIENT)
Dept: PAIN MEDICINE | Facility: CLINIC | Age: 63
End: 2023-12-14
Payer: COMMERCIAL

## 2023-12-14 ENCOUNTER — PATIENT MESSAGE (OUTPATIENT)
Dept: FAMILY MEDICINE | Facility: CLINIC | Age: 63
End: 2023-12-14

## 2023-12-14 ENCOUNTER — OFFICE VISIT (OUTPATIENT)
Dept: FAMILY MEDICINE | Facility: CLINIC | Age: 63
End: 2023-12-14
Payer: COMMERCIAL

## 2023-12-14 VITALS — BODY MASS INDEX: 25.71 KG/M2 | HEIGHT: 66 IN | WEIGHT: 160 LBS

## 2023-12-14 DIAGNOSIS — R41.3 MEMORY LOSS: Primary | ICD-10-CM

## 2023-12-14 DIAGNOSIS — R45.86 EMOTIONAL LABILITY: ICD-10-CM

## 2023-12-14 DIAGNOSIS — Z79.899 ENCOUNTER FOR LONG-TERM (CURRENT) USE OF MEDICATIONS: ICD-10-CM

## 2023-12-14 DIAGNOSIS — R41.3 OTHER AMNESIA: ICD-10-CM

## 2023-12-14 PROBLEM — J44.9 CHRONIC OBSTRUCTIVE PULMONARY DISEASE, UNSPECIFIED COPD TYPE: Status: RESOLVED | Noted: 2023-12-14 | Resolved: 2023-12-14

## 2023-12-14 PROBLEM — J44.9 CHRONIC OBSTRUCTIVE PULMONARY DISEASE, UNSPECIFIED COPD TYPE: Status: ACTIVE | Noted: 2023-12-14

## 2023-12-14 PROCEDURE — 3008F BODY MASS INDEX DOCD: CPT | Mod: CPTII,95,, | Performed by: FAMILY MEDICINE

## 2023-12-14 PROCEDURE — 3044F HG A1C LEVEL LT 7.0%: CPT | Mod: CPTII,95,, | Performed by: FAMILY MEDICINE

## 2023-12-14 PROCEDURE — 3008F PR BODY MASS INDEX (BMI) DOCUMENTED: ICD-10-PCS | Mod: CPTII,95,, | Performed by: FAMILY MEDICINE

## 2023-12-14 PROCEDURE — 1159F MED LIST DOCD IN RCRD: CPT | Mod: CPTII,95,, | Performed by: FAMILY MEDICINE

## 2023-12-14 PROCEDURE — 4010F ACE/ARB THERAPY RXD/TAKEN: CPT | Mod: CPTII,95,, | Performed by: FAMILY MEDICINE

## 2023-12-14 PROCEDURE — 99214 PR OFFICE/OUTPT VISIT, EST, LEVL IV, 30-39 MIN: ICD-10-PCS | Mod: 95,,, | Performed by: FAMILY MEDICINE

## 2023-12-14 PROCEDURE — 4010F PR ACE/ARB THEARPY RXD/TAKEN: ICD-10-PCS | Mod: CPTII,95,, | Performed by: FAMILY MEDICINE

## 2023-12-14 PROCEDURE — 99214 OFFICE O/P EST MOD 30 MIN: CPT | Mod: 95,,, | Performed by: FAMILY MEDICINE

## 2023-12-14 PROCEDURE — 1159F PR MEDICATION LIST DOCUMENTED IN MEDICAL RECORD: ICD-10-PCS | Mod: CPTII,95,, | Performed by: FAMILY MEDICINE

## 2023-12-14 PROCEDURE — 1160F PR REVIEW ALL MEDS BY PRESCRIBER/CLIN PHARMACIST DOCUMENTED: ICD-10-PCS | Mod: CPTII,95,, | Performed by: FAMILY MEDICINE

## 2023-12-14 PROCEDURE — 1160F RVW MEDS BY RX/DR IN RCRD: CPT | Mod: CPTII,95,, | Performed by: FAMILY MEDICINE

## 2023-12-14 PROCEDURE — 3044F PR MOST RECENT HEMOGLOBIN A1C LEVEL <7.0%: ICD-10-PCS | Mod: CPTII,95,, | Performed by: FAMILY MEDICINE

## 2023-12-14 NOTE — ASSESSMENT & PLAN NOTE
Complete history and Limited telemedicine physical was completed today.  Complete and thorough medication reconciliation was performed.  Discussed risks and benefits of medications.  Advised patient on orders and health maintenance.  We discussed old records and old labs if available.  Will request any records not available through epic.  Continue current medications listed on your summary sheet.

## 2023-12-14 NOTE — ASSESSMENT & PLAN NOTE
Possibly medication side effect.  Patient will try decreasing some of his medications for reflux 1st and then possibly other medications such as titrating down on gabapentin.    Check MRI.  Referral to Neurology for further evaluation and treatment.

## 2023-12-14 NOTE — ASSESSMENT & PLAN NOTE
Likely related to his memory loss and headaches.  Check MRI.  Check gabapentin level.  Referral to neurology.

## 2023-12-14 NOTE — PATIENT INSTRUCTIONS
Follow up in about 6 months (around 6/14/2024), or if symptoms worsen or fail to improve, for Med refills.     Dear patient,   As a result of recent federal legislation (The Federal Cures Act), you may receive lab or pathology results from your visit in your [facility] account before your physician is able to contact you. Your physician or their representative will relay the results to you with their recommendations at their soonest availability.     If no improvement in symptoms or symptoms worsen, please be advised to call MD, follow-up at clinic and/or go to ER if becomes severe.    Bipin Penaloza M.D.        We Offer TELEHEALTH & Same Day Appointments!   Book your Telehealth appointment with me through my nurse or   Clinic appointments on [facility]!    [address] [place] [address]    Office: [phone]   FAX: [phone]    Check out my Facebook Page and Follow Me at: [email or web address]    Check out my website at [employer] by clicking on: [email or web address]    To Schedule appointments online, go to [facility]: [email or web address]

## 2023-12-14 NOTE — PROGRESS NOTES
Primary Care Telemedicine Note  The patient location is:  Patient's Home - [place]  The chief complaint leading to consultation is:   Chief Complaint   Patient presents with    Memory Loss      Total time:  see MDM below. The total time spent on the encounter, which includes face to face time and non-face to face time preparing to see the patient (eg, review of previous medical records, tests), Obtaining and/or reviewing separately obtained history, documenting clinical information in the electronic or other health record, independently interpreting results (not separately reported)/communicating results to the patient/family/caregiver, and/or care coordination (not separately reported).    Visit type: Virtual visit with synchronous audio and video  Each patient to whom he or she provides medical services by telemedicine is:  (1) informed of the relationship between the physician and patient and the respective role of any other health care provider with respect to management of the patient; and (2) notified that he or she may decline to receive medical services by telemedicine and may withdraw from such care at any time.  =================================================================    PLAN:      Problem List Items Addressed This Visit       Encounter for long-term (current) use of medications (Chronic)     Complete history and Limited telemedicine physical was completed today.  Complete and thorough medication reconciliation was performed.  Discussed risks and benefits of medications.  Advised patient on orders and health maintenance.  We discussed old records and old labs if available.  Will request any records not available through epic.  Continue current medications listed on your summary sheet.           Relevant Orders    GABAPENTIN LEVEL    Other amnesia - Primary     Possibly medication side effect.  Patient will try decreasing some of his medications for reflux 1st and then possibly other medications  such as titrating down on gabapentin.    Check MRI.  Referral to Neurology for further evaluation and treatment.         Relevant Orders    MRI Brain Without Contrast    Ambulatory referral/consult to Neurology    Emotional lability     Likely related to his memory loss and headaches.  Check MRI.  Check gabapentin level.  Referral to neurology.         Relevant Orders    Ambulatory referral/consult to Neurology     Future Appointments       Date Provider Specialty Appt Notes    5/16/2024 Samm Wagner MD Cardiology 7 month f/u           Medication Management for assessment above:   Medication List with Changes/Refills   Current Medications    ACETAMINOPHEN (TYLENOL EXTRA STRENGTH) 500 MG TABLET    Take 500 mg by mouth 2 (two) times a day.    AMITRIPTYLINE (ELAVIL) 25 MG TABLET    Take 1 tablet (25 mg total) by mouth every evening.    ASPIRIN (ECOTRIN) 81 MG EC TABLET    Take 1 tablet (81 mg total) by mouth once daily.    ATENOLOL (TENORMIN) 25 MG TABLET    TAKE 1 TABLET BY MOUTH EVERY DAY IN THE MORNING    BUPROPION (WELLBUTRIN XL) 150 MG TB24 TABLET    Take 1 tablet (150 mg total) by mouth once daily.    CLOPIDOGREL (PLAVIX) 75 MG TABLET    TAKE 1 TABLET BY MOUTH EVERY DAY    DENTA 5000 PLUS 1.1 % CREA    Take 1 application by mouth 2 (two) times daily.    FAMOTIDINE (PEPCID) 20 MG TABLET    Take 2 tablets (40 mg total) by mouth 2 (two) times daily.    FERROUS SULFATE 325 (65 FE) MG EC TABLET    Take 1 tablet (325 mg total) by mouth once daily.    GABAPENTIN (NEURONTIN) 300 MG CAPSULE    Take 3 capsules (900 mg total) by mouth 2 (two) times daily.    ICOSAPENT ETHYL (VASCEPA) 1 GRAM CAP    Take 2 capsules (2 g total) by mouth 2 (two) times daily.    LISINOPRIL 10 MG TABLET    Take 1 tablet (10 mg total) by mouth every evening.    MULTIVITAMIN (THERAGRAN) PER TABLET    Take 1 tablet by mouth once daily.    PANTOPRAZOLE (PROTONIX) 40 MG TABLET    Take 1 tablet (40 mg total) by mouth 2 (two) times daily.     PAPAVERINE 30 MG/ML INJECTION    Add phentolamine 1 mg/mL, add alprostadil 20 micrograms/mL    ROSUVASTATIN (CRESTOR) 5 MG TABLET    TAKE 1 TABLET BY MOUTH EVERY DAY IN THE EVENING    VITAMIN D3-FOLIC ACID 250 MCG (10,000 UNIT)-1 MG TAB    Take by mouth Daily.   Discontinued Medications    SUCRALFATE (CARAFATE) 100 MG/ML SUSPENSION    Take 10 mLs (1 g total) by mouth 4 (four) times daily before meals and nightly.       Bipin Penaloza M.D.  ==========================================================================  Subjective:   Patient ID: Sal Villarreal Jr. is a 63 y.o. male.  has a past medical history of Anxiety, Arthritis, Depression, Digestive disorder, Fatigue, History of acute bronchitis, History of psychiatric care, Hypertension, Intervertebral disc disorder with radiculopathy of lumbar region (3/23/2021), Psychiatric problem, PVD (peripheral vascular disease), and S/P femoral-femoral bypass surgery (12/1/2021).   Chief Complaint: Memory Loss      Problem List Items Addressed This Visit       Encounter for long-term (current) use of medications (Chronic)    Overview     December 2023: Reviewed labs.  Sept 2023: Reviewed labs.  March 2023: Reviewed labs.  CHRONIC. Stable. Compliant with medications for managed conditions. See medication list. No SE reported.   Routine lab analysis is being monitored. Refills were addressed.  Lab Results   Component Value Date    WBC 5.94 10/11/2023    HGB 12.4 (L) 10/11/2023    HCT 38.4 (L) 10/11/2023    MCV 94 10/11/2023     10/11/2023       Chemistry        Component Value Date/Time     10/11/2023 0729    K 4.0 10/11/2023 0729     10/11/2023 0729    CO2 24 10/11/2023 0729    BUN 5 (L) 10/11/2023 0729    CREATININE 1.1 10/11/2023 0729    GLU 94 10/11/2023 0729        Component Value Date/Time    CALCIUM 8.9 10/11/2023 0729    ALKPHOS 122 10/11/2023 0729    AST 31 10/11/2023 0729    ALT 24 10/11/2023 0729    BILITOT 0.3 10/11/2023 0729     ESTGFRAFRICA >60.0 06/06/2022 1037    EGFRNONAA >60.0 06/06/2022 1037        Lab Results   Component Value Date    TSH 2.206 10/11/2023          Current Assessment & Plan     Complete history and Limited telemedicine physical was completed today.  Complete and thorough medication reconciliation was performed.  Discussed risks and benefits of medications.  Advised patient on orders and health maintenance.  We discussed old records and old labs if available.  Will request any records not available through epic.  Continue current medications listed on your summary sheet.           Other amnesia - Primary    Overview     New problem.  Patient states she has been having difficulty with short-term memory.  Recently.  Patient denies any new medications.  He has started amitriptyline several months ago but this medication has been helping him sleep.         Current Assessment & Plan     Possibly medication side effect.  Patient will try decreasing some of his medications for reflux 1st and then possibly other medications such as titrating down on gabapentin.    Check MRI.  Referral to Neurology for further evaluation and treatment.         Emotional lability    Overview     New problem.  Patient states he is having emotional lability.  Patient states he can cry watching a commercial.  This is unusual for him.  No new medications recently.         Current Assessment & Plan     Likely related to his memory loss and headaches.  Check MRI.  Check gabapentin level.  Referral to neurology.             Review of patient's allergies indicates:   Allergen Reactions    Blueberry      Current Outpatient Medications   Medication Instructions    acetaminophen (TYLENOL EXTRA STRENGTH) 500 mg, Oral, 2 times daily    amitriptyline (ELAVIL) 25 mg, Oral, Nightly    aspirin (ECOTRIN) 81 mg, Oral, Daily    atenoloL (TENORMIN) 25 MG tablet TAKE 1 TABLET BY MOUTH EVERY DAY IN THE MORNING    buPROPion (WELLBUTRIN XL) 150 mg, Oral, Daily     "clopidogreL (PLAVIX) 75 mg tablet TAKE 1 TABLET BY MOUTH EVERY DAY    DENTA 5000 PLUS 1.1 % Crea 1 application , Oral, 2 times daily    famotidine (PEPCID) 40 mg, Oral, 2 times daily    ferrous sulfate 325 mg, Oral, Daily    gabapentin (NEURONTIN) 900 mg, Oral, 2 times daily    icosapent ethyL (VASCEPA) 2 g, Oral, 2 times daily    lisinopriL 10 mg, Oral, Nightly    multivitamin (THERAGRAN) per tablet 1 tablet, Oral, Daily    pantoprazole (PROTONIX) 40 mg, Oral, 2 times daily    papaverine 30 mg/mL injection Add phentolamine 1 mg/mL, add alprostadil 20 micrograms/mL    rosuvastatin (CRESTOR) 5 mg, Oral, Nightly    vitamin D3-folic acid 250 mcg (10,000 unit)-1 mg Tab Oral, Daily      I have reviewed the PMH, social history, FamilyHx, surgical history, allergies and medications documented / confirmed by the patient at the time of this visit.  Review of Systems   Constitutional:  Positive for activity change. Negative for unexpected weight change.   HENT:  Positive for trouble swallowing. Negative for hearing loss and rhinorrhea.    Eyes:  Negative for discharge and visual disturbance.   Respiratory:  Negative for chest tightness and wheezing.    Cardiovascular:  Negative for chest pain and palpitations.   Gastrointestinal:  Positive for diarrhea. Negative for blood in stool, constipation and vomiting.   Endocrine: Negative for polydipsia and polyuria.   Genitourinary:  Negative for difficulty urinating, hematuria and urgency.   Musculoskeletal:  Positive for arthralgias and neck pain. Negative for joint swelling.   Neurological:  Positive for headaches. Negative for weakness.   Psychiatric/Behavioral:  Positive for confusion and dysphoric mood.      Objective:   Ht 5' 6" (1.676 m)   Wt 72.6 kg (160 lb)   BMI 25.82 kg/m²   Physical Exam  Constitutional:       General: He is not in acute distress.     Appearance: He is well-developed. He is not diaphoretic.   HENT:      Head: Normocephalic and atraumatic.   Eyes:      " Extraocular Movements: Extraocular movements intact.      Pupils: Pupils are equal, round, and reactive to light.   Pulmonary:      Effort: Pulmonary effort is normal.   Musculoskeletal:         General: Normal range of motion.      Cervical back: Normal range of motion.   Skin:     Findings: No rash.   Neurological:      Mental Status: He is alert and oriented to person, place, and time.   Psychiatric:         Attention and Perception: He is attentive.         Mood and Affect: Mood normal. Mood is not anxious or depressed. Affect is not labile, blunt, angry or inappropriate.         Speech: He is communicative. Speech is not rapid and pressured, delayed, slurred or tangential.         Behavior: Behavior normal. Behavior is not agitated, slowed, aggressive, withdrawn, hyperactive or combative.         Thought Content: Thought content normal. Thought content is not paranoid or delusional. Thought content does not include homicidal or suicidal ideation. Thought content does not include homicidal or suicidal plan.         Cognition and Memory: Memory is not impaired.         Judgment: Judgment normal. Judgment is not impulsive or inappropriate.         Assessment:     1. Memory loss    2. Emotional lability    3. Encounter for long-term (current) use of medications    4. Other amnesia      MDM:   Moderate medical complexity. Moderate risk.   Total time: 32 minutes.  This includes total time spent on the encounter, which includes face to face time and non-face to face time preparing to see the patient (eg, review of previous medical records, tests), Obtaining and/or reviewing separately obtained history, documenting clinical information in the electronic or other health record, independently interpreting results (not separately reported)/communicating results to the patient/family/caregiver, and/or care coordination (not separately reported).    I have Reviewed and summarized old records.  I have performed thorough  medication reconciliation today and discussed risk and benefits of medications.  I have reviewed labs and discussed with patient.  All questions were answered.  I am requesting old records and will review them once they are available. Cardiology, PMR    I have signed for the following orders AND/OR meds.  Orders Placed This Encounter   Procedures    MRI Brain Without Contrast     Standing Status:   Future     Standing Expiration Date:   12/14/2024     Order Specific Question:   Does the patient have or ever had a pacemaker or a defibrillator (Note: Some facilities may not be able to schedule an MRI for patients with pacemakers and defibrillators. You should contact your local radiology dept to determine if this is the case.)?     Answer:   No     Order Specific Question:   Does the patient have an aneurysm or surgical clip, pump, nerve/brain stimulator, middle/inner ear prosthesis, or other metal implant or foreign object (bullet, shrapnel)? If they have a card related to their implant, ask them to bring it. Issues related to the implant may cause the MRI to be delayed.     Answer:   No     Order Specific Question:   Will the patient require sedation?     Answer:   No     Order Specific Question:   May the Radiologist modify the order per protocol to meet the clinical needs of the patient?     Answer:   Yes     Order Specific Question:   Does the patient have on a skin patch for medication with aluminized backing?     Answer:   No    GABAPENTIN LEVEL     Standing Status:   Future     Standing Expiration Date:   2/11/2025    Ambulatory referral/consult to Neurology     Standing Status:   Future     Standing Expiration Date:   1/14/2025     Referral Priority:   Urgent     Referral Type:   Consultation     Referral Reason:   Specialty Services Required     Requested Specialty:   Neurology     Number of Visits Requested:   1           Follow up in about 6 months (around 6/14/2024), or if symptoms worsen or fail to  improve, for Med refills.  Future Appointments       Date Provider Specialty Appt Notes    5/16/2024 Samm Wagner MD Cardiology 7 month f/u          If no improvement in symptoms or symptoms worsen, advised to call/follow-up at clinic or go to ER. Patient voiced understanding and all questions/concerns were addressed.   DISCLAIMER: This note was compiled by using a speech recognition dictation system and therefore please be aware that typographical / speech recognition errors can and do occur.  Please contact me if you see any errors specifically.    Bipin Penaloza M.D.       Office: [phone] [address] [place] [address]  FAX: [phone]

## 2024-01-02 ENCOUNTER — HOSPITAL ENCOUNTER (OUTPATIENT)
Dept: RADIOLOGY | Facility: HOSPITAL | Age: 64
Discharge: HOME | End: 2024-01-02
Attending: FAMILY MEDICINE
Payer: COMMERCIAL

## 2024-01-02 DIAGNOSIS — R41.3 OTHER AMNESIA: ICD-10-CM

## 2024-01-02 PROCEDURE — 70551 MRI BRAIN STEM W/O DYE: CPT | Mod: TC,PO

## 2024-01-02 PROCEDURE — 70551 MRI BRAIN STEM W/O DYE: CPT | Mod: 26,,, | Performed by: RADIOLOGY

## 2024-01-02 NOTE — PROGRESS NOTES
1st check to see if patient has seen the results.  If not then  CALL patient with results and Document verification.  Schedule follow-up if needed.  [phone]  MRI of the brain reviewed by radiology.  There is no significant abnormality noted on MRI.  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  Follow-up/establish care with ENT if having issues.

## 2024-01-10 ENCOUNTER — LAB VISIT (OUTPATIENT)
Dept: LAB | Facility: HOSPITAL | Age: 64
End: 2024-01-10
Attending: SURGERY
Payer: COMMERCIAL

## 2024-01-10 DIAGNOSIS — M25.642 STIFFNESS OF FINGER JOINT OF LEFT HAND: ICD-10-CM

## 2024-01-10 DIAGNOSIS — M25.641 STIFFNESS OF FINGER JOINT OF RIGHT HAND: ICD-10-CM

## 2024-01-10 DIAGNOSIS — M79.642 BILATERAL HAND PAIN: ICD-10-CM

## 2024-01-10 DIAGNOSIS — M79.641 BILATERAL HAND PAIN: ICD-10-CM

## 2024-01-10 LAB
CCP AB SER IA-ACNC: <0.5 U/ML
CRP SERPL-MCNC: 1.2 MG/L (ref 0–8.2)
ERYTHROCYTE [SEDIMENTATION RATE] IN BLOOD BY PHOTOMETRIC METHOD: 6 MM/HR (ref 0–23)
RHEUMATOID FACT SERPL-ACNC: <13 IU/ML (ref 0–15)
URATE SERPL-MCNC: 7.7 MG/DL (ref 3.4–7)

## 2024-01-10 PROCEDURE — 84550 ASSAY OF BLOOD/URIC ACID: CPT | Performed by: SURGERY

## 2024-01-10 PROCEDURE — 86431 RHEUMATOID FACTOR QUANT: CPT | Performed by: SURGERY

## 2024-01-10 PROCEDURE — 85652 RBC SED RATE AUTOMATED: CPT | Performed by: SURGERY

## 2024-01-10 PROCEDURE — 86038 ANTINUCLEAR ANTIBODIES: CPT | Performed by: SURGERY

## 2024-01-10 PROCEDURE — 86140 C-REACTIVE PROTEIN: CPT | Performed by: SURGERY

## 2024-01-10 PROCEDURE — 86200 CCP ANTIBODY: CPT | Performed by: SURGERY

## 2024-01-10 PROCEDURE — 36415 COLL VENOUS BLD VENIPUNCTURE: CPT | Mod: PO | Performed by: SURGERY

## 2024-01-11 LAB — ANA SER QL IF: NORMAL

## 2024-01-15 NOTE — PROGRESS NOTES
"Subjective:      Patient ID: Sal Villarreal Jr. is a 63 y.o. male.    Chief Complaint:  Memory difficulties.       History of Present Illness  HPI 63 years old C. Male with PMHx of HTN / CAD / Depression Ds / Lumbar spine DGD's changes and others Medical issues, came for the evaluation and recommendation of Memory difficulties.      Started: about 8 months.   Describes: short term memory.   Timing: went from constant to intermittent.   Frequency: comes and goes / " seems improving ".   Pain:  None.   Location: [facility].   Family: Mother with Dementia.   Medications: Neurontin.   Worsen: medications ( Neurontin ).   Alleviated: none.   Associated symptoms: " feeling in a fog " at time / very emotional / words findings difficulties / mild behavioral issues ( irritable at time ) /   Can get distracted / occasionally getting lost in conversation.   Triggers: stress.   Prodrome symptoms: none.      Was taken Neurontin 900 mg BID - for about 3 years / decreased to 300 mg BID a month ago - has noticed memory seems better.     Patient feels some Memory difficulties - short term.   No CVA / no head Sx.  Concussion x 2 / no LOC.   Works full time - feels has had issues with memory in the past.  No getting lost driving / no burning food / no issues with name - people that He knows well /    Review of Systems  Review of Systems   Allergic/Immunologic:        Blueberry.    Neurological:         Memory loss.    All other systems reviewed and are negative.    Objective:     Neurologic Exam     Mental Status   Oriented to person, place, and time.   Registration: recalls 3 of 3 objects. Recall at 5 minutes: recalls 3 of 3 objects. Follows 3 step commands.   Attention: normal. Concentration: normal.   Speech: speech is normal   Level of consciousness: alert  Knowledge: good. Able to perform simple calculations.   Able to name object. Able to read. Able to repeat. Able to write. Normal comprehension.     Cranial Nerves   Cranial nerves " II through XII intact.     CN III, IV, VI   Pupils are equal, round, and reactive to light.    Motor Exam   Muscle bulk: normal  Overall muscle tone: normal    Strength   Strength 5/5 throughout.   Right neck flexion: 5/5  Left neck flexion: 5/5  Right neck extension: 5/5  Left neck extension: 5/5  Right deltoid: 5/5  Left deltoid: 5/5  Right biceps: 5/5  Left biceps: 5/5  Right triceps: 5/5  Left triceps: 5/5  Right wrist flexion: 5/5  Left wrist flexion: 5/5  Right wrist extension: 5/5  Left wrist extension: 5/5  Right interossei: 5/5  Left interossei: 5/5  Right abdominals: 5/5  Left abdominals: 5/5  Right iliopsoas: 5/5  Left iliopsoas: 5/5  Right quadriceps: 5/5  Left quadriceps: 5/5  Right hamstrin/5  Left hamstrin/5  Right glutei: 5/5  Left glutei: 5/5  Right anterior tibial: 5/5  Left anterior tibial: 5/5  Right posterior tibial: 5/5  Left posterior tibial: 5/5  Right peroneal: 5/5  Left peroneal: 5/5  Right gastroc: 5/5  Left gastroc: 5/5    Sensory Exam   Light touch normal.   Vibration normal.   Proprioception normal.   Pinprick normal.   Graphesthesia: normal  Stereognosis: normal    Gait, Coordination, and Reflexes     Gait  Gait: normal    Coordination   Romberg: negative  Finger to nose coordination: normal  Heel to shin coordination: normal  Tandem walking coordination: normal    Tremor   Resting tremor: absent  Intention tremor: absent  Action tremor: absent    Reflexes   Right brachioradialis: 2+  Left brachioradialis: 2+  Right biceps: 2+  Left biceps: 2+  Right triceps: 2+  Left triceps: 2+  Right patellar: 2+  Left patellar: 2+  Right achilles: 2+  Left achilles: 2+  Right : 2+  Left : 2+  Right plantar: normal  Left plantar: normal  Right Hughes: absent  Left Hughes: absent  Right ankle clonus: absent  Left ankle clonus: absent  Right pendular knee jerk: absent  Left pendular knee jerk: absent      Physical Exam  Vitals and nursing note reviewed.   Constitutional:        Appearance: Normal appearance. He is normal weight.   HENT:      Head: Normocephalic and atraumatic.      Right Ear: Tympanic membrane normal.      Left Ear: Tympanic membrane normal.      Nose: Nose normal.      Mouth/Throat:      Mouth: Mucous membranes are moist.      Pharynx: Oropharynx is clear.   Eyes:      Extraocular Movements: Extraocular movements intact.      Conjunctiva/sclera: Conjunctivae normal.      Pupils: Pupils are equal, round, and reactive to light.   Cardiovascular:      Rate and Rhythm: Normal rate and regular rhythm.      Pulses: Normal pulses.      Heart sounds: Normal heart sounds.   Pulmonary:      Effort: Pulmonary effort is normal.      Breath sounds: Normal breath sounds.   Abdominal:      General: Abdomen is flat. Bowel sounds are normal.   Genitourinary:     Comments: Deferred.   Musculoskeletal:         General: Normal range of motion.      Cervical back: Normal range of motion and neck supple.   Skin:     General: Skin is warm and dry.      Capillary Refill: Capillary refill takes less than 2 seconds.   Neurological:      General: No focal deficit present.      Mental Status: He is alert and oriented to person, place, and time. Mental status is at baseline.      Cranial Nerves: Cranial nerves 2-12 are intact.      Motor: Motor strength is normal.     Coordination: Finger-Nose-Finger Test, Heel to Shin Test and Romberg Test normal.      Gait: Gait is intact. Tandem walk normal.      Deep Tendon Reflexes:      Reflex Scores:       Tricep reflexes are 2+ on the right side and 2+ on the left side.       Bicep reflexes are 2+ on the right side and 2+ on the left side.       Brachioradialis reflexes are 2+ on the right side and 2+ on the left side.       Patellar reflexes are 2+ on the right side and 2+ on the left side.       Achilles reflexes are 2+ on the right side and 2+ on the left side.  Psychiatric:         Mood and Affect: Mood normal.         Speech: Speech normal.          Behavior: Behavior normal.         Thought Content: Thought content normal.         Judgment: Judgment normal.        MMSE: 30/ 30.   Assessment:   Patient Neurological Assessment is non focal.    - Side effects of medications.   - Memory difficulties.     Plan:   63 years old C. Male with PMHx as above came for the evaluation and recommendation of Memory Loss.     Neurontin is the culprit of patient cognitive / memory issues / still He has a family Hx of Dementia - will keep seeing Him and continue evaluating with MMSE.   I recommended to discussed switching the Gabapentin with another medication for his Neck and CTS issues - patient will discuss it with his pain Management.    Labs: CBC / CMP / ESR / CRP / RF / [facility] - done 01/ 2024 - non significant abnormalities.  Vit B 12 / Folate - normal parameters.   Personally reviewed MRI Brain - done 01/ 2024 - normal for age.     Please do not hesitate to contact me with any updates, questions or concerns.      Stevan Salazar MD.  General Neurologist.

## 2024-01-19 ENCOUNTER — OFFICE VISIT (OUTPATIENT)
Dept: NEUROLOGY | Facility: CLINIC | Age: 64
End: 2024-01-19
Payer: COMMERCIAL

## 2024-01-19 VITALS
HEIGHT: 66 IN | SYSTOLIC BLOOD PRESSURE: 122 MMHG | HEART RATE: 100 BPM | WEIGHT: 165.38 LBS | DIASTOLIC BLOOD PRESSURE: 88 MMHG | BODY MASS INDEX: 26.58 KG/M2

## 2024-01-19 DIAGNOSIS — R45.86 EMOTIONAL LABILITY: Primary | ICD-10-CM

## 2024-01-19 DIAGNOSIS — R41.3 MEMORY LOSS: ICD-10-CM

## 2024-01-19 DIAGNOSIS — R41.3 OTHER AMNESIA: ICD-10-CM

## 2024-01-19 PROCEDURE — 4010F ACE/ARB THERAPY RXD/TAKEN: CPT | Mod: CPTII,S$GLB,, | Performed by: PSYCHIATRY & NEUROLOGY

## 2024-01-19 PROCEDURE — 3074F SYST BP LT 130 MM HG: CPT | Mod: CPTII,S$GLB,, | Performed by: PSYCHIATRY & NEUROLOGY

## 2024-01-19 PROCEDURE — 1159F MED LIST DOCD IN RCRD: CPT | Mod: CPTII,S$GLB,, | Performed by: PSYCHIATRY & NEUROLOGY

## 2024-01-19 PROCEDURE — 99203 OFFICE O/P NEW LOW 30 MIN: CPT | Mod: S$GLB,,, | Performed by: PSYCHIATRY & NEUROLOGY

## 2024-01-19 PROCEDURE — 99999 PR PBB SHADOW E&M-EST. PATIENT-LVL V: CPT | Mod: PBBFAC,,, | Performed by: PSYCHIATRY & NEUROLOGY

## 2024-01-19 PROCEDURE — 1160F RVW MEDS BY RX/DR IN RCRD: CPT | Mod: CPTII,S$GLB,, | Performed by: PSYCHIATRY & NEUROLOGY

## 2024-01-19 PROCEDURE — 3008F BODY MASS INDEX DOCD: CPT | Mod: CPTII,S$GLB,, | Performed by: PSYCHIATRY & NEUROLOGY

## 2024-01-19 PROCEDURE — 3079F DIAST BP 80-89 MM HG: CPT | Mod: CPTII,S$GLB,, | Performed by: PSYCHIATRY & NEUROLOGY

## 2024-01-29 ENCOUNTER — OFFICE VISIT (OUTPATIENT)
Dept: FAMILY MEDICINE | Facility: CLINIC | Age: 64
End: 2024-01-29
Payer: COMMERCIAL

## 2024-01-29 DIAGNOSIS — B96.89 ACUTE BACTERIAL SINUSITIS: ICD-10-CM

## 2024-01-29 DIAGNOSIS — E79.0 ELEVATED URIC ACID IN BLOOD: Primary | ICD-10-CM

## 2024-01-29 DIAGNOSIS — J01.90 ACUTE BACTERIAL SINUSITIS: ICD-10-CM

## 2024-01-29 PROCEDURE — 1159F MED LIST DOCD IN RCRD: CPT | Mod: CPTII,95,, | Performed by: NURSE PRACTITIONER

## 2024-01-29 PROCEDURE — 99214 OFFICE O/P EST MOD 30 MIN: CPT | Mod: 95,,, | Performed by: NURSE PRACTITIONER

## 2024-01-29 PROCEDURE — 4010F ACE/ARB THERAPY RXD/TAKEN: CPT | Mod: CPTII,95,, | Performed by: NURSE PRACTITIONER

## 2024-01-29 PROCEDURE — 1160F RVW MEDS BY RX/DR IN RCRD: CPT | Mod: CPTII,95,, | Performed by: NURSE PRACTITIONER

## 2024-01-29 RX ORDER — COLCHICINE 0.6 MG/1
TABLET ORAL
Qty: 3 TABLET | Refills: 2 | Status: SHIPPED | OUTPATIENT
Start: 2024-01-29 | End: 2024-02-22

## 2024-01-29 RX ORDER — ALLOPURINOL 100 MG/1
100 TABLET ORAL DAILY
Qty: 30 TABLET | Refills: 11 | Status: SHIPPED | OUTPATIENT
Start: 2024-01-29 | End: 2024-02-22 | Stop reason: DRUGHIGH

## 2024-01-29 RX ORDER — AZITHROMYCIN 250 MG/1
TABLET, FILM COATED ORAL
Qty: 6 TABLET | Refills: 0 | Status: SHIPPED | OUTPATIENT
Start: 2024-01-29 | End: 2024-02-03

## 2024-01-29 NOTE — PROGRESS NOTES
Primary Care Telemedicine Note  The patient location is:  Patient's Home - [place]  The chief complaint leading to consultation is:   Chief Complaint   Patient presents with    Gout    Sinus Problem      Total time:  see MDM below. The total time spent on the encounter, which includes face to face time and non-face to face time preparing to see the patient (eg, review of previous medical records, tests), Obtaining and/or reviewing separately obtained history, documenting clinical information in the electronic or other health record, independently interpreting results (not separately reported)/communicating results to the patient/family/caregiver, and/or care coordination (not separately reported).    Visit type: Virtual visit with synchronous audio only and video  Each patient to whom he or she provides medical services by telemedicine is:  (1) informed of the relationship between the physician and patient and the respective role of any other health care provider with respect to management of the patient; and (2) notified that he or she may decline to receive medical services by telemedicine and may withdraw from such care at any time.  =================================================================    Assessment/Plan:  Problem List Items Addressed This Visit          Orthopedic    Elevated uric acid in blood - Primary    Overview     Chronic. Ongoing issues related to carpal tunnel. Following with specialist, Dr. Aguiar for this. He recently had labs done which showed elevated uric acid. States he was advised to follow up with his PCP to discuss gouty arthritis. He has bilateral hand pain. There is no redness, warmth, tenderness. He reports mild swelling. He is currently doing physical therapy. He is on long term anticoagulation with Plavix. No known previous issues related to gout.     Lab Results   Component Value Date    URICACID 7.7 (H) 01/10/2024          Current Assessment & Plan     Virtual visit today.  Limited exam. Trial of Colchicine for acute flare. Recommend low dose allopurinol for maintenance. Discussed low purine diet. Caution use of NSAIDs. He recently had steroids last week.          Relevant Medications    colchicine (COLCRYS) 0.6 mg tablet    allopurinoL (ZYLOPRIM) 100 MG tablet     Other Visit Diagnoses       Acute bacterial sinusitis        Relevant Medications    azithromycin (Z-SILVIA) 250 MG tablet  - avoid additional steroids since recently had last week  - start ABX as prescribed  - recommend OTC Zyrtec and Flonase nasal spray  - Follow up if no improvement in symptoms      Follow up if symptoms worsen or fail to improve.  ER precautions for severe or worsening symptoms.     Helena Azul, TARIK  _____________________________________________________________________________________________________________________________________________________    CC: gout; sinus infection    HPI: Patient is a 64-year-old male who presents virtually today as an established patient here for gout. He reports ongoing issues related to carpal tunnel. Following with specialist, Dr. Aguiar for this. He recently had labs done which we reviewed. He states that he was advised to follow up with his PCP to discuss gout. He has bilateral hand pain. There is no redness, warmth, tenderness. He reports mild swelling. He is currently doing physical therapy. He is on long term anticoagulation with Plavix. No known previous issues related to gout.     Lab Results   Component Value Date    URICACID 7.7 (H) 01/10/2024     Sinusitis: This is a new problem. The current episode started over a week ago. The problem is gradually worsening. There has been no fever. He is experiencing no pain. Associated symptoms include congestion, postnasal drip, runny nose, ear pressure, foul daily, and sinus pressure. Pertinent negatives include no chills, coughing, diaphoresis, ear pain, headaches, hoarse voice, neck pain, shortness of breath, sneezing, sore  throat or swollen glands. Past treatments include OTC sinus medications. The treatment provided no relief. He has had no known sick contacts. He recently had steroids last week.     Past Medical History:  Past Medical History:   Diagnosis Date    Anxiety     Arthritis     Depression     Digestive disorder     ulcers    Fatigue     History of acute bronchitis     History of psychiatric care     Hypertension     Intervertebral disc disorder with radiculopathy of lumbar region 3/23/2021    Psychiatric problem     PVD (peripheral vascular disease)     S/P femoral-femoral bypass surgery 12/1/2021     Past Surgical History:   Procedure Laterality Date    ANTERIOR LUMBAR INTERBODY FUSION (ALIF) Right 03/23/2021    Procedure: FUSION, SPINE, LUMBAR, ALIF RIGHT L5-S1;  Surgeon: Kelly Aayla MD;  Location: [facility] OR;  Service: Neurosurgery;  Laterality: Right;    AORTOGRAPHY WITH EXTREMITY RUNOFF N/A 07/27/2021    Procedure: AORTOGRAM, WITH EXTREMITY RUNOFF;  Surgeon: Jim Briggs MD;  Location: [facility] CATH;  Service: Cardiovascular;  Laterality: N/A;    AORTOGRAPHY WITH SERIALOGRAPHY Bilateral 07/27/2021    Procedure: AORTOGRAM, WITH SERIALOGRAPHY;  Surgeon: Jim Briggs MD;  Location: [facility] CATH;  Service: Cardiovascular;  Laterality: Bilateral;    APPENDECTOMY  3/23    CREATION OF FEMORAL-FEMORAL ARTERY BYPASS WITH GRAFT Left 11/30/2021    Procedure: CREATION, BYPASS, ARTERIAL, FEMORAL TO FEMORAL, USING GRAFT;  Surgeon: Jim Briggs MD;  Location: [facility] OR;  Service: Cardiovascular;  Laterality: Left;    ENDARTERECTOMY OF FEMORAL ARTERY Bilateral 11/30/2021    Procedure: ENDARTERECTOMY, FEMORAL;  Surgeon: Jim Briggs MD;  Location: [facility] OR;  Service: Cardiovascular;  Laterality: Bilateral;    FUSION OF LUMBAR SPINE BY ANTERIOR APPROACH  03/23/2021    Procedure: FUSION, SPINE, LUMBAR, ANTERIOR APPROACH;  Surgeon: Jim Briggs MD;  Location: [facility] OR;  Service: Cardiovascular;;    HARVESTING OF BONE GRAFT N/A 03/23/2021     Procedure: SURGICAL PROCUREMENT, BONE GRAFT-Iliac;  Surgeon: Kelly Ayala MD;  Location: [facility] OR;  Service: Neurosurgery;  Laterality: N/A;    HYPOSPADIAS CORRECTION      SPINE SURGERY  3/22    VASECTOMY       Review of patient's allergies indicates:   Allergen Reactions    Blueberry      Social History     Tobacco Use    Smoking status: Former     Current packs/day: 0.00     Average packs/day: 0.3 packs/day for 42.0 years (10.5 ttl pk-yrs)     Types: Cigarettes     Start date: 1980     Quit date: 2022     Years since quittin.3     Passive exposure: Never    Smokeless tobacco: Never   Substance Use Topics    Alcohol use: Not Currently     Comment: stopped recently - last drink 3/21    Drug use: No     Family History   Problem Relation Age of Onset    Diabetes Mother     Hypertension Mother     Diabetes Father     Hypertension Father     Cancer Sister     Muscular dystrophy Son     Arthritis Paternal Grandfather     Heart disease Paternal Grandfather     Birth defects Brother     Cancer Brother     Heart disease Maternal Aunt     Heart disease Paternal Uncle     Suicide Neg Hx     Sexual abuse Neg Hx     Self injury Neg Hx     Seizures Neg Hx     Schizophrenia Neg Hx     Physical abuse Neg Hx     Paranoid behavior Neg Hx     OCD Neg Hx     Drug abuse Neg Hx     Depression Neg Hx     Dementia Neg Hx     Bipolar disorder Neg Hx     Anxiety disorder Neg Hx     Alcohol abuse Neg Hx      Current Outpatient Medications on File Prior to Visit   Medication Sig Dispense Refill    acetaminophen (TYLENOL EXTRA STRENGTH) 500 MG tablet Take 500 mg by mouth 2 (two) times a day.      amitriptyline (ELAVIL) 25 MG tablet Take 1 tablet (25 mg total) by mouth every evening. 90 tablet 1    aspirin (ECOTRIN) 81 MG EC tablet Take 1 tablet (81 mg total) by mouth once daily. 90 tablet 0    atenoloL (TENORMIN) 25 MG tablet TAKE 1 TABLET BY MOUTH EVERY DAY IN THE MORNING 90 tablet 1    buPROPion (WELLBUTRIN XL) 150 MG  TB24 tablet Take 1 tablet (150 mg total) by mouth once daily. 30 tablet 11    clopidogreL (PLAVIX) 75 mg tablet TAKE 1 TABLET BY MOUTH EVERY DAY 90 tablet 3    DENTA 5000 PLUS 1.1 % Crea Take 1 application by mouth 2 (two) times daily.      famotidine (PEPCID) 20 MG tablet Take 2 tablets (40 mg total) by mouth 2 (two) times daily. 180 tablet 3    ferrous sulfate 325 (65 FE) MG EC tablet Take 1 tablet (325 mg total) by mouth once daily. 90 tablet 4    gabapentin (NEURONTIN) 300 MG capsule Take 3 capsules (900 mg total) by mouth 2 (two) times daily. (Patient taking differently: Take 300 mg by mouth 2 (two) times daily.) 180 capsule 11    icosapent ethyL (VASCEPA) 1 gram Cap Take 2 capsules (2 g total) by mouth 2 (two) times daily. 360 capsule 1    lisinopriL 10 MG tablet Take 1 tablet (10 mg total) by mouth every evening. 90 tablet 1    multivitamin (THERAGRAN) per tablet Take 1 tablet by mouth once daily.      pantoprazole (PROTONIX) 40 MG tablet Take 1 tablet (40 mg total) by mouth 2 (two) times daily. 90 tablet 3    papaverine 30 mg/mL injection Add phentolamine 1 mg/mL, add alprostadil 20 micrograms/mL 10 mL 11    rosuvastatin (CRESTOR) 5 MG tablet TAKE 1 TABLET BY MOUTH EVERY DAY IN THE EVENING 90 tablet 1    vitamin D3-folic acid 250 mcg (10,000 unit)-1 mg Tab Take by mouth Daily.       No current facility-administered medications on file prior to visit.     Review of Systems   Constitutional:  Negative for activity change and unexpected weight change.   HENT:  Positive for congestion, ear pain (pressure), postnasal drip and sinus pressure. Negative for hearing loss, rhinorrhea and trouble swallowing.    Eyes:  Negative for discharge and visual disturbance.   Respiratory:  Negative for chest tightness and wheezing.    Cardiovascular:  Negative for chest pain and palpitations.   Gastrointestinal:  Negative for blood in stool, constipation, diarrhea and vomiting.   Endocrine: Negative for polydipsia and polyuria.    Genitourinary:  Negative for difficulty urinating, hematuria and urgency.   Musculoskeletal:  Positive for arthralgias and joint swelling.   Neurological:  Negative for weakness and headaches.   Psychiatric/Behavioral:  Negative for confusion and dysphoric mood.      There were no vitals filed for this visit.    Wt Readings from Last 3 Encounters:   01/19/24 75 kg (165 lb 5.5 oz)   12/14/23 72.6 kg (160 lb)   10/16/23 72.8 kg (160 lb 7.9 oz)     Physical Exam  Vitals reviewed.   Constitutional:       General: He is awake. He is not in acute distress.     Appearance: Normal appearance. He is not ill-appearing.   HENT:      Head: Normocephalic and atraumatic.      Right Ear: External ear normal.      Left Ear: External ear normal.      Nose: Congestion present.   Eyes:      Extraocular Movements: Extraocular movements intact.      Conjunctiva/sclera: Conjunctivae normal.   Pulmonary:      Effort: Pulmonary effort is normal. No respiratory distress.   Musculoskeletal:      Cervical back: Normal range of motion.   Skin:     General: Skin is warm and dry.      Coloration: Skin is not pale.      Findings: No rash.   Neurological:      General: No focal deficit present.      Mental Status: He is alert and oriented to person, place, and time. Mental status is at baseline.   Psychiatric:         Mood and Affect: Mood normal.         Speech: Speech normal. Speech is not rapid and pressured, delayed or slurred.         Behavior: Behavior normal. Behavior is not agitated, slowed, aggressive, withdrawn or hyperactive. Behavior is cooperative.         Thought Content: Thought content normal.         Judgment: Judgment normal.       Health Maintenance   Topic Date Due    TETANUS VACCINE  Never done    Shingles Vaccine (1 of 2) Never done    PROSTATE-SPECIFIC ANTIGEN  10/11/2024    High Dose Statin  01/19/2025    Aspirin/Antiplatelet Therapy  01/19/2025    Lipid Panel  10/11/2028    Colorectal Cancer Screening  07/25/2029     Hepatitis C Screening  Completed

## 2024-01-29 NOTE — ASSESSMENT & PLAN NOTE
Virtual visit today. Limited exam. Trial of Colchicine for acute flare. Recommend low dose allopurinol for maintenance. Discussed low purine diet. Caution use of NSAIDs. He recently had steroids last week.

## 2024-02-12 ENCOUNTER — PATIENT MESSAGE (OUTPATIENT)
Dept: FAMILY MEDICINE | Facility: CLINIC | Age: 64
End: 2024-02-12
Payer: COMMERCIAL

## 2024-02-13 NOTE — TELEPHONE ENCOUNTER
Please have him follow-up appointment with Dr. Penaloza to get more details on this..  I do not see where he was actually confirmed to have gout .  He apparently had an elevated uric acid level from an outside provider and then had a telemedicine visit with a few doses of colchicine and starting allopurinol..  Allopurinol would be to prevent gout attack not for acute treatment.  He would need to have lab work after he had been on the allopurinol to determine the effect on his uric acid level.  He may need additional colchicine or other medication if having an acute gout attack.

## 2024-02-15 ENCOUNTER — CLINICAL SUPPORT (OUTPATIENT)
Dept: SMOKING CESSATION | Facility: CLINIC | Age: 64
End: 2024-02-15
Payer: COMMERCIAL

## 2024-02-15 DIAGNOSIS — F17.200 NICOTINE DEPENDENCE: Primary | ICD-10-CM

## 2024-02-15 PROCEDURE — 99407 BEHAV CHNG SMOKING > 10 MIN: CPT | Mod: S$GLB,,, | Performed by: GENERAL PRACTICE

## 2024-02-15 PROCEDURE — 99999 PR PBB SHADOW E&M-EST. PATIENT-LVL I: CPT | Mod: PBBFAC,,,

## 2024-02-15 NOTE — PROGRESS NOTES
Spoke with patient today in regard to smoking cessation progress 6/12 month telephone follow up, he states that he is tobacco free.  Commended patient on the accomplishments thus far.  Informed patient of benefit period, future follow up, and contact information if any further help or support is needed.  Will complete smart form for 6/12 month follow up on Quit attempt #3 and resolve episode.

## 2024-02-19 ENCOUNTER — TELEPHONE (OUTPATIENT)
Dept: FAMILY MEDICINE | Facility: CLINIC | Age: 64
End: 2024-02-19
Payer: COMMERCIAL

## 2024-02-20 DIAGNOSIS — F33.41 RECURRENT MAJOR DEPRESSIVE DISORDER, IN PARTIAL REMISSION: ICD-10-CM

## 2024-02-20 RX ORDER — BUPROPION HYDROCHLORIDE 150 MG/1
150 TABLET ORAL
Qty: 90 TABLET | Refills: 3 | Status: SHIPPED | OUTPATIENT
Start: 2024-02-20 | End: 2024-04-17

## 2024-02-20 NOTE — TELEPHONE ENCOUNTER
No care due was identified.  [facility] Embedded Care Due Messages. Reference number: [id number].   2/20/2024 12:33:56 AM CST

## 2024-02-20 NOTE — TELEPHONE ENCOUNTER
Refill Decision Note   Sal Villarreal  is requesting a refill authorization.  Brief Assessment and Rationale for Refill:  Approve     Medication Therapy Plan:         Comments:     Note composed:6:57 AM 02/20/2024

## 2024-02-22 ENCOUNTER — OFFICE VISIT (OUTPATIENT)
Dept: FAMILY MEDICINE | Facility: CLINIC | Age: 64
End: 2024-02-22
Payer: COMMERCIAL

## 2024-02-22 ENCOUNTER — TELEPHONE (OUTPATIENT)
Dept: FAMILY MEDICINE | Facility: CLINIC | Age: 64
End: 2024-02-22

## 2024-02-22 VITALS
HEIGHT: 66 IN | SYSTOLIC BLOOD PRESSURE: 121 MMHG | WEIGHT: 164.31 LBS | TEMPERATURE: 98 F | BODY MASS INDEX: 26.41 KG/M2 | HEART RATE: 92 BPM | DIASTOLIC BLOOD PRESSURE: 82 MMHG

## 2024-02-22 DIAGNOSIS — E79.0 ELEVATED URIC ACID IN BLOOD: ICD-10-CM

## 2024-02-22 DIAGNOSIS — M54.2 CHRONIC NECK PAIN: Primary | ICD-10-CM

## 2024-02-22 DIAGNOSIS — G89.29 CHRONIC NECK PAIN: Primary | ICD-10-CM

## 2024-02-22 DIAGNOSIS — G56.03 BILATERAL CARPAL TUNNEL SYNDROME: ICD-10-CM

## 2024-02-22 PROCEDURE — 3079F DIAST BP 80-89 MM HG: CPT | Mod: CPTII,S$GLB,, | Performed by: NURSE PRACTITIONER

## 2024-02-22 PROCEDURE — 3008F BODY MASS INDEX DOCD: CPT | Mod: CPTII,S$GLB,, | Performed by: NURSE PRACTITIONER

## 2024-02-22 PROCEDURE — 99214 OFFICE O/P EST MOD 30 MIN: CPT | Mod: S$GLB,,, | Performed by: NURSE PRACTITIONER

## 2024-02-22 PROCEDURE — 1159F MED LIST DOCD IN RCRD: CPT | Mod: CPTII,S$GLB,, | Performed by: NURSE PRACTITIONER

## 2024-02-22 PROCEDURE — 1160F RVW MEDS BY RX/DR IN RCRD: CPT | Mod: CPTII,S$GLB,, | Performed by: NURSE PRACTITIONER

## 2024-02-22 PROCEDURE — 4010F ACE/ARB THERAPY RXD/TAKEN: CPT | Mod: CPTII,S$GLB,, | Performed by: NURSE PRACTITIONER

## 2024-02-22 PROCEDURE — 99999 PR PBB SHADOW E&M-EST. PATIENT-LVL IV: CPT | Mod: PBBFAC,,, | Performed by: NURSE PRACTITIONER

## 2024-02-22 PROCEDURE — 3074F SYST BP LT 130 MM HG: CPT | Mod: CPTII,S$GLB,, | Performed by: NURSE PRACTITIONER

## 2024-02-22 RX ORDER — ALLOPURINOL 300 MG/1
300 TABLET ORAL DAILY
Qty: 90 TABLET | Refills: 1 | Status: SHIPPED | OUTPATIENT
Start: 2024-02-22

## 2024-02-22 NOTE — ASSESSMENT & PLAN NOTE
External records requested. Discussed shooting pain may be radiating from neck. Patient has chronic neck pain. Follows with Dr. Caal. Unable to view external records. Would like to avoid additional steroids. Caution NSAIDs with Plavix. Continue physical therapy. I recommend he follow up with pain management today as scheduled. Patient reports he has had an MRI a few months ago.

## 2024-02-22 NOTE — TELEPHONE ENCOUNTER
----- Message from Lisa Drummond sent at 2/22/2024  2:54 PM CST -----  Contact: Sal  Type:  Patient Returning Call    Who Called: Sal  Who Left Message for Patient:Nurse  Does the patient know what this is regarding?:unknown  Would the patient rather a call back or a response via [facility]? Call back  Best Call Back Number:    Additional Information: Sal called in and stated he had a missed call from the nurse, please call back if possible     Thanks  KAYY

## 2024-02-22 NOTE — ASSESSMENT & PLAN NOTE
Will increase dose of allopurinol, though, explained I suspect his pain may be radiating from his neck (see other problem). He plans to follow up with Dr. Caal today at [facility].

## 2024-02-22 NOTE — TELEPHONE ENCOUNTER
Called pt to let him know we needed him to come sign a JAGJIT for records. Patient verbalized understanding and stated he will come to office and sign

## 2024-02-22 NOTE — PROGRESS NOTES
Primary Care Telemedicine Note  The patient location is:  Patient's Home - [place]  The chief complaint leading to consultation is:   Chief Complaint   Patient presents with    discuss Allopurinol      Total time:  see MDM below. The total time spent on the encounter, which includes face to face time and non-face to face time preparing to see the patient (eg, review of previous medical records, tests), Obtaining and/or reviewing separately obtained history, documenting clinical information in the electronic or other health record, independently interpreting results (not separately reported)/communicating results to the patient/family/caregiver, and/or care coordination (not separately reported).    Visit type: Virtual visit with synchronous audio only and video  Each patient to whom he or she provides medical services by telemedicine is:  (1) informed of the relationship between the physician and patient and the respective role of any other health care provider with respect to management of the patient; and (2) notified that he or she may decline to receive medical services by telemedicine and may withdraw from such care at any time.  =================================================================    Assessment/Plan:  Problem List Items Addressed This Visit          Neuro    Bilateral carpal tunnel syndrome    Overview     Chronic. Bilateral hand pain. He had carpal tunnel surgery on right in October and left in November of 2023. Done at [facility] with Dr. Aguiar. He has been in physical therapy since having surgery. He has tried steroids and reports this helps. Per patient he was told surgery was successful but continues to have shooting pain down bilateral hands. There is no numbness, tingling, weakness. Works in pest control. Uses hands frequently. He does have chronic neck pain.  Unable to take NSAIDs. He has had recent steroids. He has failed gabapentin.          Current Assessment & Plan     External records  requested. Discussed shooting pain may be radiating from neck. Patient has chronic neck pain. Follows with Dr. Caal. Unable to view external records. Would like to avoid additional steroids. Caution NSAIDs with Plavix. Continue physical therapy. I recommend he follow up with pain management today as scheduled. Patient reports he has had an MRI a few months ago.             Orthopedic    Elevated uric acid in blood    Overview     Feb 2024: Continues to have bilateral hand pain. He is wanting to increase dose of Allopurinol.     Jan 2024: Chronic. Ongoing issues w/ bilateral hand pain. Following with specialist, Dr. Aguiar ([facility]). He recently had labs done which showed elevated uric acid (- DARWIN, rheumatoid factor, Sed, CRP). States he was advised to follow up with his PCP to discuss possible gouty arthritis. There is no redness, warmth, tenderness. He reports mild swelling. He is currently doing physical therapy. He is on long term anticoagulation with Plavix. No known previous issues related to gout.     Lab Results   Component Value Date    URICACID 7.7 (H) 01/10/2024          Current Assessment & Plan     Will increase dose of allopurinol, though, explained I suspect his pain may be radiating from his neck (see other problem). He plans to follow up with Dr. Caal today at [facility].          Relevant Medications    allopurinoL (ZYLOPRIM) 300 MG tablet    Chronic neck pain - Primary    Overview     Chronic. Ongoing for several months. No recent injuries, falls, or known trauma. He reports that there is bilateral arm/hand pain. Patient was treated for bilateral carpal tunnel (see problem) and s/p surgery for bilateral carpal tunnel. He continues to have sharp pain that radiates into hands. History of cervical stenosis and radiculopathy. He has failed gabapentin - does not like how he feels. He is cautious about taking pain medications. Unable to take NSAIDs w/ long term use of plavix. He has been in physical therapy  for the last few months with no relief. He has recently tried oral steroids and reports had steroid injection with Dr. Caal ([facility]) last week with no relief. Unable to view records. States he had an MRI a few months ago. Previously followed with Dr. Cramer as well.          Current Assessment & Plan     Will request external records. He has an appt with Dr. Caal today. Recommend he follow up with specialist as scheduled. Supportive care- rest, ice, heat, avoid heavy lifting, limit strenuous activity.           Follow up if symptoms worsen or fail to improve.  ER precautions for severe or worsening symptoms.     Helena Azul, TARIK  _____________________________________________________________________________________________________________________________________________________    CC: allopurinol     HPI: Patient is a 64-year-old male who presents in clinic today as an established patient here for allopurinol. He is wanting to increase dose today. He continues to have bilateral hand pain. Chronic condition has been reviewed. Further details as stated above.     Past Medical History:  Past Medical History:   Diagnosis Date    Anxiety     Arthritis     Depression     Digestive disorder     ulcers    Fatigue     History of acute bronchitis     History of psychiatric care     Hypertension     Intervertebral disc disorder with radiculopathy of lumbar region 3/23/2021    Psychiatric problem     PVD (peripheral vascular disease)     S/P femoral-femoral bypass surgery 12/1/2021     Past Surgical History:   Procedure Laterality Date    ANTERIOR LUMBAR INTERBODY FUSION (ALIF) Right 03/23/2021    Procedure: FUSION, SPINE, LUMBAR, ALIF RIGHT L5-S1;  Surgeon: Kelly Ayala MD;  Location: [facility] OR;  Service: Neurosurgery;  Laterality: Right;    AORTOGRAPHY WITH EXTREMITY RUNOFF N/A 07/27/2021    Procedure: AORTOGRAM, WITH EXTREMITY RUNOFF;  Surgeon: Jim Briggs MD;  Location: [facility] CATH;  Service: Cardiovascular;   Laterality: N/A;    AORTOGRAPHY WITH SERIALOGRAPHY Bilateral 2021    Procedure: AORTOGRAM, WITH SERIALOGRAPHY;  Surgeon: Jim Briggs MD;  Location: [facility] CATH;  Service: Cardiovascular;  Laterality: Bilateral;    APPENDECTOMY  3/23    CREATION OF FEMORAL-FEMORAL ARTERY BYPASS WITH GRAFT Left 2021    Procedure: CREATION, BYPASS, ARTERIAL, FEMORAL TO FEMORAL, USING GRAFT;  Surgeon: Jim Briggs MD;  Location: [facility] OR;  Service: Cardiovascular;  Laterality: Left;    ENDARTERECTOMY OF FEMORAL ARTERY Bilateral 2021    Procedure: ENDARTERECTOMY, FEMORAL;  Surgeon: Jim Briggs MD;  Location: [facility] OR;  Service: Cardiovascular;  Laterality: Bilateral;    FUSION OF LUMBAR SPINE BY ANTERIOR APPROACH  2021    Procedure: FUSION, SPINE, LUMBAR, ANTERIOR APPROACH;  Surgeon: Jim Briggs MD;  Location: [facility] OR;  Service: Cardiovascular;;    HARVESTING OF BONE GRAFT N/A 2021    Procedure: SURGICAL PROCUREMENT, BONE GRAFT-Iliac;  Surgeon: Kelly Ayala MD;  Location: [facility] OR;  Service: Neurosurgery;  Laterality: N/A;    HYPOSPADIAS CORRECTION      SPINE SURGERY  3/22    VASECTOMY       Review of patient's allergies indicates:   Allergen Reactions    Blueberry      Social History     Tobacco Use    Smoking status: Former     Current packs/day: 0.00     Average packs/day: 0.3 packs/day for 42.0 years (10.5 ttl pk-yrs)     Types: Cigarettes     Start date: 1980     Quit date: 2022     Years since quittin.4     Passive exposure: Never    Smokeless tobacco: Never   Substance Use Topics    Alcohol use: Not Currently     Comment: stopped recently - last drink 3/21    Drug use: No     Family History   Problem Relation Age of Onset    Diabetes Mother     Hypertension Mother     Diabetes Father     Hypertension Father     Cancer Sister     Muscular dystrophy Son     Arthritis Paternal Grandfather     Heart disease Paternal Grandfather     Birth defects Brother     Cancer Brother     Heart disease  Maternal Aunt     Heart disease Paternal Uncle     Suicide Neg Hx     Sexual abuse Neg Hx     Self injury Neg Hx     Seizures Neg Hx     Schizophrenia Neg Hx     Physical abuse Neg Hx     Paranoid behavior Neg Hx     OCD Neg Hx     Drug abuse Neg Hx     Depression Neg Hx     Dementia Neg Hx     Bipolar disorder Neg Hx     Anxiety disorder Neg Hx     Alcohol abuse Neg Hx      Current Outpatient Medications on File Prior to Visit   Medication Sig Dispense Refill    acetaminophen (TYLENOL EXTRA STRENGTH) 500 MG tablet Take 500 mg by mouth 2 (two) times a day.      amitriptyline (ELAVIL) 25 MG tablet Take 1 tablet (25 mg total) by mouth every evening. 90 tablet 1    aspirin (ECOTRIN) 81 MG EC tablet Take 1 tablet (81 mg total) by mouth once daily. 90 tablet 0    atenoloL (TENORMIN) 25 MG tablet TAKE 1 TABLET BY MOUTH EVERY DAY IN THE MORNING 90 tablet 1    buPROPion (WELLBUTRIN XL) 150 MG TB24 tablet TAKE 1 TABLET BY MOUTH EVERY DAY 90 tablet 3    clopidogreL (PLAVIX) 75 mg tablet TAKE 1 TABLET BY MOUTH EVERY DAY 90 tablet 3    DENTA 5000 PLUS 1.1 % Crea Take 1 application by mouth 2 (two) times daily.      famotidine (PEPCID) 20 MG tablet Take 2 tablets (40 mg total) by mouth 2 (two) times daily. 180 tablet 3    ferrous sulfate 325 (65 FE) MG EC tablet Take 1 tablet (325 mg total) by mouth once daily. 90 tablet 4    icosapent ethyL (VASCEPA) 1 gram Cap Take 2 capsules (2 g total) by mouth 2 (two) times daily. 360 capsule 1    lisinopriL 10 MG tablet Take 1 tablet (10 mg total) by mouth every evening. 90 tablet 1    multivitamin (THERAGRAN) per tablet Take 1 tablet by mouth once daily.      pantoprazole (PROTONIX) 40 MG tablet Take 1 tablet (40 mg total) by mouth 2 (two) times daily. 90 tablet 3    rosuvastatin (CRESTOR) 5 MG tablet TAKE 1 TABLET BY MOUTH EVERY DAY IN THE EVENING 90 tablet 1    [DISCONTINUED] allopurinoL (ZYLOPRIM) 100 MG tablet Take 1 tablet (100 mg total) by mouth once daily. 30 tablet 11     "papaverine 30 mg/mL injection Add phentolamine 1 mg/mL, add alprostadil 20 micrograms/mL 10 mL 11    [DISCONTINUED] colchicine (COLCRYS) 0.6 mg tablet Take 2 tabs once and then 1 tab in 2 hours 3 tablet 2    [DISCONTINUED] gabapentin (NEURONTIN) 300 MG capsule Take 3 capsules (900 mg total) by mouth 2 (two) times daily. (Patient taking differently: Take 300 mg by mouth 2 (two) times daily.) 180 capsule 11    [DISCONTINUED] vitamin D3-folic acid 250 mcg (10,000 unit)-1 mg Tab Take by mouth Daily.       No current facility-administered medications on file prior to visit.     Review of Systems   Constitutional:  Negative for appetite change, chills, fatigue and fever.   HENT:  Negative for congestion, rhinorrhea and sore throat.    Eyes:  Negative for visual disturbance.   Respiratory:  Negative for cough and shortness of breath.    Cardiovascular:  Negative for chest pain, palpitations and leg swelling.   Gastrointestinal:  Negative for abdominal pain, diarrhea and vomiting.   Genitourinary:  Negative for difficulty urinating, dysuria and hematuria.   Musculoskeletal:  Positive for arthralgias, neck pain and neck stiffness. Negative for myalgias.   Skin:  Negative for rash and wound.   Neurological:  Negative for dizziness and headaches.   Psychiatric/Behavioral:  Negative for behavioral problems. The patient is not nervous/anxious.      Vitals:    02/22/24 1247   BP: 121/82   Pulse: 92   Temp: 98.2 °F (36.8 °C)   TempSrc: Temporal   Weight: 74.5 kg (164 lb 4.8 oz)   Height: 5' 6" (1.676 m)     Wt Readings from Last 3 Encounters:   02/22/24 74.5 kg (164 lb 4.8 oz)   01/19/24 75 kg (165 lb 5.5 oz)   12/14/23 72.6 kg (160 lb)     Physical Exam  Vitals reviewed.   Constitutional:       General: He is not in acute distress.     Appearance: Normal appearance. He is not ill-appearing.   HENT:      Head: Normocephalic and atraumatic.      Right Ear: External ear normal.      Left Ear: External ear normal.      Nose: Nose " normal.   Eyes:      Extraocular Movements: Extraocular movements intact.      Conjunctiva/sclera: Conjunctivae normal.   Cardiovascular:      Rate and Rhythm: Normal rate.      Heart sounds: Normal heart sounds.   Pulmonary:      Effort: Pulmonary effort is normal. No respiratory distress.      Breath sounds: Normal breath sounds.   Abdominal:      General: Abdomen is flat. There is no distension.   Musculoskeletal:         General: Normal range of motion.      Right hand: No swelling, deformity or tenderness. Normal range of motion. Normal capillary refill. Normal pulse.      Left hand: No swelling, deformity or tenderness. Normal range of motion. Normal capillary refill. Normal pulse.      Cervical back: Normal range of motion. Tenderness present. No swelling, deformity, erythema, rigidity, spasms, torticollis or bony tenderness. Pain with movement present. No muscular tenderness. Normal range of motion.   Skin:     General: Skin is warm and dry.      Capillary Refill: Capillary refill takes less than 2 seconds.      Coloration: Skin is not pale.      Findings: No rash.   Neurological:      General: No focal deficit present.      Mental Status: He is alert and oriented to person, place, and time. Mental status is at baseline.   Psychiatric:         Mood and Affect: Mood normal.         Speech: Speech normal. Speech is not rapid and pressured, delayed or slurred.         Behavior: Behavior normal. Behavior is not agitated, slowed, aggressive, withdrawn, hyperactive or combative. Behavior is cooperative.         Thought Content: Thought content normal.         Judgment: Judgment normal.       Health Maintenance   Topic Date Due    TETANUS VACCINE  Never done    Shingles Vaccine (1 of 2) Never done    PROSTATE-SPECIFIC ANTIGEN  10/11/2024    High Dose Statin  02/22/2025    Aspirin/Antiplatelet Therapy  02/22/2025    Lipid Panel  10/11/2028    Colorectal Cancer Screening  07/25/2029    Hepatitis C Screening   Completed

## 2024-02-22 NOTE — ASSESSMENT & PLAN NOTE
Will request external records. He has an appt with Dr. Caal today. Recommend he follow up with specialist as scheduled. Supportive care- rest, ice, heat, avoid heavy lifting, limit strenuous activity.

## 2024-02-29 DIAGNOSIS — I10 ESSENTIAL HYPERTENSION: ICD-10-CM

## 2024-02-29 RX ORDER — ATENOLOL 25 MG/1
TABLET ORAL
Qty: 90 TABLET | Refills: 3 | Status: SHIPPED | OUTPATIENT
Start: 2024-02-29

## 2024-02-29 NOTE — TELEPHONE ENCOUNTER
Refill Decision Note   Sal Villarreal  is requesting a refill authorization.  Brief Assessment and Rationale for Refill:  Approve     Medication Therapy Plan:         Comments:     Note composed:6:28 AM 02/29/2024

## 2024-02-29 NOTE — TELEPHONE ENCOUNTER
No care due was identified.  [facility] Embedded Care Due Messages. Reference number: [id number].   2/29/2024 12:23:30 AM CST

## 2024-03-18 ENCOUNTER — TELEPHONE (OUTPATIENT)
Dept: CARDIOLOGY | Facility: CLINIC | Age: 64
End: 2024-03-18
Payer: COMMERCIAL

## 2024-03-18 NOTE — TELEPHONE ENCOUNTER
Pt scheduled for interventional pain procedure. Pt taking asa and plavix. Please advise.     Elizabeth   P- [phone]  F- [phone]  Also fax to [phone]

## 2024-03-19 ENCOUNTER — PATIENT MESSAGE (OUTPATIENT)
Dept: CARDIOLOGY | Facility: CLINIC | Age: 64
End: 2024-03-19
Payer: COMMERCIAL

## 2024-03-19 RX ORDER — CLOPIDOGREL BISULFATE 75 MG/1
TABLET ORAL
Qty: 90 TABLET | Refills: 1 | Status: SHIPPED | OUTPATIENT
Start: 2024-03-19

## 2024-03-19 NOTE — TELEPHONE ENCOUNTER
Refill Decision Note   Sal Villarreal  is requesting a refill authorization.  Brief Assessment and Rationale for Refill:  Approve     Medication Therapy Plan:         Pharmacist review requested: Yes   Extended chart review required: Yes   Comments:     Note composed:1:30 PM 03/19/2024

## 2024-03-19 NOTE — TELEPHONE ENCOUNTER
Refill Routing Note   Medication(s) are not appropriate for processing by [facility] for the following reason(s):        Drug-disease interaction    [facility] action(s):  Defer        Medication Therapy Plan: clopidogreL and Bleeding nose    Pharmacist review requested: Yes     Appointments  past 12m or future 3m with PCP    Date Provider   Last Visit   12/14/2023 Bipin Penaloza MD   Next Visit   Visit date not found Bipin Pnealoza MD   ED visits in past 90 days: 0        Note composed:11:44 AM 03/19/2024

## 2024-03-19 NOTE — TELEPHONE ENCOUNTER
No care due was identified.  [facility] Embedded Care Due Messages. Reference number: [id number].   3/19/2024 12:05:28 AM CDT

## 2024-04-09 DIAGNOSIS — I10 ESSENTIAL HYPERTENSION: Primary | ICD-10-CM

## 2024-04-09 RX ORDER — LISINOPRIL 10 MG/1
10 TABLET ORAL NIGHTLY
Qty: 90 TABLET | Refills: 0 | Status: SHIPPED | OUTPATIENT
Start: 2024-04-09

## 2024-04-15 ENCOUNTER — E-VISIT (OUTPATIENT)
Dept: FAMILY MEDICINE | Facility: CLINIC | Age: 64
End: 2024-04-15
Payer: COMMERCIAL

## 2024-04-15 DIAGNOSIS — Z79.899 ENCOUNTER FOR LONG-TERM (CURRENT) USE OF MEDICATIONS: Primary | ICD-10-CM

## 2024-04-15 DIAGNOSIS — F33.41 RECURRENT MAJOR DEPRESSIVE DISORDER, IN PARTIAL REMISSION: Chronic | ICD-10-CM

## 2024-04-15 PROCEDURE — 99499 UNLISTED E&M SERVICE: CPT | Mod: 95,,, | Performed by: FAMILY MEDICINE

## 2024-04-16 ENCOUNTER — TELEPHONE (OUTPATIENT)
Dept: FAMILY MEDICINE | Facility: CLINIC | Age: 64
End: 2024-04-16
Payer: COMMERCIAL

## 2024-04-17 PROCEDURE — 99421 OL DIG E/M SVC 5-10 MIN: CPT | Mod: ,,, | Performed by: FAMILY MEDICINE

## 2024-04-17 RX ORDER — BUPROPION HYDROCHLORIDE 75 MG/1
TABLET ORAL
Qty: 90 TABLET | Refills: 0 | Status: SHIPPED | OUTPATIENT
Start: 2024-04-17 | End: 2024-06-10

## 2024-04-17 NOTE — PATIENT INSTRUCTIONS
Follow up if symptoms worsen or fail to improve.     Dear patient,   As a result of recent federal legislation (The Federal Cures Act), you may receive lab or pathology results from your visit in your [facility] account before your physician is able to contact you. Your physician or their representative will relay the results to you with their recommendations at their soonest availability.     If no improvement in symptoms or symptoms worsen, please be advised to call MD, follow-up at clinic and/or go to ER if becomes severe.    Bipin Penaloza M.D.        We Offer TELEHEALTH & Same Day Appointments!   Book your Telehealth appointment with me through my nurse or   Clinic appointments on [employer] Information!    [address] [place] [address]    Office: [phone]   FAX: [phone]    Check out my Facebook Page and Follow Me at: [email or web address]    Check out my website at [employer] by clicking on: [email or web address]    To Schedule appointments online, go to [employer]: [email or web address]

## 2024-04-17 NOTE — PROGRESS NOTES
Patient ID: Sal Villarreal Jr. is a 64 y.o. male.    Chief Complaint: Medication Management (Entered automatically based on patient selection in Patient Portal.)    The patient initiated a request through [employer] on 4/15/2024 for evaluation and management with a chief complaint of Medication Management (Entered automatically based on patient selection in Patient Portal.)     I evaluated the questionnaire submission on 04/17/2024  .    [facility] Peq Evisit Medication    4/16/2024  4:30 PM CDT - Filed by Patient   Do you agree to participate in an E-Visit? Yes   If you have any of the following symptoms, please present to your local emergency room or call 911:  I acknowledge   Medication requests for narcotics will not be addressed via an E-Visit.  Please schedule an appointment. I acknowledge   Choose the state of your primary residence [place]   Do you want to address a new or existing medication? I would like to address a medication I currently take   What is the main issue you would like addressed today? Discontinue Wellbutrin   Would you like to change or continue your medication? Change medication   What medication would you like changed?  Wellbutrin   What is your current dose? 150mg   How often do you take your medication? 1 time daily   Why do you need the medication changed? Side effects     [facility] Peq Evisit Medication Branch Side Effects    4/16/2024  4:30 PM CDT - Filed by Patient   List the side effects you had with the medication: Dry mouth. Slight trilling in ears. Slight blurring far vision.   Have you stopped taking the medicine? No   Are you still having side effects? Yes   Do you need treatment for your side effects? No    What medical condition is the  medication intended to treat? Mood issues   Provide any additional information you feel is important.    Please attach any relevant images or files    Are you able to take your vital signs? Yes   Systolic Blood Pressure: 130   Diastolic Blood  Pressure: 90   Weight: 154   Height: 66   Pulse: 92   Temperature:    Respiration rate:    Pulse Oxygen:          Encounter Diagnoses   Name Primary?    Encounter for long-term (current) use of medications Yes    Recurrent major depressive disorder, in partial remission         No orders of the defined types were placed in this encounter.     Medications Ordered This Encounter   Medications    buPROPion (WELLBUTRIN) 75 MG tablet     Sig: Take 1 tablet (75 mg total) by mouth 2 (two) times daily for 30 days, THEN 1 tablet (75 mg total) once daily.     Dispense:  90 tablet     Refill:  0        Follow up if symptoms worsen or fail to improve.      E-Visit Time Tracking:    Day 1 Time (in minutes): 6    Total Time (in minutes): 6

## 2024-04-22 RX ORDER — ICOSAPENT ETHYL 1 G/1
2 CAPSULE ORAL 2 TIMES DAILY
Qty: 360 CAPSULE | Refills: 1 | Status: SHIPPED | OUTPATIENT
Start: 2024-04-22

## 2024-05-05 ENCOUNTER — PATIENT MESSAGE (OUTPATIENT)
Dept: CARDIOLOGY | Facility: CLINIC | Age: 64
End: 2024-05-05
Payer: COMMERCIAL

## 2024-05-09 ENCOUNTER — PATIENT MESSAGE (OUTPATIENT)
Dept: CARDIOLOGY | Facility: CLINIC | Age: 64
End: 2024-05-09
Payer: COMMERCIAL

## 2024-05-10 ENCOUNTER — LAB VISIT (OUTPATIENT)
Dept: LAB | Facility: HOSPITAL | Age: 64
End: 2024-05-10
Attending: INTERNAL MEDICINE
Payer: COMMERCIAL

## 2024-05-10 DIAGNOSIS — E78.1 HYPERTRIGLYCERIDEMIA: Chronic | ICD-10-CM

## 2024-05-10 DIAGNOSIS — I77.810 MILD ASCENDING AORTA DILATATION: Chronic | ICD-10-CM

## 2024-05-10 DIAGNOSIS — I10 ESSENTIAL HYPERTENSION: Chronic | ICD-10-CM

## 2024-05-10 DIAGNOSIS — I77.9 PERIPHERAL ARTERIAL OCCLUSIVE DISEASE: Chronic | ICD-10-CM

## 2024-05-10 LAB
ALBUMIN SERPL BCP-MCNC: 4.2 G/DL (ref 3.5–5.2)
ALP SERPL-CCNC: 78 U/L (ref 55–135)
ALT SERPL W/O P-5'-P-CCNC: 38 U/L (ref 10–44)
ANION GAP SERPL CALC-SCNC: 9 MMOL/L (ref 8–16)
AST SERPL-CCNC: 51 U/L (ref 10–40)
BILIRUB SERPL-MCNC: 0.5 MG/DL (ref 0.1–1)
BUN SERPL-MCNC: 11 MG/DL (ref 8–23)
CALCIUM SERPL-MCNC: 9.6 MG/DL (ref 8.7–10.5)
CHLORIDE SERPL-SCNC: 103 MMOL/L (ref 95–110)
CHOLEST SERPL-MCNC: 123 MG/DL (ref 120–199)
CHOLEST/HDLC SERPL: 3.5 {RATIO} (ref 2–5)
CO2 SERPL-SCNC: 25 MMOL/L (ref 23–29)
CREAT SERPL-MCNC: 1.2 MG/DL (ref 0.5–1.4)
EST. GFR  (NO RACE VARIABLE): >60 ML/MIN/1.73 M^2
GLUCOSE SERPL-MCNC: 101 MG/DL (ref 70–110)
HDLC SERPL-MCNC: 35 MG/DL (ref 40–75)
HDLC SERPL: 28.5 % (ref 20–50)
LDLC SERPL CALC-MCNC: 44.2 MG/DL (ref 63–159)
NONHDLC SERPL-MCNC: 88 MG/DL
POTASSIUM SERPL-SCNC: 3.7 MMOL/L (ref 3.5–5.1)
PROT SERPL-MCNC: 7.2 G/DL (ref 6–8.4)
SODIUM SERPL-SCNC: 137 MMOL/L (ref 136–145)
TRIGL SERPL-MCNC: 219 MG/DL (ref 30–150)

## 2024-05-10 PROCEDURE — 36415 COLL VENOUS BLD VENIPUNCTURE: CPT | Mod: PO | Performed by: INTERNAL MEDICINE

## 2024-05-10 PROCEDURE — 80061 LIPID PANEL: CPT | Performed by: INTERNAL MEDICINE

## 2024-05-10 PROCEDURE — 80053 COMPREHEN METABOLIC PANEL: CPT | Performed by: INTERNAL MEDICINE

## 2024-05-16 ENCOUNTER — OFFICE VISIT (OUTPATIENT)
Dept: CARDIOLOGY | Facility: CLINIC | Age: 64
End: 2024-05-16
Payer: COMMERCIAL

## 2024-05-16 VITALS
BODY MASS INDEX: 25.44 KG/M2 | HEIGHT: 66 IN | SYSTOLIC BLOOD PRESSURE: 136 MMHG | HEART RATE: 82 BPM | DIASTOLIC BLOOD PRESSURE: 81 MMHG | WEIGHT: 158.31 LBS

## 2024-05-16 DIAGNOSIS — Z79.02 LONG TERM (CURRENT) USE OF ANTITHROMBOTICS/ANTIPLATELETS: Chronic | ICD-10-CM

## 2024-05-16 DIAGNOSIS — I65.21 CAROTID STENOSIS, ASYMPTOMATIC, RIGHT: Chronic | ICD-10-CM

## 2024-05-16 DIAGNOSIS — E78.5 DYSLIPIDEMIA (HIGH LDL; LOW HDL): Chronic | ICD-10-CM

## 2024-05-16 DIAGNOSIS — I73.9 PVD (PERIPHERAL VASCULAR DISEASE): ICD-10-CM

## 2024-05-16 DIAGNOSIS — I77.9 PERIPHERAL ARTERIAL OCCLUSIVE DISEASE: Primary | Chronic | ICD-10-CM

## 2024-05-16 DIAGNOSIS — I10 ESSENTIAL HYPERTENSION: Chronic | ICD-10-CM

## 2024-05-16 DIAGNOSIS — I77.810 MILD ASCENDING AORTA DILATATION: Chronic | ICD-10-CM

## 2024-05-16 PROCEDURE — 99999 PR PBB SHADOW E&M-EST. PATIENT-LVL IV: CPT | Mod: PBBFAC,,, | Performed by: INTERNAL MEDICINE

## 2024-05-16 PROCEDURE — 3008F BODY MASS INDEX DOCD: CPT | Mod: CPTII,S$GLB,, | Performed by: INTERNAL MEDICINE

## 2024-05-16 PROCEDURE — 99214 OFFICE O/P EST MOD 30 MIN: CPT | Mod: S$GLB,,, | Performed by: INTERNAL MEDICINE

## 2024-05-16 PROCEDURE — 3079F DIAST BP 80-89 MM HG: CPT | Mod: CPTII,S$GLB,, | Performed by: INTERNAL MEDICINE

## 2024-05-16 PROCEDURE — 4010F ACE/ARB THERAPY RXD/TAKEN: CPT | Mod: CPTII,S$GLB,, | Performed by: INTERNAL MEDICINE

## 2024-05-16 PROCEDURE — 1159F MED LIST DOCD IN RCRD: CPT | Mod: CPTII,S$GLB,, | Performed by: INTERNAL MEDICINE

## 2024-05-16 PROCEDURE — 3075F SYST BP GE 130 - 139MM HG: CPT | Mod: CPTII,S$GLB,, | Performed by: INTERNAL MEDICINE

## 2024-05-16 NOTE — PROGRESS NOTES
Subjective:    Patient ID:  Sal Villarreal Jr. is a 64 y.o. male who presents for Follow-up and Medication Management        HPI    DISCUSSED LABS AND GOALS CMP OK AST 51, LDL 44, HDL 35, TRIGLYCERIDE 290, DOING WELL, NO SIGNIFICANT CLAUDICATION, NOT MUCH ETOH,NO TOBACCO,  WALKS AROUND 3 MI/D, SEE ROS  Past Medical History:   Diagnosis Date    Anxiety     Arthritis     Depression     Digestive disorder     ulcers    Fatigue     History of acute bronchitis     History of psychiatric care     Hypertension     Intervertebral disc disorder with radiculopathy of lumbar region 3/23/2021    Psychiatric problem     PVD (peripheral vascular disease)     S/P femoral-femoral bypass surgery 12/1/2021     Past Surgical History:   Procedure Laterality Date    ANTERIOR LUMBAR INTERBODY FUSION (ALIF) Right 03/23/2021    Procedure: FUSION, SPINE, LUMBAR, ALIF RIGHT L5-S1;  Surgeon: Kelly Ayala MD;  Location: [facility] OR;  Service: Neurosurgery;  Laterality: Right;    AORTOGRAPHY WITH EXTREMITY RUNOFF N/A 07/27/2021    Procedure: AORTOGRAM, WITH EXTREMITY RUNOFF;  Surgeon: Jim Briggs MD;  Location: [facility] CATH;  Service: Cardiovascular;  Laterality: N/A;    AORTOGRAPHY WITH SERIALOGRAPHY Bilateral 07/27/2021    Procedure: AORTOGRAM, WITH SERIALOGRAPHY;  Surgeon: Jim Briggs MD;  Location: [facility] CATH;  Service: Cardiovascular;  Laterality: Bilateral;    APPENDECTOMY  3/23    CREATION OF FEMORAL-FEMORAL ARTERY BYPASS WITH GRAFT Left 11/30/2021    Procedure: CREATION, BYPASS, ARTERIAL, FEMORAL TO FEMORAL, USING GRAFT;  Surgeon: Jim Briggs MD;  Location: [facility] OR;  Service: Cardiovascular;  Laterality: Left;    ENDARTERECTOMY OF FEMORAL ARTERY Bilateral 11/30/2021    Procedure: ENDARTERECTOMY, FEMORAL;  Surgeon: Jim Briggs MD;  Location: [facility] OR;  Service: Cardiovascular;  Laterality: Bilateral;    FUSION OF LUMBAR SPINE BY ANTERIOR APPROACH  03/23/2021    Procedure: FUSION, SPINE, LUMBAR, ANTERIOR APPROACH;  Surgeon: Jim Briggs  MD;  Location: [facility] OR;  Service: Cardiovascular;;    HARVESTING OF BONE GRAFT N/A 2021    Procedure: SURGICAL PROCUREMENT, BONE GRAFT-Iliac;  Surgeon: Kelly Ayala MD;  Location: [facility] OR;  Service: Neurosurgery;  Laterality: N/A;    HYPOSPADIAS CORRECTION      SPINE SURGERY  3/22    VASECTOMY       Family History   Problem Relation Name Age of Onset    Diabetes Mother Reba     Hypertension Mother Reba     Diabetes Father Sal     Hypertension Father Sal     Cancer Sister      Muscular dystrophy Son      Arthritis Paternal Grandfather Luke     Heart disease Paternal Grandfather Luke     Birth defects Brother ESPERANZA     Cancer Brother ESPERANZA     Heart disease Maternal Aunt Zulema     Heart disease Paternal Uncle Santo     Suicide Neg Hx      Sexual abuse Neg Hx      Self injury Neg Hx      Seizures Neg Hx      Schizophrenia Neg Hx      Physical abuse Neg Hx      Paranoid behavior Neg Hx      OCD Neg Hx      Drug abuse Neg Hx      Depression Neg Hx      Dementia Neg Hx      Bipolar disorder Neg Hx      Anxiety disorder Neg Hx      Alcohol abuse Neg Hx       Social History     Socioeconomic History    Marital status:    Occupational History    Occupation: unemployed     Employer: [employer]   Tobacco Use    Smoking status: Former     Current packs/day: 0.00     Average packs/day: 0.3 packs/day for 42.0 years (10.5 ttl pk-yrs)     Types: Cigarettes     Start date: 1980     Quit date: 2022     Years since quittin.6     Passive exposure: Never    Smokeless tobacco: Never   Substance and Sexual Activity    Alcohol use: Not Currently     Comment: stopped recently - last drink 3/21    Drug use: No    Sexual activity: Yes     Partners: Male     Birth control/protection: Partner-Vasectomy   Other Topics Concern    Patient feels they ought to cut down on drinking/drug use Yes    Patient annoyed by others criticizing their drinking/drug use Yes    Patient has felt bad or guilty  about drinking/drug use Yes    Patient has had a drink/used drugs as an eye opener in the AM No     Social Determinants of Health     Financial Resource Strain: Low Risk  (5/9/2024)    Overall Financial Resource Strain (CARDIA)     Difficulty of Paying Living Expenses: Not hard at all   Food Insecurity: No Food Insecurity (5/9/2024)    Hunger Vital Sign     Worried About Running Out of Food in the Last Year: Never true     Ran Out of Food in the Last Year: Never true   Transportation Needs: No Transportation Needs (5/9/2024)    PRAPARE - Transportation     Lack of Transportation (Medical): No     Lack of Transportation (Non-Medical): No   Physical Activity: Sufficiently Active (5/9/2024)    Exercise Vital Sign     Days of Exercise per Week: 6 days     Minutes of Exercise per Session: 150+ min   Stress: No Stress Concern Present (5/9/2024)    [employer] - Occupational Stress Questionnaire     Feeling of Stress : Only a little   Housing Stability: Low Risk  (11/27/2023)    Housing Stability Vital Sign     Unable to Pay for Housing in the Last Year: No     Number of Places Lived in the Last Year: 1     Unstable Housing in the Last Year: No       Review of patient's allergies indicates:   Allergen Reactions    Blueberry        Current Outpatient Medications:     acetaminophen (TYLENOL EXTRA STRENGTH) 500 MG tablet, Take 500 mg by mouth 2 (two) times a day., Disp: , Rfl:     allopurinoL (ZYLOPRIM) 300 MG tablet, Take 1 tablet (300 mg total) by mouth once daily., Disp: 90 tablet, Rfl: 1    amitriptyline (ELAVIL) 25 MG tablet, Take 1 tablet (25 mg total) by mouth every evening., Disp: 90 tablet, Rfl: 1    aspirin (ECOTRIN) 81 MG EC tablet, Take 1 tablet (81 mg total) by mouth once daily., Disp: 90 tablet, Rfl: 0    atenoloL (TENORMIN) 25 MG tablet, TAKE 1 TABLET BY MOUTH EVERY DAY IN THE MORNING, Disp: 90 tablet, Rfl: 3    buPROPion (WELLBUTRIN) 75 MG tablet, Take 1 tablet (75 mg total) by  mouth 2 (two) times daily for 30 days, THEN 1 tablet (75 mg total) once daily., Disp: 90 tablet, Rfl: 0    clopidogreL (PLAVIX) 75 mg tablet, TAKE 1 TABLET BY MOUTH EVERY DAY, Disp: 90 tablet, Rfl: 1    DENTA 5000 PLUS 1.1 % Crea, Take 1 application by mouth 2 (two) times daily., Disp: , Rfl:     famotidine (PEPCID) 20 MG tablet, Take 2 tablets (40 mg total) by mouth 2 (two) times daily., Disp: 180 tablet, Rfl: 3    ferrous sulfate 325 (65 FE) MG EC tablet, Take 1 tablet (325 mg total) by mouth once daily., Disp: 90 tablet, Rfl: 4    icosapent ethyL (VASCEPA) 1 gram Cap, Take 2 capsules (2 g total) by mouth 2 (two) times daily., Disp: 360 capsule, Rfl: 1    lisinopriL 10 MG tablet, TAKE 1 TABLET BY MOUTH EVERY DAY IN THE EVENING, Disp: 90 tablet, Rfl: 0    multivitamin (THERAGRAN) per tablet, Take 1 tablet by mouth once daily., Disp: , Rfl:     pantoprazole (PROTONIX) 40 MG tablet, Take 1 tablet (40 mg total) by mouth 2 (two) times daily., Disp: 90 tablet, Rfl: 3    papaverine 30 mg/mL injection, Add phentolamine 1 mg/mL, add alprostadil 20 micrograms/mL, Disp: 10 mL, Rfl: 11    rosuvastatin (CRESTOR) 5 MG tablet, TAKE 1 TABLET BY MOUTH EVERY DAY IN THE EVENING, Disp: 90 tablet, Rfl: 1    Review of Systems   Constitutional: Negative for chills, diaphoresis, fever, malaise/fatigue and night sweats.   HENT:  Negative for congestion and nosebleeds.    Eyes:  Negative for blurred vision and visual disturbance.   Cardiovascular:  Negative for chest pain, claudication (MILD), cyanosis, dyspnea on exertion, irregular heartbeat, leg swelling, near-syncope, orthopnea, palpitations, paroxysmal nocturnal dyspnea and syncope.   Respiratory:  Negative for cough, hemoptysis, shortness of breath and wheezing.    Endocrine: Negative for polyphagia and polyuria.   Hematologic/Lymphatic: Negative for adenopathy. Does not bruise/bleed easily.   Skin:  Negative for color change and rash.   Musculoskeletal:  Negative for back pain  "(LESS), falls and neck pain (CHRONIC).   Gastrointestinal:  Negative for abdominal pain, change in bowel habit, dysphagia, jaundice, melena and nausea.   Genitourinary:  Negative for dysuria and flank pain.        ED   Neurological:  Negative for brief paralysis, focal weakness, headaches, light-headedness, loss of balance and weakness. Numbness: CARPAL TUNNEL.  Psychiatric/Behavioral:  Negative for altered mental status and depression.    Allergic/Immunologic: Negative for persistent infections.        Objective:      Vitals:    05/16/24 1258   BP: 136/81   Pulse: 82   Weight: 71.8 kg (158 lb 4.6 oz)   Height: 5' 6" (1.676 m)   PainSc:   5     Body mass index is 25.55 kg/m².    Physical Exam  Constitutional:       Appearance: Normal appearance.   HENT:      Head: Normocephalic and atraumatic.   Eyes:      General: No scleral icterus.     Extraocular Movements: Extraocular movements intact.   Neck:      Vascular: Carotid bruit present.   Cardiovascular:      Rate and Rhythm: Normal rate and regular rhythm. No extrasystoles are present.     Pulses:           Carotid pulses are 2+ on the right side with bruit and 2+ on the left side.       Radial pulses are 2+ on the right side and 2+ on the left side.        Femoral pulses are 1+ on the right side and 2+ on the left side with bruit.       Posterior tibial pulses are 1+ on the right side and 1+ on the left side.      Heart sounds: Murmur heard.      Systolic murmur is present with a grade of 1/6 at the upper right sternal border.      No friction rub. No gallop.   Pulmonary:      Effort: Pulmonary effort is normal.      Breath sounds: Normal breath sounds and air entry.   Abdominal:      Palpations: Abdomen is soft.      Tenderness: There is no abdominal tenderness.       Musculoskeletal:      Cervical back: Neck supple.      Right lower leg: No edema.      Left lower leg: No edema.        Legs:    Skin:     General: Skin is warm and dry.      Capillary Refill: " Capillary refill takes less than 2 seconds.   Neurological:      General: No focal deficit present.      Mental Status: He is alert.   Psychiatric:         Mood and Affect: Mood normal.         Speech: Speech normal.         Behavior: Behavior normal.                 ..    Chemistry        Component Value Date/Time     05/10/2024 0730    K 3.7 05/10/2024 0730     05/10/2024 0730    CO2 25 05/10/2024 0730    BUN 11 05/10/2024 0730    CREATININE 1.2 05/10/2024 0730     05/10/2024 0730        Component Value Date/Time    CALCIUM 9.6 05/10/2024 0730    ALKPHOS 78 05/10/2024 0730    AST 51 (H) 05/10/2024 0730    ALT 38 05/10/2024 0730    BILITOT 0.5 05/10/2024 0730    ESTGFRAFRICA >60.0 06/06/2022 1037    EGFRNONAA >60.0 06/06/2022 1037            ..  Lab Results   Component Value Date    CHOL 123 05/10/2024    CHOL 132 10/11/2023    CHOL 86 (L) 02/24/2023    CHOL 86 (L) 02/24/2023     Lab Results   Component Value Date    HDL 35 (L) 05/10/2024    HDL 20 (L) 10/11/2023    HDL 21 (L) 02/24/2023    HDL 21 (L) 02/24/2023     Lab Results   Component Value Date    LDLCALC 44.2 (L) 05/10/2024    LDLCALC Invalid, Trig>400.0 10/11/2023    LDLCALC 25.4 (L) 02/24/2023    LDLCALC 25.4 (L) 02/24/2023     Lab Results   Component Value Date    TRIG 219 (H) 05/10/2024    TRIG 529 (H) 10/11/2023    TRIG 198 (H) 02/24/2023    TRIG 198 (H) 02/24/2023     Lab Results   Component Value Date    CHOLHDL 28.5 05/10/2024    CHOLHDL 15.2 (L) 10/11/2023    CHOLHDL 24.4 02/24/2023    CHOLHDL 24.4 02/24/2023     ..  Lab Results   Component Value Date    WBC 5.94 10/11/2023    HGB 12.4 (L) 10/11/2023    HCT 38.4 (L) 10/11/2023    MCV 94 10/11/2023     10/11/2023       Test(s) Reviewed  I have reviewed the following in detail:  [] Stress test   [] Angiography   [] Echocardiogram   [x] Labs   [] Other:       Assessment:         ICD-10-CM ICD-9-CM   1. Peripheral arterial occlusive disease  I77.9 444.22   2. Carotid stenosis,  asymptomatic, right  I65.21 433.10   3. Mild ascending aorta dilatation  I77.810 447.71   4. Essential hypertension  I10 401.9   5. Long term (current) use of antithrombotics/antiplatelets  Z79.02 V58.63   6. Dyslipidemia (high LDL; low HDL)  E78.5 272.4   7. PVD (peripheral vascular disease)  I73.9 443.9     Problem List Items Addressed This Visit          Cardiac/Vascular    Essential hypertension (Chronic)    Overview     CHRONIC.  March 2023:  Blood pressure well controlled.  Hypertension Medications               atenoloL (TENORMIN) 25 MG tablet TAKE 1 TABLET BY MOUTH EVERY DAY IN THE MORNING    lisinopriL (PRINIVIL,ZESTRIL) 5 MG tablet TAKE 1 TABLET BY MOUTH EVERY DAY            STABLE. BP Reviewed.  Compliant with BP medications. No SE reported.   (-) CP, SOB, palpitations, dizziness, lightheadedness, HA, arm numbness, tingling or weakness, syncope.  Creatinine   Date Value Ref Range Status   02/24/2023 1.1 0.5 - 1.4 mg/dL Final   02/24/2023 1.1 0.5 - 1.4 mg/dL Final     Lab Results   Component Value Date    K 4.5 02/24/2023    K 4.5 02/24/2023       Results for orders placed or performed during the hospital encounter of 11/29/21   EKG 12-LEAD    Collection Time: 11/29/21  2:48 PM    Narrative    Test Reason : I77.9,    Vent. Rate : 061 BPM     Atrial Rate : 061 BPM     P-R Int : 156 ms          QRS Dur : 086 ms      QT Int : 416 ms       P-R-T Axes : 072 063 052 degrees     QTc Int : 418 ms    Normal sinus rhythm  Normal ECG  When compared with ECG of 28-OCT-2021 22:49,  Previous ECG has undetermined rhythm, needs review  Criteria for Anterior infarct are no longer Present  Confirmed by Deshawn CONCEPCION, Arnold JORDAN ([id number]) on 11/29/2021 2:56:48 PM    Referred By: VAISHALI DELA CRUZ           Confirmed By:Arnold Hess MD              Relevant Orders    Comprehensive Metabolic Panel    Dyslipidemia (high LDL; low HDL) (Chronic)    Overview     CHRONIC.  March 2023: Reports compliance with rosuvastatin 5 milligrams daily.   August 2022: LDL is very low on rosuvastatin 10 milligrams daily.  STABLE. Lab analysis reviewed.   (-) CP, SOB, abdominal pain, N/V/D, constipation, jaundice, skin changes.  (-) Myalgias  Lab Results   Component Value Date    CHOL 86 (L) 02/24/2023    CHOL 86 (L) 02/24/2023    CHOL 109 (L) 12/01/2022     Lab Results   Component Value Date    HDL 21 (L) 02/24/2023    HDL 21 (L) 02/24/2023    HDL 27 (L) 12/01/2022     Lab Results   Component Value Date    LDLCALC 25.4 (L) 02/24/2023    LDLCALC 25.4 (L) 02/24/2023    LDLCALC 30.0 (L) 12/01/2022     Lab Results   Component Value Date    TRIG 198 (H) 02/24/2023    TRIG 198 (H) 02/24/2023    TRIG 260 (H) 12/01/2022     Lab Results   Component Value Date    CHOLHDL 24.4 02/24/2023    CHOLHDL 24.4 02/24/2023    CHOLHDL 24.8 12/01/2022     Lab Results   Component Value Date    TOTALCHOLEST 4.1 02/24/2023    TOTALCHOLEST 4.1 02/24/2023    TOTALCHOLEST 4.0 12/01/2022     Lab Results   Component Value Date    ALT 18 02/24/2023    ALT 18 02/24/2023    AST 27 02/24/2023    AST 27 02/24/2023    ALKPHOS 83 02/24/2023    ALKPHOS 83 02/24/2023    BILITOT 0.4 02/24/2023    BILITOT 0.4 02/24/2023     ======================================================  The ASCVD Risk score (Clara ONOFRE, et al., 2019) failed to calculate for the following reasons:    The valid total cholesterol range is 130 to 320 mg/dL           Relevant Orders    Comprehensive Metabolic Panel    Lipid Panel    Peripheral arterial occlusive disease - Primary    Overview     Chronic.  Intermittent control.  Patient following with vascular surgery.  Date of Service: 07/27/2021  DATE OF PROCEDURE:  07/27/2021.     PREOPERATIVE DIAGNOSES:  1.  Peripheral arterial occlusive disease.  2.  Pain of the right hip.  3.  Severe intermittent claudication of the right lower extremity affecting   activities of daily living.     POSTOPERATIVE DIAGNOSES:  1.  Peripheral arterial occlusive disease.  2.  Pain of the right hip  3.   Severe intermittent claudication of the right lower extremity affecting   activities of daily living.     OPERATIONS:  1.  Aortogram with bilateral renal arteriogram.  2.  Bilateral common iliac arteriograms with bilateral lower extremity runoff.  3.  Measurement of pressures in the aorta and the left iliac artery.     SURGEON:  Momo Briggs M.D., F.A.C.S.     ANESTHESIA:  Lidocaine 1% for local and intravenous sedation using 4 mg of   Versed and 100 mcg of fentanyl IV.  The patient's level of consciousness was   monitored by the registered nurse from 10:59-11:30 a.m.  Other monitors included   blood pressure, pulse oximetry, heart rate and respiratory rate.     PROCEDURE IN DETAIL:  With the patient in the supine position on the cardiac   cath table, bilateral groins were prepped and draped in a sterile fashion.  CT   angiography had showed an occluded right external iliac artery and a 50%   stenosis of the left common iliac artery.  Access to the left common femoral   artery was obtained using an 18-gauge access needle and a guidewire was passed   through this.  A 5-Swedish sheath was passed over the guidewire and the guidewire   and dilator were removed and the sheath was aspirated and flushed.  A J-tip   guidewire was passed through the sheath and advanced up into the suprarenal   aorta under fluoroscopy.  The guidewire was removed.  Contrast was injected and   digital subtraction angiography was performed and an abdominal aortogram with   bilateral renal arteriograms was done.  The renal arteries were patent.  The   infrarenal aorta had calcified plaque with some mild luminal irregularities, but   no significant stenosis.     The angiographic catheter was pulled down into the distal aorta just above the   aortic bifurcation.  Contrast was injected and cineangiography was performed,   and bilateral common iliac arteriograms with bilateral lower extremity runoff   were performed.  The right common iliac artery was  patent.  The right   hypogastric artery was patent and large, giving off a lot of collaterals around   the hip.  The right external iliac artery and the right common femoral artery   were both occluded.  The very distal common femoral artery reconstituted a   couple of millimeters above the femoral bifurcation.  The profunda femoris and   the superficial femoral artery and the right popliteal artery were all patent.    Runoff was via 3 vessels.     The left common iliac artery had some luminal irregularities, but did not seem   to have high-grade stenosis.  The left hypogastric and the left external iliac   arteries were patent, although the left external iliac artery seemed to have   about 20% stenosis distally.  The left common femoral, the left profunda, the   left superficial femoral and the left popliteal arteries were patent.  Runoff   was via 3 vessels.  Bilateral oblique arteriograms of the iliac arteries were   then performed to see if there was any stenosis that could not be seen with the   anteroposterior projections.  No high-grade stenosis in the left common iliac   artery could be seen.  The Omniflush angiographic catheter was then exchanged   for a straight Glidecath.  Pressures were then measured in the infrarenal aorta   and the catheter was pulled back with continuous blood pressure measurements   down into the left common iliac and then into the left external iliac artery.    There was absolutely no change in pressure from the aorta into the common iliac   or into the external iliac artery.  No intervention was performed.  The sheath   was pulled from the left common femoral artery and pressure was held for about   20 minutes.  At the end of that time, there was no bleeding and no hematoma.    Sterile dressing was placed.  The patient tolerated the procedure and left the   Cardiac Cath Lab in satisfactory and stable condition.        RON  dd: 07/27/2021 11:52:36 (CDT)  td: 07/27/2021 13:12:39  (CDT)  Doc ID   #[id number]  Job ID #[id number]     CC:          Last signed by: Jim Briggs MD at 7/27/2021  3:08 PM              Carotid stenosis, asymptomatic, right    Relevant Orders    CV Ultrasound Bilateral Doppler Carotid    Mild ascending aorta dilatation    PVD (peripheral vascular disease)       Hematology    Long term (current) use of antithrombotics/antiplatelets    Relevant Orders    CBC Auto Differential        Plan:     DAILY MEDS, WALKING EXERCISE PROGRAM EXPLAINED, CHECK CAROTID ULTRASOUND,ALL CV CLINICALLY STABLE, NO ANGINA, NO HF, NO TIA, NO CLINICAL ARRHYTHMIA,CONTINUE CURRENT MEDS, EDUCATION, DIET, EXERCISE RETURN TO CLINIC IN, 6 MO WITH LABS      Peripheral arterial occlusive disease    Carotid stenosis, asymptomatic, right  -     CV Ultrasound Bilateral Doppler Carotid; Future    Mild ascending aorta dilatation    Essential hypertension  -     Comprehensive Metabolic Panel; Future; Expected date: 11/16/2024    Long term (current) use of antithrombotics/antiplatelets  -     CBC Auto Differential; Future; Expected date: 11/16/2024    Dyslipidemia (high LDL; low HDL)  -     Comprehensive Metabolic Panel; Future; Expected date: 11/16/2024  -     Lipid Panel; Future; Expected date: 11/16/2024    PVD (peripheral vascular disease)    RTC Low level/low impact aerobic exercise 5x's/wk. Heart healthy diet and risk factor modification.    See labs and med orders.    Aerobic exercise 5x's/wk. Heart healthy diet and risk factor modification.    See labs and med orders.

## 2024-05-17 PROBLEM — I73.9 PVD (PERIPHERAL VASCULAR DISEASE): Status: ACTIVE | Noted: 2024-05-17

## 2024-05-18 ENCOUNTER — PATIENT MESSAGE (OUTPATIENT)
Dept: FAMILY MEDICINE | Facility: CLINIC | Age: 64
End: 2024-05-18
Payer: COMMERCIAL

## 2024-05-18 DIAGNOSIS — K21.9 GASTROESOPHAGEAL REFLUX DISEASE, UNSPECIFIED WHETHER ESOPHAGITIS PRESENT: ICD-10-CM

## 2024-05-18 NOTE — TELEPHONE ENCOUNTER
No care due was identified.  [facility] Embedded Care Due Messages. Reference number: [id number].   5/18/2024 7:19:04 AM CDT

## 2024-05-19 NOTE — TELEPHONE ENCOUNTER
Refill Routing Note   Medication(s) are not appropriate for processing by [facility] for the following reason(s):        Outside of protocol    [facility] action(s):  Route      Medication Therapy Plan: [facility] dose limit: 40mg/day      Appointments  past 12m or future 3m with PCP    Date Provider   Last Visit   4/15/2024 Bipin Penaloza MD   Next Visit   Visit date not found Bipin Penaloza MD   ED visits in past 90 days: 0        Note composed:5:14 PM 05/19/2024

## 2024-05-20 DIAGNOSIS — E78.5 DYSLIPIDEMIA (HIGH LDL; LOW HDL): ICD-10-CM

## 2024-05-20 RX ORDER — PANTOPRAZOLE SODIUM 40 MG/1
TABLET, DELAYED RELEASE ORAL
Qty: 180 TABLET | Refills: 3 | Status: SHIPPED | OUTPATIENT
Start: 2024-05-20

## 2024-05-21 RX ORDER — ROSUVASTATIN CALCIUM 5 MG/1
5 TABLET, COATED ORAL NIGHTLY
Qty: 90 TABLET | Refills: 1 | Status: SHIPPED | OUTPATIENT
Start: 2024-05-21

## 2024-05-21 NOTE — TELEPHONE ENCOUNTER
No care due was identified.  [facility] Embedded Care Due Messages. Reference number: [id number].   5/20/2024 7:53:44 PM CDT

## 2024-05-21 NOTE — TELEPHONE ENCOUNTER
Refill Routing Note   Medication(s) are not appropriate for processing by [facility] for the following reason(s):        Responsible provider unclear    [facility] action(s):  Defer             Appointments  past 12m or future 3m with PCP    Date Provider   Last Visit   4/15/2024 Bipin Penaloza MD   Next Visit   Visit date not found Bipin Penaloza MD   ED visits in past 90 days: 0        Note composed:8:27 AM 05/21/2024

## 2024-06-03 ENCOUNTER — OFFICE VISIT (OUTPATIENT)
Dept: ORTHOPEDICS | Facility: CLINIC | Age: 64
End: 2024-06-03
Payer: COMMERCIAL

## 2024-06-03 VITALS — BODY MASS INDEX: 25.71 KG/M2 | HEIGHT: 66 IN | WEIGHT: 160 LBS

## 2024-06-03 DIAGNOSIS — G89.18 PILLAR PAIN, POST-OPERATIVE: ICD-10-CM

## 2024-06-03 DIAGNOSIS — M65.331 TRIGGER FINGER, RIGHT MIDDLE FINGER: Primary | ICD-10-CM

## 2024-06-03 PROCEDURE — 99999 PR PBB SHADOW E&M-EST. PATIENT-LVL III: CPT | Mod: PBBFAC,,, | Performed by: ORTHOPAEDIC SURGERY

## 2024-06-03 PROCEDURE — 3008F BODY MASS INDEX DOCD: CPT | Mod: CPTII,S$GLB,, | Performed by: ORTHOPAEDIC SURGERY

## 2024-06-03 PROCEDURE — 4010F ACE/ARB THERAPY RXD/TAKEN: CPT | Mod: CPTII,S$GLB,, | Performed by: ORTHOPAEDIC SURGERY

## 2024-06-03 PROCEDURE — 1159F MED LIST DOCD IN RCRD: CPT | Mod: CPTII,S$GLB,, | Performed by: ORTHOPAEDIC SURGERY

## 2024-06-03 PROCEDURE — 99203 OFFICE O/P NEW LOW 30 MIN: CPT | Mod: S$GLB,,, | Performed by: ORTHOPAEDIC SURGERY

## 2024-06-03 NOTE — PROGRESS NOTES
6/3/2024    Chief Complaint:  Chief Complaint   Patient presents with    Right Hand - Pain    Left Hand - Pain       HPI:  Sal Villarreal Jr. is a 64 y.o. male, who presents to clinic today Has a history of bilateral carpal tunnel syndrome.  He underwent carpal tunnel releases.  These were performed approximately 6 and 8 months ago.  He states that he was continuing to have pain and dysfunction about the right hand.  He was here today for a 2nd opinion and further evaluation    PMHX:  Past Medical History:   Diagnosis Date    Anxiety     Arthritis     Depression     Digestive disorder     ulcers    Fatigue     History of acute bronchitis     History of psychiatric care     Hypertension     Intervertebral disc disorder with radiculopathy of lumbar region 3/23/2021    Psychiatric problem     PVD (peripheral vascular disease)     S/P femoral-femoral bypass surgery 12/1/2021       PSHX:  Past Surgical History:   Procedure Laterality Date    ANTERIOR LUMBAR INTERBODY FUSION (ALIF) Right 03/23/2021    Procedure: FUSION, SPINE, LUMBAR, ALIF RIGHT L5-S1;  Surgeon: Kelly Ayala MD;  Location: [facility] OR;  Service: Neurosurgery;  Laterality: Right;    AORTOGRAPHY WITH EXTREMITY RUNOFF N/A 07/27/2021    Procedure: AORTOGRAM, WITH EXTREMITY RUNOFF;  Surgeon: Jim Briggs MD;  [facility];  Service: Cardiovascular;  Laterality: N/A;    AORTOGRAPHY WITH SERIALOGRAPHY Bilateral 07/27/2021    Procedure: AORTOGRAM, WITH SERIALOGRAPHY;  Surgeon: Jim Briggs MD;  Location: [facility] CATH;  Service: Cardiovascular;  Laterality: Bilateral;    APPENDECTOMY  3/23    CREATION OF FEMORAL-FEMORAL ARTERY BYPASS WITH GRAFT Left 11/30/2021    Procedure: CREATION, BYPASS, ARTERIAL, FEMORAL TO FEMORAL, USING GRAFT;  Surgeon: Jim Briggs MD;  Location: [facility] OR;  Service: Cardiovascular;  Laterality: Left;    ENDARTERECTOMY OF FEMORAL ARTERY Bilateral 11/30/2021    Procedure: ENDARTERECTOMY, FEMORAL;  Surgeon: Jim Briggs MD;   Location: [facility] OR;  Service: Cardiovascular;  Laterality: Bilateral;    FUSION OF LUMBAR SPINE BY ANTERIOR APPROACH  2021    Procedure: FUSION, SPINE, LUMBAR, ANTERIOR APPROACH;  Surgeon: Jim Briggs MD;  Location: [facility] OR;  Service: Cardiovascular;;    HARVESTING OF BONE GRAFT N/A 2021    Procedure: SURGICAL PROCUREMENT, BONE GRAFT-Iliac;  Surgeon: Kelly Ayala MD;  Location: [facility] OR;  Service: Neurosurgery;  Laterality: N/A;    HYPOSPADIAS CORRECTION      SPINE SURGERY  3/22    VASECTOMY         FMHX:  Family History   Problem Relation Name Age of Onset    Diabetes Mother Reba     Hypertension Mother Reba     Diabetes Father Sal     Hypertension Father Sal     Cancer Sister      Muscular dystrophy Son      Arthritis Paternal Grandfather Luke     Heart disease Paternal Grandfather Luke     Birth defects Brother ESPERANZA     Cancer Brother ESPERANZA     Heart disease Maternal Aunt Zulema     Heart disease Paternal Uncle Santo     Suicide Neg Hx      Sexual abuse Neg Hx      Self injury Neg Hx      Seizures Neg Hx      Schizophrenia Neg Hx      Physical abuse Neg Hx      Paranoid behavior Neg Hx      OCD Neg Hx      Drug abuse Neg Hx      Depression Neg Hx      Dementia Neg Hx      Bipolar disorder Neg Hx      Anxiety disorder Neg Hx      Alcohol abuse Neg Hx         SOCHX:  Social History     Tobacco Use    Smoking status: Former     Current packs/day: 0.00     Average packs/day: 0.3 packs/day for 42.0 years (10.5 ttl pk-yrs)     Types: Cigarettes     Start date: 1980     Quit date: 2022     Years since quittin.6     Passive exposure: Never    Smokeless tobacco: Never   Substance Use Topics    Alcohol use: Not Currently     Comment: stopped recently - last drink 3/21       ALLERGIES:  Blueberry    CURRENT MEDICATIONS:  Current Outpatient Medications on File Prior to Visit   Medication Sig Dispense Refill    acetaminophen (TYLENOL EXTRA STRENGTH) 500 MG tablet Take 500 mg by mouth 2  (two) times a day.      allopurinoL (ZYLOPRIM) 300 MG tablet Take 1 tablet (300 mg total) by mouth once daily. 90 tablet 1    amitriptyline (ELAVIL) 25 MG tablet Take 1 tablet (25 mg total) by mouth every evening. 90 tablet 1    aspirin (ECOTRIN) 81 MG EC tablet Take 1 tablet (81 mg total) by mouth once daily. 90 tablet 0    atenoloL (TENORMIN) 25 MG tablet TAKE 1 TABLET BY MOUTH EVERY DAY IN THE MORNING 90 tablet 3    buPROPion (WELLBUTRIN) 75 MG tablet Take 1 tablet (75 mg total) by mouth 2 (two) times daily for 30 days, THEN 1 tablet (75 mg total) once daily. 90 tablet 0    clopidogreL (PLAVIX) 75 mg tablet TAKE 1 TABLET BY MOUTH EVERY DAY 90 tablet 1    DENTA 5000 PLUS 1.1 % Crea Take 1 application by mouth 2 (two) times daily.      famotidine (PEPCID) 20 MG tablet Take 2 tablets (40 mg total) by mouth 2 (two) times daily. 180 tablet 3    ferrous sulfate 325 (65 FE) MG EC tablet Take 1 tablet (325 mg total) by mouth once daily. 90 tablet 4    icosapent ethyL (VASCEPA) 1 gram Cap Take 2 capsules (2 g total) by mouth 2 (two) times daily. 360 capsule 1    lisinopriL 10 MG tablet TAKE 1 TABLET BY MOUTH EVERY DAY IN THE EVENING 90 tablet 0    multivitamin (THERAGRAN) per tablet Take 1 tablet by mouth once daily.      pantoprazole (PROTONIX) 40 MG tablet Take 1 tablet (40 mg total) by mouth 2 (two) times daily. 180 tablet 3    papaverine 30 mg/mL injection Add phentolamine 1 mg/mL, add alprostadil 20 micrograms/mL 10 mL 11    rosuvastatin (CRESTOR) 5 MG tablet Take 1 tablet (5 mg total) by mouth every evening. 90 tablet 1     No current facility-administered medications on file prior to visit.       REVIEW OF SYSTEMS:  Review of Systems   Constitutional: Negative.    HENT: Negative.     Eyes: Negative.    Respiratory: Negative.     Cardiovascular: Negative.    Gastrointestinal: Negative.    Genitourinary: Negative.    Musculoskeletal:  Positive for joint pain.   Skin: Negative.    Neurological: Negative.   "  Endo/Heme/Allergies: Negative.    Psychiatric/Behavioral: Negative.       GENERAL PHYSICAL EXAM:   Ht 5' 6" (1.676 m)   Wt 72.6 kg (160 lb)   BMI 25.82 kg/m²    GEN: well developed, well nourished, no acute distress   HENT: Normocephalic, atraumatic   EYES: No discharge, conjunctiva normal   NECK: Supple, non-tender   PULM: No wheezing, no respiratory distress   CV: RRR   ABD: Soft, non-tender    ORTHO EXAM:    Examination of the right hand and wrist reveals that he has a well-healed incision.  There is no edema.  There are no major skin changes.  Palpation produces mild tenderness over the region of the radial side of the incision.  There is also significant amount of tenderness over the A1 pulley of the middle finger.  Flexion and extension reveals very mild triggering which is painful.  He was also decrease in the ability to extend.  He does report grossly intact sensation throughout the median distribution.  Has a negative Tinel's overlying the carpal tunnel.    RADIOLOGY:     None    ASSESSMENT:    Status post carpal tunnel release with right hand pillar pain and right middle finger triggering    PLAN:  1.  After consent was obtained injection was placed to the right middle finger flexor tendon sheath     2.  I do feel like he has some pillar pain over the wrist which will resolve on its own    3.  He will follow up in 6 weeks for repeat evaluation.  At that point I will assess the response to the injection and discuss any further treatment    "

## 2024-06-09 DIAGNOSIS — Z79.899 ENCOUNTER FOR LONG-TERM (CURRENT) USE OF MEDICATIONS: ICD-10-CM

## 2024-06-09 DIAGNOSIS — F33.41 RECURRENT MAJOR DEPRESSIVE DISORDER, IN PARTIAL REMISSION: Chronic | ICD-10-CM

## 2024-06-09 NOTE — TELEPHONE ENCOUNTER
No care due was identified.  Health [facility] Embedded Care Due Messages. Reference number: [id number].   6/09/2024 9:32:43 AM CDT

## 2024-06-10 RX ORDER — BUPROPION HYDROCHLORIDE 75 MG/1
TABLET ORAL
Qty: 135 TABLET | Refills: 1 | Status: SHIPPED | OUTPATIENT
Start: 2024-06-10

## 2024-06-10 NOTE — TELEPHONE ENCOUNTER
Refill Routing Note   Medication(s) are not appropriate for processing by [facility] for the following reason(s):        New or recently adjusted medication    [facility] action(s):  Defer               Appointments  past 12m or future 3m with PCP    Date Provider   Last Visit   4/15/2024 Bipin Penaloza MD   Next Visit   Visit date not found Bipin Penaloza MD   ED visits in past 90 days: 0        Note composed:6:53 AM 06/10/2024

## 2024-06-13 ENCOUNTER — TELEPHONE (OUTPATIENT)
Dept: UROLOGY | Facility: CLINIC | Age: 64
End: 2024-06-13
Payer: COMMERCIAL

## 2024-06-14 ENCOUNTER — HOSPITAL ENCOUNTER (OUTPATIENT)
Dept: CARDIOLOGY | Facility: HOSPITAL | Age: 64
Discharge: HOME | End: 2024-06-14
Attending: INTERNAL MEDICINE
Payer: COMMERCIAL

## 2024-06-14 DIAGNOSIS — I65.21 CAROTID STENOSIS, ASYMPTOMATIC, RIGHT: Chronic | ICD-10-CM

## 2024-06-14 LAB
LEFT ARM DIASTOLIC BLOOD PRESSURE: 81 MMHG
LEFT ARM SYSTOLIC BLOOD PRESSURE: 136 MMHG
LEFT CBA DIAS: 12 CM/S
LEFT CBA SYS: 51 CM/S
LEFT CCA DIST DIAS: 20 CM/S
LEFT CCA DIST SYS: 59 CM/S
LEFT CCA MID DIAS: 24 CM/S
LEFT CCA MID SYS: 88 CM/S
LEFT CCA PROX DIAS: 22 CM/S
LEFT CCA PROX SYS: 103 CM/S
LEFT ECA DIAS: 11 CM/S
LEFT ECA SYS: 66 CM/S
LEFT ICA DIST DIAS: 48 CM/S
LEFT ICA DIST SYS: 117 CM/S
LEFT ICA MID DIAS: 52 CM/S
LEFT ICA MID SYS: 132 CM/S
LEFT ICA PROX DIAS: 31 CM/S
LEFT ICA PROX SYS: 82 CM/S
LEFT VERTEBRAL DIAS: 7 CM/S
LEFT VERTEBRAL SYS: 26 CM/S
OHS CV CAROTID ULTRASOUND LEFT ICA/CCA RATIO: 2.24
OHS CV PV CAROTID LEFT HIGHEST CCA: 103
OHS CV PV CAROTID LEFT HIGHEST ICA: 132
OHS CV PV CAROTID RIGHT HIGHEST CCA: 88
OHS CV US CAROTID LEFT HIGHEST EDV: 52
RIGHT ARM DIASTOLIC BLOOD PRESSURE: 81 MMHG
RIGHT ARM SYSTOLIC BLOOD PRESSURE: 136 MMHG
RIGHT CBA DIAS: 10 CM/S
RIGHT CBA SYS: 46 CM/S
RIGHT CCA DIST DIAS: 13 CM/S
RIGHT CCA DIST SYS: 63 CM/S
RIGHT CCA MID DIAS: 14 CM/S
RIGHT CCA MID SYS: 88 CM/S
RIGHT CCA PROX DIAS: 8 CM/S
RIGHT CCA PROX SYS: 70 CM/S
RIGHT ECA DIAS: 16 CM/S
RIGHT ECA SYS: 101 CM/S
RIGHT VERTEBRAL DIAS: 21 CM/S
RIGHT VERTEBRAL SYS: 83 CM/S

## 2024-06-14 PROCEDURE — 93880 EXTRACRANIAL BILAT STUDY: CPT | Mod: PO

## 2024-06-14 PROCEDURE — 93880 EXTRACRANIAL BILAT STUDY: CPT | Mod: 26,,, | Performed by: INTERNAL MEDICINE

## 2024-06-20 DIAGNOSIS — I65.29 OCCLUSION AND STENOSIS OF UNSPECIFIED CAROTID ARTERY: Primary | ICD-10-CM

## 2024-06-26 DIAGNOSIS — I65.29 CAROTID ARTERY STENOSIS: Primary | ICD-10-CM

## 2024-06-27 ENCOUNTER — HOSPITAL ENCOUNTER (OUTPATIENT)
Dept: RADIOLOGY | Facility: HOSPITAL | Age: 64
Discharge: HOME | End: 2024-06-27
Attending: INTERNAL MEDICINE
Payer: COMMERCIAL

## 2024-06-27 DIAGNOSIS — I65.29 OCCLUSION AND STENOSIS OF UNSPECIFIED CAROTID ARTERY: ICD-10-CM

## 2024-06-27 PROCEDURE — 25500020 PHARM REV CODE 255: Mod: PO | Performed by: INTERNAL MEDICINE

## 2024-06-27 PROCEDURE — 70498 CT ANGIOGRAPHY NECK: CPT | Mod: TC,PO

## 2024-06-27 PROCEDURE — 70498 CT ANGIOGRAPHY NECK: CPT | Mod: 26,,, | Performed by: RADIOLOGY

## 2024-06-27 RX ADMIN — IOHEXOL 100 ML: 350 INJECTION, SOLUTION INTRAVENOUS at 09:06

## 2024-06-29 ENCOUNTER — PATIENT MESSAGE (OUTPATIENT)
Dept: CARDIOLOGY | Facility: CLINIC | Age: 64
End: 2024-06-29
Payer: COMMERCIAL

## 2024-06-29 DIAGNOSIS — E78.5 DYSLIPIDEMIA (HIGH LDL; LOW HDL): Primary | Chronic | ICD-10-CM

## 2024-07-01 RX ORDER — OMEGA-3-ACID ETHYL ESTERS 1 G/1
2 CAPSULE, LIQUID FILLED ORAL 2 TIMES DAILY
Qty: 360 CAPSULE | Refills: 1 | Status: SHIPPED | OUTPATIENT
Start: 2024-07-01 | End: 2025-07-01

## 2024-07-06 DIAGNOSIS — I10 ESSENTIAL HYPERTENSION: ICD-10-CM

## 2024-07-08 RX ORDER — LISINOPRIL 10 MG/1
10 TABLET ORAL NIGHTLY
Qty: 90 TABLET | Refills: 1 | Status: SHIPPED | OUTPATIENT
Start: 2024-07-08

## 2024-07-09 ENCOUNTER — OFFICE VISIT (OUTPATIENT)
Dept: NEUROLOGY | Facility: CLINIC | Age: 64
End: 2024-07-09
Payer: COMMERCIAL

## 2024-07-09 VITALS
BODY MASS INDEX: 25.61 KG/M2 | SYSTOLIC BLOOD PRESSURE: 133 MMHG | HEART RATE: 91 BPM | RESPIRATION RATE: 16 BRPM | WEIGHT: 159.38 LBS | HEIGHT: 66 IN | OXYGEN SATURATION: 99 % | DIASTOLIC BLOOD PRESSURE: 92 MMHG

## 2024-07-09 DIAGNOSIS — R45.86 EMOTIONAL LABILITY: Primary | ICD-10-CM

## 2024-07-09 PROCEDURE — 3075F SYST BP GE 130 - 139MM HG: CPT | Mod: CPTII,S$GLB,, | Performed by: PSYCHIATRY & NEUROLOGY

## 2024-07-09 PROCEDURE — 99999 PR PBB SHADOW E&M-EST. PATIENT-LVL IV: CPT | Mod: PBBFAC,,, | Performed by: PSYCHIATRY & NEUROLOGY

## 2024-07-09 PROCEDURE — 1159F MED LIST DOCD IN RCRD: CPT | Mod: CPTII,S$GLB,, | Performed by: PSYCHIATRY & NEUROLOGY

## 2024-07-09 PROCEDURE — 3008F BODY MASS INDEX DOCD: CPT | Mod: CPTII,S$GLB,, | Performed by: PSYCHIATRY & NEUROLOGY

## 2024-07-09 PROCEDURE — 3080F DIAST BP >= 90 MM HG: CPT | Mod: CPTII,S$GLB,, | Performed by: PSYCHIATRY & NEUROLOGY

## 2024-07-09 PROCEDURE — 99213 OFFICE O/P EST LOW 20 MIN: CPT | Mod: S$GLB,,, | Performed by: PSYCHIATRY & NEUROLOGY

## 2024-07-09 PROCEDURE — 4010F ACE/ARB THERAPY RXD/TAKEN: CPT | Mod: CPTII,S$GLB,, | Performed by: PSYCHIATRY & NEUROLOGY

## 2024-07-09 PROCEDURE — 1160F RVW MEDS BY RX/DR IN RCRD: CPT | Mod: CPTII,S$GLB,, | Performed by: PSYCHIATRY & NEUROLOGY

## 2024-07-09 NOTE — PROGRESS NOTES
Subjective:       Patient ID: Sal Villarreal Jr. is a 64 y.o. male.    Chief Complaint: Memory loss.     HPI    The patient presented on 01/ 2024 for evaluation of Memory difficulties.   New issues: none.   In the process weaning Wellbutrin off - under the care of the PCP.    Neurontin - stopped.     Review of Systems   All other systems reviewed and are negative.          Current Outpatient Medications:     acetaminophen (TYLENOL EXTRA STRENGTH) 500 MG tablet, Take 500 mg by mouth 2 (two) times a day., Disp: , Rfl:     allopurinoL (ZYLOPRIM) 300 MG tablet, Take 1 tablet (300 mg total) by mouth once daily., Disp: 90 tablet, Rfl: 1    amitriptyline (ELAVIL) 25 MG tablet, Take 1 tablet (25 mg total) by mouth every evening., Disp: 90 tablet, Rfl: 1    aspirin (ECOTRIN) 81 MG EC tablet, Take 1 tablet (81 mg total) by mouth once daily., Disp: 90 tablet, Rfl: 0    atenoloL (TENORMIN) 25 MG tablet, TAKE 1 TABLET BY MOUTH EVERY DAY IN THE MORNING, Disp: 90 tablet, Rfl: 3    buPROPion (WELLBUTRIN) 75 MG tablet, TAKE 1 TABLET 2 (TWO) TIMES DAILY FOR 30 DAYS, THEN 1 TABLET (75 MG TOTAL) ONCE DAILY., Disp: 135 tablet, Rfl: 1    clopidogreL (PLAVIX) 75 mg tablet, TAKE 1 TABLET BY MOUTH EVERY DAY, Disp: 90 tablet, Rfl: 1    DENTA 5000 PLUS 1.1 % Crea, Take 1 application by mouth 2 (two) times daily., Disp: , Rfl:     famotidine (PEPCID) 20 MG tablet, Take 2 tablets (40 mg total) by mouth 2 (two) times daily., Disp: 180 tablet, Rfl: 3    ferrous sulfate 325 (65 FE) MG EC tablet, Take 1 tablet (325 mg total) by mouth once daily., Disp: 90 tablet, Rfl: 4    lisinopriL 10 MG tablet, TAKE 1 TABLET BY MOUTH EVERY DAY IN THE EVENING, Disp: 90 tablet, Rfl: 1    multivitamin (THERAGRAN) per tablet, Take 1 tablet by mouth once daily., Disp: , Rfl:     omega-3 acid ethyl esters (LOVAZA) 1 gram capsule, Take 2 capsules (2 g total) by mouth 2 (two) times daily., Disp: 360 capsule, Rfl: 1    pantoprazole (PROTONIX) 40 MG tablet, Take 1  tablet (40 mg total) by mouth 2 (two) times daily., Disp: 180 tablet, Rfl: 3    rosuvastatin (CRESTOR) 5 MG tablet, Take 1 tablet (5 mg total) by mouth every evening., Disp: 90 tablet, Rfl: 1    papaverine 30 mg/mL injection, Add phentolamine 1 mg/mL, add alprostadil 20 micrograms/mL, Disp: 10 mL, Rfl: 11    Past Medical History:   Diagnosis Date    Anxiety     Arthritis     Depression     Digestive disorder     ulcers    Fatigue     History of acute bronchitis     History of psychiatric care     Hypertension     Intervertebral disc disorder with radiculopathy of lumbar region 3/23/2021    Psychiatric problem     PVD (peripheral vascular disease)     S/P femoral-femoral bypass surgery 12/1/2021       Past Surgical History:   Procedure Laterality Date    ANTERIOR LUMBAR INTERBODY FUSION (ALIF) Right 03/23/2021    Procedure: FUSION, SPINE, LUMBAR, ALIF RIGHT L5-S1;  Surgeon: Kelly Ayala MD;  Location: [facility];  Service: Neurosurgery;  Laterality: Right;    AORTOGRAPHY WITH EXTREMITY RUNOFF N/A 07/27/2021    Procedure: AORTOGRAM, WITH EXTREMITY RUNOFF;  Surgeon: Jim Briggs MD;  Location: [facility] CATH;  Service: Cardiovascular;  Laterality: N/A;    AORTOGRAPHY WITH SERIALOGRAPHY Bilateral 07/27/2021    Procedure: AORTOGRAM, WITH SERIALOGRAPHY;  Surgeon: Jim Briggs MD;  Location: [facility] CATH;  Service: Cardiovascular;  Laterality: Bilateral;    APPENDECTOMY  3/23    CREATION OF FEMORAL-FEMORAL ARTERY BYPASS WITH GRAFT Left 11/30/2021    Procedure: CREATION, BYPASS, ARTERIAL, FEMORAL TO FEMORAL, USING GRAFT;  Surgeon: Jim Briggs MD;  Location: [facility] OR;  Service: Cardiovascular;  Laterality: Left;    ENDARTERECTOMY OF FEMORAL ARTERY Bilateral 11/30/2021    Procedure: ENDARTERECTOMY, FEMORAL;  Surgeon: Jim Briggs MD;  Location: [facility] OR;  Service: Cardiovascular;  Laterality: Bilateral;    FUSION OF LUMBAR SPINE BY ANTERIOR APPROACH  03/23/2021    Procedure: FUSION, SPINE, LUMBAR, ANTERIOR APPROACH;  Surgeon: Jim  MD Brigitte;  Location: [facility] OR;  Service: Cardiovascular;;    HARVESTING OF BONE GRAFT N/A 2021    Procedure: SURGICAL PROCUREMENT, BONE GRAFT-Iliac;  Surgeon: Kelly Ayala MD;  Location: [facility] OR;  Service: Neurosurgery;  Laterality: N/A;    HYPOSPADIAS CORRECTION      SPINE SURGERY  3/22    VASECTOMY         Social History     Socioeconomic History    Marital status:    Occupational History    Occupation: unemployed     Employer: [employer]   Tobacco Use    Smoking status: Former     Current packs/day: 0.00     Average packs/day: 0.3 packs/day for 42.0 years (10.5 ttl pk-yrs)     Types: Cigarettes     Start date: 1980     Quit date: 2022     Years since quittin.7     Passive exposure: Never    Smokeless tobacco: Never   Substance and Sexual Activity    Alcohol use: Not Currently     Comment: stopped recently - last drink 3/21    Drug use: No    Sexual activity: Yes     Partners: Male     Birth control/protection: Partner-Vasectomy   Other Topics Concern    Patient feels they ought to cut down on drinking/drug use Yes    Patient annoyed by others criticizing their drinking/drug use Yes    Patient has felt bad or guilty about drinking/drug use Yes    Patient has had a drink/used drugs as an eye opener in the AM No     Social Determinants of Health     Financial Resource Strain: Low Risk  (2024)    Overall Financial Resource Strain (CARDIA)     Difficulty of Paying Living Expenses: Not hard at all   Food Insecurity: No Food Insecurity (2024)    Hunger Vital Sign     Worried About Running Out of Food in the Last Year: Never true     Ran Out of Food in the Last Year: Never true   Transportation Needs: No Transportation Needs (2024)    PRAPARE - Transportation     Lack of Transportation (Medical): No     Lack of Transportation (Non-Medical): No   Physical Activity: Sufficiently Active (2024)    Exercise Vital Sign     Days of Exercise per Week: 6 days      Minutes of Exercise per Session: 150+ min   Stress: No Stress Concern Present (5/9/2024)    [employer] [place] [employer] - Occupational Stress Questionnaire     Feeling of Stress : Only a little   Housing Stability: Low Risk  (11/27/2023)    Housing Stability Vital Sign     Unable to Pay for Housing in the Last Year: No     Number of Places Lived in the Last Year: 1     Unstable Housing in the Last Year: No       Past/Current Medical/Surgical History, Past/Current Social History, Past/Current Family History and Past/Current Medications were reviewed in detail.    Objective:     VITAL SIGNS WERE REVIEWED    GENERAL APPEARANCE:     The patient looks comfortable.    BMI    No signs of respiratory distress.    Normal breathing pattern.    No dysmorphic features    Normal eye contact.       GENERAL MEDICAL EXAM:    HEENT:  Head is atraumatic normocephalic.     FUNDOSCOPIC (OPHTHALMOSCOPIC) EXAMINATION showed no disc edema (papilledema).      NECK: No JVD. No visible lesions or goiters.     CHEST-CARDIOPULMONARY: No cyanosis. No tachypnea. Normal respiratory effort.    [id number]: No jaundice. No stomas or lesions. No visible hernias. No catheters.     SKIN, HAIR, NAILS: No pathognomonic skin rash.No neurofibromatosis. No visible lesions.No stigmata of autoimmune disease. No clubbing.    LIMBS: No varicose veins. No visible swelling.    MUSCULOSKELETAL: No visible deformities.No visible lesions.    Neurologic Exam     Mental Status   Oriented to person, place, and time.   Registration: recalls 3 of 3 objects. Recall at 5 minutes: recalls 3 of 3 objects. Follows 3 step commands.   Attention: normal. Concentration: normal.   Speech: speech is normal   Level of consciousness: alert  Knowledge: good. Able to perform simple calculations.   Able to name object. Able to read. Able to repeat. Able to write. Normal comprehension.     Cranial Nerves   Cranial nerves II through XII intact.      Motor Exam   Muscle bulk: normal  Overall muscle tone: normal    Strength   Strength 5/5 throughout.   Right neck flexion: 5/5  Left neck flexion: 5/5  Right neck extension: 5/5  Left neck extension: 5/5  Right deltoid: 5/5  Left deltoid: 5/5  Right biceps: 5/5  Left biceps: 5/5  Right triceps: 5/5  Left triceps: 5/5  Right wrist flexion: 5/5  Left wrist flexion: 5/5  Right wrist extension: 5/5  Left wrist extension: 5/5  Right interossei: 5/5  Left interossei: 5/5  Right abdominals: 5/5  Left abdominals: 5/5  Right iliopsoas: 5/5  Left iliopsoas: 5/5  Right quadriceps: 5/5  Left quadriceps: 5/5  Right hamstrin/5  Left hamstrin/5  Right glutei: 5/5  Left glutei: 5/5  Right anterior tibial: 5/5  Left anterior tibial: 5/5  Right posterior tibial: 5/5  Left posterior tibial: 5/5  Right peroneal: 5/5  Left peroneal: 5/5  Right gastroc: 5/5  Left gastroc: 5/5    Sensory Exam   Light touch normal.   Vibration normal.   Proprioception normal.   Pinprick normal.   Graphesthesia: normal  Stereognosis: normal    Gait, Coordination, and Reflexes     Gait  Gait: normal    Coordination   Romberg: negative  Finger to nose coordination: normal  Heel to shin coordination: normal  Tandem walking coordination: normal    Tremor   Resting tremor: absent  Intention tremor: absent  Action tremor: absent    Reflexes   Right brachioradialis: 2+  Left brachioradialis: 2+  Right biceps: 2+  Left biceps: 2+  Right triceps: 2+  Left triceps: 2+  Right patellar: 2+  Left patellar: 2+  Right achilles: 2+  Left achilles: 2+  Right : 2+  Left : 2+  Right plantar: normal  Left plantar: normal  Right Hughes: absent  Left Hughes: absent  Right ankle clonus: absent  Left ankle clonus: absent  Right pendular knee jerk: absent  Left pendular knee jerk: absent        Lab Results   Component Value Date    WBC 5.94 10/11/2023    HGB 12.4 (L) 10/11/2023    HCT 38.4 (L) 10/11/2023    MCV 94 10/11/2023     10/11/2023       Sodium    Date Value Ref Range Status   05/10/2024 137 136 - 145 mmol/L Final     Potassium   Date Value Ref Range Status   05/10/2024 3.7 3.5 - 5.1 mmol/L Final     Chloride   Date Value Ref Range Status   05/10/2024 103 95 - 110 mmol/L Final     CO2   Date Value Ref Range Status   05/10/2024 25 23 - 29 mmol/L Final     Glucose   Date Value Ref Range Status   05/10/2024 101 70 - 110 mg/dL Final     BUN   Date Value Ref Range Status   05/10/2024 11 8 - 23 mg/dL Final     Creatinine   Date Value Ref Range Status   06/27/2024 1.1 0.5 - 1.4 mg/dL Final     Calcium   Date Value Ref Range Status   05/10/2024 9.6 8.7 - 10.5 mg/dL Final     Total Protein   Date Value Ref Range Status   05/10/2024 7.2 6.0 - 8.4 g/dL Final     Albumin   Date Value Ref Range Status   05/10/2024 4.2 3.5 - 5.2 g/dL Final     Total Bilirubin   Date Value Ref Range Status   05/10/2024 0.5 0.1 - 1.0 mg/dL Final     Comment:     For infants and newborns, interpretation of results should be based  on gestational age, weight and in agreement with clinical  observations.    Premature Infant recommended reference ranges:  Up to 24 hours.............<8.0 mg/dL  Up to 48 hours............<12.0 mg/dL  3-5 days..................<15.0 mg/dL  6-29 days.................<15.0 mg/dL       Alkaline Phosphatase   Date Value Ref Range Status   05/10/2024 78 55 - 135 U/L Final     AST   Date Value Ref Range Status   05/10/2024 51 (H) 10 - 40 U/L Final     ALT   Date Value Ref Range Status   05/10/2024 38 10 - 44 U/L Final     Anion Gap   Date Value Ref Range Status   05/10/2024 9 8 - 16 mmol/L Final     eGFR if    Date Value Ref Range Status   06/06/2022 >60.0 >60 mL/min/1.73 m^2 Final     eGFR if non    Date Value Ref Range Status   06/06/2022 >60.0 >60 mL/min/1.73 m^2 Final     Comment:     Calculation used to obtain the estimated glomerular filtration  rate (eGFR) is the CKD-EPI equation.          Lab Results   Component Value Date     [id number] 842 10/11/2023       Lab Results   Component Value Date    TSH 2.206 10/11/2023       No results found in the last 24 hours.    No results found in the last 24 hours.      LABORATORY EVALUATION      RADIOLOGY EVALUATION       NEUROPHYSIOLOGY EVALUATION       PATHOLOGY EVALUATION        NEUROCOGNITIVE AND NEUROPSYCHOLOGY EVALUATION     Reviewed the neuroimaging independently     MMSE: 30/ 30 ( 01/ 2024 ).     07/ 09/ 2024:  USHA COGNITIVE ASSESSMENT (MOCA) TOTAL SCORE: 29 / 30 - missed 0 words out of 5 in 5 minutes.      NORMAL-MILD NCD 26-30     MILD DEMENTIA 20-25     MODERATE DEMENTIA 10-19     SEVERE DEMENTIA <10.    Assessment:   Patient Neurological Assessment is non focal.    - Side effects of medications.   - Emotional liability.   Plan:   63 years old C. Male with PMHx as above came for the evaluation and recommendation of Memory Loss - seems to Me patient issues   Are more related to Anxiety / mild Depression / possible medications side effects but no Dementia at this moment.      Hold Neurontin.  Weaning Wellbutrin - under PCP care.   The Wellbutrin originally was to quit smoking, which he did 2 years.    Labs: CBC / CMP / ESR / CRP / RF / DARWIN - done 01/ 2024 - non significant abnormalities.  Vit B 12 / Folate - normal parameters.   Personally reviewed MRI Brain - done 01/ 2024 - normal for age.    MEDICAL/SURGICAL COMORBIDITIES     All relevant medical comorbidities noted and managed by primary care physician and medical care team.      HEALTHY LIFESTYLE AND PREVENTATIVE CARE    The patient to adhere to the age-appropriate health maintenance guidelines including screening tests and vaccinations.   The patient to adhere to  healthy lifestyle, optimal weight, exercise, healthy diet,   good sleep hygiene and avoiding drugs including smoking, alcohol and recreational drugs.    Stevan Palmer MD  General Neurology.

## 2024-07-24 ENCOUNTER — PATIENT MESSAGE (OUTPATIENT)
Dept: FAMILY MEDICINE | Facility: CLINIC | Age: 64
End: 2024-07-24
Payer: COMMERCIAL

## 2024-07-25 ENCOUNTER — PATIENT MESSAGE (OUTPATIENT)
Dept: FAMILY MEDICINE | Facility: CLINIC | Age: 64
End: 2024-07-25
Payer: COMMERCIAL

## 2024-07-25 DIAGNOSIS — Z79.899 ENCOUNTER FOR LONG-TERM (CURRENT) USE OF MEDICATIONS: Primary | ICD-10-CM

## 2024-07-25 RX ORDER — AMITRIPTYLINE HYDROCHLORIDE 25 MG/1
25 TABLET, FILM COATED ORAL NIGHTLY
Qty: 90 TABLET | Refills: 1 | Status: SHIPPED | OUTPATIENT
Start: 2024-07-25

## 2024-07-25 NOTE — TELEPHONE ENCOUNTER
I received your message which was reviewed along with the the medication list and allergies that we have below.  Please review it for accuracy to make sure that we have the most recent records on your history.     Based on this, the following orders were placed AND/OR medicines were sent in.     No orders of the defined types were placed in this encounter.      Medications written and sent at this time include:  Medications Ordered This Encounter   Medications    amitriptyline (ELAVIL) 25 MG tablet     Sig: Take 1 tablet (25 mg total) by mouth every evening.     Dispense:  90 tablet     Refill:  1       Your pharmacy(ies) of choice at this time on record include the list below and any medications would have been sent to the one at the top.    [id number] - [place], [place] - [address] [place]  [address] [address]  Phone: [phone] Fax: [phone]    [id number] - [place], [place] - [address]  [address] [address]  Phone: [phone] Fax: [phone]    [facility]  [address] [place] [address]  Phone: [phone] Fax: [phone]      Thank you for choosing us as your healthcare provider!  Dr. Bipin Penaloza    ALLERGY LIST  Review of patient's allergies indicates:   Allergen Reactions    Blueberry        MEDICATION LIST  Current Outpatient Medications on File Prior to Visit   Medication Sig Dispense Refill    acetaminophen (TYLENOL EXTRA STRENGTH) 500 MG tablet Take 500 mg by mouth 2 (two) times a day.      allopurinoL (ZYLOPRIM) 300 MG tablet Take 1 tablet (300 mg total) by mouth once daily. 90 tablet 1    aspirin (ECOTRIN) 81 MG EC tablet Take 1 tablet (81 mg total) by mouth once daily. 90 tablet 0    atenoloL (TENORMIN) 25 MG tablet TAKE 1 TABLET BY MOUTH EVERY DAY IN THE MORNING 90 tablet 3    buPROPion (WELLBUTRIN) 75 MG tablet TAKE 1 TABLET 2 (TWO) TIMES DAILY FOR 30 DAYS, THEN 1 TABLET (75 MG  TOTAL) ONCE DAILY. 135 tablet 1    clopidogreL (PLAVIX) 75 mg tablet TAKE 1 TABLET BY MOUTH EVERY DAY 90 tablet 1    DENTA 5000 PLUS 1.1 % Crea Take 1 application by mouth 2 (two) times daily.      famotidine (PEPCID) 20 MG tablet Take 2 tablets (40 mg total) by mouth 2 (two) times daily. 180 tablet 3    ferrous sulfate 325 (65 FE) MG EC tablet Take 1 tablet (325 mg total) by mouth once daily. 90 tablet 4    lisinopriL 10 MG tablet TAKE 1 TABLET BY MOUTH EVERY DAY IN THE EVENING 90 tablet 1    multivitamin (THERAGRAN) per tablet Take 1 tablet by mouth once daily.      omega-3 acid ethyl esters (LOVAZA) 1 gram capsule Take 2 capsules (2 g total) by mouth 2 (two) times daily. 360 capsule 1    pantoprazole (PROTONIX) 40 MG tablet Take 1 tablet (40 mg total) by mouth 2 (two) times daily. 180 tablet 3    rosuvastatin (CRESTOR) 5 MG tablet Take 1 tablet (5 mg total) by mouth every evening. 90 tablet 1    [DISCONTINUED] amitriptyline (ELAVIL) 25 MG tablet Take 1 tablet (25 mg total) by mouth every evening. 90 tablet 1     No current facility-administered medications on file prior to visit.       HEALTH MAINTENANCE THAT IS OVERDUE OR NEEDS TO BE UPDATED ON OUR CHART IS LISTED BELOW.  IF YOU HAVE HAD IT DONE ELSEWHERE, PLEASE SEND US DATES AND RECORDS IF YOU HAVE THEM TO MAKE YOUR CHART ACCURATE.  IF YOU HAVE NOT HAD THESE DONE AND ARE READY FOR US TO SCHEDULE THEM, PLEASE SEND US A MESSAGE.  Health Maintenance Due   Topic Date Due    TETANUS VACCINE  Never done    RSV Vaccine (Age 60+ and Pregnant patients) (1 - 1-dose 60+ series) Never done    COVID-19 Vaccine (4 - 2023-24 season) 09/01/2023       DISCLAIMER: This note was compiled by using a speech recognition dictation system and therefore please be aware that typographical / speech recognition errors can and do occur.  Please contact me if you see any errors specifically.    Bipin Penaloza MD  We Offer Telehealth & Same Day Appointments!   Book your Telehealth  appointment with me through my nurse or   Clinic appointments on [email or web address]!  [phone]     Check out my Facebook Page and Follow Me at: CLICK HERE    Check out my website at [email or web address] by clicking on: CLICK HERE    To Schedule appointments online, go to [employer]: CLICK HERE     Location: [email or web address]    [address] [place] [address]    FAX: [phone]

## 2024-08-08 ENCOUNTER — LAB VISIT (OUTPATIENT)
Dept: LAB | Facility: HOSPITAL | Age: 64
End: 2024-08-08
Attending: FAMILY MEDICINE
Payer: COMMERCIAL

## 2024-08-08 DIAGNOSIS — Z12.5 ENCOUNTER FOR PROSTATE CANCER SCREENING: ICD-10-CM

## 2024-08-08 DIAGNOSIS — Z13.6 ENCOUNTER FOR LIPID SCREENING FOR CARDIOVASCULAR DISEASE: ICD-10-CM

## 2024-08-08 DIAGNOSIS — Z13.220 ENCOUNTER FOR LIPID SCREENING FOR CARDIOVASCULAR DISEASE: ICD-10-CM

## 2024-08-08 DIAGNOSIS — R79.89 HIGH SERUM VITAMIN D: ICD-10-CM

## 2024-08-08 DIAGNOSIS — Z79.899 ENCOUNTER FOR LONG-TERM (CURRENT) USE OF MEDICATIONS: ICD-10-CM

## 2024-08-08 DIAGNOSIS — Z00.00 WELL ADULT EXAM: ICD-10-CM

## 2024-08-08 DIAGNOSIS — D50.9 IRON DEFICIENCY ANEMIA, UNSPECIFIED IRON DEFICIENCY ANEMIA TYPE: ICD-10-CM

## 2024-08-08 LAB
25(OH)D3+25(OH)D2 SERPL-MCNC: 64 NG/ML (ref 30–96)
ALBUMIN SERPL BCP-MCNC: 3.9 G/DL (ref 3.5–5.2)
ALP SERPL-CCNC: 83 U/L (ref 55–135)
ALT SERPL W/O P-5'-P-CCNC: 17 U/L (ref 10–44)
ANION GAP SERPL CALC-SCNC: 13 MMOL/L (ref 8–16)
AST SERPL-CCNC: 28 U/L (ref 10–40)
BILIRUB SERPL-MCNC: 0.6 MG/DL (ref 0.1–1)
BUN SERPL-MCNC: 5 MG/DL (ref 8–23)
CALCIUM SERPL-MCNC: 8.8 MG/DL (ref 8.7–10.5)
CHLORIDE SERPL-SCNC: 107 MMOL/L (ref 95–110)
CHOLEST SERPL-MCNC: 98 MG/DL (ref 120–199)
CHOLEST/HDLC SERPL: 3.6 {RATIO} (ref 2–5)
CO2 SERPL-SCNC: 23 MMOL/L (ref 23–29)
COMPLEXED PSA SERPL-MCNC: 1.2 NG/ML (ref 0–4)
CREAT SERPL-MCNC: 1 MG/DL (ref 0.5–1.4)
EST. GFR  (NO RACE VARIABLE): >60 ML/MIN/1.73 M^2
GLUCOSE SERPL-MCNC: 98 MG/DL (ref 70–110)
HDLC SERPL-MCNC: 27 MG/DL (ref 40–75)
HDLC SERPL: 27.6 % (ref 20–50)
IRON SERPL-MCNC: 74 UG/DL (ref 45–160)
LDLC SERPL CALC-MCNC: 41.6 MG/DL (ref 63–159)
NONHDLC SERPL-MCNC: 71 MG/DL
POTASSIUM SERPL-SCNC: 3.8 MMOL/L (ref 3.5–5.1)
PROT SERPL-MCNC: 6.7 G/DL (ref 6–8.4)
SATURATED IRON: 30 % (ref 20–50)
SODIUM SERPL-SCNC: 143 MMOL/L (ref 136–145)
TOTAL IRON BINDING CAPACITY: 250 UG/DL (ref 250–450)
TRANSFERRIN SERPL-MCNC: 169 MG/DL (ref 200–375)
TRIGL SERPL-MCNC: 147 MG/DL (ref 30–150)
TSH SERPL DL<=0.005 MIU/L-ACNC: 1.14 UIU/ML (ref 0.4–4)

## 2024-08-08 PROCEDURE — 85027 COMPLETE CBC AUTOMATED: CPT | Performed by: FAMILY MEDICINE

## 2024-08-08 PROCEDURE — 80053 COMPREHEN METABOLIC PANEL: CPT | Performed by: FAMILY MEDICINE

## 2024-08-08 PROCEDURE — 80171 DRUG SCREEN QUANT GABAPENTIN: CPT | Performed by: FAMILY MEDICINE

## 2024-08-08 PROCEDURE — 82306 VITAMIN D 25 HYDROXY: CPT | Performed by: FAMILY MEDICINE

## 2024-08-08 PROCEDURE — 83036 HEMOGLOBIN GLYCOSYLATED A1C: CPT | Performed by: FAMILY MEDICINE

## 2024-08-08 PROCEDURE — 84153 ASSAY OF PSA TOTAL: CPT | Performed by: FAMILY MEDICINE

## 2024-08-08 PROCEDURE — 83540 ASSAY OF IRON: CPT | Performed by: FAMILY MEDICINE

## 2024-08-08 PROCEDURE — 84443 ASSAY THYROID STIM HORMONE: CPT | Performed by: FAMILY MEDICINE

## 2024-08-08 PROCEDURE — 80061 LIPID PANEL: CPT | Performed by: FAMILY MEDICINE

## 2024-08-09 LAB
ERYTHROCYTE [DISTWIDTH] IN BLOOD BY AUTOMATED COUNT: 14.1 % (ref 11.5–14.5)
ESTIMATED AVG GLUCOSE: 100 MG/DL (ref 68–131)
HBA1C MFR BLD: 5.1 % (ref 4–5.6)
HCT VFR BLD AUTO: 40.1 % (ref 40–54)
HGB BLD-MCNC: 12.9 G/DL (ref 14–18)
MCH RBC QN AUTO: 30.7 PG (ref 27–31)
MCHC RBC AUTO-ENTMCNC: 32.2 G/DL (ref 32–36)
MCV RBC AUTO: 96 FL (ref 82–98)
PLATELET # BLD AUTO: 199 K/UL (ref 150–450)
PMV BLD AUTO: 10.4 FL (ref 9.2–12.9)
RBC # BLD AUTO: 4.2 M/UL (ref 4.6–6.2)
WBC # BLD AUTO: 5.74 K/UL (ref 3.9–12.7)

## 2024-08-10 LAB — GABAPENTIN SERPLBLD-MCNC: <0.5 MCG/ML (ref 2–20)

## 2024-08-11 NOTE — PROGRESS NOTES
Make follow-up lab appointment per recommendation below.  Check to see if patient has seen the results through my chart.  If not then,  #CALL THE PATIENT# to discuss results/see if they have questions and document verification of contact. Make F/U appt if needed. [phone]    #My interpretation that was sent to them through [employer]:  Art, I have reviewed your recent blood work.     Your complete blood count is stable.  Chronic anemia is present.  Iron levels have improved from previous.  Your metabolic panel which shows your glucose, kidney function, electrolytes, and liver function is normal.   Thyroid study is normal.   Your cholesterol is stable.    Your hemoglobin A1c is normal.  This test is gold standard screening test for diabetes.  It is a measures 3 months of your average blood sugar.  Vitamin-D level is stable.  PSA screening for prostate cancer is within normal limits.  Repeat annually.  =========================  Also please address any outstanding health maintenance that may be due: TETANUS VACCINE Never done  RSV Vaccine (Age 60+ and Pregnant patients)(1 - 1-dose 60+ series) Never done  COVID-19 Vaccine(4 - 2023-24 season) due on 09/01/2023

## 2024-08-12 ENCOUNTER — OFFICE VISIT (OUTPATIENT)
Dept: FAMILY MEDICINE | Facility: CLINIC | Age: 64
End: 2024-08-12
Payer: COMMERCIAL

## 2024-08-12 VITALS
SYSTOLIC BLOOD PRESSURE: 131 MMHG | DIASTOLIC BLOOD PRESSURE: 84 MMHG | BODY MASS INDEX: 25.12 KG/M2 | WEIGHT: 156.31 LBS | HEIGHT: 66 IN | RESPIRATION RATE: 18 BRPM | HEART RATE: 86 BPM

## 2024-08-12 DIAGNOSIS — E55.9 VITAMIN D DEFICIENCY: Chronic | ICD-10-CM

## 2024-08-12 DIAGNOSIS — D69.2 OTHER NONTHROMBOCYTOPENIC PURPURA: ICD-10-CM

## 2024-08-12 DIAGNOSIS — E79.0 ELEVATED URIC ACID IN BLOOD: ICD-10-CM

## 2024-08-12 DIAGNOSIS — Z87.891 FORMER SMOKER: ICD-10-CM

## 2024-08-12 DIAGNOSIS — I10 ESSENTIAL HYPERTENSION: Primary | Chronic | ICD-10-CM

## 2024-08-12 DIAGNOSIS — Z79.899 ENCOUNTER FOR LONG-TERM (CURRENT) USE OF MEDICATIONS: Chronic | ICD-10-CM

## 2024-08-12 DIAGNOSIS — F33.41 RECURRENT MAJOR DEPRESSIVE DISORDER, IN PARTIAL REMISSION: Chronic | ICD-10-CM

## 2024-08-12 DIAGNOSIS — E78.5 DYSLIPIDEMIA (HIGH LDL; LOW HDL): Chronic | ICD-10-CM

## 2024-08-12 DIAGNOSIS — D64.9 ANEMIA, UNSPECIFIED TYPE: ICD-10-CM

## 2024-08-12 PROBLEM — M51.16 INTERVERTEBRAL DISC DISORDER WITH RADICULOPATHY OF LUMBAR REGION: Status: RESOLVED | Noted: 2021-03-23 | Resolved: 2024-08-12

## 2024-08-12 PROBLEM — M54.2 CHRONIC NECK PAIN: Status: RESOLVED | Noted: 2024-02-22 | Resolved: 2024-08-12

## 2024-08-12 PROBLEM — Z79.02 LONG TERM (CURRENT) USE OF ANTITHROMBOTICS/ANTIPLATELETS: Status: RESOLVED | Noted: 2022-01-14 | Resolved: 2024-08-12

## 2024-08-12 PROBLEM — Z71.6 TOBACCO ABUSE COUNSELING: Status: RESOLVED | Noted: 2022-01-14 | Resolved: 2024-08-12

## 2024-08-12 PROBLEM — M70.21 OLECRANON BURSITIS OF RIGHT ELBOW: Chronic | Status: RESOLVED | Noted: 2022-02-25 | Resolved: 2024-08-12

## 2024-08-12 PROBLEM — G89.29 CHRONIC NECK PAIN: Status: RESOLVED | Noted: 2024-02-22 | Resolved: 2024-08-12

## 2024-08-12 PROBLEM — Z01.810 PRE-OPERATIVE CARDIOVASCULAR EXAMINATION: Status: RESOLVED | Noted: 2021-10-22 | Resolved: 2024-08-12

## 2024-08-12 PROBLEM — M43.17 SPONDYLOLISTHESIS AT L5-S1 LEVEL: Status: RESOLVED | Noted: 2021-03-25 | Resolved: 2024-08-12

## 2024-08-12 PROBLEM — R79.89 HIGH SERUM VITAMIN D: Status: RESOLVED | Noted: 2023-03-01 | Resolved: 2024-08-12

## 2024-08-12 PROBLEM — M25.521 RIGHT ELBOW PAIN: Status: RESOLVED | Noted: 2022-02-08 | Resolved: 2024-08-12

## 2024-08-12 PROBLEM — F17.210 CIGARETTE SMOKER: Status: RESOLVED | Noted: 2021-06-08 | Resolved: 2024-08-12

## 2024-08-12 PROBLEM — M51.369 LUMBAR DEGENERATIVE DISC DISEASE: Status: RESOLVED | Noted: 2020-08-24 | Resolved: 2024-08-12

## 2024-08-12 PROBLEM — M79.604 RIGHT LEG PAIN: Status: RESOLVED | Noted: 2020-08-24 | Resolved: 2024-08-12

## 2024-08-12 PROBLEM — Z01.818 PRE-OP EXAM: Status: RESOLVED | Noted: 2022-02-25 | Resolved: 2024-08-12

## 2024-08-12 PROBLEM — M51.36 LUMBAR DEGENERATIVE DISC DISEASE: Status: RESOLVED | Noted: 2020-08-24 | Resolved: 2024-08-12

## 2024-08-12 PROBLEM — G56.03 BILATERAL CARPAL TUNNEL SYNDROME: Status: RESOLVED | Noted: 2024-02-22 | Resolved: 2024-08-12

## 2024-08-12 PROCEDURE — 1160F RVW MEDS BY RX/DR IN RCRD: CPT | Mod: CPTII,S$GLB,, | Performed by: NURSE PRACTITIONER

## 2024-08-12 PROCEDURE — 3044F HG A1C LEVEL LT 7.0%: CPT | Mod: CPTII,S$GLB,, | Performed by: NURSE PRACTITIONER

## 2024-08-12 PROCEDURE — 99214 OFFICE O/P EST MOD 30 MIN: CPT | Mod: S$GLB,,, | Performed by: NURSE PRACTITIONER

## 2024-08-12 PROCEDURE — 99999 PR PBB SHADOW E&M-EST. PATIENT-LVL IV: CPT | Mod: PBBFAC,,, | Performed by: NURSE PRACTITIONER

## 2024-08-12 PROCEDURE — 1159F MED LIST DOCD IN RCRD: CPT | Mod: CPTII,S$GLB,, | Performed by: NURSE PRACTITIONER

## 2024-08-12 PROCEDURE — 3079F DIAST BP 80-89 MM HG: CPT | Mod: CPTII,S$GLB,, | Performed by: NURSE PRACTITIONER

## 2024-08-12 PROCEDURE — 3075F SYST BP GE 130 - 139MM HG: CPT | Mod: CPTII,S$GLB,, | Performed by: NURSE PRACTITIONER

## 2024-08-12 PROCEDURE — 4010F ACE/ARB THERAPY RXD/TAKEN: CPT | Mod: CPTII,S$GLB,, | Performed by: NURSE PRACTITIONER

## 2024-08-12 PROCEDURE — 3008F BODY MASS INDEX DOCD: CPT | Mod: CPTII,S$GLB,, | Performed by: NURSE PRACTITIONER

## 2024-08-12 RX ORDER — ALLOPURINOL 100 MG/1
100 TABLET ORAL DAILY
Qty: 90 TABLET | Refills: 1 | Status: SHIPPED | OUTPATIENT
Start: 2024-08-12

## 2024-08-12 RX ORDER — SODIUM FLUORIDE 6.1 MG/ML
PASTE, DENTIFRICE DENTAL
COMMUNITY
Start: 2024-07-15

## 2024-08-12 NOTE — ASSESSMENT & PLAN NOTE
Reduce allopurinol to 100 mg daily. Discussed he could try to gradually taper off medication if desired. Discussed previously elevated uric acid. If worsening of joint pain or gout flares recommend follow up to discuss and recheck uric acid levels.

## 2024-08-12 NOTE — PROGRESS NOTES
Assessment/Plan:  Problem List Items Addressed This Visit          Psychiatric    Recurrent major depressive disorder, in partial remission (Chronic)    Overview     Chronic. August 2024: He stopped smoking 2 years ago. He has continued to take wellbutrin but would like to consider weaning off medication. His mood has been stable. Denies anxiety/depression/SIHI. He has reduced medication and is currently taking 75 mg once daily.     March 2023: Patient was doing better when he was on Wellbutrin for smoking cessation.  He reports that since being off of this medication he has been more irritable.  Denies any SI HI or hallucinations    Previous history:  Stable.  Patient declines any medication.  He is doing well with smoking cessation.  He is going on a trip in October 2 [place] and would like to be tobacco free by then.  Denies SI HI or hallucinations.         Current Assessment & Plan     Discussed weaning of wellbutrin. Follow up if worsening or no improvement in symptoms.  Please be advised of condition course.  SNRI/SSRI is first-line treatment for this condition.  Please be advised of the risk of discontinuing this medication without tapering/contacting MD.  Patient has been advised of side effects, and all questions were answered.  Patient voiced understanding.  Patient will follow up routinely and notify us if having any side effects or worsening or persistent symptoms.  ER precautions were given. Antidepressant/Antianxiety Medication Initiation:  Patient informed of risks, benefits, and potential side effects of medication and accepts informed consent.  Common side effects include nausea, fatigue, headache, insomnia, etc see medication insert for complete side effect profile.  Most importantly be advised of the possibility of new or worsening suicidal thoughts/depression/anxiety etcetera.  Please be advised to stop the medication immediately and seek urgent treatment if this occurs.  Therefore please do not  to abruptly discontinue medication without physician guidance except in cases of sudden onset or worsening of SI.             Cardiac/Vascular    Essential hypertension - Primary (Chronic)    Overview     CHRONIC. August 2024:  Blood pressure well controlled.  Hypertension Medications               atenoloL (TENORMIN) 25 MG tablet TAKE 1 TABLET BY MOUTH EVERY DAY IN THE MORNING    lisinopriL (PRINIVIL,ZESTRIL) 5 MG tablet TAKE 1 TABLET BY MOUTH EVERY DAY            STABLE. BP Reviewed.  Compliant with BP medications. No SE reported.   (-) CP, SOB, palpitations, dizziness, lightheadedness, HA, arm numbness, tingling or weakness, syncope.  Creatinine   Date Value Ref Range Status   08/08/2024 1.0 0.5 - 1.4 mg/dL Final     Lab Results   Component Value Date    K 3.8 08/08/2024       Results for orders placed or performed during the hospital encounter of 11/29/21   EKG 12-LEAD    Collection Time: 11/29/21  2:48 PM    Narrative    Test Reason : I77.9,    Vent. Rate : 061 BPM     Atrial Rate : 061 BPM     P-R Int : 156 ms          QRS Dur : 086 ms      QT Int : 416 ms       P-R-T Axes : 072 063 052 degrees     QTc Int : 418 ms    Normal sinus rhythm  Normal ECG  When compared with ECG of 28-OCT-2021 22:49,  Previous ECG has undetermined rhythm, needs review  Criteria for Anterior infarct are no longer Present  Confirmed by Deshawn CONCEPCION, Arnold JORDAN ([id number]) on 11/29/2021 2:56:48 PM    Referred By: VAISHALI DELA CRUZ           Confirmed By:Arnold Hess MD              Current Assessment & Plan     Follow-up with Cardiology.  Continue current medication regimen.Counseled on importance of hypertension disease course, I recommend ongoing Education for DASH-diet and exercise. Counseled on medication regimen importance of treating high blood pressure. Please be advised of risk of untreated blood pressure as discussed. Please advised of ER precautions were given for symptoms of hypertensive urgency and emergency.          Dyslipidemia (high  LDL; low HDL) (Chronic)    Overview     CHRONIC. August 2024: reviewed labs. He is taking statin and fish oil. Vascepa not covered by his insurance.    Hyperlipidemia Medications               omega-3 acid ethyl esters (LOVAZA) 1 gram capsule Take 2 capsules (2 g total) by mouth 2 (two) times daily.    rosuvastatin (CRESTOR) 5 MG tablet Take 1 tablet (5 mg total) by mouth every evening.     March 2023: Reports compliance with rosuvastatin 5 milligrams daily.  August 2022: LDL is very low on rosuvastatin 10 milligrams daily.  STABLE. Lab analysis reviewed.   (-) CP, SOB, abdominal pain, N/V/D, constipation, jaundice, skin changes.  (-) Myalgias  Lab Results   Component Value Date    CHOL 98 (L) 08/08/2024    CHOL 123 05/10/2024    CHOL 132 10/11/2023     Lab Results   Component Value Date    HDL 27 (L) 08/08/2024    HDL 35 (L) 05/10/2024    HDL 20 (L) 10/11/2023     Lab Results   Component Value Date    LDLCALC 41.6 (L) 08/08/2024    LDLCALC 44.2 (L) 05/10/2024    LDLCALC Invalid, Trig>400.0 10/11/2023     Lab Results   Component Value Date    TRIG 147 08/08/2024    TRIG 219 (H) 05/10/2024    TRIG 529 (H) 10/11/2023     Lab Results   Component Value Date    CHOLHDL 27.6 08/08/2024    CHOLHDL 28.5 05/10/2024    CHOLHDL 15.2 (L) 10/11/2023     Lab Results   Component Value Date    TOTALCHOLEST 3.6 08/08/2024    TOTALCHOLEST 3.5 05/10/2024    TOTALCHOLEST 6.6 (H) 10/11/2023     Lab Results   Component Value Date    ALT 17 08/08/2024    AST 28 08/08/2024    ALKPHOS 83 08/08/2024    BILITOT 0.6 08/08/2024     ======================================================  The ASCVD Risk score (Clara ONOFRE, et al., 2019) failed to calculate for the following reasons:    The valid total cholesterol range is 130 to 320 mg/dL           Current Assessment & Plan     Continue current regimen.  Follow-up with Cardiology.  Counseled on hyperlipidemia disease course, healthy diet and increased need for exercise. Please be advised of the  risk of cardiovascular disease, increase stroke and heart attack risk with uncontrolled/untreated hyperlipidemia. Patient voiced understanding and understood the treatment plan. All questions were answered.               Hematology    Other nonthrombocytopenic purpura    Overview     Taking plavix. No bleeding issues reported. He does bruise easily.     Lab Results   Component Value Date    WBC 5.74 08/08/2024    HGB 12.9 (L) 08/08/2024    HCT 40.1 08/08/2024    MCV 96 08/08/2024     08/08/2024              Current Assessment & Plan     Monitor labs. Bleeding precautions.             Oncology    Anemia    Overview     Chronic.  Control is uncertain.  Patient is up-to-date on colonoscopy. He is taking iron with no SE. Labs have slightly improved.     Lab Results   Component Value Date    WBC 5.74 08/08/2024    HGB 12.9 (L) 08/08/2024    HCT 40.1 08/08/2024    MCV 96 08/08/2024     08/08/2024       Lab Results   Component Value Date    IRON 74 08/08/2024    TRANSFERRIN 169 (L) 08/08/2024    TIBC 250 08/08/2024    FESATURATED 30 08/08/2024      Lab Results   Component Value Date    FGOJSMMS71 842 10/11/2023     Lab Results   Component Value Date    FOLATE 14.6 10/11/2023            Current Assessment & Plan     Continue iron supplement. Continue to monitor labs. Anemia precautions.            Endocrine    Vitamin D deficiency (Chronic)    Overview     Chronic. Previously with hieu vitamin D. No longer taking supplement. No SE reported.   Lab Results   Component Value Date    [id number] 64 08/08/2024    [id number] 67 10/11/2023    JGNPIOLC34BA 119 (H) 02/24/2023             Current Assessment & Plan     Continue to monitor levels.             Orthopedic    Elevated uric acid in blood    Overview     August 2024: patient is requesting to reduce medication and take least amount as needed. No recent gout flares. He is taking allopurinol 300 mg daily. He would like to consider weaning off medication.      Feb 2024: Continues to have bilateral hand pain. He is wanting to increase dose of Allopurinol.     Jan 2024: Chronic. Ongoing issues w/ bilateral hand pain. Following with specialist, Dr. Aguiar ([facility]). He recently had labs done which showed elevated uric acid (- DARWIN, rheumatoid factor, Sed, CRP). States he was advised to follow up with his PCP to discuss possible gouty arthritis. There is no redness, warmth, tenderness. He reports mild swelling. He is currently doing physical therapy. He is on long term anticoagulation with Plavix. No known previous issues related to gout.     Lab Results   Component Value Date    URICACID 7.7 (H) 01/10/2024            Current Assessment & Plan     Reduce allopurinol to 100 mg daily. Discussed he could try to gradually taper off medication if desired. Discussed previously elevated uric acid. If worsening of joint pain or gout flares recommend follow up to discuss and recheck uric acid levels.          Relevant Medications    allopurinoL (ZYLOPRIM) 100 MG tablet       Other    Encounter for long-term (current) use of medications (Chronic)    Overview     August 2024: reviewed labs.  December 2023: Reviewed labs.  Sept 2023: Reviewed labs.  March 2023: Reviewed labs.  CHRONIC. Stable. Compliant with medications for managed conditions. See medication list. No SE reported.   Routine lab analysis is being monitored. Refills were addressed.  Lab Results   Component Value Date    WBC 5.74 08/08/2024    HGB 12.9 (L) 08/08/2024    HCT 40.1 08/08/2024    MCV 96 08/08/2024     08/08/2024         Chemistry        Component Value Date/Time     08/08/2024 0712    K 3.8 08/08/2024 0712     08/08/2024 0712    CO2 23 08/08/2024 0712    BUN 5 (L) 08/08/2024 0712    CREATININE 1.0 08/08/2024 0712    GLU 98 08/08/2024 0712        Component Value Date/Time    CALCIUM 8.8 08/08/2024 0712    ALKPHOS 83 08/08/2024 0712    AST 28 08/08/2024 0712    ALT 17 08/08/2024 0712     BILITOT 0.6 08/08/2024 0712    ESTGFRAFRICA >60.0 06/06/2022 1037    EGFRNONAA >60.0 06/06/2022 1037        Lab Results   Component Value Date    TSH 1.143 08/08/2024            Current Assessment & Plan     Complete history and Limited telemedicine physical was completed today.  Complete and thorough medication reconciliation was performed.  Discussed risks and benefits of medications.  Advised patient on orders and health maintenance.  We discussed old records and old labs if available.  Will request any records not available through epic.  Continue current medications listed on your summary sheet.         Former smoker    Overview     Chronic cigarette smoker for 30+ years. He quit smoking 2 years ago.          Current Assessment & Plan     Continue smoking cessation. The patient was counseled on the dangers of tobacco use. Reviewed strategies to maximize success, including counseling, nicotine replacement therapy and pharmacologic option.           Follow up in about 6 months (around 2/12/2025), or if symptoms worsen or fail to improve.  ER precautions for severe or worsening symptoms.     Helena Azul NP  _____________________________________________________________________________________________________________________________________________________    CC: discuss medications     HPI: Patient is a 64-year-old male who presents in clinic today as an established patient here to discuss medications. Chronic condition has been reviewed and remains stable. Further details as stated above. He has had recent labs which he would like to review. Discussed labs in detail as well. See above.     Past Medical History:  Past Medical History:   Diagnosis Date    Anxiety     Arthritis     Depression     Digestive disorder     ulcers    Fatigue     History of acute bronchitis     History of psychiatric care     Hypertension     Intervertebral disc disorder with radiculopathy of lumbar region 3/23/2021    Psychiatric  problem     PVD (peripheral vascular disease)     S/P femoral-femoral bypass surgery 12/1/2021     Past Surgical History:   Procedure Laterality Date    ANTERIOR LUMBAR INTERBODY FUSION (ALIF) Right 03/23/2021    Procedure: FUSION, SPINE, LUMBAR, ALIF RIGHT L5-S1;  Surgeon: Kelly Ayala MD;  Location: [facility] OR;  Service: Neurosurgery;  Laterality: Right;    AORTOGRAPHY WITH EXTREMITY RUNOFF N/A 07/27/2021    Procedure: AORTOGRAM, WITH EXTREMITY RUNOFF;  Surgeon: Jim Briggs MD;  Location: [facility] CATH;  Service: Cardiovascular;  Laterality: N/A;    AORTOGRAPHY WITH SERIALOGRAPHY Bilateral 07/27/2021    Procedure: AORTOGRAM, WITH SERIALOGRAPHY;  Surgeon: Jim Briggs MD;  Location: [facility] CATH;  Service: Cardiovascular;  Laterality: Bilateral;    APPENDECTOMY  3/23    CREATION OF FEMORAL-FEMORAL ARTERY BYPASS WITH GRAFT Left 11/30/2021    Procedure: CREATION, BYPASS, ARTERIAL, FEMORAL TO FEMORAL, USING GRAFT;  Surgeon: Jim Briggs MD;  Location: [facility] OR;  Service: Cardiovascular;  Laterality: Left;    ENDARTERECTOMY OF FEMORAL ARTERY Bilateral 11/30/2021    Procedure: ENDARTERECTOMY, FEMORAL;  Surgeon: Jim Briggs MD;  Location: [facility] OR;  Service: Cardiovascular;  Laterality: Bilateral;    FUSION OF LUMBAR SPINE BY ANTERIOR APPROACH  03/23/2021    Procedure: FUSION, SPINE, LUMBAR, ANTERIOR APPROACH;  Surgeon: Jim Briggs MD;  Location: [facility] OR;  Service: Cardiovascular;;    HARVESTING OF BONE GRAFT N/A 03/23/2021    Procedure: SURGICAL PROCUREMENT, BONE GRAFT-Iliac;  Surgeon: Kelly Ayala MD;  Location: [facility] OR;  Service: Neurosurgery;  Laterality: N/A;    HYPOSPADIAS CORRECTION      SPINE SURGERY  3/22    VASECTOMY       Review of patient's allergies indicates:   Allergen Reactions    Blueberry      Social History     Tobacco Use    Smoking status: Former     Current packs/day: 0.00     Average packs/day: 0.3 packs/day for 42.0 years (10.5 ttl pk-yrs)     Types: Cigarettes     Start date: 9/29/1980     Quit  date: 2022     Years since quittin.8     Passive exposure: Never    Smokeless tobacco: Never   Substance Use Topics    Alcohol use: Not Currently     Comment: stopped recently - last drink 3/21    Drug use: No     Family History   Problem Relation Name Age of Onset    Diabetes Mother Reba     Hypertension Mother Reba     Diabetes Father Sal     Hypertension Father Sal     Cancer Sister      Muscular dystrophy Son      Arthritis Paternal Grandfather Luke     Heart disease Paternal Grandfather Luke     Birth defects Brother ESPERANZA     Cancer Brother ESPERANZA     Heart disease Maternal Aunt Zulema     Heart disease Paternal Uncle Santo     Suicide Neg Hx      Sexual abuse Neg Hx      Self injury Neg Hx      Seizures Neg Hx      Schizophrenia Neg Hx      Physical abuse Neg Hx      Paranoid behavior Neg Hx      OCD Neg Hx      Drug abuse Neg Hx      Depression Neg Hx      Dementia Neg Hx      Bipolar disorder Neg Hx      Anxiety disorder Neg Hx      Alcohol abuse Neg Hx       Current Outpatient Medications on File Prior to Visit   Medication Sig Dispense Refill    acetaminophen (TYLENOL EXTRA STRENGTH) 500 MG tablet Take 500 mg by mouth 2 (two) times a day.      amitriptyline (ELAVIL) 25 MG tablet Take 1 tablet (25 mg total) by mouth every evening. 90 tablet 1    aspirin (ECOTRIN) 81 MG EC tablet Take 1 tablet (81 mg total) by mouth once daily. 90 tablet 0    atenoloL (TENORMIN) 25 MG tablet TAKE 1 TABLET BY MOUTH EVERY DAY IN THE MORNING 90 tablet 3    clopidogreL (PLAVIX) 75 mg tablet TAKE 1 TABLET BY MOUTH EVERY DAY 90 tablet 1    famotidine (PEPCID) 20 MG tablet Take 2 tablets (40 mg total) by mouth 2 (two) times daily. 180 tablet 3    ferrous sulfate 325 (65 FE) MG EC tablet Take 1 tablet (325 mg total) by mouth once daily. 90 tablet 4    lisinopriL 10 MG tablet TAKE 1 TABLET BY MOUTH EVERY DAY IN THE EVENING 90 tablet 1    multivitamin (THERAGRAN) per tablet Take 1 tablet by mouth once daily.       "omega-3 acid ethyl esters (LOVAZA) 1 gram capsule Take 2 capsules (2 g total) by mouth 2 (two) times daily. 360 capsule 1    pantoprazole (PROTONIX) 40 MG tablet Take 1 tablet (40 mg total) by mouth 2 (two) times daily. 180 tablet 3    rosuvastatin (CRESTOR) 5 MG tablet Take 1 tablet (5 mg total) by mouth every evening. 90 tablet 1    SODIUM FLUORIDE 5000 DRY MOUTH 1.1 % Pste SMARTSIG:By Mouth Morning-Night      [DISCONTINUED] allopurinoL (ZYLOPRIM) 300 MG tablet Take 1 tablet (300 mg total) by mouth once daily. 90 tablet 1    [DISCONTINUED] buPROPion (WELLBUTRIN) 75 MG tablet TAKE 1 TABLET 2 (TWO) TIMES DAILY FOR 30 DAYS, THEN 1 TABLET (75 MG TOTAL) ONCE DAILY. 135 tablet 1    [DISCONTINUED] DENTA 5000 PLUS 1.1 % Crea Take 1 application by mouth 2 (two) times daily.       No current facility-administered medications on file prior to visit.     Review of Systems   Constitutional:  Negative for appetite change, chills, fatigue and fever.   HENT:  Negative for congestion, rhinorrhea and sore throat.    Eyes:  Negative for visual disturbance.   Respiratory:  Negative for cough and shortness of breath.    Cardiovascular:  Negative for chest pain, palpitations and leg swelling.   Gastrointestinal:  Negative for abdominal pain, diarrhea and vomiting.   Genitourinary:  Negative for difficulty urinating, dysuria and hematuria.   Musculoskeletal:  Negative for arthralgias and myalgias.   Skin:  Negative for rash and wound.   Neurological:  Negative for dizziness and headaches.   Psychiatric/Behavioral:  Negative for behavioral problems. The patient is not nervous/anxious.        Vitals:    08/12/24 0721   BP: 131/84   BP Location: Right arm   Patient Position: Sitting   Pulse: 86   Resp: 18   Weight: 70.9 kg (156 lb 4.8 oz)   Height: 5' 6" (1.676 m)     Wt Readings from Last 3 Encounters:   08/12/24 70.9 kg (156 lb 4.8 oz)   07/09/24 72.3 kg (159 lb 6.3 oz)   06/03/24 72.6 kg (160 lb)     Physical Exam  Vitals reviewed. "   Constitutional:       General: He is not in acute distress.     Appearance: Normal appearance. He is not ill-appearing.   HENT:      Head: Normocephalic and atraumatic.      Right Ear: External ear normal.      Left Ear: External ear normal.      Nose: Nose normal.   Eyes:      Extraocular Movements: Extraocular movements intact.      Conjunctiva/sclera: Conjunctivae normal.   Cardiovascular:      Rate and Rhythm: Normal rate.      Heart sounds: Normal heart sounds.   Pulmonary:      Effort: Pulmonary effort is normal. No respiratory distress.      Breath sounds: Normal breath sounds.   Abdominal:      General: Abdomen is flat. There is no distension.   Musculoskeletal:         General: Normal range of motion.      Cervical back: Normal range of motion.   Skin:     General: Skin is warm and dry.      Capillary Refill: Capillary refill takes less than 2 seconds.      Coloration: Skin is not pale.      Findings: No rash.   Neurological:      General: No focal deficit present.      Mental Status: He is alert and oriented to person, place, and time. Mental status is at baseline.   Psychiatric:         Mood and Affect: Mood normal.         Speech: Speech normal. Speech is not rapid and pressured, delayed or slurred.         Behavior: Behavior normal. Behavior is not agitated, slowed, aggressive, withdrawn, hyperactive or combative. Behavior is cooperative.         Thought Content: Thought content normal.         Judgment: Judgment normal.         Health Maintenance   Topic Date Due    TETANUS VACCINE  Never done    Shingles Vaccine (2 of 2) 08/16/2024    PROSTATE-SPECIFIC ANTIGEN  08/08/2025    High Dose Statin  08/12/2025    Aspirin/Antiplatelet Therapy  08/12/2025    Colorectal Cancer Screening  07/25/2029    Lipid Panel  08/08/2029    Hepatitis C Screening  Completed

## 2024-08-12 NOTE — PATIENT INSTRUCTIONS
Mariano Huang,     If you are due for any health screening(s) below please notify me so we can arrange them to be ordered and scheduled. Most healthy patients at your age complete them, but you are free to accept or refuse.     If you can't do it, I'll definitely understand. If you can, I'd certainly appreciate it!    All of your core healthy metrics are met.

## 2024-08-12 NOTE — ASSESSMENT & PLAN NOTE
Continue smoking cessation. The patient was counseled on the dangers of tobacco use. Reviewed strategies to maximize success, including counseling, nicotine replacement therapy and pharmacologic option.

## 2024-08-12 NOTE — ASSESSMENT & PLAN NOTE
Discussed weaning of wellbutrin. Follow up if worsening or no improvement in symptoms.  Please be advised of condition course.  SNRI/SSRI is first-line treatment for this condition.  Please be advised of the risk of discontinuing this medication without tapering/contacting MD.  Patient has been advised of side effects, and all questions were answered.  Patient voiced understanding.  Patient will follow up routinely and notify us if having any side effects or worsening or persistent symptoms.  ER precautions were given. Antidepressant/Antianxiety Medication Initiation:  Patient informed of risks, benefits, and potential side effects of medication and accepts informed consent.  Common side effects include nausea, fatigue, headache, insomnia, etc see medication insert for complete side effect profile.  Most importantly be advised of the possibility of new or worsening suicidal thoughts/depression/anxiety etcetera.  Please be advised to stop the medication immediately and seek urgent treatment if this occurs.  Therefore please do not to abruptly discontinue medication without physician guidance except in cases of sudden onset or worsening of SI.

## 2024-08-21 ENCOUNTER — PATIENT MESSAGE (OUTPATIENT)
Dept: FAMILY MEDICINE | Facility: CLINIC | Age: 64
End: 2024-08-21
Payer: COMMERCIAL

## 2024-08-26 ENCOUNTER — PATIENT MESSAGE (OUTPATIENT)
Dept: UROLOGY | Facility: CLINIC | Age: 64
End: 2024-08-26
Payer: COMMERCIAL

## 2024-08-26 RX ORDER — TADALAFIL 20 MG/1
20 TABLET ORAL DAILY PRN
Qty: 20 TABLET | Refills: 11 | Status: SHIPPED | OUTPATIENT
Start: 2024-08-26

## 2024-08-27 DIAGNOSIS — M25.522 LEFT ELBOW PAIN: Primary | ICD-10-CM

## 2024-09-06 ENCOUNTER — OFFICE VISIT (OUTPATIENT)
Dept: ORTHOPEDICS | Facility: CLINIC | Age: 64
End: 2024-09-06
Payer: COMMERCIAL

## 2024-09-06 ENCOUNTER — HOSPITAL ENCOUNTER (OUTPATIENT)
Dept: RADIOLOGY | Facility: HOSPITAL | Age: 64
Discharge: HOME | End: 2024-09-06
Attending: STUDENT IN AN ORGANIZED HEALTH CARE EDUCATION/TRAINING PROGRAM
Payer: COMMERCIAL

## 2024-09-06 VITALS — HEIGHT: 66 IN | WEIGHT: 156.31 LBS | BODY MASS INDEX: 25.12 KG/M2

## 2024-09-06 DIAGNOSIS — M25.522 LEFT ELBOW PAIN: ICD-10-CM

## 2024-09-06 DIAGNOSIS — M77.12 LATERAL EPICONDYLITIS OF LEFT ELBOW: Primary | ICD-10-CM

## 2024-09-06 PROCEDURE — 73080 X-RAY EXAM OF ELBOW: CPT | Mod: 26,LT,, | Performed by: RADIOLOGY

## 2024-09-06 PROCEDURE — 99999 PR PBB SHADOW E&M-EST. PATIENT-LVL III: CPT | Mod: PBBFAC,,, | Performed by: STUDENT IN AN ORGANIZED HEALTH CARE EDUCATION/TRAINING PROGRAM

## 2024-09-06 PROCEDURE — 73080 X-RAY EXAM OF ELBOW: CPT | Mod: TC,PO,LT

## 2024-09-06 RX ORDER — BETAMETHASONE SODIUM PHOSPHATE AND BETAMETHASONE ACETATE 3; 3 MG/ML; MG/ML
6 INJECTION, SUSPENSION INTRA-ARTICULAR; INTRALESIONAL; INTRAMUSCULAR; SOFT TISSUE
Status: DISCONTINUED | OUTPATIENT
Start: 2024-09-06 | End: 2024-09-06 | Stop reason: HOSPADM

## 2024-09-06 RX ADMIN — BETAMETHASONE SODIUM PHOSPHATE AND BETAMETHASONE ACETATE 6 MG: 3; 3 INJECTION, SUSPENSION INTRA-ARTICULAR; INTRALESIONAL; INTRAMUSCULAR; SOFT TISSUE at 02:09

## 2024-09-06 NOTE — PROCEDURES
Sports Medicine US - Guidance for Needle Placement    Date/Time: 9/6/2024 2:00 PM    Performed by: Darin Choi MD  Authorized by: Darin Choi MD  Preparation: Patient was prepped and draped in the usual sterile fashion.  Local anesthesia used: no    Anesthesia:  Local anesthesia used: no    Sedation:  Patient sedated: no    Patient tolerance: patient tolerated the procedure well with no immediate complications  Comments: Ultrasound guidance was used for needle localization. Images were saved and stored for documentation. The appropriate structures were visualized. Dynamic visualization of the needle was continuous throughout the procedures and maintained good position.

## 2024-09-06 NOTE — PATIENT INSTRUCTIONS
Assessment:  Sal Villarreal Jr. is a 64 y.o. male   Chief Complaint   Patient presents with    Left Elbow - Pain       Encounter Diagnosis   Name Primary?    Lateral epicondylitis of left elbow Yes        Plan:  Ultrasound guided cortisone injection to the left elbow  We discussed the proper protocols after the injection such as no submerging pools, baths tubs, or hot tubs for 24 hr.  Showering is okay today.  We also discussed that blood sugars can be elevated after an injection and asked patient to properly checked her sugars over the next few days and contact their PCP if there are any concerns.  We discussed red flags such as fevers, chills, red, warm, tender joint at the area of injection to please seek medical care immediately.    Apply topical diclofenac (Voltaren) up to 4 times a day to the affected area.  It can be bought over the counter at any local pharmacy.    Patient may ice every 2 hours for 15 minutes as needed to control pain and swelling.   Follow up in 3 months          Follow-up: 3 months or sooner if there are problems between now and then.    Thank you for choosing [facility] and Dr. Darin Choi for your orthopedic & sports medicine care. It is our goal to provide you with exceptional care that will help keep you healthy, active, and get you back in the game.    Please do not hesitate to reach out to us via email, phone, or MyChart with any questions, concerns, or feedback.    If you felt that you received exemplary care today, please consider leaving us feedback on [email or web address] at:  [email or web address]    If you are experiencing pain/discomfort ,or have questions after 5pm and would like to be connected to the [facility] [place] on-call team, please call this number and specify which Sports Medicine provider is treating you: [phone]

## 2024-09-06 NOTE — PROGRESS NOTES
Patient ID: Sal Villarreal Jr.  YOB: 1960  MRN: [id number]    Chief Complaint: Pain of the Left Elbow      Referred By: self    History of Present Illness: Sal Villarreal Jr. is a right-hand dominant 64 y.o. male who presents today with left elbow pain and slight swelling to right elbow. Report onset about 1 month ago.  Works as a pest control tech, he had gotten new pump and handle is harder to squeeze. He really doesn't know if this has anything to do with his pain, but it's the only new daily change.  Worse with palpation. He has had no localized redness or warmth. There has been no fever, chills, sweats. He denies limitation in ROM. Denies known injury or trauma. He has tried using ACE compression wrap which has provided no improvement in symptoms.     The patient is active in none.  Occupation: pest control       Past Medical History:   Past Medical History:   Diagnosis Date    Anxiety     Arthritis     Depression     Digestive disorder     ulcers    Fatigue     History of acute bronchitis     History of psychiatric care     Hypertension     Intervertebral disc disorder with radiculopathy of lumbar region 3/23/2021    Psychiatric problem     PVD (peripheral vascular disease)     S/P femoral-femoral bypass surgery 12/1/2021     Past Surgical History:   Procedure Laterality Date    ANTERIOR LUMBAR INTERBODY FUSION (ALIF) Right 03/23/2021    Procedure: FUSION, SPINE, LUMBAR, ALIF RIGHT L5-S1;  Surgeon: Kelly Ayala MD;  Location: [facility] OR;  Service: Neurosurgery;  Laterality: Right;    AORTOGRAPHY WITH EXTREMITY RUNOFF N/A 07/27/2021    Procedure: AORTOGRAM, WITH EXTREMITY RUNOFF;  Surgeon: Jim Briggs MD;  Location: [facility] CATH;  Service: Cardiovascular;  Laterality: N/A;    AORTOGRAPHY WITH SERIALOGRAPHY Bilateral 07/27/2021    Procedure: AORTOGRAM, WITH SERIALOGRAPHY;  Surgeon: Jim Briggs MD;  Location: [facility] CATH;  Service: Cardiovascular;  Laterality: Bilateral;     APPENDECTOMY  3/23    CREATION OF FEMORAL-FEMORAL ARTERY BYPASS WITH GRAFT Left 11/30/2021    Procedure: CREATION, BYPASS, ARTERIAL, FEMORAL TO FEMORAL, USING GRAFT;  Surgeon: Jim Briggs MD;  Location: [facility] OR;  Service: Cardiovascular;  Laterality: Left;    ENDARTERECTOMY OF FEMORAL ARTERY Bilateral 11/30/2021    Procedure: ENDARTERECTOMY, FEMORAL;  Surgeon: Jim Briggs MD;  Location: [facility] OR;  Service: Cardiovascular;  Laterality: Bilateral;    FUSION OF LUMBAR SPINE BY ANTERIOR APPROACH  03/23/2021    Procedure: FUSION, SPINE, LUMBAR, ANTERIOR APPROACH;  Surgeon: Jim Briggs MD;  Location: [facility] OR;  Service: Cardiovascular;;    HARVESTING OF BONE GRAFT N/A 03/23/2021    Procedure: SURGICAL PROCUREMENT, BONE GRAFT-Iliac;  Surgeon: Kelly Ayala MD;  Location: [facility] OR;  Service: Neurosurgery;  Laterality: N/A;    HYPOSPADIAS CORRECTION      SPINE SURGERY  3/22    VASECTOMY       Family History   Problem Relation Name Age of Onset    Diabetes Mother Reba     Hypertension Mother Reba     Diabetes Father Sal     Hypertension Father Sal     Cancer Sister      Muscular dystrophy Son      Arthritis Paternal Grandfather Luke     Heart disease Paternal Grandfather Luke     Birth defects Brother ESPERANZA     Cancer Brother ESPERANZA     Heart disease Maternal Aunt Zulema     Heart disease Paternal Uncle Santo     Suicide Neg Hx      Sexual abuse Neg Hx      Self injury Neg Hx      Seizures Neg Hx      Schizophrenia Neg Hx      Physical abuse Neg Hx      Paranoid behavior Neg Hx      OCD Neg Hx      Drug abuse Neg Hx      Depression Neg Hx      Dementia Neg Hx      Bipolar disorder Neg Hx      Anxiety disorder Neg Hx      Alcohol abuse Neg Hx       Social History     Socioeconomic History    Marital status:    Occupational History    Occupation: unemployed     Employer: [employer]   Tobacco Use    Smoking status: Former     Current packs/day: 0.00     Average packs/day: 0.3 packs/day for 42.0  years (10.5 ttl pk-yrs)     Types: Cigarettes     Start date: 1980     Quit date: 2022     Years since quittin.9     Passive exposure: Never    Smokeless tobacco: Never   Substance and Sexual Activity    Alcohol use: Not Currently     Comment: stopped recently - last drink 3/21    Drug use: No    Sexual activity: Yes     Partners: Male     Birth control/protection: Partner-Vasectomy   Other Topics Concern    Patient feels they ought to cut down on drinking/drug use Yes    Patient annoyed by others criticizing their drinking/drug use Yes    Patient has felt bad or guilty about drinking/drug use Yes    Patient has had a drink/used drugs as an eye opener in the AM No     Social Determinants of Health     Financial Resource Strain: Low Risk  (2024)    Overall Financial Resource Strain (CARDIA)     Difficulty of Paying Living Expenses: Not hard at all   Food Insecurity: No Food Insecurity (2024)    Hunger Vital Sign     Worried About Running Out of Food in the Last Year: Never true     Ran Out of Food in the Last Year: Never true   Transportation Needs: No Transportation Needs (2024)    PRAPARE - Transportation     Lack of Transportation (Medical): No     Lack of Transportation (Non-Medical): No   Physical Activity: Sufficiently Active (2024)    Exercise Vital Sign     Days of Exercise per Week: 6 days     Minutes of Exercise per Session: 150+ min   Stress: No Stress Concern Present (2024)    [employer] [employer] - Occupational Stress Questionnaire     Feeling of Stress : Only a little   Housing Stability: Low Risk  (2023)    Housing Stability Vital Sign     Unable to Pay for Housing in the Last Year: No     Number of Places Lived in the Last Year: 1     Unstable Housing in the Last Year: No     Medication List with Changes/Refills   Current Medications    ACETAMINOPHEN (TYLENOL EXTRA STRENGTH) 500 MG TABLET    Take 500 mg by mouth 2 (two) times a day.     ALLOPURINOL (ZYLOPRIM) 100 MG TABLET    Take 1 tablet (100 mg total) by mouth once daily.    AMITRIPTYLINE (ELAVIL) 25 MG TABLET    Take 1 tablet (25 mg total) by mouth every evening.    ASPIRIN (ECOTRIN) 81 MG EC TABLET    Take 1 tablet (81 mg total) by mouth once daily.    ATENOLOL (TENORMIN) 25 MG TABLET    TAKE 1 TABLET BY MOUTH EVERY DAY IN THE MORNING    CLOPIDOGREL (PLAVIX) 75 MG TABLET    TAKE 1 TABLET BY MOUTH EVERY DAY    FAMOTIDINE (PEPCID) 20 MG TABLET    Take 2 tablets (40 mg total) by mouth 2 (two) times daily.    FERROUS SULFATE 325 (65 FE) MG EC TABLET    Take 1 tablet (325 mg total) by mouth once daily.    LISINOPRIL 10 MG TABLET    TAKE 1 TABLET BY MOUTH EVERY DAY IN THE EVENING    MULTIVITAMIN (THERAGRAN) PER TABLET    Take 1 tablet by mouth once daily.    OMEGA-3 ACID ETHYL ESTERS (LOVAZA) 1 GRAM CAPSULE    Take 2 capsules (2 g total) by mouth 2 (two) times daily.    PANTOPRAZOLE (PROTONIX) 40 MG TABLET    Take 1 tablet (40 mg total) by mouth 2 (two) times daily.    ROSUVASTATIN (CRESTOR) 5 MG TABLET    Take 1 tablet (5 mg total) by mouth every evening.    SODIUM FLUORIDE 5000 DRY MOUTH 1.1 % PSTE    SMARTSIG:By Mouth Morning-Night    TADALAFIL (CIALIS) 20 MG TAB    Take 1 tablet (20 mg total) by mouth daily as needed (1/2 or 1 full tablet as needed for erectile dysfunction).     Review of patient's allergies indicates:   Allergen Reactions    Blueberry        Physical Exam:   Body mass index is 25.23 kg/m².    GENERAL: Well appearing, in no acute distress.  HEAD: Normocephalic and atraumatic.  ENT: External ears and nose grossly normal.  EYES: EOMI bilaterally  PULMONARY: Respirations are grossly even and non-labored.  NEURO: Awake, alert, and oriented x 3.  SKIN: No obvious rashes appreciated.  PSYCH: Mood & affect are appropriate.    Detailed MSK exam:     Left elbow exam:   -TTP: Lateral epicondyle  -ROM: extension 0, flexion 130  -Resisted wrist extension: positive  -Resisted 3rd finger  extension: positive  -Resisted wrist flexion: negative  -Resisted pronation: positive  -Resisted supination: negative  -Sensation intact  -Pulses 2+  - strength 5/5      Imaging:  CTA Neck  Narrative: EXAMINATION:  CTA NECK    CLINICAL HISTORY:  Carotid artery stenosis;Occlusion and stenosis of unspecified carotid artery    TECHNIQUE:  CT angiogram was performed from the level of the aria to the EAC following the IV administration of 100mL of Omnipaque 350.   Sagittal and coronal reconstructions and maximum intensity projection reconstructions were performed. Arterial stenosis percentages are based on NASCET measurement criteria.    COMPARISON:  Carotid ultrasound dated 06/14/2024    FINDINGS:  Aortic arch and great vessels: There is a conventional left-sided 3 vessel arch.  There is mild scattered calcified plaque at the aortic arch as well as at the origin of the left common carotid artery.  The arch and the origins of the great vessels are widely patent.  The included subclavian arteries are patent as well.    Carotid arteries    Right carotid artery: The common carotid artery is patent.  There is calcified plaque at the right carotid bulb and proximal internal carotid artery resulting in a stenosis of 70% extending over approximately 10 mm.  The vessel is then patent to the skull base.  There is no significant stenosis of the external carotid artery.    Left carotid artery: The left common carotid artery is patent.  There is only minimal plaque at the carotid bulb and proximal internal carotid artery without measurable stenosis of the internal carotid artery which is patent to the skull base.  The external carotid artery is also patent without stenosis.    Vertebral arteries: The left vertebral artery is very small in caliber likely representing a developmentally hypoplastic vessel.  This vessel however is patent throughout its course in the neck.  The dominant right vertebral artery is patent at its  origin but there is a stenosis of approximately 70% at the level of C6.  This extends over a course of approximately 7 mm.  The vessel is then widely patent to the skull base.    The study does extend intracranially.  The dominant right vertebral artery mostly supplies the basilar artery.  The hypoplastic left vertebral artery mostly terminates in a posteroinferior cerebellar artery.  Minimal flow demonstrated in the distal V4 segment likely represents retrograde flow.  The basilar artery is patent.  There is branching into the superior cerebellar arteries.  There is fetal origin of the right posterior cerebral artery.  There is a prominent patent left-sided posterior communicating artery.  Additionally, the petrous and cavernous segments of the internal carotid arteries are patent.  There is mild calcified plaque on the right.  The carotid terminus is patent bilaterally.  There is branching into the middle cerebral arteries bilaterally.  The right A1 segment is hypoplastic.  The dominant left A1 segment is patent and supplies both A2 segments.    Other: There is degenerative change in the cervical spine with moderate to marked disc space narrowing at the C5-6 level.  There is multilevel facet joint arthropathy.  There is ossification of the ligamentum flavum at the C4-5 level, worse on the right.  The airway is patent.  There is no pathologic cervical lymphadenopathy or dominant soft tissue mass in the neck.  The included upper lungs are clear.  Impression: 1. There is stenosis of the proximal right internal carotid artery measuring approximately 70% extending over a distance of approximately 10 mm.  2. There is no measurable stenosis of the left internal carotid artery.  3. There is a 70% stenosis of the dominant right vertebral artery at the level of C6 extending over course of approximately 7 mm.    Electronically signed by: Hebert Olivas MD  Date:    06/27/2024  Time:    09:58        Relevant imaging  results were reviewed and interpreted by me and per my read shows no acute abnormalities.  This was discussed with the patient and / or family today.     Assessment:  Sal Villarreal Jr. is a 64 y.o. male presenting with left elbow pain.   History, physical and radiographs are consistent with a likely diagnosis of lateral epicondylitis.   Plan: Steroid injection given today (see separate procedure note for details). We discussed the proper protocols after the injection such as no submerging pools, baths tubs, or hot tubs for 24 hr.  Showering is okay today.  We also discussed that blood sugars can be elevated after an injection and asked patient to properly checked her sugars over the next few days and contact their PCP if there are any concerns.  We discussed red flags such as fevers, chills, red, warm, tender joint at the area of injection to please seek medical care immediately.   Consider tenjet procedure if not improving. Consider PT referral. Continue conservative management for pain.   Follow up 3 months. All questions answered.      Lateral epicondylitis of left elbow  -     Sports Medicine US - Guidance for Needle Placement  -     Tendon Origin: L elbow         Ultrasound guidance was used for needle localization. Images were saved and stored for documentation. The appropriate structures were visualized. Dynamic visualization of the needle was continuous throughout the procedures and maintained good position.      A copy of today's visit note has been sent to the referring provider.     Electronically signed:  Darin Choi MD, MPH  09/06/2024  1:55 PM

## 2024-09-06 NOTE — PROCEDURES
Tendon Origin: L elbow    Date/Time: 9/6/2024 2:00 PM    Performed by: Darin Choi MD  Authorized by: Darin Choi MD    Consent Done?:  Yes (Verbal)  Timeout: prior to procedure the correct patient, procedure, and site was verified    Indications:  Pain  Site marked: the procedure site was marked    Timeout: prior to procedure the correct patient, procedure, and site was verified    Location:  Elbow  Site:  L elbow (lateral epicondyle)  Prep: patient was prepped and draped in usual sterile fashion    Ultrasonic Guidance for Needle Placement?: Yes    Needle size:  25 G  Approach:  Posterolateral  Medications:  6 mg betamethasone acetate-betamethasone sodium phosphate 6 mg/mL  Patient tolerance:  Patient tolerated the procedure well with no immediate complications   Ultrasound guidance was used for needle localization. Images were saved and stored for documentation. The appropriate structures were visualized. Dynamic visualization of the needle was continuous throughout the procedures and maintained good position.

## 2024-09-10 RX ORDER — CLOPIDOGREL BISULFATE 75 MG/1
TABLET ORAL
Qty: 90 TABLET | Refills: 1 | Status: SHIPPED | OUTPATIENT
Start: 2024-09-10

## 2024-09-10 NOTE — TELEPHONE ENCOUNTER
Refill Routing Note   Medication(s) are not appropriate for processing by [facility] for the following reason(s):        Drug-disease interaction    [facility] action(s):  Defer      Medication Therapy Plan: Drug-Disease: clopidogreL and Bleeding nose    Pharmacist review requested: Yes     Appointments  past 12m or future 3m with PCP    Date Provider   Last Visit   4/15/2024 Bipin Penaloza MD   Next Visit   Visit date not found Bipin Penaloza MD   ED visits in past 90 days: 0        Note composed:10:47 AM 09/10/2024

## 2024-09-10 NOTE — TELEPHONE ENCOUNTER
Refill Decision Note   Sal Villarreal  is requesting a refill authorization.  Brief Assessment and Rationale for Refill:  Approve     Medication Therapy Plan:        Pharmacist review requested: Yes   Comments:     Note composed:11:21 AM 09/10/2024

## 2024-09-10 NOTE — TELEPHONE ENCOUNTER
No care due was identified.  Health [facility] Embedded Care Due Messages. Reference number: [id number].   9/10/2024 12:21:22 AM CDT

## 2024-09-11 ENCOUNTER — PATIENT MESSAGE (OUTPATIENT)
Dept: FAMILY MEDICINE | Facility: CLINIC | Age: 64
End: 2024-09-11
Payer: COMMERCIAL

## 2024-09-28 ENCOUNTER — PATIENT MESSAGE (OUTPATIENT)
Dept: FAMILY MEDICINE | Facility: CLINIC | Age: 64
End: 2024-09-28
Payer: COMMERCIAL

## 2024-10-01 ENCOUNTER — OFFICE VISIT (OUTPATIENT)
Dept: FAMILY MEDICINE | Facility: CLINIC | Age: 64
End: 2024-10-01
Payer: COMMERCIAL

## 2024-10-01 DIAGNOSIS — F33.41 RECURRENT MAJOR DEPRESSIVE DISORDER, IN PARTIAL REMISSION: Primary | ICD-10-CM

## 2024-10-01 DIAGNOSIS — Z79.899 ENCOUNTER FOR LONG-TERM (CURRENT) USE OF MEDICATIONS: Chronic | ICD-10-CM

## 2024-10-01 PROCEDURE — 1160F RVW MEDS BY RX/DR IN RCRD: CPT | Mod: CPTII,95,, | Performed by: NURSE PRACTITIONER

## 2024-10-01 PROCEDURE — 3044F HG A1C LEVEL LT 7.0%: CPT | Mod: CPTII,95,, | Performed by: NURSE PRACTITIONER

## 2024-10-01 PROCEDURE — 4010F ACE/ARB THERAPY RXD/TAKEN: CPT | Mod: CPTII,95,, | Performed by: NURSE PRACTITIONER

## 2024-10-01 PROCEDURE — 1159F MED LIST DOCD IN RCRD: CPT | Mod: CPTII,95,, | Performed by: NURSE PRACTITIONER

## 2024-10-01 PROCEDURE — 99214 OFFICE O/P EST MOD 30 MIN: CPT | Mod: 95,,, | Performed by: NURSE PRACTITIONER

## 2024-10-01 RX ORDER — BUPROPION HYDROCHLORIDE 150 MG/1
150 TABLET ORAL DAILY
Qty: 30 TABLET | Refills: 11 | Status: SHIPPED | OUTPATIENT
Start: 2024-10-01 | End: 2025-10-01

## 2024-10-01 NOTE — ASSESSMENT & PLAN NOTE
Restart Wellbutrin  mg daily. Follow up in 4 weeks or if worsening or no improvement in symptoms. Denies SIHI.     Please be advised of condition course.  SNRI/SSRI is first-line treatment for this condition.  Please be advised of the risk of discontinuing this medication without tapering/contacting MD.  Patient has been advised of side effects, and all questions were answered.  Patient voiced understanding.  Patient will follow up routinely and notify us if having any side effects or worsening or persistent symptoms.  ER precautions were given. Antidepressant/Antianxiety Medication Initiation:  Patient informed of risks, benefits, and potential side effects of medication and accepts informed consent.  Common side effects include nausea, fatigue, headache, insomnia, etc see medication insert for complete side effect profile.  Most importantly be advised of the possibility of new or worsening suicidal thoughts/depression/anxiety etcetera.  Please be advised to stop the medication immediately and seek urgent treatment if this occurs.  Therefore please do not to abruptly discontinue medication without physician guidance except in cases of sudden onset or worsening of SI.

## 2024-10-01 NOTE — PROGRESS NOTES
Primary Care Telemedicine Note  The patient location is:  Patient's Home - [place]  The chief complaint leading to consultation is:   Chief Complaint   Patient presents with    Depression      Total time:  see MDM below. The total time spent on the encounter, which includes face to face time and non-face to face time preparing to see the patient (eg, review of previous medical records, tests), Obtaining and/or reviewing separately obtained history, documenting clinical information in the electronic or other health record, independently interpreting results (not separately reported)/communicating results to the patient/family/caregiver, and/or care coordination (not separately reported).    Visit type: Virtual visit with synchronous audio only and video  Each patient to whom he or she provides medical services by telemedicine is:  (1) informed of the relationship between the physician and patient and the respective role of any other health care provider with respect to management of the patient; and (2) notified that he or she may decline to receive medical services by telemedicine and may withdraw from such care at any time.  =================================================================    Assessment/Plan:  Problem List Items Addressed This Visit          Psychiatric    Recurrent major depressive disorder, in partial remission - Primary (Chronic)    Overview     Chronic.     October 2024: patient reports worsening of depression. He is crying more. Having more down thoughts and thinking about his son that passed away. He feels he needs to get back on wellbutrin.    August 2024: He stopped smoking 2 years ago. He has continued to take wellbutrin but would like to consider weaning off medication. His mood has been stable. Denies anxiety/depression/SIHI. He has reduced medication and is currently taking 75 mg once daily.     March 2023: Patient was doing better when he was on Wellbutrin for smoking cessation.  He  reports that since being off of this medication he has been more irritable.  Denies any SI HI or hallucinations    Previous history:  Stable.  Patient declines any medication.  He is doing well with smoking cessation.  He is going on a trip in October 2 [place] and would like to be tobacco free by then.  Denies SI HI or hallucinations.         Current Assessment & Plan     Restart Wellbutrin  mg daily. Follow up in 4 weeks or if worsening or no improvement in symptoms. Denies SIHI.     Please be advised of condition course.  SNRI/SSRI is first-line treatment for this condition.  Please be advised of the risk of discontinuing this medication without tapering/contacting MD.  Patient has been advised of side effects, and all questions were answered.  Patient voiced understanding.  Patient will follow up routinely and notify us if having any side effects or worsening or persistent symptoms.  ER precautions were given. Antidepressant/Antianxiety Medication Initiation:  Patient informed of risks, benefits, and potential side effects of medication and accepts informed consent.  Common side effects include nausea, fatigue, headache, insomnia, etc see medication insert for complete side effect profile.  Most importantly be advised of the possibility of new or worsening suicidal thoughts/depression/anxiety etcetera.  Please be advised to stop the medication immediately and seek urgent treatment if this occurs.  Therefore please do not to abruptly discontinue medication without physician guidance except in cases of sudden onset or worsening of SI.          Relevant Medications    buPROPion (WELLBUTRIN XL) 150 MG TB24 tablet       Other    Encounter for long-term (current) use of medications (Chronic)    Overview     October 2024: reviewed labs  August 2024: reviewed labs.  December 2023: Reviewed labs.  Sept 2023: Reviewed labs.  March 2023: Reviewed labs.  CHRONIC. Stable. Compliant with medications for managed  conditions. See medication list. No SE reported.   Routine lab analysis is being monitored. Refills were addressed.  Lab Results   Component Value Date    WBC 5.74 08/08/2024    HGB 12.9 (L) 08/08/2024    HCT 40.1 08/08/2024    MCV 96 08/08/2024     08/08/2024         Chemistry        Component Value Date/Time     08/08/2024 0712    K 3.8 08/08/2024 0712     08/08/2024 0712    CO2 23 08/08/2024 0712    BUN 5 (L) 08/08/2024 0712    CREATININE 1.0 08/08/2024 0712    GLU 98 08/08/2024 0712        Component Value Date/Time    CALCIUM 8.8 08/08/2024 0712    ALKPHOS 83 08/08/2024 0712    AST 28 08/08/2024 0712    ALT 17 08/08/2024 0712    BILITOT 0.6 08/08/2024 0712    ESTGFRAFRICA >60.0 06/06/2022 1037    EGFRNONAA >60.0 06/06/2022 1037        Lab Results   Component Value Date    TSH 1.143 08/08/2024            Current Assessment & Plan     Complete history and Limited telemedicine physical was completed today.  Complete and thorough medication reconciliation was performed.  Discussed risks and benefits of medications.  Advised patient on orders and health maintenance.  We discussed old records and old labs if available.  Will request any records not available through epic.  Continue current medications listed on your summary sheet.          Follow up in about 4 weeks (around 10/29/2024), or if symptoms worsen or fail to improve, for Virtual Visit.  ER precautions for severe or worsening symptoms.     Helena Azul NP  _____________________________________________________________________________________________________________________________________________________      CHIEF COMPLAINT:  Mr. Villarreal presents today for follow up on bupropion medication.    History of Present Illness  DEPRESSION:  He reports mood changes, specifically difficulty controlling his temper. He discloses experiencing grief related to the loss of his son 10 years ago, with recurring feelings of loss. He describes  frequent crying episodes, including during commercials, which he finds distressing and disruptive to his daily life. He reports previous use of bupropion (Wellbutrin) and reports that he did well when he was on medication previously. He expresses interest in continuing the medication due to experiencing mood-related symptoms, including irritability and frequent emotional episodes.  He denies current suicidal and homicidal plan or intent.    Past Medical History:  Past Medical History:   Diagnosis Date    Anxiety     Arthritis     Depression     Digestive disorder     ulcers    Fatigue     History of acute bronchitis     History of psychiatric care     Hypertension     Intervertebral disc disorder with radiculopathy of lumbar region 3/23/2021    Psychiatric problem     PVD (peripheral vascular disease)     S/P femoral-femoral bypass surgery 12/1/2021     Past Surgical History:   Procedure Laterality Date    ANTERIOR LUMBAR INTERBODY FUSION (ALIF) Right 03/23/2021    Procedure: FUSION, SPINE, LUMBAR, ALIF RIGHT L5-S1;  Surgeon: Kelly Ayala MD;  Location: [facility] OR;  Service: Neurosurgery;  Laterality: Right;    AORTOGRAPHY WITH EXTREMITY RUNOFF N/A 07/27/2021    Procedure: AORTOGRAM, WITH EXTREMITY RUNOFF;  Surgeon: Jim Briggs MD;  Location: [facility] CATH;  Service: Cardiovascular;  Laterality: N/A;    AORTOGRAPHY WITH SERIALOGRAPHY Bilateral 07/27/2021    Procedure: AORTOGRAM, WITH SERIALOGRAPHY;  Surgeon: Jim Briggs MD;  [facility] CATH;  Service: Cardiovascular;  Laterality: Bilateral;    APPENDECTOMY  3/23    CREATION OF FEMORAL-FEMORAL ARTERY BYPASS WITH GRAFT Left 11/30/2021    Procedure: CREATION, BYPASS, ARTERIAL, FEMORAL TO FEMORAL, USING GRAFT;  Surgeon: Jim Briggs MD;  Location: [facility] OR;  Service: Cardiovascular;  Laterality: Left;    ENDARTERECTOMY OF FEMORAL ARTERY Bilateral 11/30/2021    Procedure: ENDARTERECTOMY, FEMORAL;  Surgeon: Jim Briggs MD;  Location: [facility] OR;  Service: Cardiovascular;   Laterality: Bilateral;    FUSION OF LUMBAR SPINE BY ANTERIOR APPROACH  2021    Procedure: FUSION, SPINE, LUMBAR, ANTERIOR APPROACH;  Surgeon: Jim Briggs MD;  Location: [facility] OR;  Service: Cardiovascular;;    HARVESTING OF BONE GRAFT N/A 2021    Procedure: SURGICAL PROCUREMENT, BONE GRAFT-Iliac;  Surgeon: Kelly Ayala MD;  Location: [facility] OR;  Service: Neurosurgery;  Laterality: N/A;    HYPOSPADIAS CORRECTION      SPINE SURGERY  3/22    VASECTOMY       Review of patient's allergies indicates:   Allergen Reactions    Blueberry      Social History     Tobacco Use    Smoking status: Former     Current packs/day: 0.00     Average packs/day: 0.3 packs/day for 42.0 years (10.5 ttl pk-yrs)     Types: Cigarettes     Start date: 1980     Quit date: 2022     Years since quittin.0     Passive exposure: Never    Smokeless tobacco: Never   Substance Use Topics    Alcohol use: Not Currently     Comment: stopped recently - last drink 3/21    Drug use: No     Family History   Problem Relation Name Age of Onset    Diabetes Mother Reba     Hypertension Mother Reba     Diabetes Father Sal     Hypertension Father Sal     Cancer Sister      Muscular dystrophy Son      Arthritis Paternal Grandfather Luke     Heart disease Paternal Grandfather Luke     Birth defects Brother ESPERANZA     Cancer Brother ESPERANZA     Heart disease Maternal Aunt Zulema     Heart disease Paternal Uncle Santo     Suicide Neg Hx      Sexual abuse Neg Hx      Self injury Neg Hx      Seizures Neg Hx      Schizophrenia Neg Hx      Physical abuse Neg Hx      Paranoid behavior Neg Hx      OCD Neg Hx      Drug abuse Neg Hx      Depression Neg Hx      Dementia Neg Hx      Bipolar disorder Neg Hx      Anxiety disorder Neg Hx      Alcohol abuse Neg Hx       Current Outpatient Medications on File Prior to Visit   Medication Sig Dispense Refill    acetaminophen (TYLENOL EXTRA STRENGTH) 500 MG tablet Take 500 mg by mouth 2 (two) times a day.       allopurinoL (ZYLOPRIM) 100 MG tablet Take 1 tablet (100 mg total) by mouth once daily. 90 tablet 1    amitriptyline (ELAVIL) 25 MG tablet Take 1 tablet (25 mg total) by mouth every evening. 90 tablet 1    aspirin (ECOTRIN) 81 MG EC tablet Take 1 tablet (81 mg total) by mouth once daily. 90 tablet 0    atenoloL (TENORMIN) 25 MG tablet TAKE 1 TABLET BY MOUTH EVERY DAY IN THE MORNING 90 tablet 3    clopidogreL (PLAVIX) 75 mg tablet TAKE 1 TABLET BY MOUTH EVERY DAY 90 tablet 1    famotidine (PEPCID) 20 MG tablet Take 2 tablets (40 mg total) by mouth 2 (two) times daily. 180 tablet 3    ferrous sulfate 325 (65 FE) MG EC tablet Take 1 tablet (325 mg total) by mouth once daily. 90 tablet 4    lisinopriL 10 MG tablet TAKE 1 TABLET BY MOUTH EVERY DAY IN THE EVENING 90 tablet 1    multivitamin (THERAGRAN) per tablet Take 1 tablet by mouth once daily.      omega-3 acid ethyl esters (LOVAZA) 1 gram capsule Take 2 capsules (2 g total) by mouth 2 (two) times daily. 360 capsule 1    pantoprazole (PROTONIX) 40 MG tablet Take 1 tablet (40 mg total) by mouth 2 (two) times daily. 180 tablet 3    rosuvastatin (CRESTOR) 5 MG tablet Take 1 tablet (5 mg total) by mouth every evening. 90 tablet 1    SODIUM FLUORIDE 5000 DRY MOUTH 1.1 % Pste SMARTSIG:By Mouth Morning-Night      tadalafiL (CIALIS) 20 MG Tab Take 1 tablet (20 mg total) by mouth daily as needed (1/2 or 1 full tablet as needed for erectile dysfunction). 20 tablet 11     No current facility-administered medications on file prior to visit.     Review of Systems   Constitutional:  Negative for appetite change, chills, fatigue, fever and unexpected weight change.   HENT:  Negative for congestion, hearing loss, rhinorrhea, sore throat and trouble swallowing.    Eyes:  Negative for discharge and visual disturbance.   Respiratory:  Negative for cough, chest tightness, shortness of breath and wheezing.    Cardiovascular:  Negative for chest pain, palpitations and leg swelling.    Gastrointestinal:  Negative for abdominal pain, blood in stool, constipation, diarrhea and vomiting.   Endocrine: Negative for polydipsia and polyuria.   Genitourinary:  Negative for difficulty urinating, hematuria and urgency.   Musculoskeletal:  Negative for arthralgias, joint swelling and myalgias.   Skin:  Negative for rash and wound.   Neurological:  Negative for dizziness, weakness and headaches.   Psychiatric/Behavioral:  Positive for dysphoric mood. Negative for behavioral problems and confusion. The patient is not nervous/anxious.      There were no vitals filed for this visit.    Wt Readings from Last 3 Encounters:   09/06/24 70.9 kg (156 lb 4.9 oz)   08/12/24 70.9 kg (156 lb 4.8 oz)   07/09/24 72.3 kg (159 lb 6.3 oz)     Physical Exam  Vitals reviewed.   Constitutional:       General: He is awake. He is not in acute distress.     Appearance: Normal appearance. He is not ill-appearing.   HENT:      Head: Normocephalic and atraumatic.      Right Ear: External ear normal.      Left Ear: External ear normal.      Nose: Nose normal.   Eyes:      Extraocular Movements: Extraocular movements intact.      Conjunctiva/sclera: Conjunctivae normal.   Pulmonary:      Effort: Pulmonary effort is normal. No respiratory distress.   Musculoskeletal:      Cervical back: Normal range of motion.   Skin:     General: Skin is warm and dry.      Coloration: Skin is not pale.      Findings: No rash.   Neurological:      General: No focal deficit present.      Mental Status: He is alert and oriented to person, place, and time. Mental status is at baseline.      GCS: GCS eye subscore is 4. GCS verbal subscore is 5. GCS motor subscore is 6.   Psychiatric:         Attention and Perception: Attention normal. He is attentive.         Mood and Affect: Mood is not anxious, depressed or elated. Affect is not labile, blunt, flat or angry.         Speech: Speech normal. Speech is not rapid and pressured, delayed or slurred.          Behavior: Behavior normal. Behavior is not agitated, slowed, aggressive, withdrawn or hyperactive. Behavior is cooperative.         Thought Content: Thought content normal. Thought content does not include homicidal or suicidal ideation. Thought content does not include homicidal or suicidal plan.         Judgment: Judgment normal.       Health Maintenance   Topic Date Due    TETANUS VACCINE  Never done    Shingles Vaccine (2 of 2) 08/16/2024    PROSTATE-SPECIFIC ANTIGEN  08/08/2025    High Dose Statin  09/06/2025    Aspirin/Antiplatelet Therapy  09/10/2025    Colorectal Cancer Screening  07/25/2029    Lipid Panel  08/08/2029    Hepatitis C Screening  Completed     DISCLAIMER: This note was compiled by using a speech recognition dictation system and therefore please be aware that typographical / speech recognition errors can and do occur.  Please contact me if you see any errors specifically.  Consent was obtained for [employer] recording system prior to the visit.

## 2024-10-23 DIAGNOSIS — K21.9 GASTROESOPHAGEAL REFLUX DISEASE, UNSPECIFIED WHETHER ESOPHAGITIS PRESENT: ICD-10-CM

## 2024-10-23 RX ORDER — FAMOTIDINE 20 MG/1
40 TABLET, FILM COATED ORAL 2 TIMES DAILY
Qty: 360 TABLET | Refills: 0 | Status: SHIPPED | OUTPATIENT
Start: 2024-10-23

## 2024-10-23 NOTE — TELEPHONE ENCOUNTER
Refill Routing Note   Medication(s) are not appropriate for processing by [facility] for the following reason(s):        Outside of protocol    [facility] action(s):  Route      Medication Therapy Plan: OVER 40MG OOP      Appointments  past 12m or future 3m with PCP    Date Provider   Last Visit   4/15/2024 Bipin Penaloza MD   Next Visit   Visit date not found Bipin Penlaoza MD   ED visits in past 90 days: 0        Note composed:1:37 PM 10/23/2024

## 2024-10-23 NOTE — TELEPHONE ENCOUNTER
No care due was identified.  [facility] Embedded Care Due Messages. Reference number: [id number].   10/23/2024 1:33:14 PM CDT

## 2024-10-28 ENCOUNTER — PATIENT MESSAGE (OUTPATIENT)
Facility: CLINIC | Age: 64
End: 2024-10-28

## 2024-10-28 ENCOUNTER — OFFICE VISIT (OUTPATIENT)
Facility: CLINIC | Age: 64
End: 2024-10-28
Payer: COMMERCIAL

## 2024-10-28 VITALS
DIASTOLIC BLOOD PRESSURE: 84 MMHG | RESPIRATION RATE: 16 BRPM | BODY MASS INDEX: 25.12 KG/M2 | HEIGHT: 66 IN | SYSTOLIC BLOOD PRESSURE: 131 MMHG | HEART RATE: 80 BPM | WEIGHT: 156.31 LBS

## 2024-10-28 DIAGNOSIS — N48.6 PEYRONIE'S DISEASE: Primary | ICD-10-CM

## 2024-10-28 DIAGNOSIS — N52.01 ERECTILE DYSFUNCTION DUE TO ARTERIAL INSUFFICIENCY: ICD-10-CM

## 2024-10-28 PROCEDURE — 99999 PR PBB SHADOW E&M-EST. PATIENT-LVL IV: CPT | Mod: PBBFAC,,, | Performed by: UROLOGY

## 2024-10-28 PROCEDURE — 3075F SYST BP GE 130 - 139MM HG: CPT | Mod: CPTII,S$GLB,, | Performed by: UROLOGY

## 2024-10-28 PROCEDURE — 4010F ACE/ARB THERAPY RXD/TAKEN: CPT | Mod: CPTII,S$GLB,, | Performed by: UROLOGY

## 2024-10-28 PROCEDURE — 3044F HG A1C LEVEL LT 7.0%: CPT | Mod: CPTII,S$GLB,, | Performed by: UROLOGY

## 2024-10-28 PROCEDURE — 1159F MED LIST DOCD IN RCRD: CPT | Mod: CPTII,S$GLB,, | Performed by: UROLOGY

## 2024-10-28 PROCEDURE — 3079F DIAST BP 80-89 MM HG: CPT | Mod: CPTII,S$GLB,, | Performed by: UROLOGY

## 2024-10-28 PROCEDURE — 3008F BODY MASS INDEX DOCD: CPT | Mod: CPTII,S$GLB,, | Performed by: UROLOGY

## 2024-10-28 PROCEDURE — 99214 OFFICE O/P EST MOD 30 MIN: CPT | Mod: S$GLB,,, | Performed by: UROLOGY

## 2024-10-28 RX ORDER — TADALAFIL 20 MG/1
20 TABLET ORAL SEE ADMIN INSTRUCTIONS
Qty: 30 TABLET | Refills: 6 | Status: SHIPPED | OUTPATIENT
Start: 2024-10-28 | End: 2025-10-28

## 2024-10-29 ENCOUNTER — OFFICE VISIT (OUTPATIENT)
Dept: FAMILY MEDICINE | Facility: CLINIC | Age: 64
End: 2024-10-29
Payer: COMMERCIAL

## 2024-10-29 DIAGNOSIS — H93.13 TINNITUS OF BOTH EARS: ICD-10-CM

## 2024-10-29 DIAGNOSIS — F33.41 RECURRENT MAJOR DEPRESSIVE DISORDER, IN PARTIAL REMISSION: Primary | Chronic | ICD-10-CM

## 2024-10-29 DIAGNOSIS — Z79.899 ENCOUNTER FOR LONG-TERM (CURRENT) USE OF MEDICATIONS: Chronic | ICD-10-CM

## 2024-10-29 PROCEDURE — 1159F MED LIST DOCD IN RCRD: CPT | Mod: CPTII,95,, | Performed by: NURSE PRACTITIONER

## 2024-10-29 PROCEDURE — 99214 OFFICE O/P EST MOD 30 MIN: CPT | Mod: 95,,, | Performed by: NURSE PRACTITIONER

## 2024-10-29 PROCEDURE — 4010F ACE/ARB THERAPY RXD/TAKEN: CPT | Mod: CPTII,95,, | Performed by: NURSE PRACTITIONER

## 2024-10-29 PROCEDURE — 3044F HG A1C LEVEL LT 7.0%: CPT | Mod: CPTII,95,, | Performed by: NURSE PRACTITIONER

## 2024-10-31 ENCOUNTER — CLINICAL SUPPORT (OUTPATIENT)
Dept: AUDIOLOGY | Facility: CLINIC | Age: 64
End: 2024-10-31
Payer: COMMERCIAL

## 2024-10-31 DIAGNOSIS — H91.93 HIGH FREQUENCY HEARING LOSS OF BOTH EARS: Primary | ICD-10-CM

## 2024-10-31 DIAGNOSIS — H93.13 TINNITUS OF BOTH EARS: ICD-10-CM

## 2024-10-31 PROCEDURE — 92567 TYMPANOMETRY: CPT | Mod: S$GLB,,, | Performed by: AUDIOLOGIST

## 2024-10-31 PROCEDURE — 99999 PR PBB SHADOW E&M-EST. PATIENT-LVL II: CPT | Mod: PBBFAC,,, | Performed by: AUDIOLOGIST

## 2024-10-31 PROCEDURE — 92557 COMPREHENSIVE HEARING TEST: CPT | Mod: S$GLB,,, | Performed by: AUDIOLOGIST

## 2024-10-31 NOTE — PROGRESS NOTES
The patient was referred by Helena Azul NP for a hearing evaluation.    Report from the patient was as follows:    Does not notice difficulty hearing   Tinnitus - AU  History of Loud Noise Exposure - loud music  Family History of Hearing Loss - father with hearing loss with onset in older age    Otoscopic screening revealed a clear view of TM AU    A hearing evaluation was performed today. Test results indicated a mild-to-severe high frequency hearing loss bilaterally. Impedance testing showed a Type A tympanogram bilaterally.     The recommendations were as follows:    (1)  Medical evaluation due to asymmetry, Patient was given the contact number for scheduling an appointment in the ENT Clinic.  (2)  Hearing aid consult pending medical clearance  (3)  Ear protection in loud noise   (4)  Hearing evaluation in one year or sooner if hearing decrease is noted      Today's test results and recommendations were discussed with the patient.

## 2024-11-01 DIAGNOSIS — Z79.899 ENCOUNTER FOR LONG-TERM (CURRENT) USE OF MEDICATIONS: ICD-10-CM

## 2024-11-01 DIAGNOSIS — E78.5 DYSLIPIDEMIA (HIGH LDL; LOW HDL): ICD-10-CM

## 2024-11-01 DIAGNOSIS — E78.5 DYSLIPIDEMIA (HIGH LDL; LOW HDL): Chronic | ICD-10-CM

## 2024-11-01 DIAGNOSIS — D50.9 IRON DEFICIENCY ANEMIA, UNSPECIFIED IRON DEFICIENCY ANEMIA TYPE: ICD-10-CM

## 2024-11-01 DIAGNOSIS — E79.0 ELEVATED URIC ACID IN BLOOD: ICD-10-CM

## 2024-11-01 RX ORDER — FERROUS SULFATE 325(65) MG
TABLET, DELAYED RELEASE (ENTERIC COATED) ORAL
Qty: 90 TABLET | Refills: 1 | Status: SHIPPED | OUTPATIENT
Start: 2024-11-01

## 2024-11-01 RX ORDER — ALLOPURINOL 300 MG/1
300 TABLET ORAL
Qty: 90 TABLET | Refills: 1 | Status: SHIPPED | OUTPATIENT
Start: 2024-11-01

## 2024-11-01 RX ORDER — AMITRIPTYLINE HYDROCHLORIDE 25 MG/1
25 TABLET, FILM COATED ORAL NIGHTLY
Qty: 90 TABLET | Refills: 0 | Status: SHIPPED | OUTPATIENT
Start: 2024-11-01

## 2024-11-01 RX ORDER — ROSUVASTATIN CALCIUM 5 MG/1
5 TABLET, COATED ORAL NIGHTLY
Qty: 90 TABLET | Refills: 1 | Status: SHIPPED | OUTPATIENT
Start: 2024-11-01

## 2024-11-02 RX ORDER — OMEGA-3-ACID ETHYL ESTERS 1 G/1
2 CAPSULE, LIQUID FILLED ORAL 2 TIMES DAILY
Qty: 360 CAPSULE | Refills: 0 | Status: SHIPPED | OUTPATIENT
Start: 2024-11-02

## 2024-11-12 ENCOUNTER — OFFICE VISIT (OUTPATIENT)
Dept: OTOLARYNGOLOGY | Facility: CLINIC | Age: 64
End: 2024-11-12
Payer: COMMERCIAL

## 2024-11-12 VITALS — WEIGHT: 155.44 LBS | BODY MASS INDEX: 25.09 KG/M2

## 2024-11-12 DIAGNOSIS — H93.13 TINNITUS, BILATERAL: Primary | ICD-10-CM

## 2024-11-12 DIAGNOSIS — H90.3 BILATERAL HIGH FREQUENCY SENSORINEURAL HEARING LOSS: ICD-10-CM

## 2024-11-12 PROCEDURE — 4010F ACE/ARB THERAPY RXD/TAKEN: CPT | Mod: CPTII,S$GLB,, | Performed by: NURSE PRACTITIONER

## 2024-11-12 PROCEDURE — 3044F HG A1C LEVEL LT 7.0%: CPT | Mod: CPTII,S$GLB,, | Performed by: NURSE PRACTITIONER

## 2024-11-12 PROCEDURE — 1159F MED LIST DOCD IN RCRD: CPT | Mod: CPTII,S$GLB,, | Performed by: NURSE PRACTITIONER

## 2024-11-12 PROCEDURE — 3008F BODY MASS INDEX DOCD: CPT | Mod: CPTII,S$GLB,, | Performed by: NURSE PRACTITIONER

## 2024-11-12 PROCEDURE — 99203 OFFICE O/P NEW LOW 30 MIN: CPT | Mod: S$GLB,,, | Performed by: NURSE PRACTITIONER

## 2024-11-12 PROCEDURE — 99999 PR PBB SHADOW E&M-EST. PATIENT-LVL IV: CPT | Mod: PBBFAC,,, | Performed by: NURSE PRACTITIONER

## 2024-11-12 PROCEDURE — 1160F RVW MEDS BY RX/DR IN RCRD: CPT | Mod: CPTII,S$GLB,, | Performed by: NURSE PRACTITIONER

## 2024-11-12 NOTE — PROGRESS NOTES
Subjective     Patient ID: Sal Villarreal Jr. is a 64 y.o. male.    Chief Complaint: Hearing Loss and Tinnitus    HPI  Patient is new to ENT, referred by NIKKI Azul for consultation for tinnitus.   Duration: X 6 months  Severity: not bothersome, does not interfere with his ability to sleep  Laterality: AU equal  Anemia: yes  ASA/NSAID use: baby ASA daily  Caffeine intake: 2 liter of coke a day  Hearing loss: not aware of any  Sleep quality: more 8 hours  Noise exposure: yes      Review of Systems   Constitutional: Negative.    HENT:  Positive for tinnitus.    Eyes: Negative.    Respiratory: Negative.     Cardiovascular: Negative.    Gastrointestinal: Negative.    Musculoskeletal: Negative.    Integumentary:  Negative.   Neurological: Negative.    Hematological: Negative.    Psychiatric/Behavioral: Negative.            Objective     Physical Exam  Vitals and nursing note reviewed.   Constitutional:       General: He is not in acute distress.     Appearance: He is well-developed. He is not ill-appearing.   HENT:      Head: Normocephalic and atraumatic.      Right Ear: Hearing, tympanic membrane, ear canal and external ear normal. No middle ear effusion. Tympanic membrane is not erythematous.      Left Ear: Hearing, tympanic membrane, ear canal and external ear normal.  No middle ear effusion. Tympanic membrane is not erythematous.      Nose: Nose normal.   Eyes:      General: Lids are normal. No scleral icterus.        Right eye: No discharge.         Left eye: No discharge.   Neck:      Trachea: Trachea normal. No tracheal deviation.   Cardiovascular:      Rate and Rhythm: Normal rate.   Pulmonary:      Effort: Pulmonary effort is normal. No respiratory distress.      Breath sounds: No stridor. No wheezing.   Musculoskeletal:         General: Normal range of motion.      Cervical back: Normal range of motion.   Skin:     General: Skin is warm and dry.   Neurological:      Mental Status: He is alert and oriented to  person, place, and time.      Coordination: Coordination is intact.      Gait: Gait normal.   Psychiatric:         Attention and Perception: Attention normal.         Mood and Affect: Mood normal.         Speech: Speech normal.         Behavior: Behavior normal. Behavior is cooperative.          Assessment and Plan     1. Tinnitus, bilateral    2. Bilateral high frequency sensorineural hearing loss      Tinnitus handout included: elimination of exacerbating factors such as elevated blood pressure, high sodium intake, caffeine/stimulant intake, sleep deprivation/insomnia, ASA/NSAID use, certain medications, exposure to loud sounds, etc. White noise, hearing aids w/masking technology, and tinnitus retraining therapy (TRT) recommended. List of OTC herbal supplements given. All questions answered.   PATIENT IS MEDICALLY CLEARED FOR HEARING AIDS. The patient's hearing loss is not due to a temporary, correctable physical condition. There are no contraindications to hearing aid candidacy. Patient's audiogram reveals the patient is a candidate for amplification. Audiogram is reviewed in detail with the patient. The audiologist's recommendation that the patient have amplification/hearing aids is discussed and questions answered. Patient has been given information by the audiologist on how to schedule a hearing aid consultation. Patient is encouraged to wear ear protection in loud noise and return annually for hearing test. Return to clinic as needed for further ENT concerns.         No follow-ups on file.

## 2024-11-12 NOTE — PATIENT INSTRUCTIONS
Tinnitus (Ringing in the Ears)  Tinnitus is the term for a noise in your ear not caused by an outside sound. The noise might be a ringing, buzzing, hissing, roaring. It can vary in pitch and may be soft or quite loud. For some people, tinnitus is a minor nuisance. But for others, the noise can make it hard to hear, work, and even sleep. When tinnitus can't be cured, a number of treatments may offer relief.  What causes tinnitus?  Loud noises, hearing loss, and ear wax can cause tinnitus. So can certain medicines. Common medications that can cause tinnitus include antidepressants, pain medications, antianxiety meds, blood pressure meds, accutane, antibiotics, antivirals, vaccines, chemotherapy, seizure meds, and bipolar meds. Large amounts of aspirin or caffeine are sometimes to blame. In many cases, the exact cause of tinnitus is unknown.   How is tinnitus treated?  Identifying and removing the cause is the best way to treat tinnitus. For that reason, your healthcare provider may refer you to an otolaryngologist (ear, nose, and throat doctor). Your hearing may also be checked by an audiologist (hearing specialist). If you have hearing loss, wearing a hearing aid may help relieve your tinnitus. When the cause can't be found, the tinnitus itself may be treated. Some of the treatments are listed below:  Avoid exposure to loud sounds, which will exacerbate tinnitus.  Wear ear protection around loud noises.  Get your blood pressure check regularly.  If it is high, see your doctor.  Check with your PCP whether labs can be done to check for anemia and hyperthyroid, as these can worsen tinnitus.   Decrease salt intake.  Avoid stimulants such as caffeine and tobacco.  Exercise daily to improve circulation. Poor circulation worsens tinnitus.   Avoid sleep deprivation. Sleep deprivation and insomnia can worsen tinnitus. Get adequate rest.  Reduce aspirin use, if possible. Aspirin as well as NSAIDs and narcotic pain relievers  can exacerbate tinnitus.   Stop worrying about the noise.  Stress worsens tinnitus. Recognize the noise as an annoyance and learn to ignore it as much as possible. Patients with higher rates of anxiety, depression, concentration, or problems sleeping may need to discuss taking something for anxiety or depression with their primary care provider.   Consider a trial of lipoflavonoids, slow-release niacin, or Arches' Tinnitus Formula (over-the-counter).  Purchase a white noise machine to mask tinnitus.  The Songbird Tinnitus Relief wilber on your smart phone uses a combination of sounds and relaxing exercises that aim to distract your brain from focusing on tinnitus. Over time the brain learns to focus less on the tinnitus.   Maskers are small devices that look like hearing aids. They emit a pleasant sound that helps cover up the ringing in your ears, similar to the technology used in noise-cancelling headphones. Hearing aids and maskers are sometimes used together.  Cognitive Behavioral Therapy (CBT), otherwise known as Tinnitus Retraining Therapy (TRT), can be done by either an audiologist or a cognitive behavioral therapist who is certified in TRT. Tinnitus retraining therapy combines biofeedback, counseling and maskers.   Medicines that treat anxiety and depression may ease tinnitus in some people.  Hypnosis or relaxation therapy may help noise seem less severe.    First-line treatment for tinnitus:  Elimination of exacerbating factors such as elevated blood pressure, high sodium intake, caffeine/stimulants, sleep deprivation/insomnia, certain medications, aspirin, exposure to loud sounds, etc. White noise is recommended as first-line treatment.    Second-line treatment for tinnitus:  Maskers (with or without the use of a hearing aid depending on the outcome of your hearing test) and/or Cognitive Behavior Therapy (Tinnitus Retraining Therapy).  To find an audiologist or Cognitive Behavioral Therapist in your area who  is certified in Tinnitus Retraining Therapy, you must contact the American Tinnitus Association at [phone] or www.josephine.org. We have had some success with the [employer] [facility] Program.  If needed, we can refer you to Psych Department at [facility] for CBT.  The American Tinnitus Association may have additional resources.  Khushi Chun at [place] would be willing to discuss further if needed, [phone].     Tinnitus Retraining Therapy (TRT):  Some audiologists have experience doing tinnitus evaluations with pitch matching, counseling,and hearing aids that contain tinnitus solutions, so check with your audiologist first.    There is an audiologist at [facility] in [place] named Elio Stahl who sees patients for TRT.  Please call [phone] for more information.     For more information  American Speech-Hearing-Language Association [phone] www.brisa.org  American Tinnitus Association [phone] [email or web address]  National [place] on Deafness and other Communication Disorders [phone] www.nidcd.nih.gov

## 2024-11-14 ENCOUNTER — PATIENT MESSAGE (OUTPATIENT)
Dept: CARDIOLOGY | Facility: CLINIC | Age: 64
End: 2024-11-14
Payer: COMMERCIAL

## 2024-11-15 ENCOUNTER — LAB VISIT (OUTPATIENT)
Dept: LAB | Facility: HOSPITAL | Age: 64
End: 2024-11-15
Attending: INTERNAL MEDICINE
Payer: COMMERCIAL

## 2024-11-15 DIAGNOSIS — Z79.02 LONG TERM (CURRENT) USE OF ANTITHROMBOTICS/ANTIPLATELETS: Chronic | ICD-10-CM

## 2024-11-15 DIAGNOSIS — E78.5 DYSLIPIDEMIA (HIGH LDL; LOW HDL): Chronic | ICD-10-CM

## 2024-11-15 DIAGNOSIS — I10 ESSENTIAL HYPERTENSION: Chronic | ICD-10-CM

## 2024-11-15 LAB
ALBUMIN SERPL BCP-MCNC: 4.1 G/DL (ref 3.5–5.2)
ALP SERPL-CCNC: 76 U/L (ref 40–150)
ALT SERPL W/O P-5'-P-CCNC: 20 U/L (ref 10–44)
ANION GAP SERPL CALC-SCNC: 10 MMOL/L (ref 8–16)
AST SERPL-CCNC: 27 U/L (ref 10–40)
BASOPHILS # BLD AUTO: 0.01 K/UL (ref 0–0.2)
BASOPHILS NFR BLD: 0.1 % (ref 0–1.9)
BILIRUB SERPL-MCNC: 0.5 MG/DL (ref 0.1–1)
BUN SERPL-MCNC: 11 MG/DL (ref 8–23)
CALCIUM SERPL-MCNC: 9.4 MG/DL (ref 8.7–10.5)
CHLORIDE SERPL-SCNC: 106 MMOL/L (ref 95–110)
CHOLEST SERPL-MCNC: 112 MG/DL (ref 120–199)
CHOLEST/HDLC SERPL: 4.3 {RATIO} (ref 2–5)
CO2 SERPL-SCNC: 25 MMOL/L (ref 23–29)
CREAT SERPL-MCNC: 1.2 MG/DL (ref 0.5–1.4)
DIFFERENTIAL METHOD BLD: ABNORMAL
EOSINOPHIL # BLD AUTO: 0 K/UL (ref 0–0.5)
EOSINOPHIL NFR BLD: 0 % (ref 0–8)
ERYTHROCYTE [DISTWIDTH] IN BLOOD BY AUTOMATED COUNT: 12.9 % (ref 11.5–14.5)
EST. GFR  (NO RACE VARIABLE): >60 ML/MIN/1.73 M^2
GLUCOSE SERPL-MCNC: 101 MG/DL (ref 70–110)
HCT VFR BLD AUTO: 38.9 % (ref 40–54)
HDLC SERPL-MCNC: 26 MG/DL (ref 40–75)
HDLC SERPL: 23.2 % (ref 20–50)
HGB BLD-MCNC: 13.1 G/DL (ref 14–18)
IMM GRANULOCYTES # BLD AUTO: 0.06 K/UL (ref 0–0.04)
IMM GRANULOCYTES NFR BLD AUTO: 0.5 % (ref 0–0.5)
LDLC SERPL CALC-MCNC: 50.4 MG/DL (ref 63–159)
LYMPHOCYTES # BLD AUTO: 2.1 K/UL (ref 1–4.8)
LYMPHOCYTES NFR BLD: 16.4 % (ref 18–48)
MCH RBC QN AUTO: 30.9 PG (ref 27–31)
MCHC RBC AUTO-ENTMCNC: 33.7 G/DL (ref 32–36)
MCV RBC AUTO: 92 FL (ref 82–98)
MONOCYTES # BLD AUTO: 0.5 K/UL (ref 0.3–1)
MONOCYTES NFR BLD: 4 % (ref 4–15)
NEUTROPHILS # BLD AUTO: 10 K/UL (ref 1.8–7.7)
NEUTROPHILS NFR BLD: 79 % (ref 38–73)
NONHDLC SERPL-MCNC: 86 MG/DL
NRBC BLD-RTO: 0 /100 WBC
PLATELET # BLD AUTO: 203 K/UL (ref 150–450)
PMV BLD AUTO: 10.5 FL (ref 9.2–12.9)
POTASSIUM SERPL-SCNC: 4.3 MMOL/L (ref 3.5–5.1)
PROT SERPL-MCNC: 6.8 G/DL (ref 6–8.4)
RBC # BLD AUTO: 4.24 M/UL (ref 4.6–6.2)
SODIUM SERPL-SCNC: 141 MMOL/L (ref 136–145)
TRIGL SERPL-MCNC: 178 MG/DL (ref 30–150)
WBC # BLD AUTO: 12.65 K/UL (ref 3.9–12.7)

## 2024-11-15 PROCEDURE — 36415 COLL VENOUS BLD VENIPUNCTURE: CPT | Mod: PO | Performed by: INTERNAL MEDICINE

## 2024-11-15 PROCEDURE — 85025 COMPLETE CBC W/AUTO DIFF WBC: CPT | Performed by: INTERNAL MEDICINE

## 2024-11-15 PROCEDURE — 80061 LIPID PANEL: CPT | Performed by: INTERNAL MEDICINE

## 2024-11-15 PROCEDURE — 80053 COMPREHEN METABOLIC PANEL: CPT | Performed by: INTERNAL MEDICINE

## 2024-11-21 ENCOUNTER — OFFICE VISIT (OUTPATIENT)
Dept: CARDIOLOGY | Facility: CLINIC | Age: 64
End: 2024-11-21
Attending: INTERNAL MEDICINE
Payer: COMMERCIAL

## 2024-11-21 VITALS
BODY MASS INDEX: 25.15 KG/M2 | HEART RATE: 70 BPM | DIASTOLIC BLOOD PRESSURE: 78 MMHG | SYSTOLIC BLOOD PRESSURE: 132 MMHG | WEIGHT: 156.5 LBS | HEIGHT: 66 IN

## 2024-11-21 DIAGNOSIS — I77.810 MILD ASCENDING AORTA DILATATION: Chronic | ICD-10-CM

## 2024-11-21 DIAGNOSIS — I73.9 PVD (PERIPHERAL VASCULAR DISEASE): Chronic | ICD-10-CM

## 2024-11-21 DIAGNOSIS — I65.21 CAROTID STENOSIS, ASYMPTOMATIC, RIGHT: Primary | Chronic | ICD-10-CM

## 2024-11-21 DIAGNOSIS — I10 ESSENTIAL HYPERTENSION: Chronic | ICD-10-CM

## 2024-11-21 DIAGNOSIS — E78.5 DYSLIPIDEMIA (HIGH LDL; LOW HDL): Chronic | ICD-10-CM

## 2024-11-21 DIAGNOSIS — Z95.828 S/P FEMORAL-FEMORAL BYPASS SURGERY: Chronic | ICD-10-CM

## 2024-11-21 PROCEDURE — 3008F BODY MASS INDEX DOCD: CPT | Mod: CPTII,S$GLB,, | Performed by: INTERNAL MEDICINE

## 2024-11-21 PROCEDURE — 99214 OFFICE O/P EST MOD 30 MIN: CPT | Mod: S$GLB,,, | Performed by: INTERNAL MEDICINE

## 2024-11-21 PROCEDURE — 4010F ACE/ARB THERAPY RXD/TAKEN: CPT | Mod: CPTII,S$GLB,, | Performed by: INTERNAL MEDICINE

## 2024-11-21 PROCEDURE — 99999 PR PBB SHADOW E&M-EST. PATIENT-LVL IV: CPT | Mod: PBBFAC,,, | Performed by: INTERNAL MEDICINE

## 2024-11-21 PROCEDURE — 3075F SYST BP GE 130 - 139MM HG: CPT | Mod: CPTII,S$GLB,, | Performed by: INTERNAL MEDICINE

## 2024-11-21 PROCEDURE — 3044F HG A1C LEVEL LT 7.0%: CPT | Mod: CPTII,S$GLB,, | Performed by: INTERNAL MEDICINE

## 2024-11-21 PROCEDURE — 1159F MED LIST DOCD IN RCRD: CPT | Mod: CPTII,S$GLB,, | Performed by: INTERNAL MEDICINE

## 2024-11-21 PROCEDURE — 3078F DIAST BP <80 MM HG: CPT | Mod: CPTII,S$GLB,, | Performed by: INTERNAL MEDICINE

## 2024-11-21 RX ORDER — ALLOPURINOL 100 MG/1
100 TABLET ORAL
COMMUNITY
Start: 2024-11-08

## 2024-11-21 NOTE — PROGRESS NOTES
Subjective:    Patient ID:  Sal Villarreal Jr. is a 64 y.o. male who presents for Follow-up, Carotid Artery Disease, and Peripheral Arterial Disease        HPI  DISCUSSED LABS AND GOALS CMP OK HEMOGLOBIN 13.1 UP, LDL 50, HDL 26, TRIGLYCERIDE 178, CAROTID ULTRASOUND 70- 79% RIGHT INTERNAL CAROTID ARTERY STENOSIS, CTA 70%, , NO SMOKING, WALKS 3-4 MI/D, SEE ROS    Past Medical History:   Diagnosis Date    Anxiety     Arthritis     Depression     Digestive disorder     ulcers    Fatigue     History of acute bronchitis     History of psychiatric care     Hypertension     Intervertebral disc disorder with radiculopathy of lumbar region 3/23/2021    Psychiatric problem     PVD (peripheral vascular disease)     S/P femoral-femoral bypass surgery 12/1/2021     Past Surgical History:   Procedure Laterality Date    ANTERIOR LUMBAR INTERBODY FUSION (ALIF) Right 03/23/2021    Procedure: FUSION, SPINE, LUMBAR, ALIF RIGHT L5-S1;  Surgeon: Kelly Ayala MD;  Location: [facility] OR;  Service: Neurosurgery;  Laterality: Right;    AORTOGRAPHY WITH EXTREMITY RUNOFF N/A 07/27/2021    Procedure: AORTOGRAM, WITH EXTREMITY RUNOFF;  Surgeon: Jim Briggs MD;  Location: [facility] CATH;  Service: Cardiovascular;  Laterality: N/A;    AORTOGRAPHY WITH SERIALOGRAPHY Bilateral 07/27/2021    Procedure: AORTOGRAM, WITH SERIALOGRAPHY;  Surgeon: Jim Briggs MD;  [facility] CATH;  Service: Cardiovascular;  Laterality: Bilateral;    APPENDECTOMY  3/23    CREATION OF FEMORAL-FEMORAL ARTERY BYPASS WITH GRAFT Left 11/30/2021    Procedure: CREATION, BYPASS, ARTERIAL, FEMORAL TO FEMORAL, USING GRAFT;  Surgeon: Jim Briggs MD;  Location: [facility] OR;  Service: Cardiovascular;  Laterality: Left;    ENDARTERECTOMY OF FEMORAL ARTERY Bilateral 11/30/2021    Procedure: ENDARTERECTOMY, FEMORAL;  Surgeon: Jim Briggs MD;  Location: [facility] OR;  Service: Cardiovascular;  Laterality: Bilateral;    FUSION OF LUMBAR SPINE BY ANTERIOR APPROACH  03/23/2021    Procedure:  FUSION, SPINE, LUMBAR, ANTERIOR APPROACH;  Surgeon: Jim Briggs MD;  Location: [facility] OR;  Service: Cardiovascular;;    HARVESTING OF BONE GRAFT N/A 2021    Procedure: SURGICAL PROCUREMENT, BONE GRAFT-Iliac;  Surgeon: Kelly Ayala MD;  Location: [facility] OR;  Service: Neurosurgery;  Laterality: N/A;    HYPOSPADIAS CORRECTION      SPINE SURGERY  3/22    VASECTOMY       Family History   Problem Relation Name Age of Onset    Diabetes Mother Reba     Hypertension Mother Reba     Diabetes Father Sal     Hypertension Father Sal     Cancer Sister      Muscular dystrophy Son      Arthritis Paternal Grandfather Luke     Heart disease Paternal Grandfather Luke     Birth defects Brother ESPERANZA     Cancer Brother ESPERANZA     Heart disease Maternal Aunt Zulema     Heart disease Paternal Uncle Santo     Suicide Neg Hx      Sexual abuse Neg Hx      Self injury Neg Hx      Seizures Neg Hx      Schizophrenia Neg Hx      Physical abuse Neg Hx      Paranoid behavior Neg Hx      OCD Neg Hx      Drug abuse Neg Hx      Depression Neg Hx      Dementia Neg Hx      Bipolar disorder Neg Hx      Anxiety disorder Neg Hx      Alcohol abuse Neg Hx       Social History     Socioeconomic History    Marital status:    Occupational History    Occupation: unemployed     Employer: [employer]   Tobacco Use    Smoking status: Former     Current packs/day: 0.00     Average packs/day: 0.3 packs/day for 42.0 years (10.5 ttl pk-yrs)     Types: Cigarettes     Start date: 1980     Quit date: 2022     Years since quittin.1     Passive exposure: Never    Smokeless tobacco: Never   Substance and Sexual Activity    Alcohol use: Not Currently     Comment: stopped recently - last drink 3/21    Drug use: No    Sexual activity: Yes     Partners: Male     Birth control/protection: Partner-Vasectomy   Other Topics Concern    Patient feels they ought to cut down on drinking/drug use Yes    Patient annoyed by others  criticizing their drinking/drug use Yes    Patient has felt bad or guilty about drinking/drug use Yes    Patient has had a drink/used drugs as an eye opener in the AM No     Social Drivers of Health     Financial Resource Strain: Low Risk  (5/9/2024)    Overall Financial Resource Strain (CARDIA)     Difficulty of Paying Living Expenses: Not hard at all   Food Insecurity: No Food Insecurity (5/9/2024)    Hunger Vital Sign     Worried About Running Out of Food in the Last Year: Never true     Ran Out of Food in the Last Year: Never true   Transportation Needs: No Transportation Needs (5/9/2024)    PRAPARE - Transportation     Lack of Transportation (Medical): No     Lack of Transportation (Non-Medical): No   Physical Activity: Sufficiently Active (5/9/2024)    Exercise Vital Sign     Days of Exercise per Week: 6 days     Minutes of Exercise per Session: 150+ min   Stress: No Stress Concern Present (5/9/2024)    [employer] [place] [employer] - Occupational Stress Questionnaire     Feeling of Stress : Only a little   Housing Stability: Low Risk  (11/27/2023)    Housing Stability Vital Sign     Unable to Pay for Housing in the Last Year: No     Number of Places Lived in the Last Year: 1     Unstable Housing in the Last Year: No       Review of patient's allergies indicates:   Allergen Reactions    Blueberry        Current Outpatient Medications:     acetaminophen (TYLENOL EXTRA STRENGTH) 500 MG tablet, Take 500 mg by mouth 2 (two) times a day., Disp: , Rfl:     allopurinoL (ZYLOPRIM) 100 MG tablet, Take 100 mg by mouth., Disp: , Rfl:     amitriptyline (ELAVIL) 25 MG tablet, TAKE 1 TABLET BY MOUTH EVERY EVENING, Disp: 90 tablet, Rfl: 0    aspirin (ECOTRIN) 81 MG EC tablet, Take 1 tablet (81 mg total) by mouth once daily., Disp: 90 tablet, Rfl: 0    atenoloL (TENORMIN) 25 MG tablet, TAKE 1 TABLET BY MOUTH EVERY DAY IN THE MORNING, Disp: 90 tablet, Rfl: 3    buPROPion (WELLBUTRIN XL) 150 MG TB24 tablet, Take 1  tablet (150 mg total) by mouth once daily., Disp: 30 tablet, Rfl: 11    clopidogreL (PLAVIX) 75 mg tablet, TAKE 1 TABLET BY MOUTH EVERY DAY, Disp: 90 tablet, Rfl: 1    famotidine (PEPCID) 20 MG tablet, Take 2 tablets (40 mg total) by mouth 2 (two) times daily., Disp: 360 tablet, Rfl: 0    ferrous sulfate 325 (65 FE) MG EC tablet, TAKE 1 TABLET BY MOUTH ONCE DAILY., Disp: 90 tablet, Rfl: 1    lisinopriL 10 MG tablet, TAKE 1 TABLET BY MOUTH EVERY DAY IN THE EVENING, Disp: 90 tablet, Rfl: 1    multivitamin (THERAGRAN) per tablet, Take 1 tablet by mouth once daily., Disp: , Rfl:     omega-3 acid ethyl esters (LOVAZA) 1 gram capsule, TAKE 2 CAPSULES BY MOUTH 2 TIMES DAILY., Disp: 360 capsule, Rfl: 0    pantoprazole (PROTONIX) 40 MG tablet, Take 1 tablet (40 mg total) by mouth 2 (two) times daily., Disp: 180 tablet, Rfl: 3    rosuvastatin (CRESTOR) 5 MG tablet, TAKE 1 TABLET BY MOUTH EVERY DAY IN THE EVENING, Disp: 90 tablet, Rfl: 1    SODIUM FLUORIDE 5000 DRY MOUTH 1.1 % Pste, SMARTSIG:By Mouth Morning-Night, Disp: , Rfl:     tadalafiL (CIALIS) 20 MG Tab, Take 1 tablet (20 mg total) by mouth daily as needed (1/2 or 1 full tablet as needed for erectile dysfunction)., Disp: 20 tablet, Rfl: 11    tadalafiL (CIALIS) 20 MG Tab, Take 1 tablet (20 mg total) by mouth As instructed (Take 1 by mouth as needed 2 to 12 hours prior to sexual activity)., Disp: 30 tablet, Rfl: 6    tadalafiL (CIALIS) 20 MG Tab, Take 1 tablet (20 mg total) by mouth As instructed (Take 1 by mouth as needed 2 to 12 hours prior to sexual activity.)., Disp: 30 tablet, Rfl: 6    Review of Systems   Constitutional: Negative for chills, diaphoresis, fever, malaise/fatigue and night sweats.   HENT:  Negative for congestion and nosebleeds.    Eyes:  Negative for blurred vision and visual disturbance.   Cardiovascular:  Negative for chest pain, claudication, cyanosis, dyspnea on exertion, irregular heartbeat, leg swelling, near-syncope, orthopnea, palpitations,  "paroxysmal nocturnal dyspnea and syncope.   Respiratory:  Negative for cough, hemoptysis, shortness of breath and wheezing.    Endocrine: Negative for polyphagia and polyuria.   Hematologic/Lymphatic: Negative for adenopathy. Does not bruise/bleed easily.   Skin:  Negative for color change and rash.   Musculoskeletal:  Negative for back pain (MILD), falls and neck pain (CHRONIC).   Gastrointestinal:  Negative for abdominal pain, change in bowel habit, dysphagia, jaundice, melena and nausea.   Genitourinary:  Negative for dysuria and flank pain.        ED   Neurological:  Negative for brief paralysis, focal weakness, headaches, light-headedness, loss of balance and weakness.   Psychiatric/Behavioral:  Negative for altered mental status and depression.    Allergic/Immunologic: Negative for persistent infections.        Objective:      Vitals:    11/21/24 1258   BP: 132/78   Pulse: 70   Weight: 71 kg (156 lb 8.4 oz)   Height: 5' 6" (1.676 m)   PainSc: 0-No pain     Body mass index is 25.26 kg/m².    Physical Exam  Constitutional:       Appearance: Normal appearance.   HENT:      Head: Normocephalic and atraumatic.   Eyes:      Extraocular Movements: Extraocular movements intact.      Conjunctiva/sclera: Conjunctivae normal.   Neck:      Vascular: Carotid bruit present.   Cardiovascular:      Rate and Rhythm: Normal rate and regular rhythm. No extrasystoles are present.     Pulses:           Carotid pulses are 2+ on the right side with bruit and 2+ on the left side.       Radial pulses are 2+ on the right side and 2+ on the left side.        Femoral pulses are 1+ on the right side with bruit and 2+ on the left side with bruit.       Posterior tibial pulses are 1+ on the right side and 1+ on the left side.      Heart sounds: Murmur heard.      Systolic murmur is present with a grade of 2/6 at the upper right sternal border.      No friction rub. No gallop.   Pulmonary:      Effort: Pulmonary effort is normal. Prolonged " expiration: SLIGHTLY.      Breath sounds: Normal breath sounds.   Abdominal:      Palpations: Abdomen is soft.      Tenderness: There is no abdominal tenderness.       Musculoskeletal:      Cervical back: Neck supple.      Right lower leg: No edema.      Left lower leg: No edema.        Legs:    Skin:     General: Skin is warm and dry.      Capillary Refill: Capillary refill takes less than 2 seconds.   Neurological:      General: No focal deficit present.      Mental Status: He is alert.   Psychiatric:         Mood and Affect: Mood normal.         Speech: Speech normal.         Behavior: Behavior normal.                 ..    Chemistry        Component Value Date/Time     11/15/2024 0817    K 4.3 11/15/2024 0817     11/15/2024 0817    CO2 25 11/15/2024 0817    BUN 11 11/15/2024 0817    CREATININE 1.2 11/15/2024 0817     11/15/2024 0817        Component Value Date/Time    CALCIUM 9.4 11/15/2024 0817    ALKPHOS 76 11/15/2024 0817    AST 27 11/15/2024 0817    ALT 20 11/15/2024 0817    BILITOT 0.5 11/15/2024 0817    ESTGFRAFRICA >60.0 06/06/2022 1037    EGFRNONAA >60.0 06/06/2022 1037            ..  Lab Results   Component Value Date    CHOL 112 (L) 11/15/2024    CHOL 98 (L) 08/08/2024    CHOL 123 05/10/2024     Lab Results   Component Value Date    HDL 26 (L) 11/15/2024    HDL 27 (L) 08/08/2024    HDL 35 (L) 05/10/2024     Lab Results   Component Value Date    LDLCALC 50.4 (L) 11/15/2024    LDLCALC 41.6 (L) 08/08/2024    LDLCALC 44.2 (L) 05/10/2024     Lab Results   Component Value Date    TRIG 178 (H) 11/15/2024    TRIG 147 08/08/2024    TRIG 219 (H) 05/10/2024     Lab Results   Component Value Date    CHOLHDL 23.2 11/15/2024    CHOLHDL 27.6 08/08/2024    CHOLHDL 28.5 05/10/2024     ..  Lab Results   Component Value Date    WBC 12.65 11/15/2024    HGB 13.1 (L) 11/15/2024    HCT 38.9 (L) 11/15/2024    MCV 92 11/15/2024     11/15/2024       Test(s) Reviewed  I have reviewed the following in  detail:  [] Stress test   [] Angiography   [] Echocardiogram   [x] Labs   [] Other:       Assessment:         ICD-10-CM ICD-9-CM   1. Carotid stenosis, asymptomatic, right  I65.21 433.10   2. PVD (peripheral vascular disease)  I73.9 443.9   3. Dyslipidemia (high LDL; low HDL)  E78.5 272.4   4. Essential hypertension  I10 401.9   5. Mild ascending aorta dilatation  I77.810 447.71   6. S/P femoral-femoral bypass surgery  Z95.828 V45.89     Problem List Items Addressed This Visit          Cardiac/Vascular    Essential hypertension (Chronic)    Overview     CHRONIC. August 2024:  Blood pressure well controlled.  Hypertension Medications               atenoloL (TENORMIN) 25 MG tablet TAKE 1 TABLET BY MOUTH EVERY DAY IN THE MORNING    lisinopriL (PRINIVIL,ZESTRIL) 5 MG tablet TAKE 1 TABLET BY MOUTH EVERY DAY            STABLE. BP Reviewed.  Compliant with BP medications. No SE reported.   (-) CP, SOB, palpitations, dizziness, lightheadedness, HA, arm numbness, tingling or weakness, syncope.  Creatinine   Date Value Ref Range Status   08/08/2024 1.0 0.5 - 1.4 mg/dL Final     Lab Results   Component Value Date    K 3.8 08/08/2024       Results for orders placed or performed during the hospital encounter of 11/29/21   EKG 12-LEAD    Collection Time: 11/29/21  2:48 PM    Narrative    Test Reason : I77.9,    Vent. Rate : 061 BPM     Atrial Rate : 061 BPM     P-R Int : 156 ms          QRS Dur : 086 ms      QT Int : 416 ms       P-R-T Axes : 072 063 052 degrees     QTc Int : 418 ms    Normal sinus rhythm  Normal ECG  When compared with ECG of 28-OCT-2021 22:49,  Previous ECG has undetermined rhythm, needs review  Criteria for Anterior infarct are no longer Present  Confirmed by Deshawn CONCEPCION, Arnold JORDAN ([id number]) on 11/29/2021 2:56:48 PM    Referred By: VAISHALI DELA CRUZ           Confirmed By:Arnold Hess MD              Dyslipidemia (high LDL; low HDL) (Chronic)    Overview     CHRONIC. August 2024: reviewed labs. He is taking statin and  fish oil. Vascepa not covered by his insurance.    Hyperlipidemia Medications               omega-3 acid ethyl esters (LOVAZA) 1 gram capsule Take 2 capsules (2 g total) by mouth 2 (two) times daily.    rosuvastatin (CRESTOR) 5 MG tablet Take 1 tablet (5 mg total) by mouth every evening.     March 2023: Reports compliance with rosuvastatin 5 milligrams daily.  August 2022: LDL is very low on rosuvastatin 10 milligrams daily.  STABLE. Lab analysis reviewed.   (-) CP, SOB, abdominal pain, N/V/D, constipation, jaundice, skin changes.  (-) Myalgias  Lab Results   Component Value Date    CHOL 98 (L) 08/08/2024    CHOL 123 05/10/2024    CHOL 132 10/11/2023     Lab Results   Component Value Date    HDL 27 (L) 08/08/2024    HDL 35 (L) 05/10/2024    HDL 20 (L) 10/11/2023     Lab Results   Component Value Date    LDLCALC 41.6 (L) 08/08/2024    LDLCALC 44.2 (L) 05/10/2024    LDLCALC Invalid, Trig>400.0 10/11/2023     Lab Results   Component Value Date    TRIG 147 08/08/2024    TRIG 219 (H) 05/10/2024    TRIG 529 (H) 10/11/2023     Lab Results   Component Value Date    CHOLHDL 27.6 08/08/2024    CHOLHDL 28.5 05/10/2024    CHOLHDL 15.2 (L) 10/11/2023     Lab Results   Component Value Date    TOTALCHOLEST 3.6 08/08/2024    TOTALCHOLEST 3.5 05/10/2024    TOTALCHOLEST 6.6 (H) 10/11/2023     Lab Results   Component Value Date    ALT 17 08/08/2024    AST 28 08/08/2024    ALKPHOS 83 08/08/2024    BILITOT 0.6 08/08/2024     ======================================================  The ASCVD Risk score (Clara DK, et al., 2019) failed to calculate for the following reasons:    The valid total cholesterol range is 130 to 320 mg/dL           S/P femoral-femoral bypass surgery    Relevant Orders    CV Ultrasound doppler arterial legs bilat    Carotid stenosis, asymptomatic, right - Primary    Relevant Orders    CV Ultrasound Bilateral Doppler Carotid    Mild ascending aorta dilatation    PVD (peripheral vascular disease)    Relevant Orders     CV Ultrasound doppler arterial legs bilat        Plan:     ALL CV CLINICALLY STABLE, NO ANGINA, NO HF, NO TIA, NO CLINICAL ARRHYTHMIA,CONTINUE CURRENT MEDS, EDUCATION, DIET, EXERCISE , WALKING EXERCISE PROGRAM EXPLAINED, RETURN TO CLINIC IN 6 MO WITH CAROTIDS AND ARTERIAL, MONITOR STENOSIS      Carotid stenosis, asymptomatic, right  Comments:  MONITOR  Orders:  -     CV Ultrasound Bilateral Doppler Carotid; Future; Expected date: 05/21/2025    PVD (peripheral vascular disease)  -     CV Ultrasound doppler arterial legs bilat; Future; Expected date: 05/21/2025    Dyslipidemia (high LDL; low HDL)    Essential hypertension  Comments:  CONTROLLED    Mild ascending aorta dilatation    S/P femoral-femoral bypass surgery  -     CV Ultrasound doppler arterial legs bilat; Future; Expected date: 05/21/2025    RTC Low level/low impact aerobic exercise 5x's/wk. Heart healthy diet and risk factor modification.    See labs and med orders.    Aerobic exercise 5x's/wk. Heart healthy diet and risk factor modification.    See labs and med orders.      ALL CV CLINICALLY STABLE, NO ANGINA, NO HF, NO TIA, NO CLINICAL ARRHYTHMIA,CONTINUE CURRENT MEDS, EDUCATION, DIET, EXERCISE

## 2024-12-27 DIAGNOSIS — Z79.899 ENCOUNTER FOR LONG-TERM (CURRENT) USE OF MEDICATIONS: ICD-10-CM

## 2024-12-27 RX ORDER — AMITRIPTYLINE HYDROCHLORIDE 25 MG/1
25 TABLET, FILM COATED ORAL NIGHTLY
Qty: 90 TABLET | Refills: 0 | Status: SHIPPED | OUTPATIENT
Start: 2024-12-27

## 2024-12-27 NOTE — TELEPHONE ENCOUNTER
Care Due:                  Date            Visit Type   Department     Provider  --------------------------------------------------------------------------------                                ESTABLISHED                              PATIENT -    [facility] FAMILY  Last Visit: 12-      Shark Punch      MEDICINE       Bipin Penaloza  Next Visit: None Scheduled  None         None Found                                                            Last  Test          Frequency    Reason                     Performed    Due Date  --------------------------------------------------------------------------------    Office Visit  15 months..  amitriptyline, famotidine  12- 03-    [facility] Embedded Care Due Messages. Reference number: [id number].   12/27/2024 2:22:25 PM CST

## 2024-12-28 NOTE — TELEPHONE ENCOUNTER
Provider Staff:  Action required for this patient    Requires appointment      Please see care gap opportunities below in Care Due Message.    Thanks!  [facility]     Appointments      Date Provider   Last Visit   4/15/2024 Bipin Penaloza MD   Next Visit   Visit date not found Bipin Penaloza MD     Refill Decision Note   Sal Villarreal  is requesting a refill authorization.  Brief Assessment and Rationale for Refill:  Approve     Medication Therapy Plan:         Comments:     Note composed:7:54 PM 12/27/2024

## 2025-01-06 DIAGNOSIS — D50.9 IRON DEFICIENCY ANEMIA, UNSPECIFIED IRON DEFICIENCY ANEMIA TYPE: ICD-10-CM

## 2025-01-06 RX ORDER — FERROUS SULFATE 325(65) MG
325 TABLET, DELAYED RELEASE (ENTERIC COATED) ORAL DAILY
Qty: 90 TABLET | Refills: 1 | Status: SHIPPED | OUTPATIENT
Start: 2025-01-06

## 2025-01-06 NOTE — TELEPHONE ENCOUNTER
Refill Routing Note   Medication(s) are not appropriate for processing by [facility] for the following reason(s):        Outside of protocol    [facility] action(s):  Route               Appointments  past 12m or future 3m with PCP    Date Provider   Last Visit   4/15/2024 Bipin Penaloza MD   Next Visit   Visit date not found Bipin Penaloza MD   ED visits in past 90 days: 0        Note composed:9:48 AM 01/06/2025

## 2025-01-15 ENCOUNTER — TELEPHONE (OUTPATIENT)
Dept: CARDIOLOGY | Facility: CLINIC | Age: 65
End: 2025-01-15
Payer: COMMERCIAL

## 2025-01-15 ENCOUNTER — PATIENT MESSAGE (OUTPATIENT)
Dept: CARDIOLOGY | Facility: CLINIC | Age: 65
End: 2025-01-15
Payer: COMMERCIAL

## 2025-01-15 NOTE — TELEPHONE ENCOUNTER
Pt needing CV risk for Right Long Trigger finger release with Dr Barcenas under general anesthesia. Pt taking ASA and Plavix.     Fax: [phone]

## 2025-01-16 NOTE — TELEPHONE ENCOUNTER
Faxed to [phone] and [phone]   PAST SURGICAL HISTORY:  After cataract, bilateral     H/O coronary angioplasty     History of arthroscopy of knee right    Hx of CABG

## 2025-01-19 DIAGNOSIS — K21.9 GASTROESOPHAGEAL REFLUX DISEASE, UNSPECIFIED WHETHER ESOPHAGITIS PRESENT: ICD-10-CM

## 2025-01-19 NOTE — TELEPHONE ENCOUNTER
No care due was identified.  [facility] Embedded Care Due Messages. Reference number: [id number].   1/19/2025 6:54:52 AM CST

## 2025-01-21 RX ORDER — FAMOTIDINE 20 MG/1
TABLET, FILM COATED ORAL
Qty: 360 TABLET | Refills: 0 | Status: SHIPPED | OUTPATIENT
Start: 2025-01-21

## 2025-01-21 NOTE — TELEPHONE ENCOUNTER
Refill Routing Note   Medication(s) are not appropriate for processing by [facility] for the following reason(s):        Other-Exceeds max dosage. Max dose intended for 12 weeks of use only    [facility] action(s):           Medication Therapy Plan: Exceeds max dosage. Max dose intended for 12 weeks of use only    Pharmacist review requested: Yes     Appointments  past 12m or future 3m with PCP    Date Provider   Last Visit   4/15/2024 Bipin Penaloza MD   Next Visit   Visit date not found Bipin Penaloza MD   ED visits in past 90 days: 0        Note composed:2:24 PM 01/21/2025

## 2025-01-21 NOTE — TELEPHONE ENCOUNTER
Refill Routing Note   Medication(s) are not appropriate for processing by [facility] for the following reason(s):        Other: High dose    [facility] action(s):  Defer        Medication Therapy Plan: Exceeds max dosage    Pharmacist review requested: Yes     Appointments  past 12m or future 3m with PCP    Date Provider   Last Visit   4/15/2024 Bipin Penaloza MD   Next Visit   Visit date not found Bipin Penaloza MD   ED visits in past 90 days: 0        Note composed:1:06 PM 01/21/2025

## 2025-01-30 DIAGNOSIS — I10 ESSENTIAL HYPERTENSION: ICD-10-CM

## 2025-01-30 RX ORDER — LISINOPRIL 10 MG/1
10 TABLET ORAL NIGHTLY
Qty: 90 TABLET | Refills: 1 | Status: SHIPPED | OUTPATIENT
Start: 2025-01-30 | End: 2025-01-31

## 2025-01-31 ENCOUNTER — PATIENT MESSAGE (OUTPATIENT)
Dept: FAMILY MEDICINE | Facility: CLINIC | Age: 65
End: 2025-01-31

## 2025-01-31 ENCOUNTER — E-VISIT (OUTPATIENT)
Dept: FAMILY MEDICINE | Facility: CLINIC | Age: 65
End: 2025-01-31
Payer: COMMERCIAL

## 2025-01-31 DIAGNOSIS — I10 ESSENTIAL HYPERTENSION: ICD-10-CM

## 2025-01-31 PROCEDURE — 99499 UNLISTED E&M SERVICE: CPT | Mod: ,,, | Performed by: FAMILY MEDICINE

## 2025-01-31 RX ORDER — LISINOPRIL 20 MG/1
20 TABLET ORAL NIGHTLY
Qty: 90 TABLET | Refills: 4 | Status: SHIPPED | OUTPATIENT
Start: 2025-01-31 | End: 2025-02-03 | Stop reason: DRUGHIGH

## 2025-02-01 NOTE — PROGRESS NOTES
Patient ID: Sal Villarreal Jr. is a 65 y.o. male.    Chief Complaint: General Illness (Entered automatically based on patient selection in simplify[employer].)    The patient initiated a request through simplify[employer] on 1/31/2025 for evaluation and management with a chief complaint of General Illness (Entered automatically based on patient selection in [employer].)     I evaluated the questionnaire submission on 01/31/2025      [facility] Evisit [facility]group-Chronic Conditions    1/31/2025  5:10 PM CST - Filed by Patient   What do you need help with? High Blood Pressure   Do you agree to participate in an E-Visit? Yes   If you have any of the following symptoms, please do not complete an E-Visit. Instead, schedule an appointment with your provider I acknowledge   What would you like addressed about your blood pressure? New concern   What is the main issue you would like addressed today? High blood pressure readings since 1/18/2015   Your blood pressure is a: Recurring problem   When were you first diagnosed with high blood pressure? More than 1 year ago   Since you first learned you had high blood pressure, how has it changed? Gradually worsening   How would you classify your blood pressure? Hard to control   Are you having any of the following symptoms from your high blood pressure? None of the above   Are you taking any of the following medications? Over-the-counter pain and fever relievers   The following factors can make high blood pressure worse or harder to control. Which of them might be contributing to your high blood pressure?  Pain   Have you taken blood pressure medications in the past that caused you problems or side effects? No   Are you currently taking medication(s) for your blood pressure? Yes   Have you recently started a new medication or changed your dose? Yes   How often are you taking your medication per week?  Every day   Have you had any side effects from your current blood pressure medication? No   Are you able  to take your blood pressure? Yes   Please give your most recent blood pressure readings    Reading 1 142/99 1/30/25 11:50am   Reading 2 147/101 1/30/25 3:55pm   Reading 3 129/97 1/31/25 7:00am   Reading 4 140/99 1/31/25 1:00pm   Reading 5 165/107 1/31/25 6:00   If you are able to take your pulse, please provide it below.    Provide any additional information you feel is important. First noticed 1/28/25 during pre op exam for carpal tunnel surgery   Please attach any relevant images or files    Are you able to take any other vitals? Yes   Weight: 159   Height: 66   Temperature:    Respiration rate:    Pulse Oxygen:          Encounter Diagnosis   Name Primary?    Essential hypertension         No orders of the defined types were placed in this encounter.     Medications Ordered This Encounter   Medications    lisinopriL (PRINIVIL,ZESTRIL) 20 MG tablet     Sig: Take 1 tablet (20 mg total) by mouth every evening.     Dispense:  90 tablet     Refill:  4     .        Follow up in about 1 week (around 2/7/2025), or if symptoms worsen or fail to improve, for HTN.      E-Visit Time Tracking:    Day 1 Time (in minutes): 7    Total Time (in minutes): 7

## 2025-02-01 NOTE — PATIENT INSTRUCTIONS
Follow up in about 1 week (around 2/7/2025), or if symptoms worsen or fail to improve, for HTN.     Dear patient,   As a result of recent federal legislation (The Federal Cures Act), you may receive lab or pathology results from your visit in your [facility] account before your physician is able to contact you. Your physician or their representative will relay the results to you with their recommendations at their soonest availability.     If no improvement in symptoms or symptoms worsen, please be advised to call MD, follow-up at clinic and/or go to ER if becomes severe.    Bipin Penaloza M.D.        We Offer TELEHEALTH & Same Day Appointments!   Book your Telehealth appointment with me through my nurse or   Clinic appointments on [facility]!    [address] [place] [address]    Office: [phone]   FAX: [phone]    Check out my Facebook Page and Follow Me at: [email or web address]    Check out my website at [employer] by clicking on: [email or web address]    To Schedule appointments online, go to [facility]: [email or web address]

## 2025-02-02 DIAGNOSIS — E79.0 HYPERURICEMIA WITHOUT SIGNS OF INFLAMMATORY ARTHRITIS AND TOPHACEOUS DISEASE: ICD-10-CM

## 2025-02-03 ENCOUNTER — OFFICE VISIT (OUTPATIENT)
Dept: FAMILY MEDICINE | Facility: CLINIC | Age: 65
End: 2025-02-03
Payer: COMMERCIAL

## 2025-02-03 DIAGNOSIS — Z79.899 ENCOUNTER FOR LONG-TERM (CURRENT) USE OF MEDICATIONS: Chronic | ICD-10-CM

## 2025-02-03 DIAGNOSIS — Z87.891 FORMER SMOKER: ICD-10-CM

## 2025-02-03 DIAGNOSIS — I10 ESSENTIAL HYPERTENSION: Primary | ICD-10-CM

## 2025-02-03 DIAGNOSIS — E78.5 DYSLIPIDEMIA (HIGH LDL; LOW HDL): Chronic | ICD-10-CM

## 2025-02-03 DIAGNOSIS — Z95.828 S/P FEMORAL-FEMORAL BYPASS SURGERY: ICD-10-CM

## 2025-02-03 PROCEDURE — G2211 COMPLEX E/M VISIT ADD ON: HCPCS | Mod: 95,,, | Performed by: NURSE PRACTITIONER

## 2025-02-03 PROCEDURE — 4010F ACE/ARB THERAPY RXD/TAKEN: CPT | Mod: CPTII,95,, | Performed by: NURSE PRACTITIONER

## 2025-02-03 PROCEDURE — 98006 SYNCH AUDIO-VIDEO EST MOD 30: CPT | Mod: 95,,, | Performed by: NURSE PRACTITIONER

## 2025-02-03 PROCEDURE — 1159F MED LIST DOCD IN RCRD: CPT | Mod: CPTII,95,, | Performed by: NURSE PRACTITIONER

## 2025-02-03 PROCEDURE — 1160F RVW MEDS BY RX/DR IN RCRD: CPT | Mod: CPTII,95,, | Performed by: NURSE PRACTITIONER

## 2025-02-03 RX ORDER — LISINOPRIL 20 MG/1
20 TABLET ORAL 2 TIMES DAILY
Qty: 180 TABLET | Refills: 3 | Status: SHIPPED | OUTPATIENT
Start: 2025-02-03 | End: 2026-02-03

## 2025-02-03 RX ORDER — OMEGA-3-ACID ETHYL ESTERS 1 G/1
2 CAPSULE, LIQUID FILLED ORAL 2 TIMES DAILY
Qty: 360 CAPSULE | Refills: 1 | Status: SHIPPED | OUTPATIENT
Start: 2025-02-03 | End: 2025-02-04

## 2025-02-03 RX ORDER — ALLOPURINOL 100 MG/1
100 TABLET ORAL
Qty: 90 TABLET | Refills: 1 | Status: SHIPPED | OUTPATIENT
Start: 2025-02-03

## 2025-02-03 NOTE — PROGRESS NOTES
Primary Care Telemedicine Note  The patient location is:  Patient's Home - [place]  The chief complaint leading to consultation is:   Chief Complaint   Patient presents with    Hypertension      Total time:  see MDM below. The total time spent on the encounter, which includes face to face time and non-face to face time preparing to see the patient (eg, review of previous medical records, tests), Obtaining and/or reviewing separately obtained history, documenting clinical information in the electronic or other health record, independently interpreting results (not separately reported)/communicating results to the patient/family/caregiver, and/or care coordination (not separately reported).    Visit type: Virtual visit with synchronous audio only and video  Each patient to whom he or she provides medical services by telemedicine is:  (1) informed of the relationship between the physician and patient and the respective role of any other health care provider with respect to management of the patient; and (2) notified that he or she may decline to receive medical services by telemedicine and may withdraw from such care at any time.  =================================================================    Assessment/Plan:  Problem List Items Addressed This Visit          Psychiatric    Encounter for long-term (current) use of medications (Chronic)    Overview     Feb 2025: reviewed labs.  October 2024: reviewed labs  August 2024: reviewed labs.  December 2023: Reviewed labs.  Sept 2023: Reviewed labs.  March 2023: Reviewed labs.  CHRONIC. Stable. Compliant with medications for managed conditions. See medication list. No SE reported.   Routine lab analysis is being monitored. Refills were addressed.  Lab Results   Component Value Date    WBC 12.65 11/15/2024    HGB 13.1 (L) 11/15/2024    HCT 38.9 (L) 11/15/2024    MCV 92 11/15/2024     11/15/2024         Chemistry        Component Value Date/Time     11/15/2024  0817    K 4.3 11/15/2024 0817     11/15/2024 0817    CO2 25 11/15/2024 0817    BUN 11 11/15/2024 0817    CREATININE 1.2 11/15/2024 0817     11/15/2024 0817        Component Value Date/Time    CALCIUM 9.4 11/15/2024 0817    ALKPHOS 76 11/15/2024 0817    AST 27 11/15/2024 0817    ALT 20 11/15/2024 0817    BILITOT 0.5 11/15/2024 0817    ESTGFRAFRICA >60.0 06/06/2022 1037    EGFRNONAA >60.0 06/06/2022 1037        Lab Results   Component Value Date    TSH 1.143 08/08/2024            Current Assessment & Plan     Complete history and Limited telemedicine physical was completed today.  Complete and thorough medication reconciliation was performed.  Discussed risks and benefits of medications.  Advised patient on orders and health maintenance.  We discussed old records and old labs if available.  Will request any records not available through epic.  Continue current medications listed on your summary sheet            Cardiac/Vascular    Essential hypertension - Primary (Chronic)    Overview     CHRONIC.   Feb 2025: blood pressure running elevated at home 140-150/90-100s     August 2024:  Blood pressure well controlled.    STABLE. BP Reviewed.  Compliant with BP medications. No SE reported.   (-) CP, SOB, palpitations, dizziness, lightheadedness, HA, arm numbness, tingling or weakness, syncope.  Creatinine   Date Value Ref Range Status   08/08/2024 1.0 0.5 - 1.4 mg/dL Final     Lab Results   Component Value Date    K 3.8 08/08/2024       Results for orders placed or performed during the hospital encounter of 11/29/21   EKG 12-LEAD    Collection Time: 11/29/21  2:48 PM    Narrative    Test Reason : I77.9,    Vent. Rate : 061 BPM     Atrial Rate : 061 BPM     P-R Int : 156 ms          QRS Dur : 086 ms      QT Int : 416 ms       P-R-T Axes : 072 063 052 degrees     QTc Int : 418 ms    Normal sinus rhythm  Normal ECG  When compared with ECG of 28-OCT-2021 22:49,  Previous ECG has undetermined rhythm, needs  review  Criteria for Anterior infarct are no longer Present  Confirmed by Deshawn CONCEPCION, Arnold JORDAN ([id number]) on 11/29/2021 2:56:48 PM    Referred By: VAISHALI DELA CRUZ           Confirmed By:Arnold Hess MD     Hypertension Medications               atenoloL (TENORMIN) 25 MG tablet TAKE 1 TABLET BY MOUTH EVERY DAY IN THE MORNING    lisinopriL (PRINIVIL,ZESTRIL) 20 MG tablet Take 1 tablet (20 mg total) by mouth 2 (two) times a day.            Current Assessment & Plan     Lisinopril increased to 20 mg BID. Recommend ambulatory monitoring of BP. Notify us of fluctuations in readings or if BP remains greater than 140/90. Follow up with cardiology. Continue current blood pressure medication regimen.Counseled on importance of hypertension disease course, I recommend ongoing Education for DASH-diet and exercise. Counseled on medication regimen importance of treating high blood pressure. Please be advised of risk of untreated blood pressure as discussed. Please advised of ER precautions were given for symptoms of hypertensive urgency and emergency.          Relevant Medications    lisinopriL (PRINIVIL,ZESTRIL) 20 MG tablet    Dyslipidemia (high LDL; low HDL) (Chronic)    Overview     CHRONIC.   Feb 2025: reviewed labs.    August 2024: reviewed labs. He is taking statin and fish oil. Vascepa not covered by his insurance.    Hyperlipidemia Medications               omega-3 acid ethyl esters (LOVAZA) 1 gram capsule Take 2 capsules (2 g total) by mouth 2 (two) times daily.    rosuvastatin (CRESTOR) 5 MG tablet Take 1 tablet (5 mg total) by mouth every evening.     March 2023: Reports compliance with rosuvastatin 5 milligrams daily.  August 2022: LDL is very low on rosuvastatin 10 milligrams daily.  STABLE. Lab analysis reviewed.   (-) CP, SOB, abdominal pain, N/V/D, constipation, jaundice, skin changes.  (-) Myalgias  Lab Results   Component Value Date    CHOL 112 (L) 11/15/2024    CHOL 98 (L) 08/08/2024    CHOL 123 05/10/2024     Lab  Results   Component Value Date    HDL 26 (L) 11/15/2024    HDL 27 (L) 08/08/2024    HDL 35 (L) 05/10/2024     Lab Results   Component Value Date    LDLCALC 50.4 (L) 11/15/2024    LDLCALC 41.6 (L) 08/08/2024    LDLCALC 44.2 (L) 05/10/2024     Lab Results   Component Value Date    TRIG 178 (H) 11/15/2024    TRIG 147 08/08/2024    TRIG 219 (H) 05/10/2024     Lab Results   Component Value Date    CHOLHDL 23.2 11/15/2024    CHOLHDL 27.6 08/08/2024    CHOLHDL 28.5 05/10/2024     Lab Results   Component Value Date    TOTALCHOLEST 4.3 11/15/2024    TOTALCHOLEST 3.6 08/08/2024    TOTALCHOLEST 3.5 05/10/2024     Lab Results   Component Value Date    ALT 20 11/15/2024    AST 27 11/15/2024    ALKPHOS 76 11/15/2024    BILITOT 0.5 11/15/2024     ======================================================  The ASCVD Risk score (Clara ONOFRE, et al., 2019) failed to calculate for the following reasons:    The valid total cholesterol range is 130 to 320 mg/dL           Current Assessment & Plan     Continue current regimen.  Follow-up with Cardiology.  Counseled on hyperlipidemia disease course, healthy diet and increased need for exercise. Please be advised of the risk of cardiovascular disease, increase stroke and heart attack risk with uncontrolled/untreated hyperlipidemia. Patient voiced understanding and understood the treatment plan. All questions were answered.          Relevant Medications    omega-3 acid ethyl esters (LOVAZA) 1 gram capsule    S/P femoral-femoral bypass surgery    Overview     Left 2021. On ASA, statin, plavix          Current Assessment & Plan     Continue current medication regimen. Follow up with cardiology.             Other    Former smoker    Overview     Chronic cigarette smoker for 30+ years. He quit smoking 2 years ago.          Current Assessment & Plan     Continue smoking cessation. The patient was counseled on the dangers of tobacco use. Reviewed strategies to maximize success, including counseling,  nicotine replacement therapy and pharmacologic option.           Follow up in about 2 weeks (around 2/17/2025), or if symptoms worsen or fail to improve.  ER precautions for severe or worsening symptoms.     Helena Azul, TARIK  _____________________________________________________________________________________________________________________________________________________    CC: HTN    HPI: Patient is a 65-year-old male who presents virtually today as an established patient here for hypertension.    HTN: The patient has a diagnosis of hypertension and the blood pressure has been high on recent checks.  The patient has been compliant on the medicine listed below and has not had problems with side effects.  The patient has had no headache, vision changes, chest pain, shortness of breath, dizziness, palpitations.  Denies any identifiable exacerbating or relieving factors.    Hyperlipidemia: This is a chronic problem. The current episode started more than 1 year ago. The problem is controlled. Recent lipid tests were reviewed and are variable. Pertinent negatives include no chest pain or shortness of breath. Current antihyperlipidemic treatment includes statins. The current treatment provides moderate improvement of lipids. There are no compliance problems.      MEDICAL HISTORY:  He reports that he underwent femoral bypass surgery one year ago for 100% leg blockage.   Stress test was completed in 2021.    FAMILY HISTORY:  He has family history of heart problems in grandmother, grandfather, and two male first cousins on maternal side.    SOCIAL HISTORY:  He quit smoking 2-3 years ago and currently drinks two regular Coca-Cola sodas daily.    Past Medical History:  Past Medical History:   Diagnosis Date    Anxiety     Arthritis     Depression     Digestive disorder     ulcers    Fatigue     History of acute bronchitis     History of psychiatric care     Hypertension     Intervertebral disc disorder with radiculopathy  of lumbar region 3/23/2021    Psychiatric problem     PVD (peripheral vascular disease)     S/P femoral-femoral bypass surgery 12/1/2021     Past Surgical History:   Procedure Laterality Date    ANTERIOR LUMBAR INTERBODY FUSION (ALIF) Right 03/23/2021    Procedure: FUSION, SPINE, LUMBAR, ALIF RIGHT L5-S1;  Surgeon: Kelly Ayala MD;  Location: [facility] OR;  Service: Neurosurgery;  Laterality: Right;    AORTOGRAPHY WITH EXTREMITY RUNOFF N/A 07/27/2021    Procedure: AORTOGRAM, WITH EXTREMITY RUNOFF;  Surgeon: Jim Briggs MD;  Location: [facility] CATH;  Service: Cardiovascular;  Laterality: N/A;    AORTOGRAPHY WITH SERIALOGRAPHY Bilateral 07/27/2021    Procedure: AORTOGRAM, WITH SERIALOGRAPHY;  Surgeon: Jim Briggs MD;  Location: [facility] CATH;  Service: Cardiovascular;  Laterality: Bilateral;    APPENDECTOMY  3/23    CREATION OF FEMORAL-FEMORAL ARTERY BYPASS WITH GRAFT Left 11/30/2021    Procedure: CREATION, BYPASS, ARTERIAL, FEMORAL TO FEMORAL, USING GRAFT;  Surgeon: Jim Briggs MD;  Location: [facility] OR;  Service: Cardiovascular;  Laterality: Left;    ENDARTERECTOMY OF FEMORAL ARTERY Bilateral 11/30/2021    Procedure: ENDARTERECTOMY, FEMORAL;  Surgeon: Jim Briggs MD;  Location: [facility] OR;  Service: Cardiovascular;  Laterality: Bilateral;    FUSION OF LUMBAR SPINE BY ANTERIOR APPROACH  03/23/2021    Procedure: FUSION, SPINE, LUMBAR, ANTERIOR APPROACH;  Surgeon: Jim Briggs MD;  Location: [facility] OR;  Service: Cardiovascular;;    HARVESTING OF BONE GRAFT N/A 03/23/2021    Procedure: SURGICAL PROCUREMENT, BONE GRAFT-Iliac;  Surgeon: Kelly Ayala MD;  Location: [facility] OR;  Service: Neurosurgery;  Laterality: N/A;    HYPOSPADIAS CORRECTION      SPINE SURGERY  3/22    VASECTOMY       Review of patient's allergies indicates:   Allergen Reactions    Blueberry      Social History     Tobacco Use    Smoking status: Former     Current packs/day: 0.00     Average packs/day: 0.3 packs/day for 42.0 years (10.5 ttl pk-yrs)     Types:  Cigarettes     Start date: 1980     Quit date: 2022     Years since quittin.3     Passive exposure: Never    Smokeless tobacco: Never   Substance Use Topics    Alcohol use: Not Currently     Comment: stopped recently - last drink 3/21    Drug use: No     Family History   Problem Relation Name Age of Onset    Diabetes Mother Reba     Hypertension Mother Reba     Diabetes Father Sal     Hypertension Father Sal     Cancer Sister      Muscular dystrophy Son      Arthritis Paternal Grandfather Luke     Heart disease Paternal Grandfather Luke     Birth defects Brother ESPERANZA     Cancer Brother ESPERANZA     Heart disease Maternal Aunt Zulema     Heart disease Paternal Uncle Santo     Suicide Neg Hx      Sexual abuse Neg Hx      Self injury Neg Hx      Seizures Neg Hx      Schizophrenia Neg Hx      Physical abuse Neg Hx      Paranoid behavior Neg Hx      OCD Neg Hx      Drug abuse Neg Hx      Depression Neg Hx      Dementia Neg Hx      Bipolar disorder Neg Hx      Anxiety disorder Neg Hx      Alcohol abuse Neg Hx       Current Outpatient Medications on File Prior to Visit   Medication Sig Dispense Refill    acetaminophen (TYLENOL EXTRA STRENGTH) 500 MG tablet Take 500 mg by mouth 2 (two) times a day.      allopurinoL (ZYLOPRIM) 100 MG tablet TAKE 1 TABLET BY MOUTH EVERY DAY 90 tablet 1    amitriptyline (ELAVIL) 25 MG tablet Take 1 tablet (25 mg total) by mouth every evening. 90 tablet 0    aspirin (ECOTRIN) 81 MG EC tablet Take 1 tablet (81 mg total) by mouth once daily. 90 tablet 0    atenoloL (TENORMIN) 25 MG tablet TAKE 1 TABLET BY MOUTH EVERY DAY IN THE MORNING 90 tablet 3    buPROPion (WELLBUTRIN XL) 150 MG TB24 tablet Take 1 tablet (150 mg total) by mouth once daily. 30 tablet 11    clopidogreL (PLAVIX) 75 mg tablet TAKE 1 TABLET BY MOUTH EVERY DAY 90 tablet 1    famotidine (PEPCID) 20 MG tablet TAKE 2 TABLETS BY MOUTH 2 TIMES DAILY. 360 tablet 0    ferrous sulfate 325 (65 FE) MG EC tablet Take 1  tablet (325 mg total) by mouth once daily. 90 tablet 1    multivitamin (THERAGRAN) per tablet Take 1 tablet by mouth once daily.      pantoprazole (PROTONIX) 40 MG tablet Take 1 tablet (40 mg total) by mouth 2 (two) times daily. 180 tablet 3    rosuvastatin (CRESTOR) 5 MG tablet TAKE 1 TABLET BY MOUTH EVERY DAY IN THE EVENING 90 tablet 1    SODIUM FLUORIDE 5000 DRY MOUTH 1.1 % Pste SMARTSIG:By Mouth Morning-Night      tadalafiL (CIALIS) 20 MG Tab Take 1 tablet (20 mg total) by mouth daily as needed (1/2 or 1 full tablet as needed for erectile dysfunction). 20 tablet 11    tadalafiL (CIALIS) 20 MG Tab Take 1 tablet (20 mg total) by mouth As instructed (Take 1 by mouth as needed 2 to 12 hours prior to sexual activity). 30 tablet 6    tadalafiL (CIALIS) 20 MG Tab Take 1 tablet (20 mg total) by mouth As instructed (Take 1 by mouth as needed 2 to 12 hours prior to sexual activity.). 30 tablet 6     No current facility-administered medications on file prior to visit.     Review of Systems   Constitutional:  Negative for appetite change, chills, fatigue and fever.   HENT:  Negative for congestion, rhinorrhea and sore throat.    Eyes:  Negative for visual disturbance.   Respiratory:  Negative for cough and shortness of breath.    Cardiovascular:  Negative for chest pain, palpitations and leg swelling.   Gastrointestinal:  Negative for abdominal pain, diarrhea and vomiting.   Genitourinary:  Negative for difficulty urinating, dysuria and hematuria.   Musculoskeletal:  Positive for neck pain. Negative for arthralgias and myalgias.   Skin:  Negative for rash and wound.   Neurological:  Negative for dizziness and headaches.   Psychiatric/Behavioral:  Negative for behavioral problems. The patient is not nervous/anxious.        There were no vitals filed for this visit.    Wt Readings from Last 3 Encounters:   11/21/24 71 kg (156 lb 8.4 oz)   11/12/24 70.5 kg (155 lb 6.8 oz)   10/28/24 70.9 kg (156 lb 4.9 oz)     Physical  Exam  Vitals reviewed.   Constitutional:       General: He is awake. He is not in acute distress.     Appearance: Normal appearance. He is not ill-appearing.   HENT:      Head: Normocephalic and atraumatic.      Right Ear: External ear normal.      Left Ear: External ear normal.      Nose: Nose normal.   Eyes:      Extraocular Movements: Extraocular movements intact.      Conjunctiva/sclera: Conjunctivae normal.   Pulmonary:      Effort: Pulmonary effort is normal. No respiratory distress.   Musculoskeletal:      Cervical back: Normal range of motion.   Skin:     Coloration: Skin is not pale.      Findings: No rash.   Neurological:      General: No focal deficit present.      Mental Status: He is alert and oriented to person, place, and time. Mental status is at baseline.      GCS: GCS eye subscore is 4. GCS verbal subscore is 5. GCS motor subscore is 6.   Psychiatric:         Attention and Perception: He is attentive.         Mood and Affect: Mood normal. Mood is not anxious, depressed or elated. Affect is not labile, blunt, flat, angry or tearful.         Speech: Speech normal. He is communicative. Speech is not rapid and pressured, delayed or slurred.         Behavior: Behavior normal. Behavior is not agitated, slowed, aggressive, withdrawn or hyperactive. Behavior is cooperative.         Thought Content: Thought content normal. Thought content is not paranoid. Thought content does not include homicidal or suicidal ideation.         Judgment: Judgment normal.         Health Maintenance   Topic Date Due    TETANUS VACCINE  Never done    RSV Vaccine (Age 60+ and Pregnant patients) (1 - Risk 60-74 years 1-dose series) Never done    COVID-19 Vaccine (4 - 2024-25 season) 09/01/2024    HIV Screening  08/25/2027 (Originally 1/22/1975)    PROSTATE-SPECIFIC ANTIGEN  08/08/2025    High Dose Statin  11/21/2025    Aspirin/Antiplatelet Therapy  11/21/2025    Hemoglobin A1c (Diabetic Prevention Screening)  08/08/2027     Colorectal Cancer Screening  07/25/2029    Lipid Panel  11/15/2029    Hepatitis C Screening  Completed    Shingles Vaccine  Completed    Influenza Vaccine  Completed    Pneumococcal Vaccines (Age 50+)  Completed    Abdominal Aortic Aneurysm Screening  Completed     This note was generated with the assistance of ambient listening technology. Verbal consent was obtained by the patient and accompanying visitor(s) for the recording of patient appointment to facilitate this note. I attest to having reviewed and edited the generated note for accuracy, though some syntax or spelling errors may persist. Please contact the author of this note for any clarification.    Visit today included increased complexity associated with the care of the episodic problem - see above- addressed and managing the longitudinal care of the patient due to the serious and/or complex managed problem(s) - see above.

## 2025-02-03 NOTE — TELEPHONE ENCOUNTER
Refill Routing Note   Medication(s) are not appropriate for processing by [facility] for the following reason(s):        Required labs outdated  No active prescription written by provider    [facility] action(s):  Defer               Appointments  past 12m or future 3m with PCP    Date Provider   Last Visit   1/31/2025 Bipin Penaloza MD   Next Visit   Visit date not found Bipin Penaloza MD   ED visits in past 90 days: 0        Note composed:2:06 PM 02/03/2025

## 2025-02-03 NOTE — TELEPHONE ENCOUNTER
No care due was identified.  [facility] Embedded Care Due Messages. Reference number: [id number].   2/03/2025 9:05:24 AM CST

## 2025-02-04 RX ORDER — OMEGA-3-ACID ETHYL ESTERS 1 G/1
2 CAPSULE, LIQUID FILLED ORAL 2 TIMES DAILY
Qty: 360 CAPSULE | Refills: 1 | Status: SHIPPED | OUTPATIENT
Start: 2025-02-04

## 2025-02-04 NOTE — ASSESSMENT & PLAN NOTE
Complete history and Limited telemedicine physical was completed today.  Complete and thorough medication reconciliation was performed.  Discussed risks and benefits of medications.  Advised patient on orders and health maintenance.  We discussed old records and old labs if available.  Will request any records not available through epic.  Continue current medications listed on your summary sheet

## 2025-02-04 NOTE — ASSESSMENT & PLAN NOTE
Lisinopril increased to 20 mg BID. Recommend ambulatory monitoring of BP. Notify us of fluctuations in readings or if BP remains greater than 140/90. Follow up with cardiology. Continue current blood pressure medication regimen.Counseled on importance of hypertension disease course, I recommend ongoing Education for DASH-diet and exercise. Counseled on medication regimen importance of treating high blood pressure. Please be advised of risk of untreated blood pressure as discussed. Please advised of ER precautions were given for symptoms of hypertensive urgency and emergency.

## 2025-02-05 PROBLEM — R07.89 OTHER CHEST PAIN: Status: ACTIVE | Noted: 2025-02-05

## 2025-02-05 PROBLEM — I65.01 STENOSIS OF RIGHT VERTEBRAL ARTERY: Status: ACTIVE | Noted: 2025-02-05

## 2025-02-06 ENCOUNTER — OFFICE VISIT (OUTPATIENT)
Dept: CARDIOLOGY | Facility: CLINIC | Age: 65
End: 2025-02-06
Payer: COMMERCIAL

## 2025-02-06 VITALS
HEIGHT: 66 IN | DIASTOLIC BLOOD PRESSURE: 101 MMHG | HEART RATE: 96 BPM | BODY MASS INDEX: 25.01 KG/M2 | SYSTOLIC BLOOD PRESSURE: 159 MMHG | WEIGHT: 155.63 LBS

## 2025-02-06 DIAGNOSIS — I65.01 STENOSIS OF RIGHT VERTEBRAL ARTERY: ICD-10-CM

## 2025-02-06 DIAGNOSIS — E78.5 DYSLIPIDEMIA (HIGH LDL; LOW HDL): Chronic | ICD-10-CM

## 2025-02-06 DIAGNOSIS — Z95.828 S/P FEMORAL-FEMORAL BYPASS SURGERY: ICD-10-CM

## 2025-02-06 DIAGNOSIS — I77.810 MILD ASCENDING AORTA DILATATION: ICD-10-CM

## 2025-02-06 DIAGNOSIS — I73.9 PVD (PERIPHERAL VASCULAR DISEASE): ICD-10-CM

## 2025-02-06 DIAGNOSIS — I65.21 CAROTID STENOSIS, ASYMPTOMATIC, RIGHT: ICD-10-CM

## 2025-02-06 DIAGNOSIS — R07.89 OTHER CHEST PAIN: Primary | ICD-10-CM

## 2025-02-06 DIAGNOSIS — I10 ESSENTIAL HYPERTENSION: Chronic | ICD-10-CM

## 2025-02-06 PROCEDURE — 99999 PR PBB SHADOW E&M-EST. PATIENT-LVL III: CPT | Mod: PBBFAC,,,

## 2025-02-06 PROCEDURE — 93005 ELECTROCARDIOGRAM TRACING: CPT | Mod: PO

## 2025-02-06 PROCEDURE — 99214 OFFICE O/P EST MOD 30 MIN: CPT | Mod: S$GLB,,,

## 2025-02-06 PROCEDURE — 3077F SYST BP >= 140 MM HG: CPT | Mod: CPTII,S$GLB,,

## 2025-02-06 PROCEDURE — 93010 ELECTROCARDIOGRAM REPORT: CPT | Mod: S$GLB,,, | Performed by: INTERNAL MEDICINE

## 2025-02-06 PROCEDURE — 1101F PT FALLS ASSESS-DOCD LE1/YR: CPT | Mod: CPTII,S$GLB,,

## 2025-02-06 PROCEDURE — 3008F BODY MASS INDEX DOCD: CPT | Mod: CPTII,S$GLB,,

## 2025-02-06 PROCEDURE — 1159F MED LIST DOCD IN RCRD: CPT | Mod: CPTII,S$GLB,,

## 2025-02-06 PROCEDURE — 3288F FALL RISK ASSESSMENT DOCD: CPT | Mod: CPTII,S$GLB,,

## 2025-02-06 PROCEDURE — 3080F DIAST BP >= 90 MM HG: CPT | Mod: CPTII,S$GLB,,

## 2025-02-06 PROCEDURE — 4010F ACE/ARB THERAPY RXD/TAKEN: CPT | Mod: CPTII,S$GLB,,

## 2025-02-06 NOTE — PROGRESS NOTES
Subjective:    Patient ID:  Sal Villarreal Jr. is a 65 y.o. male patient here for evaluation No chief complaint on file.    History of Present Illness:     Sal Villarreal Jr. is a 65 y.o. male who follows with Dr. Wagner here today for evaluation elevated blood pressure and chest pain. Last seen in clinic 11/2024. He denies SOB/NAVARRO, palpitations, dizziness, fatigue, activity intolerance. He walks 3-4 miles daily without issues.     He has seen his PCP twice in last week for elevated blood pressure readings. His PCP increased lisinopril from 10 to 20 mg daily then changed to 20 mg BID, starting on 2/3/2025. Mild  improvement since but remains elevated at 140s/90s.     During interview, he reports being at his baseline state of health until his pre-op appointment for trigger finger surgery last week. Blood pressure elevated at pre-op appointment and has been high since surgery. He reports intermittent chest pain, primarily at rest, best described as sharp and non-radiating, various places on bilateral chest walls, typically lasts less than a minute before resolving spontaneously. He reports BLE claudication with walking (only up steep hill) but this is chronic for him, no progression in symptoms.     EKG today: NSR 92. No evidence of acute ischemia.     Focused Past History includes:  Atypical chest pain  Dobutamine stress echo 2021:  Negative  PAD s/p femoral-femoral bypass (2021)  BLE arterial US 6/2024:  Patent fem-fem bypass graft. Right RASHEED 0.7.  Left RASHEED 0.9.  Carotid artery stenosis, R>L; Right vertebral artery stenosis  CTA Neck 6/2024:  70% stenosis JING.  Minimal plaque without stenosis in LICA and bilateral ECA.  70% stenosis of dominant right vertebral artery.   Hypertension   Dyslipidemia  Former 10 pack-year smoker, quit 2022      Most Recent Echocardiogram Results  Results for orders placed in visit on 10/28/21    Stress Echo Which stress agent will be used? Pharmacological; Color Flow Doppler?  Yes    Interpretation Summary  · The left ventricle is normal in size with normal systolic function.  · Normal left ventricular diastolic function.  · The estimated ejection fraction is 65%.  · Normal right ventricular size with normal right ventricular systolic function.  · Normal central venous pressure (3 mmHg).  · The ascending aorta is mildly dilated.  · The ECG portion of this study is negative for myocardial ischemia.  · During stress, the following arrhythmias were observed: rare PACs.  · The stress echo portion of this study is negative for myocardial ischemia.      Most Recent Nuclear Stress Test Results  No results found for this or any previous visit.      Most Recent Cardiac PET Stress Test Results  No results found for this or any previous visit.      Most Recent Cardiovascular Angiogram results  Results for orders placed during the hospital encounter of 07/27/21    Cardiac catheterization    Narrative  Procedure performed in the Invasive Lab  - See Procedure Log link below for nursing documentation  - See OpNote on Surgeries Tab for physician findings      Other Most Recent Cardiology Results  Results for orders placed during the hospital encounter of 06/14/24    CV Ultrasound Bilateral Doppler Carotid    Interpretation Summary    There is 70-79% right Internal Carotid Stenosis.    There is 20-39% left Internal Carotid Stenosis.    Moderate heterogeneous calcified plaque right side, mild heterogeneous plaque in the left carotid system.    Normal antegrade vertebral flow bilaterally.      REVIEW OF SYSTEMS: As noted in HPI   CARDIOVASCULAR: No recent chest pain, palpitations, arm/neck/jaw pain, or edema.  RESPIRATORY: No recent fever, cough, SOB.  : No blood in the urine  GI: No reflux, nausea, vomiting, or blood in stool.   MUSCULOSKELETAL: No falls.   NEURO: No headaches, syncope, or dizziness.  EYES: No sudden changes in vision.     Past Medical History:   Diagnosis Date    Anxiety     Arthritis      Depression     Digestive disorder     ulcers    Fatigue     History of acute bronchitis     History of psychiatric care     Hypertension     Intervertebral disc disorder with radiculopathy of lumbar region 3/23/2021    Psychiatric problem     PVD (peripheral vascular disease)     S/P femoral-femoral bypass surgery 12/1/2021     Past Surgical History:   Procedure Laterality Date    ANTERIOR LUMBAR INTERBODY FUSION (ALIF) Right 03/23/2021    Procedure: FUSION, SPINE, LUMBAR, ALIF RIGHT L5-S1;  Surgeon: Kelly Ayala MD;  Location: [facility] OR;  Service: Neurosurgery;  Laterality: Right;    AORTOGRAPHY WITH EXTREMITY RUNOFF N/A 07/27/2021    Procedure: AORTOGRAM, WITH EXTREMITY RUNOFF;  Surgeon: Jim Briggs MD;  Location: [facility] CATH;  Service: Cardiovascular;  Laterality: N/A;    AORTOGRAPHY WITH SERIALOGRAPHY Bilateral 07/27/2021    Procedure: AORTOGRAM, WITH SERIALOGRAPHY;  Surgeon: Jim Briggs MD;  Location: [facility] CATH;  Service: Cardiovascular;  Laterality: Bilateral;    APPENDECTOMY  3/23    CREATION OF FEMORAL-FEMORAL ARTERY BYPASS WITH GRAFT Left 11/30/2021    Procedure: CREATION, BYPASS, ARTERIAL, FEMORAL TO FEMORAL, USING GRAFT;  Surgeon: Jim Briggs MD;  Location: [facility] OR;  Service: Cardiovascular;  Laterality: Left;    ENDARTERECTOMY OF FEMORAL ARTERY Bilateral 11/30/2021    Procedure: ENDARTERECTOMY, FEMORAL;  Surgeon: Jim Briggs MD;  Location: [facility] OR;  Service: Cardiovascular;  Laterality: Bilateral;    FUSION OF LUMBAR SPINE BY ANTERIOR APPROACH  03/23/2021    Procedure: FUSION, SPINE, LUMBAR, ANTERIOR APPROACH;  Surgeon: Jim Briggs MD;  Location: [facility] OR;  Service: Cardiovascular;;    HARVESTING OF BONE GRAFT N/A 03/23/2021    Procedure: SURGICAL PROCUREMENT, BONE GRAFT-Iliac;  Surgeon: Kelly Ayala MD;  Location: [facility] OR;  Service: Neurosurgery;  Laterality: N/A;    HYPOSPADIAS CORRECTION      SPINE SURGERY  3/22    VASECTOMY       Social History     Tobacco Use    Smoking status: Former      Current packs/day: 0.00     Average packs/day: 0.3 packs/day for 42.0 years (10.5 ttl pk-yrs)     Types: Cigarettes     Start date: 1980     Quit date: 2022     Years since quittin.3     Passive exposure: Never    Smokeless tobacco: Never   Substance Use Topics    Alcohol use: Not Currently     Comment: stopped recently - last drink 3/21    Drug use: No         Objective      Vitals:    25 1535   BP: (!) 159/101   Pulse: 96       The ASCVD Risk score (Clara DK, et al., 2019) failed to calculate for the following reasons:    The valid total cholesterol range is 130 to 320 mg/dL      LAST EKG  Results for orders placed or performed during the hospital encounter of 21   EKG 12-LEAD    Collection Time: 21  2:48 PM    Narrative    Test Reason : I77.9,    Vent. Rate : 061 BPM     Atrial Rate : 061 BPM     P-R Int : 156 ms          QRS Dur : 086 ms      QT Int : 416 ms       P-R-T Axes : 072 063 052 degrees     QTc Int : 418 ms    Normal sinus rhythm  Normal ECG  When compared with ECG of 28-OCT-2021 22:49,  Previous ECG has undetermined rhythm, needs review  Criteria for Anterior infarct are no longer Present  Confirmed by Deshawn CONCEPCION, Arnold JORDAN ([id number]) on 2021 2:56:48 PM    Referred By: VAISHALI DELA CRUZ           Confirmed By:Arnold Hess MD     LIPIDS - LAST 2   Lab Results   Component Value Date    CHOL 112 (L) 11/15/2024    CHOL 98 (L) 2024    HDL 26 (L) 11/15/2024    HDL 27 (L) 2024    LDLCALC 50.4 (L) 11/15/2024    LDLCALC 41.6 (L) 2024    TRIG 178 (H) 11/15/2024    TRIG 147 2024    CHOLHDL 23.2 11/15/2024    CHOLHDL 27.6 2024     CARDIAC PROFILE - LAST 2  Lab Results   Component Value Date     2005      CBC - LAST 2  Lab Results   Component Value Date    WBC 12.65 11/15/2024    WBC 5.74 2024    HGB 13.1 (L) 11/15/2024    HGB 12.9 (L) 2024    HCT 38.9 (L) 11/15/2024    HCT 40.1 2024     11/15/2024      08/08/2024     Lab Results   Component Value Date    LABPT 13.1 11/29/2021    LABPT 12.8 03/18/2021    INR 1.0 11/29/2021    INR 1.0 03/18/2021    APTT 148.7 (HH) 11/30/2021    APTT 31.1 11/29/2021     CHEMISTRY - LAST 2  Lab Results   Component Value Date     11/15/2024     08/08/2024    K 4.3 11/15/2024    K 3.8 08/08/2024    CO2 25 11/15/2024    CO2 23 08/08/2024    BUN 11 11/15/2024    BUN 5 (L) 08/08/2024    CREATININE 1.2 11/15/2024    CREATININE 1.0 08/08/2024     11/15/2024    GLU 98 08/08/2024    CALCIUM 9.4 11/15/2024    CALCIUM 8.8 08/08/2024    PH 7.391 11/30/2021    PH 7.478 (H) 11/30/2021    ALBUMIN 4.1 11/15/2024    ALBUMIN 3.9 08/08/2024    ALT 20 11/15/2024    ALT 17 08/08/2024    AST 27 11/15/2024    AST 28 08/08/2024      ENDOCRINE - LAST 2  Lab Results   Component Value Date    HGBA1C 5.1 08/08/2024    HGBA1C 5.3 10/11/2023    TSH 1.143 08/08/2024    TSH 2.206 10/11/2023        PHYSICAL EXAM  CONSTITUTIONAL: Well built, well nourished in no apparent distress  NECK: no carotid bruit, no JVD  LUNGS: CTA  CHEST WALL: no tenderness  HEART: regular rate and rhythm, S1, S2 normal, no murmur, click, rub or gallop   ABDOMEN: soft, non-tender; bowel sounds normal; no masses,  no organomegaly  EXTREMITIES: Extremities normal, no edema, no calf tenderness noted  NEURO: AAO X 3    I HAVE REVIEWED :    The vital signs, most recent cardiac testing, and most recent pertinent non-cardiology provider notes.    Current Outpatient Medications   Medication Instructions    acetaminophen (TYLENOL EXTRA STRENGTH) 500 mg, 2 times daily    allopurinoL (ZYLOPRIM) 100 mg, Oral    amitriptyline (ELAVIL) 25 mg, Oral, Nightly    aspirin (ECOTRIN) 81 mg, Oral, Daily    atenoloL (TENORMIN) 25 MG tablet TAKE 1 TABLET BY MOUTH EVERY DAY IN THE MORNING    buPROPion (WELLBUTRIN XL) 150 mg, Oral, Daily    clopidogreL (PLAVIX) 75 mg tablet TAKE 1 TABLET BY MOUTH EVERY DAY    famotidine (PEPCID) 20 MG tablet TAKE 2  TABLETS BY MOUTH 2 TIMES DAILY.    ferrous sulfate 325 mg, Oral, Daily    lisinopriL (PRINIVIL,ZESTRIL) 20 mg, Oral, 2 times daily    multivitamin (THERAGRAN) per tablet 1 tablet, Daily    omega-3 acid ethyl esters (LOVAZA) 2 g, Oral, 2 times daily    pantoprazole (PROTONIX) 40 MG tablet Take 1 tablet (40 mg total) by mouth 2 (two) times daily.    rosuvastatin (CRESTOR) 5 mg, Oral, Nightly    SODIUM FLUORIDE 5000 DRY MOUTH 1.1 % Pste SMARTSIG:By Mouth Morning-Night    tadalafiL (CIALIS) 20 mg, Oral, Daily PRN    tadalafiL (CIALIS) 20 mg, Oral, See admin instructions    tadalafiL (CIALIS) 20 mg, Oral, See admin instructions        Assessment & Plan   Other chest pain (Primary)  - Dobutamine stress echo    Carotid stenosis, asymptomatic, right  Monitor, carotid US scheduled in 5/2025    PVD (peripheral vascular disease)  S/P femoral-femoral bypass surgery  Chronic claudication  BLE Arterial US scheduled in 5/2025  Continue DAPT     Mild ascending aorta dilatation  Stable     Essential hypertension  Renal arteries without stenosis on 2021 CT.   Lisinopril recently changed to 20 mg BID by PCP  - Increase atenolol to 50 mg daily  - Daily BP log, call/message with results  - Consider switching to carvedilol if no improvement  - Consider renal ultrasound if blood pressure remains elevated     Dyslipidemia (high LDL; low HDL)  Continue rosuvastatin 5 mg nightly  Continue omega-3    Stenosis of right vertebral artery  Stable        Emphasized the importance of modifying lifestyle related risk factors including tobacco avoidance, limiting alcohol intake, aerobic exercise, weight management and Mediterranean diet.      Follow up as scheduled.     Corey Darvill, NP [facility]   Office: [phone]

## 2025-02-07 LAB
OHS QRS DURATION: 90 MS
OHS QTC CALCULATION: 435 MS

## 2025-02-13 ENCOUNTER — PATIENT MESSAGE (OUTPATIENT)
Dept: CARDIOLOGY | Facility: CLINIC | Age: 65
End: 2025-02-13
Payer: COMMERCIAL

## 2025-02-21 ENCOUNTER — HOSPITAL ENCOUNTER (OUTPATIENT)
Dept: CARDIOLOGY | Facility: HOSPITAL | Age: 65
Discharge: HOME | End: 2025-02-21
Payer: COMMERCIAL

## 2025-02-21 VITALS — HEIGHT: 66 IN | BODY MASS INDEX: 24.91 KG/M2 | WEIGHT: 155 LBS

## 2025-02-21 DIAGNOSIS — I10 ESSENTIAL HYPERTENSION: Chronic | ICD-10-CM

## 2025-02-21 DIAGNOSIS — I10 ESSENTIAL HYPERTENSION: ICD-10-CM

## 2025-02-21 DIAGNOSIS — R07.89 OTHER CHEST PAIN: ICD-10-CM

## 2025-02-21 LAB
ASCENDING AORTA: 3.76 CM
AV INDEX (PROSTH): 0.48
AV MEAN GRADIENT: 8 MMHG
AV PEAK GRADIENT: 14 MMHG
AV VALVE AREA BY VELOCITY RATIO: 1.5 CM²
AV VALVE AREA: 1.6 CM²
AV VELOCITY RATIO: 0.42
BSA FOR ECHO PROCEDURE: 1.81 M2
CV ECHO LV RWT: 0.43 CM
CV STRESS BASE HR: 69 BPM
DIASTOLIC BLOOD PRESSURE: 77 MMHG
DOP CALC AO PEAK VEL: 1.9 M/S
DOP CALC AO VTI: 43.9 CM
DOP CALC LVOT AREA: 3.5 CM2
DOP CALC LVOT DIAMETER: 2.1 CM
DOP CALC LVOT PEAK VEL: 0.8 M/S
DOP CALC LVOT STROKE VOLUME: 72.4 CM3
DOP CALCLVOT PEAK VEL VTI: 20.9 CM
E WAVE DECELERATION TIME: 212 MSEC
E/A RATIO: 1.15
E/E' RATIO: 8 M/S
ECHO LV POSTERIOR WALL: 0.9 CM (ref 0.6–1.1)
FRACTIONAL SHORTENING: 28.6 % (ref 28–44)
INTERVENTRICULAR SEPTUM: 1 CM (ref 0.6–1.1)
LEFT ATRIUM AREA SYSTOLIC (APICAL 2 CHAMBER): 16.41 CM2
LEFT ATRIUM AREA SYSTOLIC (APICAL 4 CHAMBER): 17.08 CM2
LEFT ATRIUM SIZE: 3.7 CM
LEFT ATRIUM VOLUME INDEX MOD: 25 ML/M2
LEFT ATRIUM VOLUME MOD: 45 ML
LEFT INTERNAL DIMENSION IN SYSTOLE: 3 CM (ref 2.1–4)
LEFT VENTRICLE DIASTOLIC VOLUME INDEX: 43.1 ML/M2
LEFT VENTRICLE DIASTOLIC VOLUME: 77.15 ML
LEFT VENTRICLE END SYSTOLIC VOLUME APICAL 2 CHAMBER: 43.26 ML
LEFT VENTRICLE END SYSTOLIC VOLUME APICAL 4 CHAMBER: 45.32 ML
LEFT VENTRICLE MASS INDEX: 71.4 G/M2
LEFT VENTRICLE SYSTOLIC VOLUME INDEX: 19.3 ML/M2
LEFT VENTRICLE SYSTOLIC VOLUME: 34.58 ML
LEFT VENTRICULAR INTERNAL DIMENSION IN DIASTOLE: 4.2 CM (ref 3.5–6)
LEFT VENTRICULAR MASS: 127.8 G
LV LATERAL E/E' RATIO: 6.9 M/S
LV SEPTAL E/E' RATIO: 8.6 M/S
LVED V (TEICH): 77.15 ML
LVES V (TEICH): 34.58 ML
LVOT MG: 1.7 MMHG
LVOT MV: 0.63 CM/S
MV PEAK A VEL: 0.6 M/S
MV PEAK E VEL: 0.69 M/S
MV STENOSIS PRESSURE HALF TIME: 61.52 MS
MV VALVE AREA P 1/2 METHOD: 3.58 CM2
OHS CV CPX 1 MINUTE RECOVERY HEART RATE: 129 BPM
OHS CV CPX 85 PERCENT MAX PREDICTED HEART RATE MALE: 132
OHS CV CPX MAX PREDICTED HEART RATE: 155
OHS CV CPX PATIENT IS FEMALE: 0
OHS CV CPX PATIENT IS MALE: 1
OHS CV CPX PEAK DIASTOLIC BLOOD PRESSURE: 71 MMHG
OHS CV CPX PEAK HEAR RATE: 142 BPM
OHS CV CPX PEAK RATE PRESSURE PRODUCT: NORMAL
OHS CV CPX PEAK SYSTOLIC BLOOD PRESSURE: 138 MMHG
OHS CV CPX PERCENT MAX PREDICTED HEART RATE ACHIEVED: 92
OHS CV CPX RATE PRESSURE PRODUCT PRESENTING: 9108
OHS CV INITIAL DOSE: 10 MCG/KG/MIN
OHS CV PEAK DOSE: 40 MCG/KG/MIN
OHS CV RV/LV RATIO: 0.95 CM
PISA TR MAX VEL: 2.1 M/S
PULM VEIN S/D RATIO: 1.13
PV PEAK D VEL: 0.47 M/S
PV PEAK S VEL: 0.53 M/S
RA VOL SYS: 33.62 ML
RIGHT ATRIAL AREA: 13.2 CM2
RIGHT ATRIUM VOLUME AREA LENGTH APICAL 4 CHAMBER: 30.53 ML
RIGHT VENTRICLE DIASTOLIC BASEL DIMENSION: 4 CM
RIGHT VENTRICLE DIASTOLIC LENGTH: 7 CM
RIGHT VENTRICLE DIASTOLIC MID DIMENSION: 2.6 CM
RIGHT VENTRICULAR END-DIASTOLIC DIMENSION: 4.02 CM
RIGHT VENTRICULAR LENGTH IN DIASTOLE (APICAL 4-CHAMBER VIEW): 7.01 CM
RV MID DIAMA: 2.63 CM
RV TISSUE DOPPLER FREE WALL SYSTOLIC VELOCITY 1 (APICAL 4 CHAMBER VIEW): 12.79 CM/S
SINUS: 3.83 CM
STJ: 2.9 CM
SYSTOLIC BLOOD PRESSURE: 132 MMHG
TDI LATERAL: 0.1 M/S
TDI SEPTAL: 0.08 M/S
TDI: 0.09 M/S
TR MAX PG: 18 MMHG
TRICUSPID ANNULAR PLANE SYSTOLIC EXCURSION: 2.59 CM
Z-SCORE OF LEFT VENTRICULAR DIMENSION IN END DIASTOLE: -1.64
Z-SCORE OF LEFT VENTRICULAR DIMENSION IN END SYSTOLE: -0.15

## 2025-02-21 PROCEDURE — 93351 STRESS TTE COMPLETE: CPT | Mod: PO

## 2025-02-21 PROCEDURE — 93351 STRESS TTE COMPLETE: CPT | Mod: 26,,, | Performed by: INTERNAL MEDICINE

## 2025-02-21 PROCEDURE — 93325 DOPPLER ECHO COLOR FLOW MAPG: CPT | Mod: 26,,, | Performed by: INTERNAL MEDICINE

## 2025-02-21 PROCEDURE — 93320 DOPPLER ECHO COMPLETE: CPT | Mod: 26,,, | Performed by: INTERNAL MEDICINE

## 2025-02-21 RX ORDER — ATENOLOL 25 MG/1
25 TABLET ORAL
Qty: 90 TABLET | Refills: 0 | Status: SHIPPED | OUTPATIENT
Start: 2025-02-21

## 2025-02-21 NOTE — TELEPHONE ENCOUNTER
No care due was identified.  [facility] Embedded Care Due Messages. Reference number: [id number].   2/21/2025 12:12:55 AM CST

## 2025-02-21 NOTE — NURSING NOTE
Dobutamine Stress Echo procedure and medication explained; patient verbalized understanding. 24G IV started to left forearm; flushed and secured. Dobutamine infusion started at 10mcg/kg/min per protocol (increased by 10mcg/kg/min every 3 minutes with a max dose of 60mcg/kg/min). Dobutamine 40mcg/kg/min infused; 0.5mg Atropine given. Patient's VS returned to baseline during recovery period. IV D/C'd, cath tip intact. Patient tolerated well; no distress noted.

## 2025-02-21 NOTE — TELEPHONE ENCOUNTER
Refill Routing Note   Medication(s) are not appropriate for processing by [facility] for the following reason(s):        Required vitals abnormal    [facility] action(s):  Defer               Appointments  past 12m or future 3m with PCP    Date Provider   Last Visit   1/31/2025 Bipin Penaloza MD   Next Visit   Visit date not found Bipin Penaloza MD   ED visits in past 90 days: 0        Note composed:3:08 AM 02/21/2025

## 2025-02-24 ENCOUNTER — RESULTS FOLLOW-UP (OUTPATIENT)
Dept: CARDIOLOGY | Facility: CLINIC | Age: 65
End: 2025-02-24

## 2025-02-24 LAB
ASCENDING AORTA: 3.76 CM
AV INDEX (PROSTH): 0.48
AV MEAN GRADIENT: 8 MMHG
AV PEAK GRADIENT: 14 MMHG
AV VALVE AREA BY VELOCITY RATIO: 1.5 CM²
AV VALVE AREA: 1.6 CM²
AV VELOCITY RATIO: 0.42
BSA FOR ECHO PROCEDURE: 1.81 M2
CV ECHO LV RWT: 0.43 CM
CV STRESS BASE HR: 69 BPM
DIASTOLIC BLOOD PRESSURE: 77 MMHG
DOP CALC AO PEAK VEL: 1.9 M/S
DOP CALC AO VTI: 43.9 CM
DOP CALC LVOT AREA: 3.5 CM2
DOP CALC LVOT DIAMETER: 2.1 CM
DOP CALC LVOT PEAK VEL: 0.8 M/S
DOP CALC LVOT STROKE VOLUME: 72.4 CM3
DOP CALCLVOT PEAK VEL VTI: 20.9 CM
E WAVE DECELERATION TIME: 212 MSEC
E/A RATIO: 1.15
E/E' RATIO: 8 M/S
ECHO LV POSTERIOR WALL: 0.9 CM (ref 0.6–1.1)
FRACTIONAL SHORTENING: 28.6 % (ref 28–44)
INTERVENTRICULAR SEPTUM: 1 CM (ref 0.6–1.1)
LEFT ATRIUM AREA SYSTOLIC (APICAL 2 CHAMBER): 16.41 CM2
LEFT ATRIUM AREA SYSTOLIC (APICAL 4 CHAMBER): 17.08 CM2
LEFT ATRIUM SIZE: 3.7 CM
LEFT ATRIUM VOLUME INDEX MOD: 25 ML/M2
LEFT ATRIUM VOLUME MOD: 45 ML
LEFT INTERNAL DIMENSION IN SYSTOLE: 3 CM (ref 2.1–4)
LEFT VENTRICLE DIASTOLIC VOLUME INDEX: 43.1 ML/M2
LEFT VENTRICLE DIASTOLIC VOLUME: 77.15 ML
LEFT VENTRICLE END SYSTOLIC VOLUME APICAL 2 CHAMBER: 43.26 ML
LEFT VENTRICLE END SYSTOLIC VOLUME APICAL 4 CHAMBER: 45.32 ML
LEFT VENTRICLE MASS INDEX: 71.4 G/M2
LEFT VENTRICLE SYSTOLIC VOLUME INDEX: 19.3 ML/M2
LEFT VENTRICLE SYSTOLIC VOLUME: 34.58 ML
LEFT VENTRICULAR INTERNAL DIMENSION IN DIASTOLE: 4.2 CM (ref 3.5–6)
LEFT VENTRICULAR MASS: 127.8 G
LV LATERAL E/E' RATIO: 6.9 M/S
LV SEPTAL E/E' RATIO: 8.6 M/S
LVED V (TEICH): 77.15 ML
LVES V (TEICH): 34.58 ML
LVOT MG: 1.7 MMHG
LVOT MV: 0.63 CM/S
MV PEAK A VEL: 0.6 M/S
MV PEAK E VEL: 0.69 M/S
MV STENOSIS PRESSURE HALF TIME: 61.52 MS
MV VALVE AREA P 1/2 METHOD: 3.58 CM2
OHS CV CPX 1 MINUTE RECOVERY HEART RATE: 129 BPM
OHS CV CPX 85 PERCENT MAX PREDICTED HEART RATE MALE: 132
OHS CV CPX MAX PREDICTED HEART RATE: 155
OHS CV CPX PATIENT IS FEMALE: 0
OHS CV CPX PATIENT IS MALE: 1
OHS CV CPX PEAK DIASTOLIC BLOOD PRESSURE: 71 MMHG
OHS CV CPX PEAK HEAR RATE: 142 BPM
OHS CV CPX PEAK RATE PRESSURE PRODUCT: NORMAL
OHS CV CPX PEAK SYSTOLIC BLOOD PRESSURE: 138 MMHG
OHS CV CPX PERCENT MAX PREDICTED HEART RATE ACHIEVED: 92
OHS CV CPX RATE PRESSURE PRODUCT PRESENTING: 9108
OHS CV INITIAL DOSE: 10 MCG/KG/MIN
OHS CV PEAK DOSE: 40 MCG/KG/MIN
OHS CV RV/LV RATIO: 0.95 CM
PISA TR MAX VEL: 2.1 M/S
PULM VEIN S/D RATIO: 1.13
PV PEAK D VEL: 0.47 M/S
PV PEAK S VEL: 0.53 M/S
RA PRESSURE ESTIMATED: 3 MMHG
RA VOL SYS: 33.62 ML
RIGHT ATRIAL AREA: 13.2 CM2
RIGHT ATRIUM VOLUME AREA LENGTH APICAL 4 CHAMBER: 30.53 ML
RIGHT VENTRICLE DIASTOLIC BASEL DIMENSION: 4 CM
RIGHT VENTRICLE DIASTOLIC LENGTH: 7 CM
RIGHT VENTRICLE DIASTOLIC MID DIMENSION: 2.6 CM
RIGHT VENTRICULAR END-DIASTOLIC DIMENSION: 4.02 CM
RIGHT VENTRICULAR LENGTH IN DIASTOLE (APICAL 4-CHAMBER VIEW): 7.01 CM
RV MID DIAMA: 2.63 CM
RV TB RVSP: 5 MMHG
RV TISSUE DOPPLER FREE WALL SYSTOLIC VELOCITY 1 (APICAL 4 CHAMBER VIEW): 12.79 CM/S
SINUS: 3.83 CM
STJ: 2.9 CM
SYSTOLIC BLOOD PRESSURE: 132 MMHG
TDI LATERAL: 0.1 M/S
TDI SEPTAL: 0.08 M/S
TDI: 0.09 M/S
TR MAX PG: 18 MMHG
TRICUSPID ANNULAR PLANE SYSTOLIC EXCURSION: 2.59 CM
TV REST PULMONARY ARTERY PRESSURE: 21 MMHG
Z-SCORE OF LEFT VENTRICULAR DIMENSION IN END DIASTOLE: -1.64
Z-SCORE OF LEFT VENTRICULAR DIMENSION IN END SYSTOLE: -0.15

## 2025-03-06 ENCOUNTER — PATIENT MESSAGE (OUTPATIENT)
Dept: ADMINISTRATIVE | Facility: HOSPITAL | Age: 65
End: 2025-03-06
Payer: COMMERCIAL

## 2025-03-07 ENCOUNTER — PATIENT OUTREACH (OUTPATIENT)
Dept: ADMINISTRATIVE | Facility: HOSPITAL | Age: 65
End: 2025-03-07
Payer: COMMERCIAL

## 2025-03-07 VITALS — DIASTOLIC BLOOD PRESSURE: 88 MMHG | SYSTOLIC BLOOD PRESSURE: 130 MMHG

## 2025-03-21 DIAGNOSIS — K21.9 GASTROESOPHAGEAL REFLUX DISEASE, UNSPECIFIED WHETHER ESOPHAGITIS PRESENT: ICD-10-CM

## 2025-03-21 NOTE — TELEPHONE ENCOUNTER
Care Due:                  Date            Visit Type   Department     Provider  --------------------------------------------------------------------------------                                ESTABLISHED                              PATIENT -    [facility] FAMILY  Last Visit: 12-      [place]      MEDICINE       Bipin Penaloza  Next Visit: None Scheduled  None         None Found                                                            Last  Test          Frequency    Reason                     Performed    Due Date  --------------------------------------------------------------------------------    Office Visit  15 months..  amitriptyline, atenoloL..  12- 03-    [facility] Embedded Care Due Messages. Reference number: [id number].   3/21/2025 10:47:34 AM CDT

## 2025-03-23 RX ORDER — PANTOPRAZOLE SODIUM 40 MG/1
TABLET, DELAYED RELEASE ORAL
Qty: 180 TABLET | Refills: 1 | Status: SHIPPED | OUTPATIENT
Start: 2025-03-23

## 2025-03-23 NOTE — TELEPHONE ENCOUNTER
Refill Routing Note   Medication(s) are not appropriate for processing by [facility] for the following reason(s):        Outside of protocol: 40mg PPI BID oop  Patient not seen by provider within 15 months    [facility] action(s):  Route   Requires appointment : Yes   Requires labs : Yes             Appointments  past 12m or future 3m with PCP    Date Provider   Last Visit   1/31/2025 Bipin Penaloza MD   Next Visit   Visit date not found Bipin Penaloza MD   ED visits in past 90 days: 0        Note composed:9:00 PM 03/22/2025

## 2025-03-24 ENCOUNTER — PATIENT MESSAGE (OUTPATIENT)
Dept: FAMILY MEDICINE | Facility: CLINIC | Age: 65
End: 2025-03-24
Payer: COMMERCIAL

## 2025-03-25 ENCOUNTER — LAB VISIT (OUTPATIENT)
Dept: LAB | Facility: HOSPITAL | Age: 65
End: 2025-03-25
Attending: FAMILY MEDICINE
Payer: COMMERCIAL

## 2025-03-25 DIAGNOSIS — Z13.6 ENCOUNTER FOR LIPID SCREENING FOR CARDIOVASCULAR DISEASE: ICD-10-CM

## 2025-03-25 DIAGNOSIS — Z13.220 ENCOUNTER FOR LIPID SCREENING FOR CARDIOVASCULAR DISEASE: ICD-10-CM

## 2025-03-25 DIAGNOSIS — Z79.899 ENCOUNTER FOR LONG-TERM (CURRENT) USE OF MEDICATIONS: ICD-10-CM

## 2025-03-25 DIAGNOSIS — R79.89 HIGH SERUM VITAMIN D: ICD-10-CM

## 2025-03-25 DIAGNOSIS — Z00.00 WELL ADULT EXAM: ICD-10-CM

## 2025-03-25 DIAGNOSIS — Z12.5 ENCOUNTER FOR PROSTATE CANCER SCREENING: ICD-10-CM

## 2025-03-25 LAB
25(OH)D3+25(OH)D2 SERPL-MCNC: 53 NG/ML (ref 30–96)
ALBUMIN SERPL BCP-MCNC: 3.9 G/DL (ref 3.5–5.2)
ALP SERPL-CCNC: 77 UNIT/L (ref 40–150)
ALT SERPL W/O P-5'-P-CCNC: 27 UNIT/L (ref 10–44)
ANION GAP (OHS): 7 MMOL/L (ref 8–16)
AST SERPL-CCNC: 28 UNIT/L (ref 11–45)
BILIRUB SERPL-MCNC: 0.3 MG/DL (ref 0.1–1)
BUN SERPL-MCNC: 8 MG/DL (ref 8–23)
CALCIUM SERPL-MCNC: 8.8 MG/DL (ref 8.7–10.5)
CHLORIDE SERPL-SCNC: 107 MMOL/L (ref 95–110)
CHOLEST SERPL-MCNC: 105 MG/DL (ref 120–199)
CHOLEST/HDLC SERPL: 4.4 {RATIO} (ref 2–5)
CO2 SERPL-SCNC: 26 MMOL/L (ref 23–29)
CREAT SERPL-MCNC: 1.2 MG/DL (ref 0.5–1.4)
EAG (OHS): 105 MG/DL (ref 68–131)
ERYTHROCYTE [DISTWIDTH] IN BLOOD BY AUTOMATED COUNT: 12.8 % (ref 11.5–14.5)
GFR SERPLBLD CREATININE-BSD FMLA CKD-EPI: >60 ML/MIN/1.73/M2
GLUCOSE SERPL-MCNC: 96 MG/DL (ref 70–110)
HBA1C MFR BLD: 5.3 % (ref 4–5.6)
HCT VFR BLD AUTO: 39.6 % (ref 40–54)
HDLC SERPL-MCNC: 24 MG/DL (ref 40–75)
HDLC SERPL: 22.9 % (ref 20–50)
HGB BLD-MCNC: 12.9 GM/DL (ref 14–18)
LDLC SERPL CALC-MCNC: 6.6 MG/DL (ref 63–159)
MCH RBC QN AUTO: 29.8 PG (ref 27–50)
MCHC RBC AUTO-ENTMCNC: 32.6 G/DL (ref 32–36)
MCV RBC AUTO: 92 FL (ref 82–98)
NONHDLC SERPL-MCNC: 81 MG/DL
PLATELET # BLD AUTO: 176 K/UL (ref 150–450)
PMV BLD AUTO: 10.2 FL (ref 9.2–12.9)
POTASSIUM SERPL-SCNC: 4.5 MMOL/L (ref 3.5–5.1)
PROT SERPL-MCNC: 6.5 GM/DL (ref 6–8.4)
PSA SERPL-MCNC: 2.04 NG/ML
RBC # BLD AUTO: 4.33 M/UL (ref 4.6–6.2)
SODIUM SERPL-SCNC: 140 MMOL/L (ref 136–145)
TRIGL SERPL-MCNC: 372 MG/DL (ref 30–150)
TSH SERPL-ACNC: 2.31 UIU/ML (ref 0.4–4)
WBC # BLD AUTO: 6.53 K/UL (ref 3.9–12.7)

## 2025-03-25 PROCEDURE — 85027 COMPLETE CBC AUTOMATED: CPT

## 2025-03-25 PROCEDURE — 84153 ASSAY OF PSA TOTAL: CPT

## 2025-03-25 PROCEDURE — 83036 HEMOGLOBIN GLYCOSYLATED A1C: CPT

## 2025-03-25 PROCEDURE — 84443 ASSAY THYROID STIM HORMONE: CPT

## 2025-03-25 PROCEDURE — 80061 LIPID PANEL: CPT

## 2025-03-25 PROCEDURE — 36415 COLL VENOUS BLD VENIPUNCTURE: CPT | Mod: PO

## 2025-03-25 PROCEDURE — 82306 VITAMIN D 25 HYDROXY: CPT

## 2025-03-25 PROCEDURE — 80053 COMPREHEN METABOLIC PANEL: CPT

## 2025-03-26 ENCOUNTER — OFFICE VISIT (OUTPATIENT)
Dept: FAMILY MEDICINE | Facility: CLINIC | Age: 65
End: 2025-03-26
Payer: COMMERCIAL

## 2025-03-26 VITALS
DIASTOLIC BLOOD PRESSURE: 82 MMHG | HEART RATE: 74 BPM | BODY MASS INDEX: 25.37 KG/M2 | HEIGHT: 66 IN | SYSTOLIC BLOOD PRESSURE: 130 MMHG | OXYGEN SATURATION: 100 % | WEIGHT: 157.88 LBS

## 2025-03-26 DIAGNOSIS — Z95.828 S/P FEMORAL-FEMORAL BYPASS SURGERY: ICD-10-CM

## 2025-03-26 DIAGNOSIS — Z13.220 ENCOUNTER FOR LIPID SCREENING FOR CARDIOVASCULAR DISEASE: ICD-10-CM

## 2025-03-26 DIAGNOSIS — K21.9 GASTROESOPHAGEAL REFLUX DISEASE, UNSPECIFIED WHETHER ESOPHAGITIS PRESENT: ICD-10-CM

## 2025-03-26 DIAGNOSIS — Z79.899 ENCOUNTER FOR LONG-TERM (CURRENT) USE OF MEDICATIONS: ICD-10-CM

## 2025-03-26 DIAGNOSIS — Z13.6 ENCOUNTER FOR LIPID SCREENING FOR CARDIOVASCULAR DISEASE: ICD-10-CM

## 2025-03-26 DIAGNOSIS — Z00.00 WELL ADULT EXAM: Primary | ICD-10-CM

## 2025-03-26 DIAGNOSIS — F33.41 RECURRENT MAJOR DEPRESSIVE DISORDER, IN PARTIAL REMISSION: ICD-10-CM

## 2025-03-26 PROCEDURE — 1101F PT FALLS ASSESS-DOCD LE1/YR: CPT | Mod: CPTII,S$GLB,, | Performed by: FAMILY MEDICINE

## 2025-03-26 PROCEDURE — 4010F ACE/ARB THERAPY RXD/TAKEN: CPT | Mod: CPTII,S$GLB,, | Performed by: FAMILY MEDICINE

## 2025-03-26 PROCEDURE — 99999 PR PBB SHADOW E&M-EST. PATIENT-LVL IV: CPT | Mod: PBBFAC,,, | Performed by: FAMILY MEDICINE

## 2025-03-26 PROCEDURE — 3079F DIAST BP 80-89 MM HG: CPT | Mod: CPTII,S$GLB,, | Performed by: FAMILY MEDICINE

## 2025-03-26 PROCEDURE — 99397 PER PM REEVAL EST PAT 65+ YR: CPT | Mod: S$GLB,,, | Performed by: FAMILY MEDICINE

## 2025-03-26 PROCEDURE — 3044F HG A1C LEVEL LT 7.0%: CPT | Mod: CPTII,S$GLB,, | Performed by: FAMILY MEDICINE

## 2025-03-26 PROCEDURE — 3288F FALL RISK ASSESSMENT DOCD: CPT | Mod: CPTII,S$GLB,, | Performed by: FAMILY MEDICINE

## 2025-03-26 PROCEDURE — 1160F RVW MEDS BY RX/DR IN RCRD: CPT | Mod: CPTII,S$GLB,, | Performed by: FAMILY MEDICINE

## 2025-03-26 PROCEDURE — 3008F BODY MASS INDEX DOCD: CPT | Mod: CPTII,S$GLB,, | Performed by: FAMILY MEDICINE

## 2025-03-26 PROCEDURE — 3075F SYST BP GE 130 - 139MM HG: CPT | Mod: CPTII,S$GLB,, | Performed by: FAMILY MEDICINE

## 2025-03-26 PROCEDURE — 1159F MED LIST DOCD IN RCRD: CPT | Mod: CPTII,S$GLB,, | Performed by: FAMILY MEDICINE

## 2025-03-26 RX ORDER — CLOPIDOGREL BISULFATE 75 MG/1
75 TABLET ORAL
Qty: 30 TABLET | Refills: 5 | Status: SHIPPED | OUTPATIENT
Start: 2025-03-26 | End: 2025-03-26 | Stop reason: SDUPTHER

## 2025-03-26 RX ORDER — FAMOTIDINE 20 MG/1
20 TABLET, FILM COATED ORAL NIGHTLY PRN
Qty: 90 TABLET | Refills: 4 | Status: SHIPPED | OUTPATIENT
Start: 2025-03-26

## 2025-03-26 RX ORDER — BUPROPION HYDROCHLORIDE 150 MG/1
150 TABLET ORAL DAILY
Qty: 90 TABLET | Refills: 4 | Status: SHIPPED | OUTPATIENT
Start: 2025-03-26 | End: 2026-03-26

## 2025-03-26 RX ORDER — VITAMIN E 268 MG
CAPSULE ORAL
COMMUNITY
Start: 2024-12-05

## 2025-03-26 RX ORDER — CLOPIDOGREL BISULFATE 75 MG/1
75 TABLET ORAL DAILY
Qty: 90 TABLET | Refills: 4 | Status: SHIPPED | OUTPATIENT
Start: 2025-03-26

## 2025-03-26 NOTE — PROGRESS NOTES
This note is specifically for wellness visit performed today.   WELLNESS EXAM    Patient ID: Sal Villarreal Jr. is a 65 y.o. male.  has a past medical history of Anemia (2020), Anxiety, Arthritis, Bleeding disorder (2023), Bursitis (2020), Carpal tunnel syndrome (10/23), Depression, Digestive disorder, Fatigue, Food allergy (1965), GERD (gastroesophageal reflux disease) (1978), Hearing loss (2024), History of acute bronchitis, History of psychiatric care, Hypertension, Intervertebral disc disorder with radiculopathy of lumbar region (03/23/2021), Psychiatric problem, PVD (peripheral vascular disease), S/P femoral-femoral bypass surgery (12/01/2021), Scoliosis (1/70), and Ulcer (1974).   Chief Complaint:  Encounter for wellness exam    Well Adult Physical: Patient here for a comprehensive physical exam.The patient reports chronic problems.  History of Present Illness  The patient is a 65-year-old male who presents for evaluation of chronic medical conditions and medication refills.    He has been experiencing issues with his blood pressure earlier this year, which prompted a visit to the cardiologist. A stress test was conducted, yielding normal results. He recently underwent blood work yesterday and prior to that for Dr. Wagner. He is currently on a regimen of Lovaza, taking it twice daily.    He has been attempting to discontinue famotidine but has been experiencing heartburn as a result. He is also consuming a significant amount of peanut butter to manage his reflux symptoms. His current medication includes pantoprazole and famotidine 20 mg, both taken once daily in the morning.    He is on Wellbutrin 150 mg, which appears to be effective for him.    He is on Plavix due to a stent placement and has been advised to continue this medication indefinitely. He has abstained from smoking for nearly 3 years and reports no respiratory issues. He has received pneumonia vaccines.    SOCIAL HISTORY  He has not smoked in  "almost 3 years.    MEDICATIONS  Current: Lovaza, famotidine, pantoprazole, Wellbutrin, Plavix    IMMUNIZATIONS  He has had pneumonia shots.   CHRONIC.  Triglycerides are elevated.. Lab analysis reviewed.   (-) CP, SOB, abdominal pain, N/V/D, constipation, jaundice, skin changes.  (-) Myalgias  Lab Results   Component Value Date    CHOL 105 (L) 03/25/2025    CHOL 112 (L) 11/15/2024    CHOL 98 (L) 08/08/2024     Lab Results   Component Value Date    HDL 24 (L) 03/25/2025    HDL 26 (L) 11/15/2024    HDL 27 (L) 08/08/2024     Lab Results   Component Value Date    LDLCALC 50.4 (L) 11/15/2024    LDLCALC 41.6 (L) 08/08/2024    LDLCALC 44.2 (L) 05/10/2024     Lab Results   Component Value Date    TRIG 372 (H) 03/25/2025    TRIG 178 (H) 11/15/2024    TRIG 147 08/08/2024     Lab Results   Component Value Date    CHOLHDL 22.9 03/25/2025    CHOLHDL 23.2 11/15/2024    CHOLHDL 27.6 08/08/2024     Lab Results   Component Value Date    TOTALCHOLEST 4.4 03/25/2025    TOTALCHOLEST 4.3 11/15/2024    TOTALCHOLEST 3.6 08/08/2024     Lab Results   Component Value Date    ALT 27 03/25/2025    AST 28 03/25/2025    ALKPHOS 77 03/25/2025    BILITOT 0.3 03/25/2025     ======================================================    Do you take any herbs or supplements that were not prescribed by a doctor? no   Are you taking calcium supplements? no    History: UROLOGIST: The natural history of prostate cancer and ongoing controversy regarding screening and potential treatment outcomes of prostate cancer has been discussed with the patient. The meaning of a false positive PSA and a false negative PSA has been discussed. He indicates understanding of the limitations of this screening test and wishes  to proceed with screening PSA testing.  Date last PSA:   Lab Results   Component Value Date    PSA 2.04 03/25/2025    PSA 1.2 08/08/2024    PSA 1.4 10/11/2023      No results found for: "TESTOSTERONE"   Testosterone, Total (ng/dL)   Date Value "   02/24/2023 822      Colon cancer screening:  Colon cancer screening is up-to-date.    Health Maintenance Topics with due status: Not Due       Topic Last Completion Date    Colorectal Cancer Screening 07/25/2019    PROSTATE-SPECIFIC ANTIGEN 03/25/2025    Hemoglobin A1c (Diabetic Prevention Screening) 03/25/2025    Lipid Panel 03/25/2025    High Dose Statin 03/26/2025    Aspirin/Antiplatelet Therapy 03/26/2025      ==============================================  History reviewed.   There are no preventive care reminders to display for this patient.    Past Medical History:  Past Medical History:   Diagnosis Date    Anemia 2020    Anxiety     Arthritis     Bleeding disorder 2023    Bursitis 2020    Carpal tunnel syndrome 10/23    Depression     Digestive disorder     ulcers    Fatigue     Food allergy 1965    GERD (gastroesophageal reflux disease) 1978    Hearing loss 2024    History of acute bronchitis     History of psychiatric care     Hypertension     Intervertebral disc disorder with radiculopathy of lumbar region 03/23/2021    Psychiatric problem     PVD (peripheral vascular disease)     S/P femoral-femoral bypass surgery 12/01/2021    Scoliosis 1/70    Ulcer 1974     Past Surgical History:   Procedure Laterality Date    ABDOMINAL SURGERY  3/23    Appendicitis    ANTERIOR LUMBAR INTERBODY FUSION (ALIF) Right 03/23/2021    Procedure: FUSION, SPINE, LUMBAR, ALIF RIGHT L5-S1;  Surgeon: Kelly Ayala MD;  Location: [facility] OR;  Service: Neurosurgery;  Laterality: Right;    AORTOGRAPHY WITH EXTREMITY RUNOFF N/A 07/27/2021    Procedure: AORTOGRAM, WITH EXTREMITY RUNOFF;  Surgeon: Jim Briggs MD;  Location: [facility] CATH;  Service: Cardiovascular;  Laterality: N/A;    AORTOGRAPHY WITH SERIALOGRAPHY Bilateral 07/27/2021    Procedure: AORTOGRAM, WITH SERIALOGRAPHY;  Surgeon: Jim Briggs MD;  Location: [facility] CATH;  Service: Cardiovascular;  Laterality: Bilateral;    APPENDECTOMY  3/23    BACK SURGERY  3/22    Fusion     CARPAL TUNNEL RELEASE  11/23    CREATION OF FEMORAL-FEMORAL ARTERY BYPASS WITH GRAFT Left 11/30/2021    Procedure: CREATION, BYPASS, ARTERIAL, FEMORAL TO FEMORAL, USING GRAFT;  Surgeon: Jim Briggs MD;  Location: [facility] OR;  Service: Cardiovascular;  Laterality: Left;    ENDARTERECTOMY OF FEMORAL ARTERY Bilateral 11/30/2021    Procedure: ENDARTERECTOMY, FEMORAL;  Surgeon: Jim Briggs MD;  Location: [facility] OR;  Service: Cardiovascular;  Laterality: Bilateral;    FUSION OF LUMBAR SPINE BY ANTERIOR APPROACH  03/23/2021    Procedure: FUSION, SPINE, LUMBAR, ANTERIOR APPROACH;  Surgeon: Jim Briggs MD;  Location: [facility] OR;  Service: Cardiovascular;;    HAND SURGERY  11/23    Carpal tunnel    HARVESTING OF BONE GRAFT N/A 03/23/2021    Procedure: SURGICAL PROCUREMENT, BONE GRAFT-Iliac;  Surgeon: Kelly Ayala MD;  Location: [facility] OR;  Service: Neurosurgery;  Laterality: N/A;    HYPOSPADIAS CORRECTION      SPINAL FUSION  3/22    SPINE SURGERY  3/22    VASECTOMY       Review of patient's allergies indicates:   Allergen Reactions    Blueberry      Medications Ordered Prior to Encounter[1]  Social History[2]  Family History   Problem Relation Name Age of Onset    Diabetes Mother Reba     Hypertension Mother Reba     Diabetes Father Sal     Hypertension Father Sal     Cancer Sister      Muscular dystrophy Son      Arthritis Paternal Grandfather Luke     Heart disease Paternal Grandfather Luke     Birth defects Brother ESPERANZA     Cancer Brother ESPERANZA     Heart disease Maternal Aunt Zulema     Heart disease Paternal Uncle Santo     Stroke Maternal Grandfather Tremaine     Heart failure Paternal Grandmother Reba     Birth defects Brother ESPERANZA     Heart disease Maternal Aunt Zulema     Cancer Sister I have no sister     Suicide Neg Hx      Sexual abuse Neg Hx      Self injury Neg Hx      Seizures Neg Hx      Schizophrenia Neg Hx      Physical abuse Neg Hx      Paranoid behavior Neg Hx      OCD Neg Hx      Drug abuse Neg Hx       Depression Neg Hx      Dementia Neg Hx      Bipolar disorder Neg Hx      Anxiety disorder Neg Hx      Alcohol abuse Neg Hx         Review of Systems   Constitutional:  Negative for activity change and unexpected weight change.   HENT:  Positive for hearing loss. Negative for rhinorrhea and trouble swallowing.    Eyes:  Negative for discharge and visual disturbance.   Respiratory:  Negative for chest tightness and wheezing.    Cardiovascular:  Positive for chest pain (Chronic). Negative for palpitations.   Gastrointestinal:  Negative for blood in stool, constipation, diarrhea and vomiting.   Endocrine: Negative for polydipsia and polyuria.   Genitourinary:  Negative for difficulty urinating, hematuria and urgency.   Musculoskeletal:  Positive for arthralgias and neck pain. Negative for joint swelling.   Neurological:  Positive for headaches. Negative for weakness.   Psychiatric/Behavioral:  Negative for confusion and dysphoric mood.         Objective:     Vitals:    03/26/25 1440   BP: 130/82   Pulse: 74    Body mass index is 25.49 kg/m².  Physical Exam  Vitals and nursing note reviewed.   Constitutional:       General: He is not in acute distress.     Appearance: He is well-developed. He is not diaphoretic.   HENT:      Head: Normocephalic and atraumatic.      Right Ear: External ear normal.      Left Ear: External ear normal.      Nose: Nose normal. No rhinorrhea.   Eyes:      Extraocular Movements: Extraocular movements intact.      Pupils: Pupils are equal, round, and reactive to light.   Cardiovascular:      Rate and Rhythm: Normal rate.      Pulses: Normal pulses.   Pulmonary:      Effort: Pulmonary effort is normal. No respiratory distress.      Breath sounds: Normal breath sounds.   Abdominal:      General: Bowel sounds are normal.      Palpations: Abdomen is soft.   Musculoskeletal:         General: Normal range of motion.      Cervical back: Normal range of motion and neck supple.   Skin:     General:  Skin is warm and dry.      Capillary Refill: Capillary refill takes less than 2 seconds.      Findings: No rash.   Neurological:      General: No focal deficit present.      Mental Status: He is alert and oriented to person, place, and time.      Cranial Nerves: No cranial nerve deficit.      Motor: No weakness.      Gait: Gait normal.   Psychiatric:         Attention and Perception: He is attentive.         Mood and Affect: Mood normal. Mood is not anxious or depressed. Affect is not labile, blunt, angry or inappropriate.         Speech: He is communicative. Speech is not rapid and pressured, delayed, slurred or tangential.         Behavior: Behavior normal. Behavior is not agitated, slowed, aggressive, withdrawn, hyperactive or combative.         Thought Content: Thought content normal. Thought content is not paranoid or delusional. Thought content does not include homicidal or suicidal ideation. Thought content does not include homicidal or suicidal plan.         Cognition and Memory: Memory is not impaired.         Judgment: Judgment normal. Judgment is not impulsive or inappropriate.          Lab Visit on 03/25/2025   Component Date Value Ref Range Status    WBC 03/25/2025 6.53  3.90 - 12.70 K/uL Final    RBC 03/25/2025 4.33 (L)  4.60 - 6.20 M/uL Final    HGB 03/25/2025 12.9 (L)  14.0 - 18.0 gm/dL Final    HCT 03/25/2025 39.6 (L)  40.0 - 54.0 % Final    MCV 03/25/2025 92  82 - 98 fL Final    MCHC 03/25/2025 32.6  32.0 - 36.0 g/dL Final    RDW 03/25/2025 12.8  11.5 - 14.5 % Final    Platelet Count 03/25/2025 176  150 - 450 K/uL Final    MCH 03/25/2025 29.8  27.0 - 50.0 pg Final    MPV 03/25/2025 10.2  9.2 - 12.9 fL Final    TSH 03/25/2025 2.311  0.400 - 4.000 uIU/mL Final    Sodium 03/25/2025 140  136 - 145 mmol/L Final    Potassium 03/25/2025 4.5  3.5 - 5.1 mmol/L Final    Chloride 03/25/2025 107  95 - 110 mmol/L Final    CO2 03/25/2025 26  23 - 29 mmol/L Final    Glucose 03/25/2025 96  70 - 110 mg/dL Final     BUN 03/25/2025 8  8 - 23 mg/dL Final    Creatinine 03/25/2025 1.2  0.5 - 1.4 mg/dL Final    Calcium 03/25/2025 8.8  8.7 - 10.5 mg/dL Final    Protein Total 03/25/2025 6.5  6.0 - 8.4 gm/dL Final    Albumin 03/25/2025 3.9  3.5 - 5.2 g/dL Final    Bilirubin Total 03/25/2025 0.3  0.1 - 1.0 mg/dL Final    For infants and newborns, interpretation of results should be based   on gestational age, weight and in agreement with clinical   observations.    Premature Infant recommended reference ranges:   0-24 hours:  <8.0 mg/dL   24-48 hours: <12.0 mg/dL   3-5 days:    <15.0 mg/dL   6-29 days:   <15.0 mg/dL    ALP 03/25/2025 77  40 - 150 unit/L Final    AST 03/25/2025 28  11 - 45 unit/L Final    ALT 03/25/2025 27  10 - 44 unit/L Final    Anion Gap 03/25/2025 7 (L)  8 - 16 mmol/L Final    eGFR 03/25/2025 >60  >60 mL/min/1.73/m2 Final    Estimated GFR calculated using the CKD-EPI creatinine (2021) equation.    Hemoglobin A1c 03/25/2025 5.3  4.0 - 5.6 % Final    ADA Screening Guidelines:  5.7-6.4%  Consistent with prediabetes  >=6.5%  Consistent with diabetes    High levels of fetal hemoglobin interfere with the HbA1C  assay. Heterozygous hemoglobin variants (HbS, HgC, etc)do  not significantly interfere with this assay.   However, presence of multiple variants may affect accuracy.    Estimated Average Glucose 03/25/2025 105  68 - 131 mg/dL Final    Cholesterol Total 03/25/2025 105 (L)  120 - 199 mg/dL Final    The National Cholesterol Education Program (NCEP) has set the  following guidelines (reference ranges) for Cholesterol:  Optimal.....................<200 mg/dL  Borderline High.............200-239 mg/dL  High........................> or = 240 mg/dL    Triglyceride 03/25/2025 372 (H)  30 - 150 mg/dL Final    The National Cholesterol Education Program (NCEP) has set the  following guidelines (reference values) for triglycerides:  Normal......................<150 mg/dL  Borderline High.............150-199  mg/dL  High........................200-499 mg/dL    HDL Cholesterol 03/25/2025 24 (L)  40 - 75 mg/dL Final    The National Cholesterol Education Program (NCEP) has set the   following guidelines (reference values) for HDL Cholesterol:   Low...............<40 mg/dL   Optimal...........>60 mg/dL    LDL Cholesterol 03/25/2025 6.6 (L)  63.0 - 159.0 mg/dL Final    The National Cholesterol Education Program (NCEP) has set the  following guidelines (reference values) for LDL Cholesterol:  Optimal.......................<130 mg/dL  Borderline High...............130-159 mg/dL  High..........................160-189 mg/dL  Very High.....................>190 mg/dL  LDL calculated using the Friedewald equation.    HDL/Cholesterol Ratio 03/25/2025 22.9  20.0 - 50.0 % Final    Cholesterol/HDL Ratio 03/25/2025 4.4  2.0 - 5.0 Final    Non HDL Cholesterol 03/25/2025 81  mg/dL Final    Risk category and Non-HDL cholesterol goals:  Coronary heart disease (CHD)or equivalent (10-year risk of CHD >20%):  Non-HDL cholesterol goal     <130 mg/dL  Two or more CHD risk factors and 10-year risk of CHD <= 20%:  Non-HDL cholesterol goal     <160 mg/dL  0 to 1 CHD risk factor:  Non-HDL cholesterol goal     <190 mg/dL    Prostate Specific Antigen 03/25/2025 2.04  <=4.00 ng/mL Final    PSA Expected levels:  Hormonal therapy: < 0.05 ng/mL   Prostatectomy: < 0.01 ng/mL   Radiation therapy: < 1.00 ng/mL      Vitamin D 03/25/2025 53  30 - 96 ng/mL Final    Vitamin D deficiency.........<10 ng/mL                              Vitamin D insufficiency......10-29 ng/mL       Vitamin D sufficiency........> or equal to 30 ng/mL  Vitamin D toxicity............>100 ng/mL          Assessment / Plan:    1.  Routine health exam-patient here for annual wellness exam.  Labs ordered.  Health maintenance was reviewed and ordered.  Anticipatory guidance: Don't smoke.  Healthy diet and regular exercise recommended. Vaccine recommendations discussed.  See orders.  Reviewed  Anticipatory guidance, risk factor reduction interventions or counseling as needed, Complete history , physical was completed today.  Complete and thorough medication reconciliation was performed.  Discussed risks and benefits of medications.  Advised patient on orders and health maintenance.  We discussed old records and old labs if available.  Will request any records not available through epic.  Continue current medications listed on your summary sheet.  All questions were answered. Patient had no further concerns. Advised of diagnoses and plan. Follow up as planned or return sooner if symptoms persist or worsen.   Assessment & Plan  1. Elevated triglycerides.  His triglyceride levels are significantly elevated at 372, despite adherence to a twice-daily Lovaza regimen. This could potentially be attributed to dietary intake prior to the blood work. He is advised to maintain a low-fat, high-fiber diet, avoid fried and fatty foods, increase salad consumption, adhere to a Mediterranean diet, increase fish intake, and limit red meat consumption. The impact of peanut butter on triglyceride levels was discussed. A re-evaluation of his cholesterol levels will be conducted in 6 months.Counseled on hyperlipidemia disease course, healthy diet and increased need for exercise.  Please be advised of the risk of cardiovascular disease, increase stroke and heart attack risk with uncontrolled/untreated hyperlipidemia.     Patient voiced understanding and understood the treatment plan. All questions were answered.       2. Gastroesophageal reflux disease.  He is currently taking famotidine 20 mg once daily and pantoprazole. He attempted to discontinue famotidine but experienced heartburn. He is advised to continue his current medication regimen. A refill for famotidine will be provided.  GERD RECOMMENDATIONS  Please be advised of condition course.  - Take PPI in the morning 30-60 minutes before breakfast  - I recommend ongoing  Education for lifestyle modifications to help control/reduce reflux/abdominal pain including: avoid large meals, avoid eating within 2-3 hours of bedtime (avoid late night eating & lying down soon after eating), elevate head of bed if nocturnal symptoms are present, smoking cessation (if current smoker), & weight loss (if overweight).   - please be advised to avoid known foods which trigger reflux symptoms & to minimize/avoid high-fat foods, chocolate, caffeine, citrus, alcohol, & tomato products.  - Advised to avoid/limit use of NSAID's, since they can cause GI upset, bleeding, and/or ulcers. If needed, take with food.     3. Depression.  He is currently on Wellbutrin 150 mg, which he reports is working well for him. No changes to his medication are necessary at this time.Please be advised of condition course.  SNRI/SSRI is first-line treatment for this condition.  Please be advised of the risk of discontinuing this medication without tapering/contacting MD.  Patient has been advised of side effects, and all questions were answered.  Patient voiced understanding.  Patient will follow up routinely and notify us if having any side effects or worsening or persistent symptoms.  ER precautions were given. Antidepressant/Antianxiety Medication Initiation:  Patient informed of risks, benefits, and potential side effects of medication and accepts informed consent.  Common side effects include nausea, fatigue, headache, insomnia, etc see medication insert for complete side effect profile.  Most importantly be advised of the possibility of new or worsening suicidal thoughts/depression/anxiety etcetera.  Please be advised to stop the medication immediately and seek urgent treatment if this occurs.  Therefore please do not to abruptly discontinue medication without physician guidance except in cases of sudden onset or worsening of SI.       4. Medication management.  He is on Plavix for a stent and has been advised to continue  indefinitely. He has not smoked in almost 3 years, which is beneficial for his overall health. An ultrasound is scheduled for May to monitor the stent. A prescription for Plavix will be provided with ample refills.    Follow-up  The patient will follow up in 1 year.    Orders Placed This Encounter   Procedures    CBC Without Differential     Standing Status:   Future     Expected Date:   3/26/2025     Expiration Date:   5/25/2026    Comprehensive Metabolic Panel     Standing Status:   Future     Expected Date:   3/26/2025     Expiration Date:   5/25/2026    TSH     Standing Status:   Future     Expected Date:   3/26/2025     Expiration Date:   5/25/2026    Hemoglobin A1C     Standing Status:   Future     Expected Date:   3/26/2025     Expiration Date:   5/25/2026    Lipid Panel     Standing Status:   Future     Expected Date:   3/26/2025     Expiration Date:   5/25/2026       Medications Ordered This Encounter   Medications    buPROPion (WELLBUTRIN XL) 150 MG TB24 tablet     Sig: Take 1 tablet (150 mg total) by mouth once daily.     Dispense:  90 tablet     Refill:  4    clopidogreL (PLAVIX) 75 mg tablet     Sig: Take 1 tablet (75 mg total) by mouth once daily.     Dispense:  90 tablet     Refill:  4    famotidine (PEPCID) 20 MG tablet     Sig: Take 1 tablet (20 mg total) by mouth nightly as needed for Heartburn.     Dispense:  90 tablet     Refill:  4      Future Appointments       Date Provider Specialty Appt Notes    4/28/2025 Darian Georges MD Urology 6 month    5/6/2025  Cardiology dr. calderon    5/6/2025  Cardiology dr. calderon    5/19/2025 Samm Calderon MD Cardiology 6 mo f/u           This note was generated with the assistance of ambient listening technology. Verbal consent was obtained by the patient and accompanying visitor(s) for the recording of patient appointment to facilitate this note. I attest to having reviewed and edited the generated note for accuracy, though some syntax or spelling errors may  persist. Please contact the author of this note for any clarification.     Bipin Penaloza MD             [1]   Current Outpatient Medications on File Prior to Visit   Medication Sig Dispense Refill    acetaminophen (TYLENOL EXTRA STRENGTH) 500 MG tablet Take 500 mg by mouth 2 (two) times a day.      allopurinoL (ZYLOPRIM) 100 MG tablet TAKE 1 TABLET BY MOUTH EVERY DAY 90 tablet 1    amitriptyline (ELAVIL) 25 MG tablet Take 1 tablet (25 mg total) by mouth every evening. 90 tablet 0    aspirin (ECOTRIN) 81 MG EC tablet Take 1 tablet (81 mg total) by mouth once daily. 90 tablet 0    atenoloL (TENORMIN) 25 MG tablet TAKE 1 TABLET BY MOUTH EVERY DAY IN THE MORNING 90 tablet 0    ferrous sulfate 325 (65 FE) MG EC tablet Take 1 tablet (325 mg total) by mouth once daily. 90 tablet 1    lisinopriL (PRINIVIL,ZESTRIL) 20 MG tablet Take 1 tablet (20 mg total) by mouth 2 (two) times a day. 180 tablet 3    multivitamin (THERAGRAN) per tablet Take 1 tablet by mouth once daily.      omega-3 acid ethyl esters (LOVAZA) 1 gram capsule Take 2 capsules (2 g total) by mouth 2 (two) times daily. 360 capsule 1    pantoprazole (PROTONIX) 40 MG tablet Take 1 tablet (40 mg total) by mouth 2 (two) times daily. 180 tablet 1    rosuvastatin (CRESTOR) 5 MG tablet TAKE 1 TABLET BY MOUTH EVERY DAY IN THE EVENING 90 tablet 1    SODIUM FLUORIDE 5000 DRY MOUTH 1.1 % Pste SMARTSIG:By Mouth Morning-Night      tadalafiL (CIALIS) 20 MG Tab Take 1 tablet (20 mg total) by mouth daily as needed (1/2 or 1 full tablet as needed for erectile dysfunction). 20 tablet 11    vitamin E 400 UNIT capsule       [DISCONTINUED] buPROPion (WELLBUTRIN XL) 150 MG TB24 tablet Take 1 tablet (150 mg total) by mouth once daily. 30 tablet 11    [DISCONTINUED] famotidine (PEPCID) 20 MG tablet TAKE 2 TABLETS BY MOUTH 2 TIMES DAILY. 360 tablet 0    [DISCONTINUED] clopidogreL (PLAVIX) 75 mg tablet TAKE 1 TABLET BY MOUTH EVERY DAY 90 tablet 1    [DISCONTINUED] tadalafiL (CIALIS)  20 MG Tab Take 1 tablet (20 mg total) by mouth As instructed (Take 1 by mouth as needed 2 to 12 hours prior to sexual activity). 30 tablet 6    [DISCONTINUED] tadalafiL (CIALIS) 20 MG Tab Take 1 tablet (20 mg total) by mouth As instructed (Take 1 by mouth as needed 2 to 12 hours prior to sexual activity.). 30 tablet 6     No current facility-administered medications on file prior to visit.   [2]   Social History  Socioeconomic History    Marital status:    Occupational History    Occupation: unemployed     Employer: [employer] Insurance   Tobacco Use    Smoking status: Former     Current packs/day: 0.00     Average packs/day: 0.3 packs/day for 42.0 years (10.5 ttl pk-yrs)     Types: Cigarettes     Start date: 1980     Quit date: 2022     Years since quittin.4     Passive exposure: Never    Smokeless tobacco: Never   Substance and Sexual Activity    Alcohol use: Not Currently     Comment: stopped recently - last drink 3/21    Drug use: No    Sexual activity: Yes     Partners: Male     Birth control/protection: Partner-Vasectomy   Other Topics Concern    Patient feels they ought to cut down on drinking/drug use Yes    Patient annoyed by others criticizing their drinking/drug use Yes    Patient has felt bad or guilty about drinking/drug use Yes    Patient has had a drink/used drugs as an eye opener in the AM No     Social Drivers of Health     Financial Resource Strain: Low Risk  (2024)    Overall Financial Resource Strain (CARDIA)     Difficulty of Paying Living Expenses: Not hard at all   Food Insecurity: No Food Insecurity (2024)    Hunger Vital Sign     Worried About Running Out of Food in the Last Year: Never true     Ran Out of Food in the Last Year: Never true   Transportation Needs: No Transportation Needs (2024)    PRAPARE - Transportation     Lack of Transportation (Medical): No     Lack of Transportation (Non-Medical): No   Physical Activity: Sufficiently Active (2024)     Exercise Vital Sign     Days of Exercise per Week: 6 days     Minutes of Exercise per Session: 150+ min   Stress: No Stress Concern Present (5/9/2024)    [employer] - Occupational Stress Questionnaire     Feeling of Stress : Only a little   Housing Stability: Low Risk  (11/27/2023)    Housing Stability Vital Sign     Unable to Pay for Housing in the Last Year: No     Number of Places Lived in the Last Year: 1     Unstable Housing in the Last Year: No

## 2025-03-26 NOTE — TELEPHONE ENCOUNTER
No care due was identified.  [facility] Embedded Care Due Messages. Reference number: [id number].   3/26/2025 12:14:34 AM CDT

## 2025-04-27 DIAGNOSIS — E78.5 DYSLIPIDEMIA (HIGH LDL; LOW HDL): ICD-10-CM

## 2025-04-28 ENCOUNTER — OFFICE VISIT (OUTPATIENT)
Facility: CLINIC | Age: 65
End: 2025-04-28
Payer: COMMERCIAL

## 2025-04-28 VITALS
HEART RATE: 79 BPM | RESPIRATION RATE: 14 BRPM | DIASTOLIC BLOOD PRESSURE: 90 MMHG | WEIGHT: 155 LBS | HEIGHT: 66 IN | SYSTOLIC BLOOD PRESSURE: 136 MMHG | BODY MASS INDEX: 24.91 KG/M2

## 2025-04-28 DIAGNOSIS — N48.6 PEYRONIE'S DISEASE: ICD-10-CM

## 2025-04-28 DIAGNOSIS — R97.20 PSA ELEVATION: Primary | ICD-10-CM

## 2025-04-28 DIAGNOSIS — N52.01 ERECTILE DYSFUNCTION DUE TO ARTERIAL INSUFFICIENCY: ICD-10-CM

## 2025-04-28 PROCEDURE — 99999 PR PBB SHADOW E&M-EST. PATIENT-LVL IV: CPT | Mod: PBBFAC,,, | Performed by: UROLOGY

## 2025-04-28 PROCEDURE — 3080F DIAST BP >= 90 MM HG: CPT | Mod: CPTII,S$GLB,, | Performed by: UROLOGY

## 2025-04-28 PROCEDURE — 3288F FALL RISK ASSESSMENT DOCD: CPT | Mod: CPTII,S$GLB,, | Performed by: UROLOGY

## 2025-04-28 PROCEDURE — 1101F PT FALLS ASSESS-DOCD LE1/YR: CPT | Mod: CPTII,S$GLB,, | Performed by: UROLOGY

## 2025-04-28 PROCEDURE — 4010F ACE/ARB THERAPY RXD/TAKEN: CPT | Mod: CPTII,S$GLB,, | Performed by: UROLOGY

## 2025-04-28 PROCEDURE — 3075F SYST BP GE 130 - 139MM HG: CPT | Mod: CPTII,S$GLB,, | Performed by: UROLOGY

## 2025-04-28 PROCEDURE — 3008F BODY MASS INDEX DOCD: CPT | Mod: CPTII,S$GLB,, | Performed by: UROLOGY

## 2025-04-28 PROCEDURE — 1159F MED LIST DOCD IN RCRD: CPT | Mod: CPTII,S$GLB,, | Performed by: UROLOGY

## 2025-04-28 PROCEDURE — 99215 OFFICE O/P EST HI 40 MIN: CPT | Mod: S$GLB,,, | Performed by: UROLOGY

## 2025-04-28 PROCEDURE — 3044F HG A1C LEVEL LT 7.0%: CPT | Mod: CPTII,S$GLB,, | Performed by: UROLOGY

## 2025-04-28 RX ORDER — PENTOXIFYLLINE 400 MG/1
400 TABLET, EXTENDED RELEASE ORAL DAILY
Qty: 90 TABLET | Refills: 1 | Status: SHIPPED | OUTPATIENT
Start: 2025-04-28 | End: 2026-04-28

## 2025-04-28 RX ORDER — TADALAFIL 20 MG/1
20 TABLET ORAL SEE ADMIN INSTRUCTIONS
Qty: 30 TABLET | Refills: 6 | Status: SHIPPED | OUTPATIENT
Start: 2025-04-28 | End: 2026-04-28

## 2025-04-28 RX ORDER — ROSUVASTATIN CALCIUM 5 MG/1
5 TABLET, COATED ORAL NIGHTLY
Qty: 90 TABLET | Refills: 1 | Status: SHIPPED | OUTPATIENT
Start: 2025-04-28

## 2025-04-28 NOTE — PROGRESS NOTES
Subjective:       Patient ID: Sal Villarreal Jr. is a 65 y.o. male.    Chief Complaint: Erectile Dysfunction      History of Present Illness:     Mr Villarreal has a PSA elevation from 1.2 on 8-8-24 to 2.04 on 3-25-25.  Denies LUTS.  Denies a family history of prostate cancer.  He has a history of a urethroplasty when he was 10 years old. He said underwent cystoscopy by Dr Holden, Urologist, in approximately 2023 and that it showed no urethral stricture with mildly enlarged prostate.  He underwent transabdominal prostate US showing a volume of 26 cc.     He has peyronies disease that is stable.  He noticed an approximately 15 degree left sided curve in approximately July 2024.  No pain with an erection.  He says he has been taking vitamin E 400 IU daily since 10-28-24.  He has ED.  He says tadalafil 20 mg is helping some with his erections.  He did not have success with tadalafil or sildenafil in the past.  He did not get the BRENNAN that we discussed at his previous appointment.  He said tri-mix ICI did not help in the past.  He says when he cut his wellbutrin in half and stopped taking gabapentin for neck pain his erections improved.  He said he takes wellbutrin to help him stop smoking.  He said he stopped smoking in 2022 and he previously smoked for a long time.  He has PVD.  He has a history of a lower extremity vascular bypass surgery.      Erectile Dysfunction  Pertinent negatives include no chills or hematuria.        Past Medical History:   Diagnosis Date    Anemia 2020    Anxiety     Arthritis     Bleeding disorder 2023    Bursitis 2020    Carpal tunnel syndrome 10/23    Depression     Digestive disorder     ulcers    Fatigue     Food allergy 1965    GERD (gastroesophageal reflux disease) 1978    Hearing loss 2024    History of acute bronchitis     History of psychiatric care     Hypertension     Intervertebral disc disorder with radiculopathy of lumbar region 03/23/2021    Psychiatric problem     PVD  "(peripheral vascular disease)     S/P femoral-femoral bypass surgery 12/01/2021    Scoliosis 1/70    Ulcer 1974     Family History   Problem Relation Name Age of Onset    Diabetes Mother Reba     Hypertension Mother Reba     Diabetes Father Sal     Hypertension Father Sal     Cancer Sister      Muscular dystrophy Son      Arthritis Paternal Grandfather Lughada     Heart disease Paternal Grandfather Luke     Birth defects Brother ESPERANZA     Cancer Brother ESPERANZA     Heart disease Maternal Aunt Zulema     Heart disease Paternal Uncle Santo     Stroke Maternal Grandfather Tremaine     Heart failure Paternal Grandmother Reba     Birth defects Brother ESPERANZA     Heart disease Maternal Aunt Zulema     Cancer Sister I have no sister     Suicide Neg Hx      Sexual abuse Neg Hx      Self injury Neg Hx      Seizures Neg Hx      Schizophrenia Neg Hx      Physical abuse Neg Hx      Paranoid behavior Neg Hx      OCD Neg Hx      Drug abuse Neg Hx      Depression Neg Hx      Dementia Neg Hx      Bipolar disorder Neg Hx      Anxiety disorder Neg Hx      Alcohol abuse Neg Hx       Social History[1]  Encounter Medications[2]     Review of Systems   Constitutional:  Negative for chills and fever.   Respiratory:  Negative for shortness of breath.    Cardiovascular:  Negative for chest pain.   Gastrointestinal:  Negative for nausea and vomiting.   Genitourinary:  Negative for difficulty urinating and hematuria.   Musculoskeletal:  Negative for back pain.   Skin:  Negative for rash.   Neurological:  Negative for dizziness.   Psychiatric/Behavioral:  Negative for agitation.        Objective:     BP (!) 136/90 (BP Location: Left arm, Patient Position: Sitting)   Pulse 79   Resp 14   Ht 5' 6" (1.676 m)   Wt 70.3 kg (155 lb)   BMI 25.02 kg/m²     Physical Exam  Constitutional:       Appearance: Normal appearance.   Pulmonary:      Effort: Pulmonary effort is normal.   Abdominal:      Palpations: Abdomen is soft.   Genitourinary:     " Comments: Prostate exam deferred.  Neurological:      Mental Status: He is alert and oriented to person, place, and time.   Psychiatric:         Mood and Affect: Mood normal.         No visits with results within 1 Day(s) from this visit.   Latest known visit with results is:   Lab Visit on 03/25/2025   Component Date Value Ref Range Status    WBC 03/25/2025 6.53  3.90 - 12.70 K/uL Final    RBC 03/25/2025 4.33 (L)  4.60 - 6.20 M/uL Final    HGB 03/25/2025 12.9 (L)  14.0 - 18.0 gm/dL Final    HCT 03/25/2025 39.6 (L)  40.0 - 54.0 % Final    MCV 03/25/2025 92  82 - 98 fL Final    MCHC 03/25/2025 32.6  32.0 - 36.0 g/dL Final    RDW 03/25/2025 12.8  11.5 - 14.5 % Final    Platelet Count 03/25/2025 176  150 - 450 K/uL Final    MCH 03/25/2025 29.8  27.0 - 50.0 pg Final    MPV 03/25/2025 10.2  9.2 - 12.9 fL Final    TSH 03/25/2025 2.311  0.400 - 4.000 uIU/mL Final    Sodium 03/25/2025 140  136 - 145 mmol/L Final    Potassium 03/25/2025 4.5  3.5 - 5.1 mmol/L Final    Chloride 03/25/2025 107  95 - 110 mmol/L Final    CO2 03/25/2025 26  23 - 29 mmol/L Final    Glucose 03/25/2025 96  70 - 110 mg/dL Final    BUN 03/25/2025 8  8 - 23 mg/dL Final    Creatinine 03/25/2025 1.2  0.5 - 1.4 mg/dL Final    Calcium 03/25/2025 8.8  8.7 - 10.5 mg/dL Final    Protein Total 03/25/2025 6.5  6.0 - 8.4 gm/dL Final    Albumin 03/25/2025 3.9  3.5 - 5.2 g/dL Final    Bilirubin Total 03/25/2025 0.3  0.1 - 1.0 mg/dL Final    For infants and newborns, interpretation of results should be based   on gestational age, weight and in agreement with clinical   observations.    Premature Infant recommended reference ranges:   0-24 hours:  <8.0 mg/dL   24-48 hours: <12.0 mg/dL   3-5 days:    <15.0 mg/dL   6-29 days:   <15.0 mg/dL    ALP 03/25/2025 77  40 - 150 unit/L Final    AST 03/25/2025 28  11 - 45 unit/L Final    ALT 03/25/2025 27  10 - 44 unit/L Final    Anion Gap 03/25/2025 7 (L)  8 - 16 mmol/L Final    eGFR 03/25/2025 >60  >60 mL/min/1.73/m2 Final     Estimated GFR calculated using the CKD-EPI creatinine (2021) equation.    Hemoglobin A1c 03/25/2025 5.3  4.0 - 5.6 % Final    ADA Screening Guidelines:  5.7-6.4%  Consistent with prediabetes  >=6.5%  Consistent with diabetes    High levels of fetal hemoglobin interfere with the HbA1C  assay. Heterozygous hemoglobin variants (HbS, HgC, etc)do  not significantly interfere with this assay.   However, presence of multiple variants may affect accuracy.    Estimated Average Glucose 03/25/2025 105  68 - 131 mg/dL Final    Cholesterol Total 03/25/2025 105 (L)  120 - 199 mg/dL Final    The National Cholesterol Education Program (NCEP) has set the  following guidelines (reference ranges) for Cholesterol:  Optimal.....................<200 mg/dL  Borderline High.............200-239 mg/dL  High........................> or = 240 mg/dL    Triglyceride 03/25/2025 372 (H)  30 - 150 mg/dL Final    The National Cholesterol Education Program (NCEP) has set the  following guidelines (reference values) for triglycerides:  Normal......................<150 mg/dL  Borderline High.............150-199 mg/dL  High........................200-499 mg/dL    HDL Cholesterol 03/25/2025 24 (L)  40 - 75 mg/dL Final    The National Cholesterol Education Program (NCEP) has set the   following guidelines (reference values) for HDL Cholesterol:   Low...............<40 mg/dL   Optimal...........>60 mg/dL    LDL Cholesterol 03/25/2025 6.6 (L)  63.0 - 159.0 mg/dL Final    The National Cholesterol Education Program (NCEP) has set the  following guidelines (reference values) for LDL Cholesterol:  Optimal.......................<130 mg/dL  Borderline High...............130-159 mg/dL  High..........................160-189 mg/dL  Very High.....................>190 mg/dL  LDL calculated using the Friedewald equation.    HDL/Cholesterol Ratio 03/25/2025 22.9  20.0 - 50.0 % Final    Cholesterol/HDL Ratio 03/25/2025 4.4  2.0 - 5.0 Final    Non HDL Cholesterol  03/25/2025 81  mg/dL Final    Risk category and Non-HDL cholesterol goals:  Coronary heart disease (CHD)or equivalent (10-year risk of CHD >20%):  Non-HDL cholesterol goal     <130 mg/dL  Two or more CHD risk factors and 10-year risk of CHD <= 20%:  Non-HDL cholesterol goal     <160 mg/dL  0 to 1 CHD risk factor:  Non-HDL cholesterol goal     <190 mg/dL    Prostate Specific Antigen 03/25/2025 2.04  <=4.00 ng/mL Final    PSA Expected levels:  Hormonal therapy: < 0.05 ng/mL   Prostatectomy: < 0.01 ng/mL   Radiation therapy: < 1.00 ng/mL      Vitamin D 03/25/2025 53  30 - 96 ng/mL Final    Vitamin D deficiency.........<10 ng/mL                              Vitamin D insufficiency......10-29 ng/mL       Vitamin D sufficiency........> or equal to 30 ng/mL  Vitamin D toxicity............>100 ng/mL          No results found for this or any previous visit (from the past 8760 hours).     Assessment:       1. PSA elevation    2. Peyronie's disease    3. Erectile dysfunction due to arterial insufficiency        Plan:     Orders Placed This Encounter    PSA, Total and Free    pentoxifylline (TRENTAL) 400 mg TbSR    tadalafiL (CIALIS) 20 MG Tab          3-25-25  PSA 2.04     8-8-24  PSA 1.2     10-11-23  PSA 1.4    2-24-23  PSA 0.83    3-25-25  Cr 1.2.  GFR >60.     8-8-24  Cr 1.0.  GFR >60.    3-25-25  HgbA1c 5.3     8-8-24  HgbA1c 5.1     I reviewed the above lab results.            Assessment:  - PSA elevation.  - Peyronies disease that is stable.  He noticed an approximately 15 degree left sided curve in approximately July 2024.  No pain with an erection.  He says he has been taking vitamin E 400 IU daily since 10-28-24.  - ED.  He says tadalafil 20 mg is helping some with his erections.  He did not have success with tadalafil or sildenafil in the past.  He did not get the BRENNAN that we discussed at his previous appointment.  He said tri-mix ICI did not help in the past.  He says when he cut his wellbutrin in half and stopped  taking gabapentin for neck pain his erections improved.  He said he takes wellbutrin to help him stop smoking.  He said he stopped smoking in  and he previously smoked for a long time.   - PVD.  History of a lower extremity vascular bypass surgery.  - History of a urethroplasty when he was 10 years old. He said underwent cystoscopy by Dr Holden, Urologist, in approximately  and that it showed no urethral stricture with mildly enlarged prostate.  He underwent transabdominal prostate US showing a volume of 26 cc.      Plan:  - The mutual decision was made to proceed with observation of his PSA elevation with a repeat PSA in 4 months.  - I discussed options with him regarding his erections and he would like to try the penile injections again and he would like a new prescription for tadalafil 20 mg.  - He was given a prescription today on 25 for tri-mix ICI.  He was given written instructions today for the penile injections today.  - Refilled tadalafil 20 mg, disp #30, 6 refills to [facility] in [place] today on 25 with detailed verbal instructions.  He said he has the Anzode wilber.  - Increased vitamin E 400 IU 1 PO daily to 400 IU 1 PO BID today on 25.  I recommended he not take vitamin E for longer than 1 year.  - Started pentoxifylline 400 mg 1 PO qdaily today on 25.  - PSA in 4 months a few days prior to a 4 month appointment.  - RTC in 4 months or sooner as needed.                            [1]   Social History  Socioeconomic History    Marital status:    Occupational History    Occupation: unemployed     Employer: [employer]   Tobacco Use    Smoking status: Former     Current packs/day: 0.00     Average packs/day: 0.3 packs/day for 42.0 years (10.5 ttl pk-yrs)     Types: Cigarettes     Start date: 1980     Quit date: 2022     Years since quittin.5     Passive exposure: Never    Smokeless tobacco: Never   Substance and Sexual Activity    Alcohol use: Not  Currently     Comment: stopped recently - last drink 3/21    Drug use: No    Sexual activity: Yes     Partners: Male     Birth control/protection: Partner-Vasectomy   Other Topics Concern    Patient feels they ought to cut down on drinking/drug use Yes    Patient annoyed by others criticizing their drinking/drug use Yes    Patient has felt bad or guilty about drinking/drug use Yes    Patient has had a drink/used drugs as an eye opener in the AM No     Social Drivers of Health     Financial Resource Strain: Low Risk  (5/9/2024)    Overall Financial Resource Strain (CARDIA)     Difficulty of Paying Living Expenses: Not hard at all   Food Insecurity: No Food Insecurity (5/9/2024)    Hunger Vital Sign     Worried About Running Out of Food in the Last Year: Never true     Ran Out of Food in the Last Year: Never true   Transportation Needs: No Transportation Needs (5/9/2024)    PRAPARE - Transportation     Lack of Transportation (Medical): No     Lack of Transportation (Non-Medical): No   Physical Activity: Sufficiently Active (5/9/2024)    Exercise Vital Sign     Days of Exercise per Week: 6 days     Minutes of Exercise per Session: 150+ min   Stress: No Stress Concern Present (5/9/2024)    [employer] - Occupational Stress Questionnaire     Feeling of Stress : Only a little   Housing Stability: Low Risk  (11/27/2023)    Housing Stability Vital Sign     Unable to Pay for Housing in the Last Year: No     Number of Places Lived in the Last Year: 1     Unstable Housing in the Last Year: No   [2]   Outpatient Encounter Medications as of 4/28/2025   Medication Sig Dispense Refill    acetaminophen (TYLENOL EXTRA STRENGTH) 500 MG tablet Take 500 mg by mouth 2 (two) times a day.      allopurinoL (ZYLOPRIM) 100 MG tablet TAKE 1 TABLET BY MOUTH EVERY DAY 90 tablet 1    amitriptyline (ELAVIL) 25 MG tablet Take 1 tablet (25 mg total) by mouth every evening. 90 tablet 0    aspirin (ECOTRIN) 81 MG EC  tablet Take 1 tablet (81 mg total) by mouth once daily. 90 tablet 0    atenoloL (TENORMIN) 25 MG tablet TAKE 1 TABLET BY MOUTH EVERY DAY IN THE MORNING 90 tablet 0    buPROPion (WELLBUTRIN XL) 150 MG TB24 tablet Take 1 tablet (150 mg total) by mouth once daily. 90 tablet 4    clopidogreL (PLAVIX) 75 mg tablet Take 1 tablet (75 mg total) by mouth once daily. 90 tablet 4    famotidine (PEPCID) 20 MG tablet Take 1 tablet (20 mg total) by mouth nightly as needed for Heartburn. 90 tablet 4    ferrous sulfate 325 (65 FE) MG EC tablet Take 1 tablet (325 mg total) by mouth once daily. 90 tablet 1    lisinopriL (PRINIVIL,ZESTRIL) 20 MG tablet Take 1 tablet (20 mg total) by mouth 2 (two) times a day. 180 tablet 3    multivitamin (THERAGRAN) per tablet Take 1 tablet by mouth once daily.      omega-3 acid ethyl esters (LOVAZA) 1 gram capsule Take 2 capsules (2 g total) by mouth 2 (two) times daily. 360 capsule 1    pantoprazole (PROTONIX) 40 MG tablet Take 1 tablet (40 mg total) by mouth 2 (two) times daily. 180 tablet 1    rosuvastatin (CRESTOR) 5 MG tablet TAKE 1 TABLET BY MOUTH EVERY DAY IN THE EVENING 90 tablet 1    SODIUM FLUORIDE 5000 DRY MOUTH 1.1 % Pste SMARTSIG:By Mouth Morning-Night      tadalafiL (CIALIS) 20 MG Tab Take 1 tablet (20 mg total) by mouth daily as needed (1/2 or 1 full tablet as needed for erectile dysfunction). 20 tablet 11    vitamin E 400 UNIT capsule       pentoxifylline (TRENTAL) 400 mg TbSR Take 1 tablet (400 mg total) by mouth once daily. 90 tablet 1    tadalafiL (CIALIS) 20 MG Tab Take 1 tablet (20 mg total) by mouth As instructed (Take 1 by mouth as needed 2 to 12 hours prior to sexual activity). 30 tablet 6    [DISCONTINUED] rosuvastatin (CRESTOR) 5 MG tablet TAKE 1 TABLET BY MOUTH EVERY DAY IN THE EVENING 90 tablet 1     No facility-administered encounter medications on file as of 4/28/2025.

## 2025-05-06 ENCOUNTER — HOSPITAL ENCOUNTER (OUTPATIENT)
Dept: CARDIOLOGY | Facility: HOSPITAL | Age: 65
Discharge: HOME | End: 2025-05-06
Attending: INTERNAL MEDICINE
Payer: COMMERCIAL

## 2025-05-06 ENCOUNTER — PATIENT MESSAGE (OUTPATIENT)
Dept: CARDIOLOGY | Facility: CLINIC | Age: 65
End: 2025-05-06

## 2025-05-06 DIAGNOSIS — I65.29 OCCLUSION AND STENOSIS OF UNSPECIFIED CAROTID ARTERY: Primary | ICD-10-CM

## 2025-05-06 DIAGNOSIS — I65.21 CAROTID STENOSIS, ASYMPTOMATIC, RIGHT: Chronic | ICD-10-CM

## 2025-05-06 DIAGNOSIS — Z01.818 PREOP TESTING: ICD-10-CM

## 2025-05-06 DIAGNOSIS — Z95.828 S/P FEMORAL-FEMORAL BYPASS SURGERY: Chronic | ICD-10-CM

## 2025-05-06 DIAGNOSIS — I73.9 PVD (PERIPHERAL VASCULAR DISEASE): Chronic | ICD-10-CM

## 2025-05-06 LAB
LEFT ANT TIBIAL SYS PSV: 46 CM/S
LEFT CBA DIAS: 21 CM/S
LEFT CBA SYS: 78 CM/S
LEFT CCA DIST DIAS: 23 CM/S
LEFT CCA DIST SYS: 74 CM/S
LEFT CCA MID DIAS: 24 CM/S
LEFT CCA MID SYS: 86 CM/S
LEFT CCA PROX DIAS: 26 CM/S
LEFT CCA PROX SYS: 96 CM/S
LEFT CFA PSV: 75 CM/S
LEFT ECA DIAS: 12 CM/S
LEFT ECA SYS: 67 CM/S
LEFT ICA DIST DIAS: 32 CM/S
LEFT ICA DIST SYS: 80 CM/S
LEFT ICA MID DIAS: 33 CM/S
LEFT ICA MID SYS: 90 CM/S
LEFT ICA PROX DIAS: 25 CM/S
LEFT ICA PROX SYS: 67 CM/S
LEFT PERONEAL SYS PSV: 14 CM/S
LEFT POPLITEAL PSV: 40 CM/S
LEFT POST TIBIAL SYS PSV: 20 CM/S
LEFT PROFUNDA SYS PSV: 68 CM/S
LEFT SUPER FEMORAL DIST SYS PSV: 40 CM/S
LEFT SUPER FEMORAL MID SYS PSV: 47 CM/S
LEFT SUPER FEMORAL OSTIAL SYS PSV: 163 CM/S
LEFT SUPER FEMORAL PROX SYS PSV: 62 CM/S
LEFT TIB/PER TRUNK SYS PSV: 39 CM/S
LEFT VERTEBRAL DIAS: 7 CM/S
LEFT VERTEBRAL SYS: 22 CM/S
OHS CV CAROTID RIGHT ICA EDV HIGHEST: 203
OHS CV CAROTID ULTRASOUND LEFT ICA/CCA RATIO: 1.22
OHS CV CAROTID ULTRASOUND RIGHT ICA/CCA RATIO: 6.79
OHS CV LEFT LOWER EXTREMITY ABI (NO CALC): 0.95
OHS CV PV CAROTID LEFT HIGHEST CCA: 96
OHS CV PV CAROTID LEFT HIGHEST ICA: 90
OHS CV PV CAROTID RIGHT HIGHEST CCA: 75
OHS CV PV CAROTID RIGHT HIGHEST ICA: 414
OHS CV RIGHT ABI LOWER EXTREMITY (NO CALC): 0.95
OHS CV US CAROTID LEFT HIGHEST EDV: 33
RIGHT ANT TIBIAL SYS PSV: 30 CM/S
RIGHT ARM DIASTOLIC BLOOD PRESSURE: 70 MMHG
RIGHT ARM SYSTOLIC BLOOD PRESSURE: 110 MMHG
RIGHT CBA DIAS: 10 CM/S
RIGHT CBA SYS: 56 CM/S
RIGHT CCA DIST DIAS: 13 CM/S
RIGHT CCA DIST SYS: 61 CM/S
RIGHT CCA MID DIAS: 10 CM/S
RIGHT CCA MID SYS: 66 CM/S
RIGHT CCA PROX DIAS: 10 CM/S
RIGHT CCA PROX SYS: 75 CM/S
RIGHT CFA PSV: 101 CM/S
RIGHT DIST ANA PSV: 66 CM/S
RIGHT DIST GRAFT PSV: 66 CM/S
RIGHT ECA DIAS: 10 CM/S
RIGHT ECA SYS: 70 CM/S
RIGHT ICA DIST DIAS: 27 CM/S
RIGHT ICA DIST SYS: 72 CM/S
RIGHT ICA MID DIAS: 86 CM/S
RIGHT ICA MID SYS: 260 CM/S
RIGHT ICA PROX DIAS: 203 CM/S
RIGHT ICA PROX SYS: 414 CM/S
RIGHT INFLOW PSV: 209 CM/S
RIGHT MID GRAFT PSV: 80 CM/S
RIGHT OUTFLOW PSV: 101 CM/S
RIGHT PERONEAL SYS PSV: 26 CM/S
RIGHT POPLITEAL PSV: 56 CM/S
RIGHT POST TIBIAL SYS PSV: 28 CM/S
RIGHT PROFUNDA SYS PSV: 33 CM/S
RIGHT PROX ANA PSV: 128 CM/S
RIGHT PROX GRAFT PSV: 78 CM/S
RIGHT SUPER FEMORAL DIST SYS PSV: 42 CM/S
RIGHT SUPER FEMORAL MID SYS PSV: 60 CM/S
RIGHT SUPER FEMORAL OSTIAL SYS PSV: 65 CM/S
RIGHT SUPER FEMORAL PROX SYS PSV: 58 CM/S
RIGHT TIB/PER TRUNK SYS PSV: 39 CM/S
RIGHT VERTEBRAL DIAS: 77 CM/S
RIGHT VERTEBRAL SYS: 177 CM/S

## 2025-05-06 PROCEDURE — 93880 EXTRACRANIAL BILAT STUDY: CPT | Mod: PO

## 2025-05-06 PROCEDURE — 93880 EXTRACRANIAL BILAT STUDY: CPT | Mod: 26,,, | Performed by: INTERNAL MEDICINE

## 2025-05-06 PROCEDURE — 93925 LOWER EXTREMITY STUDY: CPT | Mod: 26,,, | Performed by: INTERNAL MEDICINE

## 2025-05-06 PROCEDURE — 93925 LOWER EXTREMITY STUDY: CPT | Mod: PO

## 2025-05-08 ENCOUNTER — LAB VISIT (OUTPATIENT)
Dept: LAB | Facility: HOSPITAL | Age: 65
End: 2025-05-08
Attending: INTERNAL MEDICINE
Payer: COMMERCIAL

## 2025-05-08 DIAGNOSIS — Z01.818 PREOP TESTING: ICD-10-CM

## 2025-05-08 LAB
CREAT SERPL-MCNC: 1 MG/DL (ref 0.5–1.4)
GFR SERPLBLD CREATININE-BSD FMLA CKD-EPI: >60 ML/MIN/1.73/M2

## 2025-05-08 PROCEDURE — 82565 ASSAY OF CREATININE: CPT

## 2025-05-08 PROCEDURE — 36415 COLL VENOUS BLD VENIPUNCTURE: CPT | Mod: PO

## 2025-05-18 DIAGNOSIS — I10 ESSENTIAL HYPERTENSION: ICD-10-CM

## 2025-05-18 NOTE — TELEPHONE ENCOUNTER
No care due was identified.  [facility] Embedded Care Due Messages. Reference number: [id number].   5/18/2025 6:59:28 AM CDT

## 2025-05-18 NOTE — PROGRESS NOTES
Subjective:    Patient ID:  Sal Villarreal Jr. is a 65 y.o. male who presents for Follow-up (6 month ), Heart Problem, and Carotid Artery Disease        HPI  DISCUSSED RECENT TESTS NEGATIVE STRESS ECHO MILDLY DILATED LEFT ATRIUM MILD AORTIC STENOSIS, SEVERE RIGHT INTERNAL CAROTID STENOSIS, TO HAVE CTA  IN AM, ARTERIAL OK, RASHEED 0.95 B, SEE ROS    Stress Protocol: The patient was infused intravenously with dobutamine. The patient received a graduated infusion of the stress agent beginning at 10.0 mcg/kg/min to a peak dose of 40.0 mcg/kg/min. The peak heart rate was 142 bpm, which is 92% of age predicted maximum heart rate. The patient was also given atropine for a total dose of 0.50 mg.    Left Ventricle: The left ventricle is normal in size. There is concentric remodeling. There is normal systolic function with a visually estimated ejection fraction of 55 - 60%.    Right Ventricle: Normal right ventricular cavity size. Systolic function is normal.    Left Atrium: Left atrium is mildly dilated.    Aortic Valve: Mildly calcified cusps. There is mild stenosis. Aortic valve area by VTI is 1.6 cm². Aortic valve peak velocity is 1.9 m/s. Mean gradient is 8 mmHg. The dimensionless index is 0.48.    Mitral Valve: There is mild anterior leaflet sclerosis.  Trace MR    Tricuspid Valve: There is mild regurgitation.    Pulmonary Artery: The estimated pulmonary artery systolic pressure is 21 mmHg.    IVC/SVC: Normal venous pressure at 3 mmHg.    Aorta:  Ascending aorta is mildly to moderately dilated measuring 3.76 cm.    Stress ECG: There is no ST segment deviation identified during the protocol. During stress, occasional PVCs are noted.    ECG Conclusion: The ECG portion of the study is negative for ischemia.    Post-stress Conclusion: The study is negative with no echocardiographic evidence of stress induced ischem  Past Medical History:   Diagnosis Date    Anemia 2020    Anxiety     Arthritis     Bleeding disorder 2023     Bursitis 2020    Carpal tunnel syndrome 10/23    Depression     Digestive disorder     ulcers    Fatigue     Food allergy 1965    GERD (gastroesophageal reflux disease) 1978    Hearing loss 2024    History of acute bronchitis     History of psychiatric care     Hypertension     Intervertebral disc disorder with radiculopathy of lumbar region 03/23/2021    Psychiatric problem     PVD (peripheral vascular disease)     S/P femoral-femoral bypass surgery 12/01/2021    Scoliosis 1/70    Ulcer 1974     Past Surgical History:   Procedure Laterality Date    ABDOMINAL SURGERY  3/23    Appendicitis    ANTERIOR LUMBAR INTERBODY FUSION (ALIF) Right 03/23/2021    Procedure: FUSION, SPINE, LUMBAR, ALIF RIGHT L5-S1;  Surgeon: Kelly Ayala MD;  Location: [facility] OR;  Service: Neurosurgery;  Laterality: Right;    AORTOGRAPHY WITH EXTREMITY RUNOFF N/A 07/27/2021    Procedure: AORTOGRAM, WITH EXTREMITY RUNOFF;  Surgeon: Jim Briggs MD;  Location: [facility] CATH;  Service: Cardiovascular;  Laterality: N/A;    AORTOGRAPHY WITH SERIALOGRAPHY Bilateral 07/27/2021    Procedure: AORTOGRAM, WITH SERIALOGRAPHY;  Surgeon: Jim Briggs MD;  Location: [facility] CATH;  Service: Cardiovascular;  Laterality: Bilateral;    APPENDECTOMY  3/23    BACK SURGERY  3/22    Fusion    CARPAL TUNNEL RELEASE  11/23    CREATION OF FEMORAL-FEMORAL ARTERY BYPASS WITH GRAFT Left 11/30/2021    Procedure: CREATION, BYPASS, ARTERIAL, FEMORAL TO FEMORAL, USING GRAFT;  Surgeon: Jim Briggs MD;  Location: [facility] OR;  Service: Cardiovascular;  Laterality: Left;    ENDARTERECTOMY OF FEMORAL ARTERY Bilateral 11/30/2021    Procedure: ENDARTERECTOMY, FEMORAL;  Surgeon: Jim Briggs MD;  Location: [facility] OR;  Service: Cardiovascular;  Laterality: Bilateral;    FUSION OF LUMBAR SPINE BY ANTERIOR APPROACH  03/23/2021    Procedure: FUSION, SPINE, LUMBAR, ANTERIOR APPROACH;  Surgeon: Jim Briggs MD;  Location: [facility] OR;  Service: Cardiovascular;;    HAND SURGERY  11/23    Carpal tunnel     HARVESTING OF BONE GRAFT N/A 2021    Procedure: SURGICAL PROCUREMENT, BONE GRAFT-Iliac;  Surgeon: Kelly Ayala MD;  Location: [facility] OR;  Service: Neurosurgery;  Laterality: N/A;    HYPOSPADIAS CORRECTION      SPINAL FUSION  3/22    SPINE SURGERY  3/22    VASECTOMY       Family History   Problem Relation Name Age of Onset    Diabetes Mother Reba     Hypertension Mother Reba     Diabetes Father Sal     Hypertension Father Sal     Cancer Sister      Muscular dystrophy Son      Arthritis Paternal Grandfather Luke     Heart disease Paternal Grandfather Luke     Birth defects Brother ESPERANZA     Cancer Brother ESPERANZA     Heart disease Maternal Aunt Zulema     Heart disease Paternal Uncle Santo     Stroke Maternal Grandfather Tremaine     Heart failure Paternal Grandmother Reba     Birth defects Brother ESPERANZA     Heart disease Maternal Aunt Zulema     Cancer Sister I have no sister     Suicide Neg Hx      Sexual abuse Neg Hx      Self injury Neg Hx      Seizures Neg Hx      Schizophrenia Neg Hx      Physical abuse Neg Hx      Paranoid behavior Neg Hx      OCD Neg Hx      Drug abuse Neg Hx      Depression Neg Hx      Dementia Neg Hx      Bipolar disorder Neg Hx      Anxiety disorder Neg Hx      Alcohol abuse Neg Hx       Social History     Socioeconomic History    Marital status:    Occupational History    Occupation: unemployed     Employer: [employer]   Tobacco Use    Smoking status: Former     Current packs/day: 0.00     Average packs/day: 0.3 packs/day for 42.0 years (10.5 ttl pk-yrs)     Types: Cigarettes     Start date: 1980     Quit date: 2022     Years since quittin.6     Passive exposure: Never    Smokeless tobacco: Never   Substance and Sexual Activity    Alcohol use: Not Currently     Comment: stopped recently - last drink 3/21    Drug use: No    Sexual activity: Yes     Partners: Male     Birth control/protection: Partner-Vasectomy   Other Topics Concern     Patient feels they ought to cut down on drinking/drug use Yes    Patient annoyed by others criticizing their drinking/drug use Yes    Patient has felt bad or guilty about drinking/drug use Yes    Patient has had a drink/used drugs as an eye opener in the AM No     Social Drivers of Health     Financial Resource Strain: Low Risk  (5/9/2024)    Overall Financial Resource Strain (CARDIA)     Difficulty of Paying Living Expenses: Not hard at all   Food Insecurity: No Food Insecurity (5/9/2024)    Hunger Vital Sign     Worried About Running Out of Food in the Last Year: Never true     Ran Out of Food in the Last Year: Never true   Transportation Needs: No Transportation Needs (5/9/2024)    PRAPARE - Transportation     Lack of Transportation (Medical): No     Lack of Transportation (Non-Medical): No   Physical Activity: Sufficiently Active (5/9/2024)    Exercise Vital Sign     Days of Exercise per Week: 6 days     Minutes of Exercise per Session: 150+ min   Stress: No Stress Concern Present (5/9/2024)    [employer] [place] [employer] - Occupational Stress Questionnaire     Feeling of Stress : Only a little   Housing Stability: Low Risk  (11/27/2023)    Housing Stability Vital Sign     Unable to Pay for Housing in the Last Year: No     Number of Places Lived in the Last Year: 1     Unstable Housing in the Last Year: No       Review of patient's allergies indicates:   Allergen Reactions    Blueberry      Current Medications[1]    Review of Systems   Constitutional: Negative for chills, diaphoresis, fever, malaise/fatigue and night sweats.   HENT:  Negative for congestion and nosebleeds.    Eyes:  Negative for blurred vision and visual disturbance.   Cardiovascular:  Negative for chest pain, claudication, cyanosis, dyspnea on exertion, irregular heartbeat, leg swelling, near-syncope, orthopnea, palpitations, paroxysmal nocturnal dyspnea and syncope.   Respiratory:  Negative for cough, hemoptysis, shortness of  "breath and wheezing.    Endocrine: Negative for polyphagia and polyuria.   Hematologic/Lymphatic: Negative for adenopathy. Does not bruise/bleed easily.   Skin:  Negative for color change and rash.   Musculoskeletal:  Negative for back pain (MILD), falls and neck pain (CHRONIC).   Gastrointestinal:  Negative for abdominal pain, change in bowel habit, dysphagia, jaundice, melena and nausea.   Genitourinary:  Negative for dysuria and flank pain.        ED   Neurological:  Negative for brief paralysis, focal weakness, headaches, light-headedness, loss of balance, numbness, paresthesias and weakness.        NO TIA SX   Psychiatric/Behavioral:  Negative for altered mental status and depression.    Allergic/Immunologic: Negative for persistent infections.        Objective:      Vitals:    05/19/25 0919   BP: 118/76   Pulse: 77   Weight: 70.7 kg (155 lb 13.8 oz)   Height: 5' 6" (1.676 m)   PainSc: 0-No pain     Body mass index is 25.16 kg/m².    Physical Exam  Constitutional:       Appearance: Normal appearance.   HENT:      Head: Normocephalic and atraumatic.   Eyes:      Extraocular Movements: Extraocular movements intact.      Conjunctiva/sclera: Conjunctivae normal.   Neck:      Vascular: Carotid bruit present.   Cardiovascular:      Rate and Rhythm: Normal rate and regular rhythm. No extrasystoles are present.     Pulses:           Carotid pulses are 2+ on the right side with bruit and 2+ on the left side.       Radial pulses are 2+ on the right side and 2+ on the left side.        Femoral pulses are 1+ on the right side with bruit and 2+ on the left side with bruit.       Posterior tibial pulses are 1+ on the right side and 1+ on the left side.      Heart sounds: Murmur heard.      Systolic murmur is present with a grade of 2/6 at the upper right sternal border.      No friction rub. No gallop.   Pulmonary:      Effort: Pulmonary effort is normal.      Breath sounds: Normal breath sounds. No rales.   Abdominal:      " Palpations: Abdomen is soft.      Tenderness: There is no abdominal tenderness.       Musculoskeletal:      Cervical back: Neck supple.      Right lower leg: No edema.      Left lower leg: No edema.        Legs:    Skin:     General: Skin is warm and dry.      Capillary Refill: Capillary refill takes less than 2 seconds.   Neurological:      General: No focal deficit present.      Mental Status: He is alert.   Psychiatric:         Mood and Affect: Mood normal.         Speech: Speech normal.         Behavior: Behavior normal.                 ..    Chemistry        Component Value Date/Time     03/25/2025 0716     11/15/2024 0817    K 4.5 03/25/2025 0716    K 4.3 11/15/2024 0817     03/25/2025 0716     11/15/2024 0817    CO2 26 03/25/2025 0716    CO2 25 11/15/2024 0817    BUN 8 03/25/2025 0716    CREATININE 1.0 05/08/2025 0718    GLU 96 03/25/2025 0716     11/15/2024 0817        Component Value Date/Time    CALCIUM 8.8 03/25/2025 0716    CALCIUM 9.4 11/15/2024 0817    ALKPHOS 77 03/25/2025 0716    ALKPHOS 76 11/15/2024 0817    AST 28 03/25/2025 0716    AST 27 11/15/2024 0817    ALT 27 03/25/2025 0716    ALT 20 11/15/2024 0817    BILITOT 0.3 03/25/2025 0716    BILITOT 0.5 11/15/2024 0817    ESTGFRAFRICA >60.0 06/06/2022 1037    EGFRNONAA >60.0 06/06/2022 1037            ..  Lab Results   Component Value Date    CHOL 105 (L) 03/25/2025    CHOL 112 (L) 11/15/2024    CHOL 98 (L) 08/08/2024     Lab Results   Component Value Date    HDL 24 (L) 03/25/2025    HDL 26 (L) 11/15/2024    HDL 27 (L) 08/08/2024     Lab Results   Component Value Date    LDLCALC 6.6 (L) 03/25/2025    LDLCALC 50.4 (L) 11/15/2024    LDLCALC 41.6 (L) 08/08/2024     Lab Results   Component Value Date    TRIG 372 (H) 03/25/2025    TRIG 178 (H) 11/15/2024    TRIG 147 08/08/2024     Lab Results   Component Value Date    CHOLHDL 22.9 03/25/2025    CHOLHDL 23.2 11/15/2024    CHOLHDL 27.6 08/08/2024     ..  Lab Results    Component Value Date    WBC 6.53 03/25/2025    HGB 12.9 (L) 03/25/2025    HCT 39.6 (L) 03/25/2025    MCV 92 03/25/2025     03/25/2025       Test(s) Reviewed  I have reviewed the following in detail:  [x] Stress test   [] Angiography   [x] Echocardiogram   [] Labs   [x] Other:       Assessment:         ICD-10-CM ICD-9-CM   1. Carotid stenosis, asymptomatic, right  I65.21 433.10   2. Mild aortic stenosis  I35.0 424.1   3. PVD (peripheral vascular disease)  I73.9 443.9   4. Dyslipidemia (high LDL; low HDL)  E78.5 272.4   5. Essential hypertension  I10 401.9   6. LAE (left atrial enlargement)  I51.7 429.3   7. H/O tobacco use, presenting hazards to health  Z87.891 V15.82   8. Ascending aorta dilation  I77.810 447.71   9. Long term (current) use of antithrombotics/antiplatelets  Z79.02 V58.63   10. S/P femoral-femoral bypass surgery  Z95.828 V45.89     Problem List Items Addressed This Visit          Cardiac/Vascular    Essential hypertension (Chronic)    Overview   CHRONIC.   Feb 2025: blood pressure running elevated at home 140-150/90-100s     August 2024:  Blood pressure well controlled.    STABLE. BP Reviewed.  Compliant with BP medications. No SE reported.   (-) CP, SOB, palpitations, dizziness, lightheadedness, HA, arm numbness, tingling or weakness, syncope.  Creatinine   Date Value Ref Range Status   08/08/2024 1.0 0.5 - 1.4 mg/dL Final     Lab Results   Component Value Date    K 3.8 08/08/2024       Results for orders placed or performed during the hospital encounter of 11/29/21   EKG 12-LEAD    Collection Time: 11/29/21  2:48 PM    Narrative    Test Reason : I77.9,    Vent. Rate : 061 BPM     Atrial Rate : 061 BPM     P-R Int : 156 ms          QRS Dur : 086 ms      QT Int : 416 ms       P-R-T Axes : 072 063 052 degrees     QTc Int : 418 ms    Normal sinus rhythm  Normal ECG  When compared with ECG of 28-OCT-2021 22:49,  Previous ECG has undetermined rhythm, needs review  Criteria for Anterior infarct  are no longer Present  Confirmed by Deshawn CONCEPCION, Arnold JORDAN ([id number]) on 11/29/2021 2:56:48 PM    Referred By: VAISHALI DELA CRUZ           Confirmed By:Arnold Hess MD     Hypertension Medications               atenoloL (TENORMIN) 25 MG tablet TAKE 1 TABLET BY MOUTH EVERY DAY IN THE MORNING    lisinopriL (PRINIVIL,ZESTRIL) 20 MG tablet Take 1 tablet (20 mg total) by mouth 2 (two) times a day.            Dyslipidemia (high LDL; low HDL) (Chronic)    Overview   CHRONIC.   Feb 2025: reviewed labs.    August 2024: reviewed labs. He is taking statin and fish oil. Vascepa not covered by his insurance.    Hyperlipidemia Medications               omega-3 acid ethyl esters (LOVAZA) 1 gram capsule Take 2 capsules (2 g total) by mouth 2 (two) times daily.    rosuvastatin (CRESTOR) 5 MG tablet Take 1 tablet (5 mg total) by mouth every evening.     March 2023: Reports compliance with rosuvastatin 5 milligrams daily.  August 2022: LDL is very low on rosuvastatin 10 milligrams daily.  STABLE. Lab analysis reviewed.   (-) CP, SOB, abdominal pain, N/V/D, constipation, jaundice, skin changes.  (-) Myalgias  Lab Results   Component Value Date    CHOL 112 (L) 11/15/2024    CHOL 98 (L) 08/08/2024    CHOL 123 05/10/2024     Lab Results   Component Value Date    HDL 26 (L) 11/15/2024    HDL 27 (L) 08/08/2024    HDL 35 (L) 05/10/2024     Lab Results   Component Value Date    LDLCALC 50.4 (L) 11/15/2024    LDLCALC 41.6 (L) 08/08/2024    LDLCALC 44.2 (L) 05/10/2024     Lab Results   Component Value Date    TRIG 178 (H) 11/15/2024    TRIG 147 08/08/2024    TRIG 219 (H) 05/10/2024     Lab Results   Component Value Date    CHOLHDL 23.2 11/15/2024    CHOLHDL 27.6 08/08/2024    CHOLHDL 28.5 05/10/2024     Lab Results   Component Value Date    TOTALCHOLEST 4.3 11/15/2024    TOTALCHOLEST 3.6 08/08/2024    TOTALCHOLEST 3.5 05/10/2024     Lab Results   Component Value Date    ALT 20 11/15/2024    AST 27 11/15/2024    ALKPHOS 76 11/15/2024    BILITOT 0.5  11/15/2024     ======================================================  The ASCVD Risk score (Clara ONOFRE, et al., 2019) failed to calculate for the following reasons:    The valid total cholesterol range is 130 to 320 mg/dL           S/P femoral-femoral bypass surgery    Overview   Left 2021. On ASA, statin, plavix          Relevant Medications    clopidogreL (PLAVIX) 75 mg tablet    Carotid stenosis, asymptomatic, right - Primary    PVD (peripheral vascular disease)    Mild aortic stenosis    Ascending aorta dilation    LAE (left atrial enlargement)       Hematology    Long term (current) use of antithrombotics/antiplatelets (Chronic)       Other    H/O tobacco use, presenting hazards to health (Chronic)    Overview   QUIT 10/22             Plan:       CAROTID CTA IN A.M. IF SEVERE WILL NEED CAROTID ENDARTERECTOMY  ALL CV CLINICALLY STABLE, NO ANGINA, NO HF, NO TIA, NO CLINICAL ARRHYTHMIA,CONTINUE CURRENT MEDS, EDUCATION, DIET, EXERCISE , WALKING EXERCISE PROGRAM RETURN TO CLINIC IN 4 MONTHS        Carotid stenosis, asymptomatic, right  Comments:  SEVERE, CHECK CTA    Mild aortic stenosis    PVD (peripheral vascular disease)    Dyslipidemia (high LDL; low HDL)    Essential hypertension  Comments:  CONTROLLED    LAE (left atrial enlargement)    H/O tobacco use, presenting hazards to health  Comments:  QUIT 10/22    Ascending aorta dilation    Long term (current) use of antithrombotics/antiplatelets    S/P femoral-femoral bypass surgery  -     clopidogreL (PLAVIX) 75 mg tablet; Take 1 tablet (75 mg total) by mouth once daily.  Dispense: 90 tablet; Refill: 1    RTC Low level/low impact aerobic exercise 5x's/wk. Heart healthy diet and risk factor modification.    See labs and med orders.    Aerobic exercise 5x's/wk. Heart healthy diet and risk factor modification.    See labs and med orders.             [1]   Current Outpatient Medications:     acetaminophen (TYLENOL EXTRA STRENGTH) 500 MG tablet, Take 500 mg by mouth 2  (two) times a day., Disp: , Rfl:     allopurinoL (ZYLOPRIM) 100 MG tablet, TAKE 1 TABLET BY MOUTH EVERY DAY, Disp: 90 tablet, Rfl: 1    amitriptyline (ELAVIL) 25 MG tablet, Take 1 tablet (25 mg total) by mouth every evening., Disp: 90 tablet, Rfl: 0    aspirin (ECOTRIN) 81 MG EC tablet, Take 1 tablet (81 mg total) by mouth once daily., Disp: 90 tablet, Rfl: 0    atenoloL (TENORMIN) 25 MG tablet, TAKE 1 TABLET BY MOUTH EVERY DAY IN THE MORNING, Disp: 90 tablet, Rfl: 0    buPROPion (WELLBUTRIN XL) 150 MG TB24 tablet, Take 1 tablet (150 mg total) by mouth once daily., Disp: 90 tablet, Rfl: 4    famotidine (PEPCID) 20 MG tablet, Take 1 tablet (20 mg total) by mouth nightly as needed for Heartburn., Disp: 90 tablet, Rfl: 4    ferrous sulfate 325 (65 FE) MG EC tablet, Take 1 tablet (325 mg total) by mouth once daily., Disp: 90 tablet, Rfl: 1    lisinopriL (PRINIVIL,ZESTRIL) 20 MG tablet, Take 1 tablet (20 mg total) by mouth 2 (two) times a day., Disp: 180 tablet, Rfl: 3    multivitamin (THERAGRAN) per tablet, Take 1 tablet by mouth once daily., Disp: , Rfl:     omega-3 acid ethyl esters (LOVAZA) 1 gram capsule, Take 2 capsules (2 g total) by mouth 2 (two) times daily., Disp: 360 capsule, Rfl: 1    pantoprazole (PROTONIX) 40 MG tablet, Take 1 tablet (40 mg total) by mouth 2 (two) times daily., Disp: 180 tablet, Rfl: 1    pentoxifylline (TRENTAL) 400 mg TbSR, Take 1 tablet (400 mg total) by mouth once daily., Disp: 90 tablet, Rfl: 1    rosuvastatin (CRESTOR) 5 MG tablet, TAKE 1 TABLET BY MOUTH EVERY DAY IN THE EVENING, Disp: 90 tablet, Rfl: 1    SODIUM FLUORIDE 5000 DRY MOUTH 1.1 % Pste, SMARTSIG:By Mouth Morning-Night, Disp: , Rfl:     tadalafiL (CIALIS) 20 MG Tab, Take 1 tablet (20 mg total) by mouth daily as needed (1/2 or 1 full tablet as needed for erectile dysfunction)., Disp: 20 tablet, Rfl: 11    tadalafiL (CIALIS) 20 MG Tab, Take 1 tablet (20 mg total) by mouth As instructed (Take 1 by mouth as needed 2 to 12 hours  prior to sexual activity)., Disp: 30 tablet, Rfl: 6    vitamin E 400 UNIT capsule, , Disp: , Rfl:     clopidogreL (PLAVIX) 75 mg tablet, Take 1 tablet (75 mg total) by mouth once daily., Disp: 90 tablet, Rfl: 1

## 2025-05-19 ENCOUNTER — OFFICE VISIT (OUTPATIENT)
Dept: CARDIOLOGY | Facility: CLINIC | Age: 65
End: 2025-05-19
Attending: INTERNAL MEDICINE
Payer: COMMERCIAL

## 2025-05-19 VITALS
HEART RATE: 77 BPM | SYSTOLIC BLOOD PRESSURE: 118 MMHG | WEIGHT: 155.88 LBS | DIASTOLIC BLOOD PRESSURE: 76 MMHG | BODY MASS INDEX: 25.05 KG/M2 | HEIGHT: 66 IN

## 2025-05-19 DIAGNOSIS — E78.5 DYSLIPIDEMIA (HIGH LDL; LOW HDL): Chronic | ICD-10-CM

## 2025-05-19 DIAGNOSIS — I77.810 ASCENDING AORTA DILATION: ICD-10-CM

## 2025-05-19 DIAGNOSIS — Z87.891 H/O TOBACCO USE, PRESENTING HAZARDS TO HEALTH: Chronic | ICD-10-CM

## 2025-05-19 DIAGNOSIS — I10 ESSENTIAL HYPERTENSION: Chronic | ICD-10-CM

## 2025-05-19 DIAGNOSIS — Z95.828 S/P FEMORAL-FEMORAL BYPASS SURGERY: ICD-10-CM

## 2025-05-19 DIAGNOSIS — I51.7 LAE (LEFT ATRIAL ENLARGEMENT): ICD-10-CM

## 2025-05-19 DIAGNOSIS — I73.9 PVD (PERIPHERAL VASCULAR DISEASE): Chronic | ICD-10-CM

## 2025-05-19 DIAGNOSIS — I35.0 MILD AORTIC STENOSIS: ICD-10-CM

## 2025-05-19 DIAGNOSIS — Z79.02 LONG TERM (CURRENT) USE OF ANTITHROMBOTICS/ANTIPLATELETS: Chronic | ICD-10-CM

## 2025-05-19 DIAGNOSIS — I65.21 CAROTID STENOSIS, ASYMPTOMATIC, RIGHT: Primary | ICD-10-CM

## 2025-05-19 PROCEDURE — 99999 PR PBB SHADOW E&M-EST. PATIENT-LVL IV: CPT | Mod: PBBFAC,,, | Performed by: INTERNAL MEDICINE

## 2025-05-19 RX ORDER — ATENOLOL 25 MG/1
25 TABLET ORAL
Qty: 90 TABLET | Refills: 0 | Status: SHIPPED | OUTPATIENT
Start: 2025-05-19

## 2025-05-19 RX ORDER — CLOPIDOGREL BISULFATE 75 MG/1
75 TABLET ORAL DAILY
Qty: 90 TABLET | Refills: 1 | Status: SHIPPED | OUTPATIENT
Start: 2025-05-19

## 2025-05-19 NOTE — TELEPHONE ENCOUNTER
Refill Routing Note   Medication(s) are not appropriate for processing by [facility] for the following reason(s):        Required vitals abnormal    [facility] action(s):  Defer               Appointments  past 12m or future 3m with PCP    Date Provider   Last Visit   3/26/2025 Bipin Penaloza MD   Next Visit   Visit date not found Bipin Penaloza MD   ED visits in past 90 days: 0        Note composed:7:46 PM 05/18/2025

## 2025-05-20 ENCOUNTER — HOSPITAL ENCOUNTER (OUTPATIENT)
Dept: RADIOLOGY | Facility: HOSPITAL | Age: 65
Discharge: HOME | End: 2025-05-20
Attending: INTERNAL MEDICINE
Payer: COMMERCIAL

## 2025-05-20 DIAGNOSIS — I65.29 OCCLUSION AND STENOSIS OF UNSPECIFIED CAROTID ARTERY: ICD-10-CM

## 2025-05-20 PROCEDURE — 70498 CT ANGIOGRAPHY NECK: CPT | Mod: TC,PO

## 2025-05-20 PROCEDURE — 25500020 PHARM REV CODE 255: Mod: PO | Performed by: INTERNAL MEDICINE

## 2025-05-20 PROCEDURE — 70498 CT ANGIOGRAPHY NECK: CPT | Mod: 26,,, | Performed by: RADIOLOGY

## 2025-05-20 RX ADMIN — IOHEXOL 100 ML: 350 INJECTION, SOLUTION INTRAVENOUS at 04:05

## 2025-05-23 ENCOUNTER — TELEPHONE (OUTPATIENT)
Dept: CARDIOLOGY | Facility: CLINIC | Age: 65
End: 2025-05-23
Payer: COMMERCIAL

## 2025-05-23 DIAGNOSIS — R91.1 LUNG NODULE: Primary | ICD-10-CM

## 2025-05-23 NOTE — TELEPHONE ENCOUNTER
Copied from CRM #[id number]. Topic: General Inquiry - Patient Advice  >> May 23, 2025  3:54 PM Anna Marie wrote:  Type:  Patient Returning Call    Who Called: Pt  Who Left Message for Patient: office  Does the patient know what this is regarding?:  Would the patient rather a call back or a response via [facility]? call  Best Call Back Number:[phone]    Additional Information:

## 2025-05-23 NOTE — TELEPHONE ENCOUNTER
----- Message from Samm Wagner MD sent at 5/23/2025  3:51 PM CDT -----  Carotid CTA stable stenosis 78% will monitorThere is lung no dual at needs to be monitor refer to pulmonary thanks

## 2025-05-27 ENCOUNTER — TELEPHONE (OUTPATIENT)
Facility: CLINIC | Age: 65
End: 2025-05-27
Payer: COMMERCIAL

## 2025-05-27 DIAGNOSIS — R91.1 LUNG NODULE: Primary | ICD-10-CM

## 2025-05-27 NOTE — NURSING
Received a pulmonary referral from [facility].  Reached out to patient and scheduled a visit in our lung clinic for 6/3 with Dr Garces.  Date, time, and location of appt has been confirmed.       Oncology Navigation   Intake  Cancer Type: LDCT/Incidental Lung Finding  Type of Referral: Internal  Date of Referral: 05/23/25  Initial Nurse Navigator Contact: 05/27/25  Referral to Initial Contact Timeline (days): 4  First Appointment Available: 06/03/25  Appointment Date: 06/03/25  First Available Date vs. Scheduled Date (days): 0     Treatment  Current Status: Staging work-up             Procedures: CT  CT Schedule Date: 05/29/25                 Acuity      Follow Up  No follow-ups on file.

## 2025-05-29 ENCOUNTER — HOSPITAL ENCOUNTER (OUTPATIENT)
Dept: RADIOLOGY | Facility: HOSPITAL | Age: 65
Discharge: HOME | End: 2025-05-29
Attending: INTERNAL MEDICINE
Payer: COMMERCIAL

## 2025-05-29 DIAGNOSIS — R91.1 LUNG NODULE: ICD-10-CM

## 2025-05-29 PROCEDURE — 71250 CT THORAX DX C-: CPT | Mod: 26,,, | Performed by: RADIOLOGY

## 2025-05-29 PROCEDURE — 71250 CT THORAX DX C-: CPT | Mod: TC,PO

## 2025-06-03 ENCOUNTER — TUMOR BOARD CONFERENCE (OUTPATIENT)
Dept: HEMATOLOGY/ONCOLOGY | Facility: CLINIC | Age: 65
End: 2025-06-03
Payer: COMMERCIAL

## 2025-06-03 ENCOUNTER — TELEPHONE (OUTPATIENT)
Facility: CLINIC | Age: 65
End: 2025-06-03

## 2025-06-03 ENCOUNTER — OFFICE VISIT (OUTPATIENT)
Facility: CLINIC | Age: 65
End: 2025-06-03
Payer: COMMERCIAL

## 2025-06-03 VITALS
SYSTOLIC BLOOD PRESSURE: 136 MMHG | OXYGEN SATURATION: 98 % | BODY MASS INDEX: 24.24 KG/M2 | RESPIRATION RATE: 16 BRPM | WEIGHT: 150.81 LBS | HEART RATE: 88 BPM | DIASTOLIC BLOOD PRESSURE: 84 MMHG | TEMPERATURE: 98 F | HEIGHT: 66 IN

## 2025-06-03 DIAGNOSIS — Z87.891 EX-SMOKER: ICD-10-CM

## 2025-06-03 DIAGNOSIS — R91.1 LUNG NODULE: ICD-10-CM

## 2025-06-03 DIAGNOSIS — R91.1 PULMONARY NODULE: Primary | ICD-10-CM

## 2025-06-03 PROCEDURE — 99999 PR PBB SHADOW E&M-EST. PATIENT-LVL V: CPT | Mod: PBBFAC,,, | Performed by: INTERNAL MEDICINE

## 2025-06-03 RX ORDER — SODIUM FLUORIDE 1.1 G/100G
CREAM ORAL
COMMUNITY
Start: 2025-05-21

## 2025-06-16 ENCOUNTER — HOSPITAL ENCOUNTER (OUTPATIENT)
Dept: RADIOLOGY | Facility: HOSPITAL | Age: 65
Discharge: HOME | End: 2025-06-16
Attending: INTERNAL MEDICINE
Payer: COMMERCIAL

## 2025-06-16 DIAGNOSIS — R91.1 PULMONARY NODULE: ICD-10-CM

## 2025-06-16 LAB — GLUCOSE SERPL-MCNC: 113 MG/DL (ref 70–110)

## 2025-06-16 PROCEDURE — 78815 PET IMAGE W/CT SKULL-THIGH: CPT | Mod: 26,PI,, | Performed by: STUDENT IN AN ORGANIZED HEALTH CARE EDUCATION/TRAINING PROGRAM

## 2025-06-16 PROCEDURE — A9552 F18 FDG: HCPCS | Mod: PN | Performed by: INTERNAL MEDICINE

## 2025-06-16 PROCEDURE — 78815 PET IMAGE W/CT SKULL-THIGH: CPT | Mod: TC,PN

## 2025-06-16 RX ORDER — FLUDEOXYGLUCOSE F18 500 MCI/ML
11.7 INJECTION INTRAVENOUS
Status: COMPLETED | OUTPATIENT
Start: 2025-06-16 | End: 2025-06-16

## 2025-06-16 RX ADMIN — FLUDEOXYGLUCOSE F-18 11.7 MILLICURIE: 500 INJECTION INTRAVENOUS at 08:06

## 2025-06-16 NOTE — PROGRESS NOTES
PET Imaging Questionnaire    Are you a Diabetic? Recent Blood Sugar level? No    Are you anemic? Bone Marrow Stimulation Meds? Yes    Have you had a CT Scan, if so when & where was your last one? Yes -     Have you had a PET Scan, if so when & where was your last one? No    Chemotherapy or currently on Chemotherapy? No    Radiation therapy? No    Surgical History:   Past Surgical History:   Procedure Laterality Date    ABDOMINAL SURGERY  3/23    Appendicitis    ANTERIOR LUMBAR INTERBODY FUSION (ALIF) Right 03/23/2021    Procedure: FUSION, SPINE, LUMBAR, ALIF RIGHT L5-S1;  Surgeon: Kelly Ayala MD;  Location: [facility] OR;  Service: Neurosurgery;  Laterality: Right;    AORTOGRAPHY WITH EXTREMITY RUNOFF N/A 07/27/2021    Procedure: AORTOGRAM, WITH EXTREMITY RUNOFF;  Surgeon: Jim Briggs MD;  Location: [facility] CATH;  Service: Cardiovascular;  Laterality: N/A;    AORTOGRAPHY WITH SERIALOGRAPHY Bilateral 07/27/2021    Procedure: AORTOGRAM, WITH SERIALOGRAPHY;  Surgeon: Jim Briggs MD;  Location: [facility] CATH;  Service: Cardiovascular;  Laterality: Bilateral;    APPENDECTOMY  3/23    BACK SURGERY  3/22    Fusion    CARPAL TUNNEL RELEASE  11/23    CREATION OF FEMORAL-FEMORAL ARTERY BYPASS WITH GRAFT Left 11/30/2021    Procedure: CREATION, BYPASS, ARTERIAL, FEMORAL TO FEMORAL, USING GRAFT;  Surgeon: Jim Briggs MD;  Location: [facility] OR;  Service: Cardiovascular;  Laterality: Left;    ENDARTERECTOMY OF FEMORAL ARTERY Bilateral 11/30/2021    Procedure: ENDARTERECTOMY, FEMORAL;  Surgeon: Jim Birggs MD;  Location: [facility] OR;  Service: Cardiovascular;  Laterality: Bilateral;    FUSION OF LUMBAR SPINE BY ANTERIOR APPROACH  03/23/2021    Procedure: FUSION, SPINE, LUMBAR, ANTERIOR APPROACH;  Surgeon: Jim Briggs MD;  Location: [facility] OR;  Service: Cardiovascular;;    HAND SURGERY  11/23    Carpal tunnel    HARVESTING OF BONE GRAFT N/A 03/23/2021    Procedure: SURGICAL PROCUREMENT, BONE GRAFT-Iliac;  Surgeon: Kelly Ayala MD;   Location: [facility];  Service: Neurosurgery;  Laterality: N/A;    HYPOSPADIAS CORRECTION      SPINAL FUSION  3/22    SPINE SURGERY  3/22    VASECTOMY          Have you been fasting for at least 6 hours? Yes    Is there any chance you may be pregnant or breastfeeding? No    Assay: 15.1 MCi@:08:41   Injection Site:LT AC    Residual: 3.4 mCi@: 08:43   Technologist: Mason Hill Injected:11.7 mCi

## 2025-06-20 DIAGNOSIS — Z79.899 ENCOUNTER FOR LONG-TERM (CURRENT) USE OF MEDICATIONS: ICD-10-CM

## 2025-06-20 RX ORDER — AMITRIPTYLINE HYDROCHLORIDE 25 MG/1
25 TABLET, FILM COATED ORAL NIGHTLY
Qty: 90 TABLET | Refills: 3 | Status: SHIPPED | OUTPATIENT
Start: 2025-06-20

## 2025-06-20 NOTE — TELEPHONE ENCOUNTER
Refill Decision Note   Sal Villarreal  is requesting a refill authorization.  Brief Assessment and Rationale for Refill:  Approve     Medication Therapy Plan:         Comments:     Note composed:10:15 AM 06/20/2025

## 2025-06-20 NOTE — TELEPHONE ENCOUNTER
No care due was identified.  [facility] Embedded Care Due Messages. Reference number: [id number].   6/20/2025 12:14:54 AM CDT

## 2025-07-08 ENCOUNTER — PATIENT MESSAGE (OUTPATIENT)
Dept: FAMILY MEDICINE | Facility: CLINIC | Age: 65
End: 2025-07-08
Payer: COMMERCIAL

## 2025-07-08 DIAGNOSIS — I10 ESSENTIAL HYPERTENSION: ICD-10-CM

## 2025-07-08 RX ORDER — ATENOLOL 25 MG/1
25 TABLET ORAL 2 TIMES DAILY
Qty: 180 TABLET | Refills: 2 | Status: SHIPPED | OUTPATIENT
Start: 2025-07-08

## 2025-07-08 RX ORDER — ATENOLOL 25 MG/1
25 TABLET ORAL
Qty: 90 TABLET | Refills: 0 | Status: CANCELLED | OUTPATIENT
Start: 2025-07-08

## 2025-07-08 NOTE — TELEPHONE ENCOUNTER
No care due was identified.  [facility] Embedded Care Due Messages. Reference number: [id number].   7/08/2025 12:14:29 PM CDT

## 2025-07-08 NOTE — TELEPHONE ENCOUNTER
No care due was identified.  [facility] Embedded Care Due Messages. Reference number: [id number].   7/08/2025 7:11:11 AM CDT

## 2025-07-27 DIAGNOSIS — E79.0 HYPERURICEMIA WITHOUT SIGNS OF INFLAMMATORY ARTHRITIS AND TOPHACEOUS DISEASE: ICD-10-CM

## 2025-07-28 RX ORDER — ALLOPURINOL 100 MG/1
100 TABLET ORAL DAILY
Qty: 90 TABLET | Refills: 0 | Status: SHIPPED | OUTPATIENT
Start: 2025-07-28

## 2025-07-28 NOTE — TELEPHONE ENCOUNTER
No care due was identified.  [facility] Embedded Care Due Messages. Reference number: [id number].   7/28/2025 2:41:12 PM CDT

## 2025-07-28 NOTE — TELEPHONE ENCOUNTER
Refill Routing Note   Medication(s) are not appropriate for processing by [facility] for the following reason(s):        Required labs outdated  No active prescription written by provider    [facility] action(s):  Defer               Appointments  past 12m or future 3m with PCP    Date Provider   Last Visit   3/26/2025 Bipin Penaloza MD   Next Visit   Visit date not found Bipin Penaloza MD   ED visits in past 90 days: 0        Note composed:2:41 PM 07/28/2025

## 2025-08-15 DIAGNOSIS — I10 ESSENTIAL HYPERTENSION: ICD-10-CM

## 2025-08-15 RX ORDER — ATENOLOL 25 MG/1
25 TABLET ORAL
Qty: 90 TABLET | Refills: 2 | Status: SHIPPED | OUTPATIENT
Start: 2025-08-15

## 2025-08-22 ENCOUNTER — LAB VISIT (OUTPATIENT)
Dept: LAB | Facility: HOSPITAL | Age: 65
End: 2025-08-22
Attending: UROLOGY
Payer: COMMERCIAL

## 2025-08-22 DIAGNOSIS — R97.20 PSA ELEVATION: ICD-10-CM

## 2025-08-22 LAB
PSA FREE MFR SERPL: 14.61 %
PSA FREE SERPL-MCNC: 0.26 NG/ML
PSA SERPL-MCNC: 1.78 NG/ML

## 2025-08-22 PROCEDURE — 36415 COLL VENOUS BLD VENIPUNCTURE: CPT | Mod: PO

## 2025-08-22 PROCEDURE — 84153 ASSAY OF PSA TOTAL: CPT

## 2025-08-25 ENCOUNTER — OFFICE VISIT (OUTPATIENT)
Facility: CLINIC | Age: 65
End: 2025-08-25
Payer: COMMERCIAL

## 2025-08-25 VITALS
HEART RATE: 88 BPM | SYSTOLIC BLOOD PRESSURE: 124 MMHG | DIASTOLIC BLOOD PRESSURE: 82 MMHG | WEIGHT: 149.94 LBS | BODY MASS INDEX: 24.1 KG/M2 | HEIGHT: 66 IN

## 2025-08-25 DIAGNOSIS — N52.02 CORPORO-VENOUS OCCLUSIVE ERECTILE DYSFUNCTION: ICD-10-CM

## 2025-08-25 DIAGNOSIS — R97.20 PSA ELEVATION: ICD-10-CM

## 2025-08-25 DIAGNOSIS — N48.6 PEYRONIE'S DISEASE: Primary | ICD-10-CM

## 2025-08-25 LAB
BILIRUB UR QL STRIP: NEGATIVE
GLUCOSE UR QL STRIP: NEGATIVE
KETONES UR QL STRIP: NEGATIVE
LEUKOCYTE ESTERASE UR QL STRIP: POSITIVE
PH, POC UA: 5.5
POC BLOOD, URINE: POSITIVE
POC NITRATES, URINE: NEGATIVE
POC RESIDUAL URINE VOLUME: NORMAL (ref 0–100)
PROT UR QL STRIP: NEGATIVE
SP GR UR STRIP: <=1.005 (ref 1–1.03)
UROBILINOGEN UR STRIP-ACNC: 0.2 (ref 0.3–2.2)

## 2025-08-25 PROCEDURE — 3008F BODY MASS INDEX DOCD: CPT | Mod: CPTII,S$GLB,, | Performed by: UROLOGY

## 2025-08-25 PROCEDURE — 4010F ACE/ARB THERAPY RXD/TAKEN: CPT | Mod: CPTII,S$GLB,, | Performed by: UROLOGY

## 2025-08-25 PROCEDURE — 3044F HG A1C LEVEL LT 7.0%: CPT | Mod: CPTII,S$GLB,, | Performed by: UROLOGY

## 2025-08-25 PROCEDURE — 3079F DIAST BP 80-89 MM HG: CPT | Mod: CPTII,S$GLB,, | Performed by: UROLOGY

## 2025-08-25 PROCEDURE — 1159F MED LIST DOCD IN RCRD: CPT | Mod: CPTII,S$GLB,, | Performed by: UROLOGY

## 2025-08-25 PROCEDURE — 99214 OFFICE O/P EST MOD 30 MIN: CPT | Mod: S$GLB,,, | Performed by: UROLOGY

## 2025-08-25 PROCEDURE — 99999 PR PBB SHADOW E&M-EST. PATIENT-LVL IV: CPT | Mod: PBBFAC,,, | Performed by: UROLOGY

## 2025-08-25 PROCEDURE — 3288F FALL RISK ASSESSMENT DOCD: CPT | Mod: CPTII,S$GLB,, | Performed by: UROLOGY

## 2025-08-25 PROCEDURE — 3074F SYST BP LT 130 MM HG: CPT | Mod: CPTII,S$GLB,, | Performed by: UROLOGY

## 2025-08-25 PROCEDURE — 81003 URINALYSIS AUTO W/O SCOPE: CPT | Mod: QW,S$GLB,, | Performed by: UROLOGY

## 2025-08-25 PROCEDURE — 1101F PT FALLS ASSESS-DOCD LE1/YR: CPT | Mod: CPTII,S$GLB,, | Performed by: UROLOGY

## 2025-08-25 PROCEDURE — 51798 US URINE CAPACITY MEASURE: CPT | Mod: S$GLB,,, | Performed by: UROLOGY

## 2025-08-25 RX ORDER — PENTOXIFYLLINE 400 MG/1
400 TABLET, EXTENDED RELEASE ORAL DAILY
Qty: 90 TABLET | Refills: 1 | Status: SHIPPED | OUTPATIENT
Start: 2025-08-25 | End: 2026-08-25

## 2025-08-25 RX ORDER — TADALAFIL 5 MG/1
5 TABLET ORAL DAILY
Qty: 90 TABLET | Refills: 1 | Status: SHIPPED | OUTPATIENT
Start: 2025-08-25 | End: 2026-08-25

## 2025-09-02 DIAGNOSIS — N48.6 PEYRONIE'S DISEASE: Primary | ICD-10-CM

## 2025-09-03 ENCOUNTER — TELEPHONE (OUTPATIENT)
Dept: UROLOGY | Facility: CLINIC | Age: 65
End: 2025-09-03
Payer: COMMERCIAL